# Patient Record
Sex: MALE | Race: WHITE | NOT HISPANIC OR LATINO | Employment: OTHER | URBAN - METROPOLITAN AREA
[De-identification: names, ages, dates, MRNs, and addresses within clinical notes are randomized per-mention and may not be internally consistent; named-entity substitution may affect disease eponyms.]

---

## 2017-01-01 ENCOUNTER — OFFICE VISIT (OUTPATIENT)
Dept: SURGERY | Facility: CLINIC | Age: 72
End: 2017-01-01
Payer: MEDICARE

## 2017-01-01 ENCOUNTER — TELEPHONE (OUTPATIENT)
Dept: SURGERY | Facility: CLINIC | Age: 72
End: 2017-01-01

## 2017-01-01 ENCOUNTER — ANESTHESIA EVENT (OUTPATIENT)
Dept: SURGERY | Facility: HOSPITAL | Age: 72
End: 2017-01-01
Payer: MEDICARE

## 2017-01-01 ENCOUNTER — SURGERY (OUTPATIENT)
Age: 72
End: 2017-01-01

## 2017-01-01 ENCOUNTER — OFFICE VISIT (OUTPATIENT)
Dept: PRIMARY CARE CLINIC | Facility: CLINIC | Age: 72
End: 2017-01-01
Payer: MEDICARE

## 2017-01-01 ENCOUNTER — OFFICE VISIT (OUTPATIENT)
Dept: CARDIOLOGY | Facility: CLINIC | Age: 72
End: 2017-01-01
Payer: MEDICARE

## 2017-01-01 ENCOUNTER — ANESTHESIA EVENT (OUTPATIENT)
Dept: SURGERY | Facility: HOSPITAL | Age: 72
DRG: 853 | End: 2017-01-01
Payer: MEDICARE

## 2017-01-01 ENCOUNTER — HOSPITAL ENCOUNTER (OUTPATIENT)
Facility: HOSPITAL | Age: 72
Discharge: HOME | End: 2017-11-13
Attending: EMERGENCY MEDICINE | Admitting: FAMILY MEDICINE
Payer: MEDICARE

## 2017-01-01 ENCOUNTER — HOSPITAL ENCOUNTER (OUTPATIENT)
Facility: HOSPITAL | Age: 72
Discharge: HOME | End: 2017-10-12
Attending: STUDENT IN AN ORGANIZED HEALTH CARE EDUCATION/TRAINING PROGRAM | Admitting: STUDENT IN AN ORGANIZED HEALTH CARE EDUCATION/TRAINING PROGRAM
Payer: MEDICARE

## 2017-01-01 ENCOUNTER — TELEPHONE (OUTPATIENT)
Dept: VASCULAR SURGERY | Facility: CLINIC | Age: 72
End: 2017-01-01

## 2017-01-01 ENCOUNTER — ANESTHESIA (OUTPATIENT)
Dept: SURGERY | Facility: HOSPITAL | Age: 72
End: 2017-01-01
Payer: MEDICARE

## 2017-01-01 ENCOUNTER — ANESTHESIA (OUTPATIENT)
Dept: SURGERY | Facility: HOSPITAL | Age: 72
DRG: 853 | End: 2017-01-01
Payer: MEDICARE

## 2017-01-01 ENCOUNTER — HOSPITAL ENCOUNTER (OUTPATIENT)
Dept: RADIOLOGY | Facility: HOSPITAL | Age: 72
Discharge: HOME | End: 2017-12-11
Attending: INTERNAL MEDICINE
Payer: MEDICARE

## 2017-01-01 ENCOUNTER — HOSPITAL ENCOUNTER (OUTPATIENT)
Facility: HOSPITAL | Age: 72
Discharge: HOME | End: 2017-11-29
Attending: SURGERY | Admitting: SURGERY
Payer: MEDICARE

## 2017-01-01 ENCOUNTER — LAB VISIT (OUTPATIENT)
Dept: LAB | Facility: HOSPITAL | Age: 72
End: 2017-01-01
Attending: INTERNAL MEDICINE
Payer: MEDICARE

## 2017-01-01 ENCOUNTER — HOSPITAL ENCOUNTER (INPATIENT)
Facility: HOSPITAL | Age: 72
LOS: 6 days | Discharge: HOME | DRG: 853 | End: 2017-11-07
Attending: EMERGENCY MEDICINE | Admitting: FAMILY MEDICINE
Payer: MEDICARE

## 2017-01-01 ENCOUNTER — TELEPHONE (OUTPATIENT)
Dept: PRIMARY CARE CLINIC | Facility: CLINIC | Age: 72
End: 2017-01-01

## 2017-01-01 ENCOUNTER — INITIAL CONSULT (OUTPATIENT)
Dept: VASCULAR SURGERY | Facility: CLINIC | Age: 72
End: 2017-01-01
Attending: SURGERY
Payer: MEDICARE

## 2017-01-01 ENCOUNTER — PATIENT OUTREACH (OUTPATIENT)
Dept: ADMINISTRATIVE | Facility: CLINIC | Age: 72
End: 2017-01-01

## 2017-01-01 ENCOUNTER — HOSPITAL ENCOUNTER (OUTPATIENT)
Dept: VASCULAR SURGERY | Facility: CLINIC | Age: 72
Discharge: HOME | End: 2017-10-31
Attending: SURGERY
Payer: MEDICARE

## 2017-01-01 VITALS
HEIGHT: 66 IN | OXYGEN SATURATION: 97 % | DIASTOLIC BLOOD PRESSURE: 66 MMHG | WEIGHT: 161.81 LBS | HEART RATE: 77 BPM | BODY MASS INDEX: 26.01 KG/M2 | RESPIRATION RATE: 18 BRPM | TEMPERATURE: 96 F | SYSTOLIC BLOOD PRESSURE: 140 MMHG

## 2017-01-01 VITALS
DIASTOLIC BLOOD PRESSURE: 74 MMHG | DIASTOLIC BLOOD PRESSURE: 51 MMHG | HEART RATE: 102 BPM | BODY MASS INDEX: 25.66 KG/M2 | BODY MASS INDEX: 25 KG/M2 | HEIGHT: 66 IN | WEIGHT: 154.88 LBS | SYSTOLIC BLOOD PRESSURE: 84 MMHG | TEMPERATURE: 98 F | WEIGHT: 159.63 LBS | HEART RATE: 93 BPM | TEMPERATURE: 97 F | SYSTOLIC BLOOD PRESSURE: 119 MMHG

## 2017-01-01 VITALS
OXYGEN SATURATION: 94 % | HEIGHT: 66 IN | TEMPERATURE: 98 F | SYSTOLIC BLOOD PRESSURE: 139 MMHG | RESPIRATION RATE: 20 BRPM | HEART RATE: 85 BPM | WEIGHT: 157 LBS | TEMPERATURE: 98 F | WEIGHT: 155.13 LBS | SYSTOLIC BLOOD PRESSURE: 128 MMHG | DIASTOLIC BLOOD PRESSURE: 72 MMHG | BODY MASS INDEX: 25.23 KG/M2 | HEIGHT: 66 IN | DIASTOLIC BLOOD PRESSURE: 83 MMHG | BODY MASS INDEX: 24.93 KG/M2 | HEART RATE: 90 BPM

## 2017-01-01 VITALS
SYSTOLIC BLOOD PRESSURE: 160 MMHG | WEIGHT: 153.25 LBS | TEMPERATURE: 98 F | HEART RATE: 69 BPM | HEART RATE: 77 BPM | BODY MASS INDEX: 24.73 KG/M2 | DIASTOLIC BLOOD PRESSURE: 55 MMHG | WEIGHT: 155 LBS | TEMPERATURE: 98 F | DIASTOLIC BLOOD PRESSURE: 76 MMHG | BODY MASS INDEX: 24.91 KG/M2 | BODY MASS INDEX: 25.74 KG/M2 | HEIGHT: 66 IN | HEART RATE: 66 BPM | WEIGHT: 159.5 LBS | SYSTOLIC BLOOD PRESSURE: 111 MMHG | SYSTOLIC BLOOD PRESSURE: 158 MMHG | TEMPERATURE: 99 F | DIASTOLIC BLOOD PRESSURE: 75 MMHG

## 2017-01-01 VITALS
HEIGHT: 66 IN | HEART RATE: 87 BPM | BODY MASS INDEX: 24.91 KG/M2 | TEMPERATURE: 99 F | SYSTOLIC BLOOD PRESSURE: 164 MMHG | DIASTOLIC BLOOD PRESSURE: 89 MMHG | WEIGHT: 155 LBS

## 2017-01-01 VITALS
DIASTOLIC BLOOD PRESSURE: 70 MMHG | WEIGHT: 161.5 LBS | HEART RATE: 82 BPM | TEMPERATURE: 98 F | BODY MASS INDEX: 26.06 KG/M2 | SYSTOLIC BLOOD PRESSURE: 170 MMHG

## 2017-01-01 VITALS
BODY MASS INDEX: 24.1 KG/M2 | HEIGHT: 66 IN | TEMPERATURE: 98 F | OXYGEN SATURATION: 99 % | DIASTOLIC BLOOD PRESSURE: 92 MMHG | HEART RATE: 87 BPM | RESPIRATION RATE: 18 BRPM | WEIGHT: 149.94 LBS | SYSTOLIC BLOOD PRESSURE: 157 MMHG

## 2017-01-01 VITALS
RESPIRATION RATE: 16 BRPM | WEIGHT: 157.69 LBS | DIASTOLIC BLOOD PRESSURE: 60 MMHG | WEIGHT: 154 LBS | HEART RATE: 75 BPM | HEIGHT: 66 IN | HEIGHT: 66 IN | OXYGEN SATURATION: 95 % | BODY MASS INDEX: 24.75 KG/M2 | DIASTOLIC BLOOD PRESSURE: 71 MMHG | TEMPERATURE: 98 F | OXYGEN SATURATION: 98 % | SYSTOLIC BLOOD PRESSURE: 139 MMHG | BODY MASS INDEX: 25.34 KG/M2 | SYSTOLIC BLOOD PRESSURE: 123 MMHG | HEART RATE: 64 BPM

## 2017-01-01 DIAGNOSIS — T82.7XXA: Primary | ICD-10-CM

## 2017-01-01 DIAGNOSIS — N18.6 ESRD ON PERITONEAL DIALYSIS: Primary | ICD-10-CM

## 2017-01-01 DIAGNOSIS — Z99.2 ESRD (END STAGE RENAL DISEASE) ON DIALYSIS: Primary | ICD-10-CM

## 2017-01-01 DIAGNOSIS — Z99.2 ESRD ON DIALYSIS: Primary | ICD-10-CM

## 2017-01-01 DIAGNOSIS — M79.89 SWELLING OF RIGHT LOWER EXTREMITY: Primary | ICD-10-CM

## 2017-01-01 DIAGNOSIS — R52 PAIN: ICD-10-CM

## 2017-01-01 DIAGNOSIS — R06.02 SOB (SHORTNESS OF BREATH): ICD-10-CM

## 2017-01-01 DIAGNOSIS — N18.6 ESRD (END STAGE RENAL DISEASE): Primary | ICD-10-CM

## 2017-01-01 DIAGNOSIS — A41.9 SEPSIS, DUE TO UNSPECIFIED ORGANISM: ICD-10-CM

## 2017-01-01 DIAGNOSIS — Z95.1 HX OF CABG: ICD-10-CM

## 2017-01-01 DIAGNOSIS — N18.6 ESRD ON HEMODIALYSIS: Primary | ICD-10-CM

## 2017-01-01 DIAGNOSIS — I50.32 CHRONIC DIASTOLIC HEART FAILURE: ICD-10-CM

## 2017-01-01 DIAGNOSIS — N18.6 ESRD ON DIALYSIS: ICD-10-CM

## 2017-01-01 DIAGNOSIS — I25.10 CAD (CORONARY ARTERY DISEASE): ICD-10-CM

## 2017-01-01 DIAGNOSIS — Z99.2 ESRD ON PERITONEAL DIALYSIS: Primary | ICD-10-CM

## 2017-01-01 DIAGNOSIS — I95.2 HYPOTENSION DUE TO DRUGS: ICD-10-CM

## 2017-01-01 DIAGNOSIS — Z99.2 ESRD ON HEMODIALYSIS: Primary | ICD-10-CM

## 2017-01-01 DIAGNOSIS — R11.0 NAUSEA: ICD-10-CM

## 2017-01-01 DIAGNOSIS — N18.6 ESRD (END STAGE RENAL DISEASE) ON DIALYSIS: Primary | ICD-10-CM

## 2017-01-01 DIAGNOSIS — Z99.2 ESRD (END STAGE RENAL DISEASE) ON DIALYSIS: ICD-10-CM

## 2017-01-01 DIAGNOSIS — I25.10 CORONARY ARTERY DISEASE INVOLVING NATIVE CORONARY ARTERY OF NATIVE HEART WITHOUT ANGINA PECTORIS: ICD-10-CM

## 2017-01-01 DIAGNOSIS — Z86.79 H/O CHF: ICD-10-CM

## 2017-01-01 DIAGNOSIS — I21.4 NSTEMI (NON-ST ELEVATED MYOCARDIAL INFARCTION): ICD-10-CM

## 2017-01-01 DIAGNOSIS — N18.6 ESRD ON DIALYSIS: Primary | ICD-10-CM

## 2017-01-01 DIAGNOSIS — Z99.2 ESRD ON DIALYSIS: ICD-10-CM

## 2017-01-01 DIAGNOSIS — A41.9 SEPSIS: ICD-10-CM

## 2017-01-01 DIAGNOSIS — I27.20 PULMONARY HYPERTENSION: Primary | ICD-10-CM

## 2017-01-01 DIAGNOSIS — R78.81 BACTEREMIA ASSOCIATED WITH INTRAVASCULAR LINE, SEQUELA: Primary | ICD-10-CM

## 2017-01-01 DIAGNOSIS — T82.7XXS BACTEREMIA ASSOCIATED WITH INTRAVASCULAR LINE, SEQUELA: Primary | ICD-10-CM

## 2017-01-01 DIAGNOSIS — R78.81 BACTEREMIA: ICD-10-CM

## 2017-01-01 DIAGNOSIS — I77.0 AVF (ARTERIOVENOUS FISTULA): Primary | ICD-10-CM

## 2017-01-01 DIAGNOSIS — I10 ESSENTIAL HYPERTENSION: ICD-10-CM

## 2017-01-01 DIAGNOSIS — N18.6 ESRD (END STAGE RENAL DISEASE) ON DIALYSIS: ICD-10-CM

## 2017-01-01 DIAGNOSIS — I77.0 AVF (ARTERIOVENOUS FISTULA): ICD-10-CM

## 2017-01-01 DIAGNOSIS — N18.4 TYPE 2 DIABETES MELLITUS WITH STAGE 4 CHRONIC KIDNEY DISEASE, WITHOUT LONG-TERM CURRENT USE OF INSULIN: ICD-10-CM

## 2017-01-01 DIAGNOSIS — F41.9 ANXIETY: ICD-10-CM

## 2017-01-01 DIAGNOSIS — M79.89 SWELLING OF RIGHT LOWER EXTREMITY: ICD-10-CM

## 2017-01-01 DIAGNOSIS — N18.6 ESRD (END STAGE RENAL DISEASE): ICD-10-CM

## 2017-01-01 DIAGNOSIS — Z01.818 PREOP EXAMINATION: Primary | ICD-10-CM

## 2017-01-01 DIAGNOSIS — E11.22 TYPE 2 DIABETES MELLITUS WITH STAGE 4 CHRONIC KIDNEY DISEASE, WITHOUT LONG-TERM CURRENT USE OF INSULIN: ICD-10-CM

## 2017-01-01 DIAGNOSIS — I25.2 OLD MI (MYOCARDIAL INFARCTION): ICD-10-CM

## 2017-01-01 DIAGNOSIS — Z01.818 PREOP EXAMINATION: ICD-10-CM

## 2017-01-01 DIAGNOSIS — E78.2 MIXED HYPERLIPIDEMIA: ICD-10-CM

## 2017-01-01 LAB
ALBUMIN SERPL BCP-MCNC: 1.9 G/DL
ALBUMIN SERPL BCP-MCNC: 2.1 G/DL
ALBUMIN SERPL BCP-MCNC: 2.1 G/DL
ALBUMIN SERPL BCP-MCNC: 2.2 G/DL
ALBUMIN SERPL BCP-MCNC: 2.2 G/DL
ALBUMIN SERPL BCP-MCNC: 2.4 G/DL
ALBUMIN SERPL BCP-MCNC: 2.7 G/DL
ALBUMIN SERPL BCP-MCNC: 2.8 G/DL
ALLENS TEST: ABNORMAL
ALP SERPL-CCNC: 118 U/L
ALP SERPL-CCNC: 125 U/L
ALP SERPL-CCNC: 133 U/L
ALP SERPL-CCNC: 145 U/L
ALP SERPL-CCNC: 67 U/L
ALP SERPL-CCNC: 79 U/L
ALP SERPL-CCNC: 93 U/L
ALP SERPL-CCNC: 93 U/L
ALP SERPL-CCNC: 96 U/L
ALP SERPL-CCNC: 98 U/L
ALT SERPL W/O P-5'-P-CCNC: 10 U/L
ALT SERPL W/O P-5'-P-CCNC: 11 U/L
ALT SERPL W/O P-5'-P-CCNC: 12 U/L
ALT SERPL W/O P-5'-P-CCNC: 12 U/L
ALT SERPL W/O P-5'-P-CCNC: 13 U/L
ALT SERPL W/O P-5'-P-CCNC: 14 U/L
ALT SERPL W/O P-5'-P-CCNC: 7 U/L
ALT SERPL W/O P-5'-P-CCNC: 7 U/L
ALT SERPL W/O P-5'-P-CCNC: 8 U/L
ALT SERPL W/O P-5'-P-CCNC: 9 U/L
ANION GAP SERPL CALC-SCNC: 10 MMOL/L
ANION GAP SERPL CALC-SCNC: 11 MMOL/L
ANION GAP SERPL CALC-SCNC: 12 MMOL/L
ANION GAP SERPL CALC-SCNC: 17 MMOL/L
ANION GAP SERPL CALC-SCNC: 8 MMOL/L
ANION GAP SERPL CALC-SCNC: 8 MMOL/L
ANION GAP SERPL CALC-SCNC: 9 MMOL/L
ANISOCYTOSIS BLD QL SMEAR: SLIGHT
AORTIC VALVE REGURGITATION: NORMAL
APTT BLDCRRT: 25.8 SEC
AST SERPL-CCNC: 13 U/L
AST SERPL-CCNC: 15 U/L
AST SERPL-CCNC: 17 U/L
AST SERPL-CCNC: 20 U/L
AST SERPL-CCNC: 20 U/L
AST SERPL-CCNC: 26 U/L
AST SERPL-CCNC: 34 U/L
AST SERPL-CCNC: 40 U/L
AST SERPL-CCNC: 48 U/L
AST SERPL-CCNC: 50 U/L
BACTERIA #/AREA URNS HPF: ABNORMAL /HPF
BACTERIA BLD CULT: NORMAL
BACTERIA CATH TIP CULT: NO GROWTH
BACTERIA CATH TIP CULT: NO GROWTH
BACTERIA SPEC AEROBE CULT: NO GROWTH
BACTERIA SPEC AEROBE CULT: NO GROWTH
BACTERIA SPEC ANAEROBE CULT: NORMAL
BACTERIA SPEC ANAEROBE CULT: NORMAL
BACTERIA UR CULT: NO GROWTH
BASOPHILS # BLD AUTO: 0.01 K/UL
BASOPHILS # BLD AUTO: 0.01 K/UL
BASOPHILS # BLD AUTO: 0.02 K/UL
BASOPHILS # BLD AUTO: 0.03 K/UL
BASOPHILS # BLD AUTO: 0.03 K/UL
BASOPHILS # BLD AUTO: 0.04 K/UL
BASOPHILS # BLD AUTO: 0.04 K/UL
BASOPHILS # BLD AUTO: 0.07 K/UL
BASOPHILS # BLD AUTO: ABNORMAL K/UL
BASOPHILS # BLD AUTO: ABNORMAL K/UL
BASOPHILS NFR BLD: 0 %
BASOPHILS NFR BLD: 0 %
BASOPHILS NFR BLD: 0.1 %
BASOPHILS NFR BLD: 0.1 %
BASOPHILS NFR BLD: 0.2 %
BASOPHILS NFR BLD: 0.4 %
BASOPHILS NFR BLD: 0.6 %
BASOPHILS NFR BLD: 0.6 %
BASOPHILS NFR BLD: 0.7 %
BASOPHILS NFR BLD: 0.9 %
BILIRUB SERPL-MCNC: 0.4 MG/DL
BILIRUB SERPL-MCNC: 0.5 MG/DL
BILIRUB SERPL-MCNC: 0.5 MG/DL
BILIRUB SERPL-MCNC: 0.6 MG/DL
BILIRUB SERPL-MCNC: 0.8 MG/DL
BILIRUB SERPL-MCNC: 1 MG/DL
BILIRUB SERPL-MCNC: 1.1 MG/DL
BILIRUB SERPL-MCNC: 1.2 MG/DL
BILIRUB UR QL STRIP: NEGATIVE
BILIRUB UR QL STRIP: NEGATIVE
BNP SERPL-MCNC: 4102 PG/ML
BNP SERPL-MCNC: 4102 PG/ML
BUN SERPL-MCNC: 109 MG/DL
BUN SERPL-MCNC: 22 MG/DL
BUN SERPL-MCNC: 23 MG/DL
BUN SERPL-MCNC: 23 MG/DL
BUN SERPL-MCNC: 27 MG/DL
BUN SERPL-MCNC: 29 MG/DL
BUN SERPL-MCNC: 30 MG/DL
BUN SERPL-MCNC: 31 MG/DL
BUN SERPL-MCNC: 32 MG/DL
BUN SERPL-MCNC: 38 MG/DL
BUN SERPL-MCNC: 38 MG/DL
BUN SERPL-MCNC: 41 MG/DL
BUN SERPL-MCNC: 53 MG/DL
CALCIUM SERPL-MCNC: 7.6 MG/DL
CALCIUM SERPL-MCNC: 7.8 MG/DL
CALCIUM SERPL-MCNC: 8.1 MG/DL
CALCIUM SERPL-MCNC: 8.1 MG/DL
CALCIUM SERPL-MCNC: 8.2 MG/DL
CALCIUM SERPL-MCNC: 8.2 MG/DL
CALCIUM SERPL-MCNC: 8.3 MG/DL
CALCIUM SERPL-MCNC: 8.4 MG/DL
CALCIUM SERPL-MCNC: 8.5 MG/DL
CALCIUM SERPL-MCNC: 8.5 MG/DL
CALCIUM SERPL-MCNC: 8.7 MG/DL
CALCIUM SERPL-MCNC: 9.4 MG/DL
CALCIUM SERPL-MCNC: 9.5 MG/DL
CHLORIDE SERPL-SCNC: 101 MMOL/L
CHLORIDE SERPL-SCNC: 102 MMOL/L
CHLORIDE SERPL-SCNC: 102 MMOL/L
CHLORIDE SERPL-SCNC: 105 MMOL/L
CHLORIDE SERPL-SCNC: 105 MMOL/L
CHLORIDE SERPL-SCNC: 106 MMOL/L
CHLORIDE SERPL-SCNC: 107 MMOL/L
CHLORIDE SERPL-SCNC: 93 MMOL/L
CHLORIDE SERPL-SCNC: 94 MMOL/L
CHLORIDE SERPL-SCNC: 98 MMOL/L
CHLORIDE SERPL-SCNC: 99 MMOL/L
CHOLEST SERPL-MCNC: 88 MG/DL
CHOLEST/HDLC SERPL: 2.9 {RATIO}
CLARITY UR: CLEAR
CLARITY UR: CLEAR
CO2 SERPL-SCNC: 20 MMOL/L
CO2 SERPL-SCNC: 21 MMOL/L
CO2 SERPL-SCNC: 21 MMOL/L
CO2 SERPL-SCNC: 22 MMOL/L
CO2 SERPL-SCNC: 23 MMOL/L
CO2 SERPL-SCNC: 24 MMOL/L
CO2 SERPL-SCNC: 25 MMOL/L
CO2 SERPL-SCNC: 26 MMOL/L
CO2 SERPL-SCNC: 26 MMOL/L
CO2 SERPL-SCNC: 27 MMOL/L
CO2 SERPL-SCNC: 32 MMOL/L
COLOR UR: YELLOW
COLOR UR: YELLOW
CORTIS SERPL-MCNC: 41 UG/DL
CREAT SERPL-MCNC: 3 MG/DL
CREAT SERPL-MCNC: 3.3 MG/DL
CREAT SERPL-MCNC: 3.3 MG/DL
CREAT SERPL-MCNC: 3.4 MG/DL
CREAT SERPL-MCNC: 3.6 MG/DL
CREAT SERPL-MCNC: 3.6 MG/DL
CREAT SERPL-MCNC: 4 MG/DL
CREAT SERPL-MCNC: 4.1 MG/DL
CREAT SERPL-MCNC: 4.4 MG/DL
CREAT SERPL-MCNC: 4.5 MG/DL
CREAT SERPL-MCNC: 4.5 MG/DL
CREAT SERPL-MCNC: 4.8 MG/DL
CREAT SERPL-MCNC: 5.1 MG/DL
DIFFERENTIAL METHOD: ABNORMAL
EOSINOPHIL # BLD AUTO: 0 K/UL
EOSINOPHIL # BLD AUTO: 0 K/UL
EOSINOPHIL # BLD AUTO: 0.2 K/UL
EOSINOPHIL # BLD AUTO: 0.3 K/UL
EOSINOPHIL # BLD AUTO: ABNORMAL K/UL
EOSINOPHIL # BLD AUTO: ABNORMAL K/UL
EOSINOPHIL NFR BLD: 0 %
EOSINOPHIL NFR BLD: 0 %
EOSINOPHIL NFR BLD: 1.8 %
EOSINOPHIL NFR BLD: 2 %
EOSINOPHIL NFR BLD: 2.6 %
EOSINOPHIL NFR BLD: 3.7 %
EOSINOPHIL NFR BLD: 4 %
EOSINOPHIL NFR BLD: 4.8 %
EOSINOPHIL NFR BLD: 4.9 %
EOSINOPHIL NFR BLD: 7 %
ERYTHROCYTE [DISTWIDTH] IN BLOOD BY AUTOMATED COUNT: 13 %
ERYTHROCYTE [DISTWIDTH] IN BLOOD BY AUTOMATED COUNT: 13 %
ERYTHROCYTE [DISTWIDTH] IN BLOOD BY AUTOMATED COUNT: 13.1 %
ERYTHROCYTE [DISTWIDTH] IN BLOOD BY AUTOMATED COUNT: 13.1 %
ERYTHROCYTE [DISTWIDTH] IN BLOOD BY AUTOMATED COUNT: 13.2 %
ERYTHROCYTE [DISTWIDTH] IN BLOOD BY AUTOMATED COUNT: 14 %
ERYTHROCYTE [DISTWIDTH] IN BLOOD BY AUTOMATED COUNT: 14 %
ERYTHROCYTE [DISTWIDTH] IN BLOOD BY AUTOMATED COUNT: 14.1 %
EST. GFR  (AFRICAN AMERICAN): 12 ML/MIN/1.73 M^2
EST. GFR  (AFRICAN AMERICAN): 13 ML/MIN/1.73 M^2
EST. GFR  (AFRICAN AMERICAN): 14 ML/MIN/1.73 M^2
EST. GFR  (AFRICAN AMERICAN): 15 ML/MIN/1.73 M^2
EST. GFR  (AFRICAN AMERICAN): 16 ML/MIN/1.73 M^2
EST. GFR  (AFRICAN AMERICAN): 18 ML/MIN/1.73 M^2
EST. GFR  (AFRICAN AMERICAN): 18 ML/MIN/1.73 M^2
EST. GFR  (AFRICAN AMERICAN): 19 ML/MIN/1.73 M^2
EST. GFR  (AFRICAN AMERICAN): 20 ML/MIN/1.73 M^2
EST. GFR  (AFRICAN AMERICAN): 20 ML/MIN/1.73 M^2
EST. GFR  (AFRICAN AMERICAN): 23 ML/MIN/1.73 M^2
EST. GFR  (NON AFRICAN AMERICAN): 10 ML/MIN/1.73 M^2
EST. GFR  (NON AFRICAN AMERICAN): 11 ML/MIN/1.73 M^2
EST. GFR  (NON AFRICAN AMERICAN): 12 ML/MIN/1.73 M^2
EST. GFR  (NON AFRICAN AMERICAN): 13 ML/MIN/1.73 M^2
EST. GFR  (NON AFRICAN AMERICAN): 14 ML/MIN/1.73 M^2
EST. GFR  (NON AFRICAN AMERICAN): 16 ML/MIN/1.73 M^2
EST. GFR  (NON AFRICAN AMERICAN): 16 ML/MIN/1.73 M^2
EST. GFR  (NON AFRICAN AMERICAN): 17 ML/MIN/1.73 M^2
EST. GFR  (NON AFRICAN AMERICAN): 20 ML/MIN/1.73 M^2
ESTIMATED PA SYSTOLIC PRESSURE: 26.83
FLUAV AG SPEC QL IA: NEGATIVE
FLUAV AG SPEC QL IA: NEGATIVE
FLUBV AG SPEC QL IA: NEGATIVE
FLUBV AG SPEC QL IA: NEGATIVE
FUNGUS SPEC CULT: NORMAL
GIANT PLATELETS BLD QL SMEAR: ABNORMAL
GLOBAL PERICARDIAL EFFUSION: NORMAL
GLUCOSE SERPL-MCNC: 100 MG/DL
GLUCOSE SERPL-MCNC: 100 MG/DL
GLUCOSE SERPL-MCNC: 111 MG/DL
GLUCOSE SERPL-MCNC: 117 MG/DL
GLUCOSE SERPL-MCNC: 139 MG/DL
GLUCOSE SERPL-MCNC: 77 MG/DL
GLUCOSE SERPL-MCNC: 87 MG/DL
GLUCOSE SERPL-MCNC: 88 MG/DL
GLUCOSE SERPL-MCNC: 93 MG/DL
GLUCOSE SERPL-MCNC: 96 MG/DL
GLUCOSE SERPL-MCNC: 97 MG/DL
GLUCOSE UR QL STRIP: NEGATIVE
GLUCOSE UR QL STRIP: NEGATIVE
GRAM STN SPEC: NORMAL
HBV SURFACE AB SER-ACNC: NEGATIVE M[IU]/ML
HBV SURFACE AG SERPL QL IA: NEGATIVE
HCO3 UR-SCNC: 28 MMOL/L (ref 24–28)
HCT VFR BLD AUTO: 24.7 %
HCT VFR BLD AUTO: 25.3 %
HCT VFR BLD AUTO: 26.3 %
HCT VFR BLD AUTO: 26.8 %
HCT VFR BLD AUTO: 26.9 %
HCT VFR BLD AUTO: 27.3 %
HCT VFR BLD AUTO: 27.8 %
HCT VFR BLD AUTO: 30.3 %
HCT VFR BLD AUTO: 31 %
HCT VFR BLD AUTO: 31.3 %
HDLC SERPL-MCNC: 30 MG/DL
HDLC SERPL: 34.1 %
HGB BLD-MCNC: 10.2 G/DL
HGB BLD-MCNC: 8.2 G/DL
HGB BLD-MCNC: 8.5 G/DL
HGB BLD-MCNC: 8.6 G/DL
HGB BLD-MCNC: 8.6 G/DL
HGB BLD-MCNC: 8.7 G/DL
HGB BLD-MCNC: 8.9 G/DL
HGB BLD-MCNC: 9.8 G/DL
HGB BLD-MCNC: 9.9 G/DL
HGB UR QL STRIP: ABNORMAL
HGB UR QL STRIP: ABNORMAL
HYALINE CASTS #/AREA URNS LPF: 0 /LPF
HYPOCHROMIA BLD QL SMEAR: ABNORMAL
INR PPP: 1.1
INR PPP: 1.2
KETONES UR QL STRIP: NEGATIVE
KETONES UR QL STRIP: NEGATIVE
LACTATE SERPL-SCNC: 0.7 MMOL/L
LACTATE SERPL-SCNC: 1.7 MMOL/L
LACTATE SERPL-SCNC: 3.7 MMOL/L
LDLC SERPL CALC-MCNC: 41.4 MG/DL
LEUKOCYTE ESTERASE UR QL STRIP: NEGATIVE
LEUKOCYTE ESTERASE UR QL STRIP: NEGATIVE
LIPASE SERPL-CCNC: 36 U/L
LYMPHOCYTES # BLD AUTO: 0.2 K/UL
LYMPHOCYTES # BLD AUTO: 0.4 K/UL
LYMPHOCYTES # BLD AUTO: 0.8 K/UL
LYMPHOCYTES # BLD AUTO: 1.2 K/UL
LYMPHOCYTES # BLD AUTO: 1.3 K/UL
LYMPHOCYTES # BLD AUTO: 1.7 K/UL
LYMPHOCYTES # BLD AUTO: 1.8 K/UL
LYMPHOCYTES # BLD AUTO: 2.3 K/UL
LYMPHOCYTES # BLD AUTO: ABNORMAL K/UL
LYMPHOCYTES # BLD AUTO: ABNORMAL K/UL
LYMPHOCYTES NFR BLD: 15.6 %
LYMPHOCYTES NFR BLD: 17 %
LYMPHOCYTES NFR BLD: 18.5 %
LYMPHOCYTES NFR BLD: 19.4 %
LYMPHOCYTES NFR BLD: 2 %
LYMPHOCYTES NFR BLD: 20 %
LYMPHOCYTES NFR BLD: 20.4 %
LYMPHOCYTES NFR BLD: 22.2 %
LYMPHOCYTES NFR BLD: 30.3 %
LYMPHOCYTES NFR BLD: 5 %
MAGNESIUM SERPL-MCNC: 1.1 MG/DL
MAGNESIUM SERPL-MCNC: 1.2 MG/DL
MAGNESIUM SERPL-MCNC: 1.4 MG/DL
MAGNESIUM SERPL-MCNC: 1.6 MG/DL
MAGNESIUM SERPL-MCNC: 1.7 MG/DL
MAGNESIUM SERPL-MCNC: 1.8 MG/DL
MAGNESIUM SERPL-MCNC: 1.9 MG/DL
MCH RBC QN AUTO: 29.1 PG
MCH RBC QN AUTO: 29.2 PG
MCH RBC QN AUTO: 29.3 PG
MCH RBC QN AUTO: 29.4 PG
MCH RBC QN AUTO: 29.5 PG
MCH RBC QN AUTO: 29.6 PG
MCH RBC QN AUTO: 29.8 PG
MCH RBC QN AUTO: 29.9 PG
MCH RBC QN AUTO: 29.9 PG
MCH RBC QN AUTO: 31.6 PG
MCHC RBC AUTO-ENTMCNC: 31.3 G/DL
MCHC RBC AUTO-ENTMCNC: 31.6 G/DL
MCHC RBC AUTO-ENTMCNC: 32.1 G/DL
MCHC RBC AUTO-ENTMCNC: 32.4 G/DL
MCHC RBC AUTO-ENTMCNC: 32.6 G/DL
MCHC RBC AUTO-ENTMCNC: 32.6 G/DL
MCHC RBC AUTO-ENTMCNC: 32.7 G/DL
MCHC RBC AUTO-ENTMCNC: 32.7 G/DL
MCHC RBC AUTO-ENTMCNC: 33.1 G/DL
MCHC RBC AUTO-ENTMCNC: 34.4 G/DL
MCV RBC AUTO: 89 FL
MCV RBC AUTO: 90 FL
MCV RBC AUTO: 90 FL
MCV RBC AUTO: 91 FL
MCV RBC AUTO: 91 FL
MCV RBC AUTO: 92 FL
MCV RBC AUTO: 93 FL
MCV RBC AUTO: 93 FL
METAMYELOCYTES NFR BLD MANUAL: 5 %
METAMYELOCYTES NFR BLD MANUAL: 5 %
MICROSCOPIC COMMENT: ABNORMAL
MONOCYTES # BLD AUTO: 0 K/UL
MONOCYTES # BLD AUTO: 0.3 K/UL
MONOCYTES # BLD AUTO: 0.6 K/UL
MONOCYTES # BLD AUTO: 0.7 K/UL
MONOCYTES # BLD AUTO: 0.9 K/UL
MONOCYTES # BLD AUTO: 0.9 K/UL
MONOCYTES # BLD AUTO: 1 K/UL
MONOCYTES # BLD AUTO: 1.1 K/UL
MONOCYTES # BLD AUTO: ABNORMAL K/UL
MONOCYTES # BLD AUTO: ABNORMAL K/UL
MONOCYTES NFR BLD: 0.5 %
MONOCYTES NFR BLD: 12.3 %
MONOCYTES NFR BLD: 13.2 %
MONOCYTES NFR BLD: 14.1 %
MONOCYTES NFR BLD: 15.8 %
MONOCYTES NFR BLD: 4 %
MONOCYTES NFR BLD: 7 %
MONOCYTES NFR BLD: 9 %
MONOCYTES NFR BLD: 9.7 %
MONOCYTES NFR BLD: 9.7 %
MYELOCYTES NFR BLD MANUAL: 2 %
MYELOCYTES NFR BLD MANUAL: 4 %
NEUTROPHILS # BLD AUTO: 3.3 K/UL
NEUTROPHILS # BLD AUTO: 3.7 K/UL
NEUTROPHILS # BLD AUTO: 3.9 K/UL
NEUTROPHILS # BLD AUTO: 4.2 K/UL
NEUTROPHILS # BLD AUTO: 4.6 K/UL
NEUTROPHILS # BLD AUTO: 5.8 K/UL
NEUTROPHILS # BLD AUTO: 6.8 K/UL
NEUTROPHILS # BLD AUTO: 8.3 K/UL
NEUTROPHILS NFR BLD: 54 %
NEUTROPHILS NFR BLD: 55.8 %
NEUTROPHILS NFR BLD: 57 %
NEUTROPHILS NFR BLD: 59.5 %
NEUTROPHILS NFR BLD: 60.2 %
NEUTROPHILS NFR BLD: 60.3 %
NEUTROPHILS NFR BLD: 66.5 %
NEUTROPHILS NFR BLD: 67.1 %
NEUTROPHILS NFR BLD: 90.5 %
NEUTROPHILS NFR BLD: 96.6 %
NEUTS BAND NFR BLD MANUAL: 3 %
NEUTS BAND NFR BLD MANUAL: 8 %
NITRITE UR QL STRIP: NEGATIVE
NITRITE UR QL STRIP: NEGATIVE
NONHDLC SERPL-MCNC: 58 MG/DL
PCO2 BLDA: 40 MMHG (ref 35–45)
PH SMN: 7.45 [PH] (ref 7.35–7.45)
PH UR STRIP: 6 [PH] (ref 5–8)
PH UR STRIP: 6 [PH] (ref 5–8)
PHOSPHATE SERPL-MCNC: 2.1 MG/DL
PHOSPHATE SERPL-MCNC: 2.1 MG/DL
PHOSPHATE SERPL-MCNC: 3.1 MG/DL
PHOSPHATE SERPL-MCNC: 3.2 MG/DL
PHOSPHATE SERPL-MCNC: 3.2 MG/DL
PHOSPHATE SERPL-MCNC: 3.7 MG/DL
PHOSPHATE SERPL-MCNC: 4.2 MG/DL
PHOSPHATE SERPL-MCNC: 4.5 MG/DL
PHOSPHATE SERPL-MCNC: 4.8 MG/DL
PLATELET # BLD AUTO: 137 K/UL
PLATELET # BLD AUTO: 166 K/UL
PLATELET # BLD AUTO: 175 K/UL
PLATELET # BLD AUTO: 188 K/UL
PLATELET # BLD AUTO: 196 K/UL
PLATELET # BLD AUTO: 220 K/UL
PLATELET # BLD AUTO: 229 K/UL
PLATELET # BLD AUTO: 234 K/UL
PLATELET # BLD AUTO: 257 K/UL
PLATELET # BLD AUTO: 262 K/UL
PLATELET BLD QL SMEAR: ABNORMAL
PMV BLD AUTO: 10.2 FL
PMV BLD AUTO: 10.3 FL
PMV BLD AUTO: 9 FL
PMV BLD AUTO: 9.2 FL
PMV BLD AUTO: 9.3 FL
PMV BLD AUTO: 9.4 FL
PMV BLD AUTO: 9.5 FL
PMV BLD AUTO: 9.6 FL
PMV BLD AUTO: 9.6 FL
PMV BLD AUTO: 9.8 FL
PO2 BLDA: 69 MMHG (ref 80–100)
POC BE: 4 MMOL/L
POC SATURATED O2: 94 % (ref 95–100)
POC TCO2: 29 MMOL/L (ref 23–27)
POCT GLUCOSE: 108 MG/DL (ref 70–110)
POCT GLUCOSE: 108 MG/DL (ref 70–110)
POCT GLUCOSE: 116 MG/DL (ref 70–110)
POCT GLUCOSE: 116 MG/DL (ref 70–110)
POCT GLUCOSE: 119 MG/DL (ref 70–110)
POCT GLUCOSE: 134 MG/DL (ref 70–110)
POCT GLUCOSE: 144 MG/DL (ref 70–110)
POCT GLUCOSE: 146 MG/DL (ref 70–110)
POCT GLUCOSE: 153 MG/DL (ref 70–110)
POCT GLUCOSE: 160 MG/DL (ref 70–110)
POCT GLUCOSE: 189 MG/DL (ref 70–110)
POCT GLUCOSE: 198 MG/DL (ref 70–110)
POCT GLUCOSE: 68 MG/DL (ref 70–110)
POCT GLUCOSE: 85 MG/DL (ref 70–110)
POCT GLUCOSE: 86 MG/DL (ref 70–110)
POCT GLUCOSE: 92 MG/DL (ref 70–110)
POCT GLUCOSE: 95 MG/DL (ref 70–110)
POCT GLUCOSE: 98 MG/DL (ref 70–110)
POIKILOCYTOSIS BLD QL SMEAR: SLIGHT
POIKILOCYTOSIS BLD QL SMEAR: SLIGHT
POLYCHROMASIA BLD QL SMEAR: ABNORMAL
POLYCHROMASIA BLD QL SMEAR: ABNORMAL
POTASSIUM SERPL-SCNC: 3.4 MMOL/L
POTASSIUM SERPL-SCNC: 3.4 MMOL/L
POTASSIUM SERPL-SCNC: 3.5 MMOL/L
POTASSIUM SERPL-SCNC: 3.6 MMOL/L
POTASSIUM SERPL-SCNC: 3.7 MMOL/L
POTASSIUM SERPL-SCNC: 3.7 MMOL/L
POTASSIUM SERPL-SCNC: 3.8 MMOL/L
POTASSIUM SERPL-SCNC: 3.9 MMOL/L
POTASSIUM SERPL-SCNC: 4.5 MMOL/L
PROCALCITONIN SERPL IA-MCNC: 14.82 NG/ML
PROCALCITONIN SERPL IA-MCNC: 28.94 NG/ML
PROT SERPL-MCNC: 5 G/DL
PROT SERPL-MCNC: 5.1 G/DL
PROT SERPL-MCNC: 5.3 G/DL
PROT SERPL-MCNC: 5.3 G/DL
PROT SERPL-MCNC: 5.4 G/DL
PROT SERPL-MCNC: 5.5 G/DL
PROT SERPL-MCNC: 5.7 G/DL
PROT SERPL-MCNC: 6 G/DL
PROT SERPL-MCNC: 6.1 G/DL
PROT SERPL-MCNC: 6.4 G/DL
PROT UR QL STRIP: ABNORMAL
PROT UR QL STRIP: ABNORMAL
PROTHROMBIN TIME: 11.8 SEC
PROTHROMBIN TIME: 13.1 SEC
PTH-INTACT SERPL-MCNC: 140.7 PG/ML
RBC # BLD AUTO: 2.69 M/UL
RBC # BLD AUTO: 2.78 M/UL
RBC # BLD AUTO: 2.89 M/UL
RBC # BLD AUTO: 2.91 M/UL
RBC # BLD AUTO: 2.98 M/UL
RBC # BLD AUTO: 2.98 M/UL
RBC # BLD AUTO: 3.06 M/UL
RBC # BLD AUTO: 3.35 M/UL
RBC # BLD AUTO: 3.37 M/UL
RBC # BLD AUTO: 3.41 M/UL
RBC #/AREA URNS HPF: 0 /HPF (ref 0–4)
RETIRED EF AND QEF - SEE NOTES: 50 (ref 55–65)
SAMPLE: ABNORMAL
SITE: ABNORMAL
SODIUM SERPL-SCNC: 132 MMOL/L
SODIUM SERPL-SCNC: 133 MMOL/L
SODIUM SERPL-SCNC: 134 MMOL/L
SODIUM SERPL-SCNC: 135 MMOL/L
SODIUM SERPL-SCNC: 135 MMOL/L
SODIUM SERPL-SCNC: 136 MMOL/L
SODIUM SERPL-SCNC: 136 MMOL/L
SODIUM SERPL-SCNC: 137 MMOL/L
SODIUM SERPL-SCNC: 137 MMOL/L
SODIUM SERPL-SCNC: 138 MMOL/L
SODIUM SERPL-SCNC: 140 MMOL/L
SODIUM SERPL-SCNC: 141 MMOL/L
SODIUM SERPL-SCNC: 142 MMOL/L
SP GR UR STRIP: 1.02 (ref 1–1.03)
SP GR UR STRIP: 1.02 (ref 1–1.03)
SPECIMEN SOURCE: NORMAL
SPECIMEN SOURCE: NORMAL
SQUAMOUS #/AREA URNS HPF: 0 /HPF
TRIGL SERPL-MCNC: 83 MG/DL
TROPONIN I SERPL DL<=0.01 NG/ML-MCNC: 0.64 NG/ML
TROPONIN I SERPL DL<=0.01 NG/ML-MCNC: 0.67 NG/ML
TROPONIN I SERPL DL<=0.01 NG/ML-MCNC: 0.81 NG/ML
TROPONIN I SERPL DL<=0.01 NG/ML-MCNC: 10.12 NG/ML
TROPONIN I SERPL DL<=0.01 NG/ML-MCNC: 10.12 NG/ML
TROPONIN I SERPL DL<=0.01 NG/ML-MCNC: 10.6 NG/ML
TROPONIN I SERPL DL<=0.01 NG/ML-MCNC: 12.05 NG/ML
TROPONIN I SERPL DL<=0.01 NG/ML-MCNC: 12.12 NG/ML
TROPONIN I SERPL DL<=0.01 NG/ML-MCNC: 12.12 NG/ML
TROPONIN I SERPL DL<=0.01 NG/ML-MCNC: 14.47 NG/ML
TROPONIN I SERPL DL<=0.01 NG/ML-MCNC: 14.47 NG/ML
TROPONIN I SERPL DL<=0.01 NG/ML-MCNC: 3.84 NG/ML
TROPONIN I SERPL DL<=0.01 NG/ML-MCNC: 4.1 NG/ML
TROPONIN I SERPL DL<=0.01 NG/ML-MCNC: 4.25 NG/ML
TROPONIN I SERPL DL<=0.01 NG/ML-MCNC: 4.49 NG/ML
TROPONIN I SERPL DL<=0.01 NG/ML-MCNC: 4.94 NG/ML
TROPONIN I SERPL DL<=0.01 NG/ML-MCNC: 4.94 NG/ML
TROPONIN I SERPL DL<=0.01 NG/ML-MCNC: 5.11 NG/ML
TROPONIN I SERPL DL<=0.01 NG/ML-MCNC: 5.38 NG/ML
TROPONIN I SERPL DL<=0.01 NG/ML-MCNC: 5.78 NG/ML
TROPONIN I SERPL DL<=0.01 NG/ML-MCNC: 6.45 NG/ML
TROPONIN I SERPL DL<=0.01 NG/ML-MCNC: 6.6 NG/ML
TROPONIN I SERPL DL<=0.01 NG/ML-MCNC: 6.8 NG/ML
TROPONIN I SERPL DL<=0.01 NG/ML-MCNC: 6.81 NG/ML
TROPONIN I SERPL DL<=0.01 NG/ML-MCNC: 6.88 NG/ML
TROPONIN I SERPL DL<=0.01 NG/ML-MCNC: 7.04 NG/ML
TROPONIN I SERPL DL<=0.01 NG/ML-MCNC: 7.5 NG/ML
TROPONIN I SERPL DL<=0.01 NG/ML-MCNC: 7.53 NG/ML
TROPONIN I SERPL DL<=0.01 NG/ML-MCNC: 9.28 NG/ML
TROPONIN I SERPL DL<=0.01 NG/ML-MCNC: 9.63 NG/ML
TSH SERPL DL<=0.005 MIU/L-ACNC: 0.85 UIU/ML
URN SPEC COLLECT METH UR: ABNORMAL
URN SPEC COLLECT METH UR: ABNORMAL
UROBILINOGEN UR STRIP-ACNC: NEGATIVE EU/DL
UROBILINOGEN UR STRIP-ACNC: NEGATIVE EU/DL
VANCOMYCIN SERPL-MCNC: 10.4 UG/ML
VANCOMYCIN SERPL-MCNC: 11 UG/ML
VANCOMYCIN SERPL-MCNC: 16.6 UG/ML
VANCOMYCIN SERPL-MCNC: <1.1 UG/ML
WBC # BLD AUTO: 11.47 K/UL
WBC # BLD AUTO: 4.95 K/UL
WBC # BLD AUTO: 6.08 K/UL
WBC # BLD AUTO: 6.76 K/UL
WBC # BLD AUTO: 7.13 K/UL
WBC # BLD AUTO: 7.45 K/UL
WBC # BLD AUTO: 7.55 K/UL
WBC # BLD AUTO: 7.79 K/UL
WBC # BLD AUTO: 8.56 K/UL
WBC # BLD AUTO: 8.73 K/UL
WBC #/AREA URNS HPF: 2 /HPF (ref 0–5)

## 2017-01-01 PROCEDURE — 63600175 PHARM REV CODE 636 W HCPCS: Performed by: INTERNAL MEDICINE

## 2017-01-01 PROCEDURE — 36000707: Performed by: STUDENT IN AN ORGANIZED HEALTH CARE EDUCATION/TRAINING PROGRAM

## 2017-01-01 PROCEDURE — 99999 PR PBB SHADOW E&M-EST. PATIENT-LVL III: CPT | Mod: PBBFAC,,, | Performed by: SURGERY

## 2017-01-01 PROCEDURE — 87086 URINE CULTURE/COLONY COUNT: CPT

## 2017-01-01 PROCEDURE — 25000003 PHARM REV CODE 250: Performed by: FAMILY MEDICINE

## 2017-01-01 PROCEDURE — 37000008 HC ANESTHESIA 1ST 15 MINUTES: Performed by: STUDENT IN AN ORGANIZED HEALTH CARE EDUCATION/TRAINING PROGRAM

## 2017-01-01 PROCEDURE — 84100 ASSAY OF PHOSPHORUS: CPT

## 2017-01-01 PROCEDURE — 87205 SMEAR GRAM STAIN: CPT

## 2017-01-01 PROCEDURE — 85730 THROMBOPLASTIN TIME PARTIAL: CPT

## 2017-01-01 PROCEDURE — 63600175 PHARM REV CODE 636 W HCPCS: Performed by: FAMILY MEDICINE

## 2017-01-01 PROCEDURE — 36415 COLL VENOUS BLD VENIPUNCTURE: CPT

## 2017-01-01 PROCEDURE — 63600175 PHARM REV CODE 636 W HCPCS: Performed by: STUDENT IN AN ORGANIZED HEALTH CARE EDUCATION/TRAINING PROGRAM

## 2017-01-01 PROCEDURE — 25000003 PHARM REV CODE 250: Performed by: PSYCHIATRY & NEUROLOGY

## 2017-01-01 PROCEDURE — G0378 HOSPITAL OBSERVATION PER HR: HCPCS

## 2017-01-01 PROCEDURE — 84484 ASSAY OF TROPONIN QUANT: CPT

## 2017-01-01 PROCEDURE — 85025 COMPLETE CBC W/AUTO DIFF WBC: CPT

## 2017-01-01 PROCEDURE — 84484 ASSAY OF TROPONIN QUANT: CPT | Mod: 91

## 2017-01-01 PROCEDURE — 25000242 PHARM REV CODE 250 ALT 637 W/ HCPCS: Performed by: FAMILY MEDICINE

## 2017-01-01 PROCEDURE — 93010 ELECTROCARDIOGRAM REPORT: CPT | Mod: ,,, | Performed by: INTERNAL MEDICINE

## 2017-01-01 PROCEDURE — 99999 PR PBB SHADOW E&M-EST. PATIENT-LVL III: CPT | Mod: PBBFAC,,, | Performed by: INTERNAL MEDICINE

## 2017-01-01 PROCEDURE — D9220A PRA ANESTHESIA: Mod: ANES,,, | Performed by: ANESTHESIOLOGY

## 2017-01-01 PROCEDURE — 71000015 HC POSTOP RECOV 1ST HR: Performed by: STUDENT IN AN ORGANIZED HEALTH CARE EDUCATION/TRAINING PROGRAM

## 2017-01-01 PROCEDURE — 83970 ASSAY OF PARATHORMONE: CPT

## 2017-01-01 PROCEDURE — 25000003 PHARM REV CODE 250: Performed by: ANESTHESIOLOGY

## 2017-01-01 PROCEDURE — 02PYX3Z REMOVAL OF INFUSION DEVICE FROM GREAT VESSEL, EXTERNAL APPROACH: ICD-10-PCS | Performed by: STUDENT IN AN ORGANIZED HEALTH CARE EDUCATION/TRAINING PROGRAM

## 2017-01-01 PROCEDURE — 94761 N-INVAS EAR/PLS OXIMETRY MLT: CPT

## 2017-01-01 PROCEDURE — 99285 EMERGENCY DEPT VISIT HI MDM: CPT | Mod: 25

## 2017-01-01 PROCEDURE — 36558 INSERT TUNNELED CV CATH: CPT | Mod: 58,,, | Performed by: STUDENT IN AN ORGANIZED HEALTH CARE EDUCATION/TRAINING PROGRAM

## 2017-01-01 PROCEDURE — 87205 SMEAR GRAM STAIN: CPT | Mod: 59

## 2017-01-01 PROCEDURE — 80053 COMPREHEN METABOLIC PANEL: CPT

## 2017-01-01 PROCEDURE — 83605 ASSAY OF LACTIC ACID: CPT

## 2017-01-01 PROCEDURE — 36565 INSERT TUNNELED CV CATH: CPT | Mod: RT,,, | Performed by: STUDENT IN AN ORGANIZED HEALTH CARE EDUCATION/TRAINING PROGRAM

## 2017-01-01 PROCEDURE — D9220A PRA ANESTHESIA: Mod: CRNA,,, | Performed by: NURSE ANESTHETIST, CERTIFIED REGISTERED

## 2017-01-01 PROCEDURE — 80048 BASIC METABOLIC PNL TOTAL CA: CPT

## 2017-01-01 PROCEDURE — 87400 INFLUENZA A/B EACH AG IA: CPT

## 2017-01-01 PROCEDURE — 99999 PR PBB SHADOW E&M-EST. PATIENT-LVL III: CPT | Mod: PBBFAC,,, | Performed by: NURSE PRACTITIONER

## 2017-01-01 PROCEDURE — 25000003 PHARM REV CODE 250

## 2017-01-01 PROCEDURE — A4216 STERILE WATER/SALINE, 10 ML: HCPCS | Performed by: FAMILY MEDICINE

## 2017-01-01 PROCEDURE — S0077 INJECTION, CLINDAMYCIN PHOSP: HCPCS | Performed by: STUDENT IN AN ORGANIZED HEALTH CARE EDUCATION/TRAINING PROGRAM

## 2017-01-01 PROCEDURE — 99999 PR PBB SHADOW E&M-EST. PATIENT-LVL III: CPT | Mod: PBBFAC,,, | Performed by: STUDENT IN AN ORGANIZED HEALTH CARE EDUCATION/TRAINING PROGRAM

## 2017-01-01 PROCEDURE — 99024 POSTOP FOLLOW-UP VISIT: CPT | Mod: S$GLB,,, | Performed by: SURGERY

## 2017-01-01 PROCEDURE — G8980 MOBILITY D/C STATUS: HCPCS | Mod: CH

## 2017-01-01 PROCEDURE — 71000015 HC POSTOP RECOV 1ST HR: Performed by: SURGERY

## 2017-01-01 PROCEDURE — 11000001 HC ACUTE MED/SURG PRIVATE ROOM

## 2017-01-01 PROCEDURE — 25000003 PHARM REV CODE 250: Performed by: NURSE PRACTITIONER

## 2017-01-01 PROCEDURE — 99024 POSTOP FOLLOW-UP VISIT: CPT | Mod: S$GLB,,, | Performed by: STUDENT IN AN ORGANIZED HEALTH CARE EDUCATION/TRAINING PROGRAM

## 2017-01-01 PROCEDURE — 99223 1ST HOSP IP/OBS HIGH 75: CPT | Mod: ,,, | Performed by: INTERNAL MEDICINE

## 2017-01-01 PROCEDURE — G8988 SELF CARE GOAL STATUS: HCPCS | Mod: CH

## 2017-01-01 PROCEDURE — 25000003 PHARM REV CODE 250: Performed by: NURSE ANESTHETIST, CERTIFIED REGISTERED

## 2017-01-01 PROCEDURE — 82962 GLUCOSE BLOOD TEST: CPT

## 2017-01-01 PROCEDURE — 94640 AIRWAY INHALATION TREATMENT: CPT

## 2017-01-01 PROCEDURE — 99204 OFFICE O/P NEW MOD 45 MIN: CPT | Mod: S$GLB,,, | Performed by: SURGERY

## 2017-01-01 PROCEDURE — 25000003 PHARM REV CODE 250: Performed by: STUDENT IN AN ORGANIZED HEALTH CARE EDUCATION/TRAINING PROGRAM

## 2017-01-01 PROCEDURE — 83735 ASSAY OF MAGNESIUM: CPT

## 2017-01-01 PROCEDURE — 87070 CULTURE OTHR SPECIMN AEROBIC: CPT | Mod: 59

## 2017-01-01 PROCEDURE — 63600175 PHARM REV CODE 636 W HCPCS: Performed by: EMERGENCY MEDICINE

## 2017-01-01 PROCEDURE — 83690 ASSAY OF LIPASE: CPT

## 2017-01-01 PROCEDURE — 93005 ELECTROCARDIOGRAM TRACING: CPT

## 2017-01-01 PROCEDURE — G8989 SELF CARE D/C STATUS: HCPCS | Mod: CH

## 2017-01-01 PROCEDURE — 93971 EXTREMITY STUDY: CPT | Mod: 26,,, | Performed by: RADIOLOGY

## 2017-01-01 PROCEDURE — 96365 THER/PROPH/DIAG IV INF INIT: CPT

## 2017-01-01 PROCEDURE — 63600175 PHARM REV CODE 636 W HCPCS: Performed by: SURGERY

## 2017-01-01 PROCEDURE — G8978 MOBILITY CURRENT STATUS: HCPCS | Mod: CH

## 2017-01-01 PROCEDURE — 81000 URINALYSIS NONAUTO W/SCOPE: CPT

## 2017-01-01 PROCEDURE — 84443 ASSAY THYROID STIM HORMONE: CPT

## 2017-01-01 PROCEDURE — 90945 DIALYSIS ONE EVALUATION: CPT

## 2017-01-01 PROCEDURE — 80100016 HC MAINTENANCE HEMODIALYSIS

## 2017-01-01 PROCEDURE — 85027 COMPLETE CBC AUTOMATED: CPT

## 2017-01-01 PROCEDURE — 87077 CULTURE AEROBIC IDENTIFY: CPT | Mod: 59

## 2017-01-01 PROCEDURE — 87070 CULTURE OTHR SPECIMN AEROBIC: CPT

## 2017-01-01 PROCEDURE — 49422 REMOVE TUNNELED IP CATH: CPT | Mod: ,,, | Performed by: STUDENT IN AN ORGANIZED HEALTH CARE EDUCATION/TRAINING PROGRAM

## 2017-01-01 PROCEDURE — 25000003 PHARM REV CODE 250: Performed by: INTERNAL MEDICINE

## 2017-01-01 PROCEDURE — 63600175 PHARM REV CODE 636 W HCPCS: Performed by: ANESTHESIOLOGY

## 2017-01-01 PROCEDURE — 27000221 HC OXYGEN, UP TO 24 HOURS

## 2017-01-01 PROCEDURE — 80202 ASSAY OF VANCOMYCIN: CPT

## 2017-01-01 PROCEDURE — 99900035 HC TECH TIME PER 15 MIN (STAT)

## 2017-01-01 PROCEDURE — 97165 OT EVAL LOW COMPLEX 30 MIN: CPT

## 2017-01-01 PROCEDURE — 99496 TRANSJ CARE MGMT HIGH F2F 7D: CPT | Mod: S$GLB,,, | Performed by: INTERNAL MEDICINE

## 2017-01-01 PROCEDURE — 37000009 HC ANESTHESIA EA ADD 15 MINS: Performed by: STUDENT IN AN ORGANIZED HEALTH CARE EDUCATION/TRAINING PROGRAM

## 2017-01-01 PROCEDURE — 25000242 PHARM REV CODE 250 ALT 637 W/ HCPCS: Performed by: INTERNAL MEDICINE

## 2017-01-01 PROCEDURE — 85610 PROTHROMBIN TIME: CPT

## 2017-01-01 PROCEDURE — 0WPG03Z REMOVAL OF INFUSION DEVICE FROM PERITONEAL CAVITY, OPEN APPROACH: ICD-10-PCS | Performed by: STUDENT IN AN ORGANIZED HEALTH CARE EDUCATION/TRAINING PROGRAM

## 2017-01-01 PROCEDURE — 99285 EMERGENCY DEPT VISIT HI MDM: CPT

## 2017-01-01 PROCEDURE — 83880 ASSAY OF NATRIURETIC PEPTIDE: CPT

## 2017-01-01 PROCEDURE — 87102 FUNGUS ISOLATION CULTURE: CPT

## 2017-01-01 PROCEDURE — 99214 OFFICE O/P EST MOD 30 MIN: CPT | Mod: S$GLB,,, | Performed by: INTERNAL MEDICINE

## 2017-01-01 PROCEDURE — 83605 ASSAY OF LACTIC ACID: CPT | Mod: 91

## 2017-01-01 PROCEDURE — 20000000 HC ICU ROOM

## 2017-01-01 PROCEDURE — 87186 SC STD MICRODIL/AGAR DIL: CPT

## 2017-01-01 PROCEDURE — 63600175 PHARM REV CODE 636 W HCPCS: Performed by: NURSE ANESTHETIST, CERTIFIED REGISTERED

## 2017-01-01 PROCEDURE — 85018 HEMOGLOBIN: CPT

## 2017-01-01 PROCEDURE — C1750 CATH, HEMODIALYSIS,LONG-TERM: HCPCS | Performed by: STUDENT IN AN ORGANIZED HEALTH CARE EDUCATION/TRAINING PROGRAM

## 2017-01-01 PROCEDURE — 71000016 HC POSTOP RECOV ADDL HR: Performed by: STUDENT IN AN ORGANIZED HEALTH CARE EDUCATION/TRAINING PROGRAM

## 2017-01-01 PROCEDURE — 36000707: Performed by: SURGERY

## 2017-01-01 PROCEDURE — 93306 TTE W/DOPPLER COMPLETE: CPT

## 2017-01-01 PROCEDURE — 80202 ASSAY OF VANCOMYCIN: CPT | Mod: 91

## 2017-01-01 PROCEDURE — 77001 FLUOROGUIDE FOR VEIN DEVICE: CPT | Mod: 26,,, | Performed by: STUDENT IN AN ORGANIZED HEALTH CARE EDUCATION/TRAINING PROGRAM

## 2017-01-01 PROCEDURE — 99233 SBSQ HOSP IP/OBS HIGH 50: CPT | Mod: ,,, | Performed by: INTERNAL MEDICINE

## 2017-01-01 PROCEDURE — 84145 PROCALCITONIN (PCT): CPT | Mod: 91

## 2017-01-01 PROCEDURE — 93971 EXTREMITY STUDY: CPT | Mod: TC

## 2017-01-01 PROCEDURE — G8979 MOBILITY GOAL STATUS: HCPCS | Mod: CH

## 2017-01-01 PROCEDURE — 25000003 PHARM REV CODE 250: Performed by: SURGERY

## 2017-01-01 PROCEDURE — 86706 HEP B SURFACE ANTIBODY: CPT

## 2017-01-01 PROCEDURE — 82803 BLOOD GASES ANY COMBINATION: CPT

## 2017-01-01 PROCEDURE — G0365 VESSEL MAPPING HEMO ACCESS: HCPCS | Mod: S$GLB,,, | Performed by: SURGERY

## 2017-01-01 PROCEDURE — 87040 BLOOD CULTURE FOR BACTERIA: CPT

## 2017-01-01 PROCEDURE — 27201423 OPTIME MED/SURG SUP & DEVICES STERILE SUPPLY: Performed by: SURGERY

## 2017-01-01 PROCEDURE — 99214 OFFICE O/P EST MOD 30 MIN: CPT | Mod: S$GLB,,, | Performed by: NURSE PRACTITIONER

## 2017-01-01 PROCEDURE — 99999 PR PBB SHADOW E&M-EST. PATIENT-LVL IV: CPT | Mod: PBBFAC,,, | Performed by: STUDENT IN AN ORGANIZED HEALTH CARE EDUCATION/TRAINING PROGRAM

## 2017-01-01 PROCEDURE — 99999 PR PBB SHADOW E&M-EST. PATIENT-LVL IV: CPT | Mod: PBBFAC,,, | Performed by: INTERNAL MEDICINE

## 2017-01-01 PROCEDURE — 36000705 HC OR TIME LEV I EA ADD 15 MIN: Performed by: STUDENT IN AN ORGANIZED HEALTH CARE EDUCATION/TRAINING PROGRAM

## 2017-01-01 PROCEDURE — 76937 US GUIDE VASCULAR ACCESS: CPT | Mod: 26,,, | Performed by: STUDENT IN AN ORGANIZED HEALTH CARE EDUCATION/TRAINING PROGRAM

## 2017-01-01 PROCEDURE — 93306 TTE W/DOPPLER COMPLETE: CPT | Mod: 26,,, | Performed by: INTERNAL MEDICINE

## 2017-01-01 PROCEDURE — 96361 HYDRATE IV INFUSION ADD-ON: CPT

## 2017-01-01 PROCEDURE — 36821 AV FUSION DIRECT ANY SITE: CPT | Mod: ,,, | Performed by: SURGERY

## 2017-01-01 PROCEDURE — 99220 PR INITIAL OBSERVATION CARE,LEVL III: CPT | Mod: 24,,, | Performed by: SURGERY

## 2017-01-01 PROCEDURE — 80061 LIPID PANEL: CPT

## 2017-01-01 PROCEDURE — 82533 TOTAL CORTISOL: CPT

## 2017-01-01 PROCEDURE — 37000009 HC ANESTHESIA EA ADD 15 MINS: Performed by: SURGERY

## 2017-01-01 PROCEDURE — 97161 PT EVAL LOW COMPLEX 20 MIN: CPT

## 2017-01-01 PROCEDURE — 36600 WITHDRAWAL OF ARTERIAL BLOOD: CPT

## 2017-01-01 PROCEDURE — A4216 STERILE WATER/SALINE, 10 ML: HCPCS | Performed by: ANESTHESIOLOGY

## 2017-01-01 PROCEDURE — 84145 PROCALCITONIN (PCT): CPT

## 2017-01-01 PROCEDURE — G8987 SELF CARE CURRENT STATUS: HCPCS | Mod: CH

## 2017-01-01 PROCEDURE — 25500020 PHARM REV CODE 255: Performed by: FAMILY MEDICINE

## 2017-01-01 PROCEDURE — 87077 CULTURE AEROBIC IDENTIFY: CPT

## 2017-01-01 PROCEDURE — 85007 BL SMEAR W/DIFF WBC COUNT: CPT

## 2017-01-01 PROCEDURE — 25000003 PHARM REV CODE 250: Performed by: EMERGENCY MEDICINE

## 2017-01-01 PROCEDURE — 87075 CULTR BACTERIA EXCEPT BLOOD: CPT

## 2017-01-01 PROCEDURE — G0257 UNSCHED DIALYSIS ESRD PT HOS: HCPCS

## 2017-01-01 PROCEDURE — 36000706: Performed by: SURGERY

## 2017-01-01 PROCEDURE — 02HV33Z INSERTION OF INFUSION DEVICE INTO SUPERIOR VENA CAVA, PERCUTANEOUS APPROACH: ICD-10-PCS | Performed by: STUDENT IN AN ORGANIZED HEALTH CARE EDUCATION/TRAINING PROGRAM

## 2017-01-01 PROCEDURE — 87400 INFLUENZA A/B EACH AG IA: CPT | Mod: 59

## 2017-01-01 PROCEDURE — 71000033 HC RECOVERY, INTIAL HOUR: Performed by: STUDENT IN AN ORGANIZED HEALTH CARE EDUCATION/TRAINING PROGRAM

## 2017-01-01 PROCEDURE — 36000706: Performed by: STUDENT IN AN ORGANIZED HEALTH CARE EDUCATION/TRAINING PROGRAM

## 2017-01-01 PROCEDURE — 87075 CULTR BACTERIA EXCEPT BLOOD: CPT | Mod: 59

## 2017-01-01 PROCEDURE — 71000016 HC POSTOP RECOV ADDL HR: Performed by: SURGERY

## 2017-01-01 PROCEDURE — 37000008 HC ANESTHESIA 1ST 15 MINUTES: Performed by: SURGERY

## 2017-01-01 PROCEDURE — 36000704 HC OR TIME LEV I 1ST 15 MIN: Performed by: STUDENT IN AN ORGANIZED HEALTH CARE EDUCATION/TRAINING PROGRAM

## 2017-01-01 PROCEDURE — 63600175 PHARM REV CODE 636 W HCPCS: Performed by: NURSE PRACTITIONER

## 2017-01-01 PROCEDURE — 87340 HEPATITIS B SURFACE AG IA: CPT

## 2017-01-01 DEVICE — CATH DIALYSIS HEMOSPLIT16FR 23: Type: IMPLANTABLE DEVICE | Site: NECK | Status: FUNCTIONAL

## 2017-01-01 DEVICE — CATH DURA-MAX 15.5F X 24CM: Type: IMPLANTABLE DEVICE | Site: CHEST | Status: FUNCTIONAL

## 2017-01-01 RX ORDER — SODIUM CHLORIDE 9 MG/ML
INJECTION, SOLUTION INTRAVENOUS
Status: DISCONTINUED | OUTPATIENT
Start: 2017-01-01 | End: 2017-01-01 | Stop reason: HOSPADM

## 2017-01-01 RX ORDER — FUROSEMIDE 10 MG/ML
80 INJECTION INTRAMUSCULAR; INTRAVENOUS ONCE
Status: COMPLETED | OUTPATIENT
Start: 2017-01-01 | End: 2017-01-01

## 2017-01-01 RX ORDER — PROPOFOL 10 MG/ML
VIAL (ML) INTRAVENOUS
Status: DISCONTINUED | OUTPATIENT
Start: 2017-01-01 | End: 2017-01-01

## 2017-01-01 RX ORDER — KETAMINE HYDROCHLORIDE 100 MG/ML
INJECTION, SOLUTION INTRAMUSCULAR; INTRAVENOUS
Status: DISCONTINUED | OUTPATIENT
Start: 2017-01-01 | End: 2017-01-01

## 2017-01-01 RX ORDER — AMLODIPINE BESYLATE 5 MG/1
10 TABLET ORAL DAILY
Status: DISCONTINUED | OUTPATIENT
Start: 2017-01-01 | End: 2017-01-01

## 2017-01-01 RX ORDER — RAMELTEON 8 MG/1
8 TABLET ORAL NIGHTLY PRN
Status: DISCONTINUED | OUTPATIENT
Start: 2017-01-01 | End: 2017-01-01 | Stop reason: HOSPADM

## 2017-01-01 RX ORDER — PARICALCITOL 5 UG/ML
5 INJECTION, SOLUTION INTRAVENOUS
Status: DISCONTINUED | OUTPATIENT
Start: 2017-01-01 | End: 2017-01-01

## 2017-01-01 RX ORDER — AMLODIPINE BESYLATE 5 MG/1
5 TABLET ORAL DAILY
Qty: 30 TABLET | Refills: 11 | Status: SHIPPED | OUTPATIENT
Start: 2017-01-01 | End: 2017-01-01 | Stop reason: SDUPTHER

## 2017-01-01 RX ORDER — HYDROMORPHONE HYDROCHLORIDE 2 MG/ML
0.5 INJECTION, SOLUTION INTRAMUSCULAR; INTRAVENOUS; SUBCUTANEOUS EVERY 5 MIN PRN
Status: DISCONTINUED | OUTPATIENT
Start: 2017-01-01 | End: 2017-01-01 | Stop reason: HOSPADM

## 2017-01-01 RX ORDER — IBUPROFEN 200 MG
24 TABLET ORAL
Status: DISCONTINUED | OUTPATIENT
Start: 2017-01-01 | End: 2017-01-01

## 2017-01-01 RX ORDER — HYDRALAZINE HYDROCHLORIDE 20 MG/ML
10 INJECTION INTRAMUSCULAR; INTRAVENOUS ONCE
Status: COMPLETED | OUTPATIENT
Start: 2017-01-01 | End: 2017-01-01

## 2017-01-01 RX ORDER — FENTANYL CITRATE 50 UG/ML
INJECTION, SOLUTION INTRAMUSCULAR; INTRAVENOUS
Status: DISCONTINUED | OUTPATIENT
Start: 2017-01-01 | End: 2017-01-01

## 2017-01-01 RX ORDER — ERGOCALCIFEROL 1.25 MG/1
50000 CAPSULE ORAL
Qty: 4 CAPSULE | Refills: 6 | Status: ON HOLD | OUTPATIENT
Start: 2017-01-01 | End: 2017-01-01

## 2017-01-01 RX ORDER — HYDROMORPHONE HYDROCHLORIDE 1 MG/ML
0.2 INJECTION, SOLUTION INTRAMUSCULAR; INTRAVENOUS; SUBCUTANEOUS EVERY 5 MIN PRN
Status: DISCONTINUED | OUTPATIENT
Start: 2017-01-01 | End: 2017-01-01 | Stop reason: HOSPADM

## 2017-01-01 RX ORDER — SERTRALINE HYDROCHLORIDE 50 MG/1
50 TABLET, FILM COATED ORAL DAILY
Qty: 30 TABLET | Refills: 11 | Status: SHIPPED | OUTPATIENT
Start: 2017-01-01 | End: 2018-01-01 | Stop reason: SDUPTHER

## 2017-01-01 RX ORDER — OXYCODONE AND ACETAMINOPHEN 10; 325 MG/1; MG/1
1 TABLET ORAL EVERY 4 HOURS PRN
Status: DISCONTINUED | OUTPATIENT
Start: 2017-01-01 | End: 2017-01-01 | Stop reason: HOSPADM

## 2017-01-01 RX ORDER — ONDANSETRON 2 MG/ML
4 INJECTION INTRAMUSCULAR; INTRAVENOUS DAILY PRN
Status: DISCONTINUED | OUTPATIENT
Start: 2017-01-01 | End: 2017-01-01 | Stop reason: HOSPADM

## 2017-01-01 RX ORDER — PARICALCITOL 5 UG/ML
0.1 INJECTION, SOLUTION INTRAVENOUS
Status: DISCONTINUED | OUTPATIENT
Start: 2017-01-01 | End: 2017-01-01 | Stop reason: HOSPADM

## 2017-01-01 RX ORDER — CARVEDILOL 6.25 MG/1
6.25 TABLET ORAL 2 TIMES DAILY
Status: DISCONTINUED | OUTPATIENT
Start: 2017-01-01 | End: 2017-01-01

## 2017-01-01 RX ORDER — CLINDAMYCIN PHOSPHATE 600 MG/50ML
600 INJECTION, SOLUTION INTRAVENOUS
Status: DISCONTINUED | OUTPATIENT
Start: 2017-01-01 | End: 2017-01-01 | Stop reason: HOSPADM

## 2017-01-01 RX ORDER — SODIUM CHLORIDE 0.9 % (FLUSH) 0.9 %
3 SYRINGE (ML) INJECTION EVERY 8 HOURS
Status: DISCONTINUED | OUTPATIENT
Start: 2017-01-01 | End: 2017-01-01 | Stop reason: SDUPTHER

## 2017-01-01 RX ORDER — SODIUM CHLORIDE 9 MG/ML
INJECTION, SOLUTION INTRAVENOUS ONCE
Status: DISCONTINUED | OUTPATIENT
Start: 2017-01-01 | End: 2017-01-01 | Stop reason: HOSPADM

## 2017-01-01 RX ORDER — SODIUM CHLORIDE 0.9 % (FLUSH) 0.9 %
3 SYRINGE (ML) INJECTION
Status: DISCONTINUED | OUTPATIENT
Start: 2017-01-01 | End: 2017-01-01 | Stop reason: HOSPADM

## 2017-01-01 RX ORDER — OXYCODONE AND ACETAMINOPHEN 10; 325 MG/1; MG/1
1 TABLET ORAL EVERY 8 HOURS PRN
Qty: 15 TABLET | Refills: 0 | Status: ON HOLD | OUTPATIENT
Start: 2017-01-01 | End: 2017-01-01 | Stop reason: HOSPADM

## 2017-01-01 RX ORDER — ALLOPURINOL 100 MG/1
100 TABLET ORAL DAILY
Status: DISCONTINUED | OUTPATIENT
Start: 2017-01-01 | End: 2017-01-01 | Stop reason: HOSPADM

## 2017-01-01 RX ORDER — METOPROLOL SUCCINATE 100 MG/1
100 TABLET, EXTENDED RELEASE ORAL DAILY
Qty: 30 TABLET | Refills: 11 | Status: SHIPPED | OUTPATIENT
Start: 2017-01-01 | End: 2018-11-08

## 2017-01-01 RX ORDER — IPRATROPIUM BROMIDE AND ALBUTEROL SULFATE 2.5; .5 MG/3ML; MG/3ML
3 SOLUTION RESPIRATORY (INHALATION) ONCE
Status: COMPLETED | OUTPATIENT
Start: 2017-01-01 | End: 2017-01-01

## 2017-01-01 RX ORDER — LORAZEPAM 0.5 MG/1
0.5 TABLET ORAL EVERY 12 HOURS PRN
Status: DISCONTINUED | OUTPATIENT
Start: 2017-01-01 | End: 2017-01-01 | Stop reason: HOSPADM

## 2017-01-01 RX ORDER — LIDOCAINE HCL/PF 100 MG/5ML
SYRINGE (ML) INTRAVENOUS
Status: DISCONTINUED | OUTPATIENT
Start: 2017-01-01 | End: 2017-01-01

## 2017-01-01 RX ORDER — SODIUM CHLORIDE 9 MG/ML
INJECTION, SOLUTION INTRAVENOUS ONCE
Status: DISCONTINUED | OUTPATIENT
Start: 2017-01-01 | End: 2017-01-01

## 2017-01-01 RX ORDER — GLUCAGON 1 MG
1 KIT INJECTION
Status: DISCONTINUED | OUTPATIENT
Start: 2017-01-01 | End: 2017-01-01

## 2017-01-01 RX ORDER — METOPROLOL SUCCINATE 50 MG/1
100 TABLET, EXTENDED RELEASE ORAL DAILY
Status: DISCONTINUED | OUTPATIENT
Start: 2017-01-01 | End: 2017-01-01

## 2017-01-01 RX ORDER — SERTRALINE HYDROCHLORIDE 25 MG/1
25 TABLET, FILM COATED ORAL DAILY
Status: COMPLETED | OUTPATIENT
Start: 2017-01-01 | End: 2017-01-01

## 2017-01-01 RX ORDER — LISINOPRIL 10 MG/1
10 TABLET ORAL DAILY
Qty: 90 TABLET | Refills: 3 | Status: SHIPPED | OUTPATIENT
Start: 2017-01-01 | End: 2017-01-01 | Stop reason: HOSPADM

## 2017-01-01 RX ORDER — ACETAMINOPHEN 500 MG
1000 TABLET ORAL
Status: COMPLETED | OUTPATIENT
Start: 2017-01-01 | End: 2017-01-01

## 2017-01-01 RX ORDER — CARVEDILOL 3.12 MG/1
3.12 TABLET ORAL 2 TIMES DAILY
Status: DISCONTINUED | OUTPATIENT
Start: 2017-01-01 | End: 2017-01-01

## 2017-01-01 RX ORDER — MAGNESIUM SULFATE HEPTAHYDRATE 40 MG/ML
2 INJECTION, SOLUTION INTRAVENOUS ONCE
Status: COMPLETED | OUTPATIENT
Start: 2017-01-01 | End: 2017-01-01

## 2017-01-01 RX ORDER — HYDROMORPHONE HYDROCHLORIDE 2 MG/ML
0.5 INJECTION, SOLUTION INTRAMUSCULAR; INTRAVENOUS; SUBCUTANEOUS EVERY 5 MIN PRN
Status: DISCONTINUED | OUTPATIENT
Start: 2017-01-01 | End: 2017-01-01

## 2017-01-01 RX ORDER — OXYCODONE AND ACETAMINOPHEN 5; 325 MG/1; MG/1
1 TABLET ORAL ONCE
Status: COMPLETED | OUTPATIENT
Start: 2017-01-01 | End: 2017-01-01

## 2017-01-01 RX ORDER — SERTRALINE HYDROCHLORIDE 50 MG/1
50 TABLET, FILM COATED ORAL DAILY
Status: DISCONTINUED | OUTPATIENT
Start: 2017-01-01 | End: 2017-01-01 | Stop reason: HOSPADM

## 2017-01-01 RX ORDER — SODIUM CHLORIDE 0.9 % (FLUSH) 0.9 %
3 SYRINGE (ML) INJECTION EVERY 8 HOURS
Status: DISCONTINUED | OUTPATIENT
Start: 2017-01-01 | End: 2017-01-01 | Stop reason: HOSPADM

## 2017-01-01 RX ORDER — PARICALCITOL 5 UG/ML
5 INJECTION, SOLUTION INTRAVENOUS
Status: DISCONTINUED | OUTPATIENT
Start: 2017-01-01 | End: 2017-01-01 | Stop reason: SDUPTHER

## 2017-01-01 RX ORDER — HYDRALAZINE HYDROCHLORIDE 20 MG/ML
10 INJECTION INTRAMUSCULAR; INTRAVENOUS EVERY 8 HOURS PRN
Status: DISCONTINUED | OUTPATIENT
Start: 2017-01-01 | End: 2017-01-01 | Stop reason: HOSPADM

## 2017-01-01 RX ORDER — ERGOCALCIFEROL 1.25 MG/1
2 CAPSULE ORAL DAILY
COMMUNITY
Start: 2017-01-01

## 2017-01-01 RX ORDER — CLOPIDOGREL BISULFATE 75 MG/1
75 TABLET ORAL DAILY
Status: DISCONTINUED | OUTPATIENT
Start: 2017-01-01 | End: 2017-01-01 | Stop reason: HOSPADM

## 2017-01-01 RX ORDER — LIDOCAINE HYDROCHLORIDE 10 MG/ML
INJECTION, SOLUTION EPIDURAL; INFILTRATION; INTRACAUDAL; PERINEURAL
Status: DISCONTINUED | OUTPATIENT
Start: 2017-01-01 | End: 2017-01-01 | Stop reason: HOSPADM

## 2017-01-01 RX ORDER — CALCIUM ACETATE 667 MG/1
667 CAPSULE ORAL
Qty: 30 CAPSULE | Refills: 3 | Status: CANCELLED | OUTPATIENT
Start: 2017-01-01 | End: 2018-10-25

## 2017-01-01 RX ORDER — PROPOFOL 10 MG/ML
VIAL (ML) INTRAVENOUS CONTINUOUS PRN
Status: DISCONTINUED | OUTPATIENT
Start: 2017-01-01 | End: 2017-01-01

## 2017-01-01 RX ORDER — AMOXICILLIN AND CLAVULANATE POTASSIUM 875; 125 MG/1; MG/1
1 TABLET, FILM COATED ORAL EVERY 12 HOURS
Qty: 20 TABLET | Refills: 0 | Status: SHIPPED | OUTPATIENT
Start: 2017-01-01 | End: 2017-01-01

## 2017-01-01 RX ORDER — OXYCODONE AND ACETAMINOPHEN 10; 325 MG/1; MG/1
1 TABLET ORAL EVERY 4 HOURS PRN
Qty: 30 TABLET | Refills: 0 | Status: CANCELLED | OUTPATIENT
Start: 2017-01-01

## 2017-01-01 RX ORDER — METOPROLOL SUCCINATE 100 MG/1
100 TABLET, EXTENDED RELEASE ORAL DAILY
COMMUNITY
Start: 2017-10-09 | End: 2017-01-01 | Stop reason: SINTOL

## 2017-01-01 RX ORDER — ALPRAZOLAM 0.25 MG/1
0.25 TABLET ORAL EVERY 8 HOURS PRN
Qty: 60 TABLET | Refills: 0 | Status: SHIPPED | OUTPATIENT
Start: 2017-01-01 | End: 2017-01-01 | Stop reason: SDUPTHER

## 2017-01-01 RX ORDER — SODIUM CHLORIDE 9 MG/ML
INJECTION, SOLUTION INTRAVENOUS CONTINUOUS PRN
Status: DISCONTINUED | OUTPATIENT
Start: 2017-01-01 | End: 2017-01-01

## 2017-01-01 RX ORDER — HYDROCODONE BITARTRATE AND ACETAMINOPHEN 5; 325 MG/1; MG/1
1 TABLET ORAL EVERY 4 HOURS PRN
Status: DISCONTINUED | OUTPATIENT
Start: 2017-01-01 | End: 2017-01-01 | Stop reason: HOSPADM

## 2017-01-01 RX ORDER — LISINOPRIL 20 MG/1
40 TABLET ORAL DAILY
Status: DISCONTINUED | OUTPATIENT
Start: 2017-01-01 | End: 2017-01-01 | Stop reason: HOSPADM

## 2017-01-01 RX ORDER — PRAVASTATIN SODIUM 40 MG/1
40 TABLET ORAL DAILY
Status: DISCONTINUED | OUTPATIENT
Start: 2017-01-01 | End: 2017-01-01 | Stop reason: HOSPADM

## 2017-01-01 RX ORDER — ACETAMINOPHEN 325 MG/1
650 TABLET ORAL EVERY 6 HOURS PRN
Status: DISCONTINUED | OUTPATIENT
Start: 2017-01-01 | End: 2017-01-01 | Stop reason: HOSPADM

## 2017-01-01 RX ORDER — TIOTROPIUM BROMIDE 18 UG/1
18 CAPSULE ORAL; RESPIRATORY (INHALATION) DAILY
Qty: 30 CAPSULE | Refills: 0 | Status: SHIPPED | OUTPATIENT
Start: 2017-01-01 | End: 2018-11-21

## 2017-01-01 RX ORDER — SODIUM CHLORIDE 9 MG/ML
INJECTION, SOLUTION INTRAVENOUS
Status: DISCONTINUED | OUTPATIENT
Start: 2017-01-01 | End: 2017-01-01

## 2017-01-01 RX ORDER — AMLODIPINE BESYLATE 10 MG/1
10 TABLET ORAL DAILY
Qty: 30 TABLET | Refills: 11
Start: 2017-01-01 | End: 2018-01-01 | Stop reason: ALTCHOICE

## 2017-01-01 RX ORDER — ONDANSETRON 2 MG/ML
4 INJECTION INTRAMUSCULAR; INTRAVENOUS EVERY 6 HOURS PRN
Status: DISCONTINUED | OUTPATIENT
Start: 2017-01-01 | End: 2017-01-01 | Stop reason: HOSPADM

## 2017-01-01 RX ORDER — ONDANSETRON 2 MG/ML
4 INJECTION INTRAMUSCULAR; INTRAVENOUS ONCE AS NEEDED
Status: ACTIVE | OUTPATIENT
Start: 2017-01-01 | End: 2017-01-01

## 2017-01-01 RX ORDER — OXYCODONE AND ACETAMINOPHEN 10; 325 MG/1; MG/1
1 TABLET ORAL EVERY 8 HOURS PRN
Status: DISCONTINUED | OUTPATIENT
Start: 2017-01-01 | End: 2017-01-01

## 2017-01-01 RX ORDER — ASPIRIN 81 MG/1
81 TABLET ORAL DAILY
Status: DISCONTINUED | OUTPATIENT
Start: 2017-01-01 | End: 2017-01-01 | Stop reason: HOSPADM

## 2017-01-01 RX ORDER — CARVEDILOL 3.12 MG/1
3.12 TABLET ORAL 2 TIMES DAILY
Status: DISCONTINUED | OUTPATIENT
Start: 2017-01-01 | End: 2017-01-01 | Stop reason: HOSPADM

## 2017-01-01 RX ORDER — MIDAZOLAM HYDROCHLORIDE 1 MG/ML
INJECTION, SOLUTION INTRAMUSCULAR; INTRAVENOUS
Status: DISCONTINUED | OUTPATIENT
Start: 2017-01-01 | End: 2017-01-01

## 2017-01-01 RX ORDER — DIPHENHYDRAMINE HYDROCHLORIDE 50 MG/ML
12.5 INJECTION INTRAMUSCULAR; INTRAVENOUS EVERY 6 HOURS PRN
Status: DISCONTINUED | OUTPATIENT
Start: 2017-01-01 | End: 2017-01-01 | Stop reason: HOSPADM

## 2017-01-01 RX ORDER — INSULIN ASPART 100 [IU]/ML
1-10 INJECTION, SOLUTION INTRAVENOUS; SUBCUTANEOUS
Status: DISCONTINUED | OUTPATIENT
Start: 2017-01-01 | End: 2017-01-01

## 2017-01-01 RX ORDER — ONDANSETRON 4 MG/1
4 TABLET, FILM COATED ORAL EVERY 8 HOURS PRN
Qty: 10 TABLET | Refills: 0 | Status: SHIPPED | OUTPATIENT
Start: 2017-01-01 | End: 2017-01-01 | Stop reason: SDUPTHER

## 2017-01-01 RX ORDER — CALCITRIOL 0.25 UG/1
0.25 CAPSULE ORAL DAILY
Status: DISCONTINUED | OUTPATIENT
Start: 2017-01-01 | End: 2017-01-01 | Stop reason: HOSPADM

## 2017-01-01 RX ORDER — METOPROLOL SUCCINATE 50 MG/1
100 TABLET, EXTENDED RELEASE ORAL DAILY
Status: DISCONTINUED | OUTPATIENT
Start: 2017-01-01 | End: 2017-01-01 | Stop reason: HOSPADM

## 2017-01-01 RX ORDER — ACETAMINOPHEN 500 MG
500 TABLET ORAL EVERY 6 HOURS PRN
Status: DISCONTINUED | OUTPATIENT
Start: 2017-01-01 | End: 2017-01-01 | Stop reason: HOSPADM

## 2017-01-01 RX ORDER — IBUPROFEN 200 MG
16 TABLET ORAL
Status: DISCONTINUED | OUTPATIENT
Start: 2017-01-01 | End: 2017-01-01

## 2017-01-01 RX ORDER — LABETALOL HYDROCHLORIDE 5 MG/ML
10 INJECTION, SOLUTION INTRAVENOUS EVERY 6 HOURS PRN
Status: DISCONTINUED | OUTPATIENT
Start: 2017-01-01 | End: 2017-01-01 | Stop reason: HOSPADM

## 2017-01-01 RX ORDER — LISINOPRIL 40 MG/1
40 TABLET ORAL DAILY
Qty: 90 TABLET | Refills: 3 | Status: SHIPPED | OUTPATIENT
Start: 2017-01-01 | End: 2018-01-01 | Stop reason: SDUPTHER

## 2017-01-01 RX ORDER — CEFAZOLIN SODIUM 2 G/50ML
2 SOLUTION INTRAVENOUS ONCE
Status: COMPLETED | OUTPATIENT
Start: 2017-01-01 | End: 2017-01-01

## 2017-01-01 RX ORDER — POTASSIUM CHLORIDE 7.45 MG/ML
10 INJECTION INTRAVENOUS ONCE
Status: COMPLETED | OUTPATIENT
Start: 2017-01-01 | End: 2017-01-01

## 2017-01-01 RX ORDER — ALPRAZOLAM 0.25 MG/1
0.25 TABLET ORAL EVERY 8 HOURS PRN
Qty: 60 TABLET | Refills: 0 | Status: SHIPPED | OUTPATIENT
Start: 2017-01-01 | End: 2018-01-01 | Stop reason: SDUPTHER

## 2017-01-01 RX ORDER — ONDANSETRON 4 MG/1
4 TABLET, FILM COATED ORAL EVERY 8 HOURS PRN
Qty: 30 TABLET | Refills: 0 | Status: SHIPPED | OUTPATIENT
Start: 2017-01-01 | End: 2018-01-01

## 2017-01-01 RX ORDER — ACETAMINOPHEN 325 MG/1
650 TABLET ORAL EVERY 6 HOURS PRN
Status: DISCONTINUED | OUTPATIENT
Start: 2017-01-01 | End: 2017-01-01

## 2017-01-01 RX ORDER — HEPARIN SODIUM 1000 [USP'U]/ML
INJECTION, SOLUTION INTRAVENOUS; SUBCUTANEOUS
Status: DISCONTINUED | OUTPATIENT
Start: 2017-01-01 | End: 2017-01-01

## 2017-01-01 RX ORDER — ALBUTEROL SULFATE 0.83 MG/ML
2.5 SOLUTION RESPIRATORY (INHALATION)
Status: DISCONTINUED | OUTPATIENT
Start: 2017-01-01 | End: 2017-01-01

## 2017-01-01 RX ORDER — SODIUM CHLORIDE 9 MG/ML
INJECTION, SOLUTION INTRAVENOUS CONTINUOUS
Status: DISCONTINUED | OUTPATIENT
Start: 2017-01-01 | End: 2017-01-01

## 2017-01-01 RX ORDER — HEPARIN SODIUM 5000 [USP'U]/ML
5000 INJECTION, SOLUTION INTRAVENOUS; SUBCUTANEOUS EVERY 8 HOURS
Status: DISCONTINUED | OUTPATIENT
Start: 2017-01-01 | End: 2017-01-01 | Stop reason: HOSPADM

## 2017-01-01 RX ORDER — PARICALCITOL 5 UG/ML
0.1 INJECTION, SOLUTION INTRAVENOUS
Status: DISCONTINUED | OUTPATIENT
Start: 2017-01-01 | End: 2017-01-01

## 2017-01-01 RX ORDER — SODIUM CHLORIDE 0.9 % (FLUSH) 0.9 %
3 SYRINGE (ML) INJECTION EVERY 8 HOURS
Status: DISCONTINUED | OUTPATIENT
Start: 2017-01-01 | End: 2017-01-01

## 2017-01-01 RX ORDER — ONDANSETRON 2 MG/ML
INJECTION INTRAMUSCULAR; INTRAVENOUS
Status: DISCONTINUED | OUTPATIENT
Start: 2017-01-01 | End: 2017-01-01

## 2017-01-01 RX ORDER — PHENYLEPHRINE HYDROCHLORIDE 10 MG/ML
INJECTION INTRAVENOUS
Status: DISCONTINUED | OUTPATIENT
Start: 2017-01-01 | End: 2017-01-01

## 2017-01-01 RX ORDER — AMOXICILLIN AND CLAVULANATE POTASSIUM 875; 125 MG/1; MG/1
1 TABLET, FILM COATED ORAL EVERY 12 HOURS
Status: DISCONTINUED | OUTPATIENT
Start: 2017-01-01 | End: 2017-01-01 | Stop reason: HOSPADM

## 2017-01-01 RX ORDER — LIDOCAINE HYDROCHLORIDE 10 MG/ML
1 INJECTION, SOLUTION EPIDURAL; INFILTRATION; INTRACAUDAL; PERINEURAL ONCE
Status: COMPLETED | OUTPATIENT
Start: 2017-01-01 | End: 2017-01-01

## 2017-01-01 RX ORDER — ASPIRIN 325 MG
325 TABLET ORAL
Status: COMPLETED | OUTPATIENT
Start: 2017-01-01 | End: 2017-01-01

## 2017-01-01 RX ORDER — CALCIUM ACETATE 667 MG/1
667 CAPSULE ORAL
Qty: 90 CAPSULE | Refills: 11 | Status: ON HOLD | OUTPATIENT
Start: 2017-01-01 | End: 2017-01-01

## 2017-01-01 RX ORDER — CARVEDILOL 3.12 MG/1
3.12 TABLET ORAL 2 TIMES DAILY
Qty: 60 TABLET | Refills: 11 | Status: ON HOLD | OUTPATIENT
Start: 2017-01-01 | End: 2017-01-01 | Stop reason: HOSPADM

## 2017-01-01 RX ORDER — OXYCODONE AND ACETAMINOPHEN 10; 325 MG/1; MG/1
1 TABLET ORAL EVERY 8 HOURS PRN
Qty: 20 TABLET | Refills: 0 | Status: SHIPPED | OUTPATIENT
Start: 2017-01-01 | End: 2017-01-01

## 2017-01-01 RX ORDER — SODIUM CHLORIDE 9 MG/ML
INJECTION, SOLUTION INTRAVENOUS CONTINUOUS
Status: DISCONTINUED | OUTPATIENT
Start: 2017-01-01 | End: 2017-01-01 | Stop reason: HOSPADM

## 2017-01-01 RX ORDER — HYDROMORPHONE HYDROCHLORIDE 2 MG/ML
0.2 INJECTION, SOLUTION INTRAMUSCULAR; INTRAVENOUS; SUBCUTANEOUS EVERY 5 MIN PRN
Status: DISCONTINUED | OUTPATIENT
Start: 2017-01-01 | End: 2017-01-01

## 2017-01-01 RX ORDER — HEPARIN SODIUM 1000 [USP'U]/ML
INJECTION, SOLUTION INTRAVENOUS; SUBCUTANEOUS
Status: DISCONTINUED | OUTPATIENT
Start: 2017-01-01 | End: 2017-01-01 | Stop reason: HOSPADM

## 2017-01-01 RX ORDER — CLOPIDOGREL BISULFATE 75 MG/1
75 TABLET ORAL DAILY
Qty: 30 TABLET | Refills: 11 | Status: SHIPPED | OUTPATIENT
Start: 2017-01-01 | End: 2018-01-01 | Stop reason: SDUPTHER

## 2017-01-01 RX ORDER — OXYCODONE AND ACETAMINOPHEN 10; 325 MG/1; MG/1
1 TABLET ORAL EVERY 6 HOURS PRN
Status: DISCONTINUED | OUTPATIENT
Start: 2017-01-01 | End: 2017-01-01

## 2017-01-01 RX ORDER — CEFAZOLIN SODIUM 1 G/3ML
INJECTION, POWDER, FOR SOLUTION INTRAMUSCULAR; INTRAVENOUS
Status: DISCONTINUED | OUTPATIENT
Start: 2017-01-01 | End: 2017-01-01

## 2017-01-01 RX ORDER — PARICALCITOL 5 UG/ML
0.1 INJECTION, SOLUTION INTRAVENOUS
Status: COMPLETED | OUTPATIENT
Start: 2017-01-01 | End: 2017-01-01

## 2017-01-01 RX ORDER — DOCUSATE SODIUM 100 MG/1
100 CAPSULE, LIQUID FILLED ORAL 2 TIMES DAILY
Status: DISCONTINUED | OUTPATIENT
Start: 2017-01-01 | End: 2017-01-01 | Stop reason: HOSPADM

## 2017-01-01 RX ORDER — AMLODIPINE BESYLATE 5 MG/1
5 TABLET ORAL DAILY
Status: DISCONTINUED | OUTPATIENT
Start: 2017-01-01 | End: 2017-01-01 | Stop reason: HOSPADM

## 2017-01-01 RX ORDER — ERGOCALCIFEROL 1.25 MG/1
50000 CAPSULE ORAL
Qty: 30 CAPSULE | Refills: 1 | Status: CANCELLED | OUTPATIENT
Start: 2017-01-01

## 2017-01-01 RX ORDER — SODIUM CHLORIDE 0.9 % (FLUSH) 0.9 %
5 SYRINGE (ML) INJECTION
Status: DISCONTINUED | OUTPATIENT
Start: 2017-01-01 | End: 2017-01-01 | Stop reason: HOSPADM

## 2017-01-01 RX ORDER — OXYCODONE AND ACETAMINOPHEN 5; 325 MG/1; MG/1
1 TABLET ORAL EVERY 6 HOURS PRN
Status: DISCONTINUED | OUTPATIENT
Start: 2017-01-01 | End: 2017-01-01

## 2017-01-01 RX ORDER — HEPARIN SODIUM 1000 [USP'U]/ML
4100 INJECTION, SOLUTION INTRAVENOUS; SUBCUTANEOUS
Status: DISCONTINUED | OUTPATIENT
Start: 2017-01-01 | End: 2017-01-01 | Stop reason: HOSPADM

## 2017-01-01 RX ORDER — OXYCODONE AND ACETAMINOPHEN 7.5; 325 MG/1; MG/1
1 TABLET ORAL ONCE
Status: COMPLETED | OUTPATIENT
Start: 2017-01-01 | End: 2017-01-01

## 2017-01-01 RX ORDER — LISINOPRIL 10 MG/1
10 TABLET ORAL DAILY
Status: DISCONTINUED | OUTPATIENT
Start: 2017-01-01 | End: 2017-01-01

## 2017-01-01 RX ORDER — AMLODIPINE BESYLATE 10 MG/1
10 TABLET ORAL DAILY
COMMUNITY
Start: 2017-10-09 | End: 2017-01-01 | Stop reason: SINTOL

## 2017-01-01 RX ORDER — HYDROMORPHONE HYDROCHLORIDE 2 MG/ML
INJECTION, SOLUTION INTRAMUSCULAR; INTRAVENOUS; SUBCUTANEOUS
Status: DISPENSED
Start: 2017-01-01 | End: 2017-01-01

## 2017-01-01 RX ORDER — POLYETHYLENE GLYCOL 3350 17 G/17G
17 POWDER, FOR SOLUTION ORAL DAILY
Status: COMPLETED | OUTPATIENT
Start: 2017-01-01 | End: 2017-01-01

## 2017-01-01 RX ORDER — OXYCODONE AND ACETAMINOPHEN 5; 325 MG/1; MG/1
1 TABLET ORAL EVERY 4 HOURS PRN
Qty: 31 TABLET | Refills: 0 | Status: SHIPPED | OUTPATIENT
Start: 2017-01-01 | End: 2018-01-01

## 2017-01-01 RX ORDER — ALBUTEROL SULFATE 0.83 MG/ML
2.5 SOLUTION RESPIRATORY (INHALATION) EVERY 6 HOURS PRN
Status: DISCONTINUED | OUTPATIENT
Start: 2017-01-01 | End: 2017-01-01 | Stop reason: HOSPADM

## 2017-01-01 RX ORDER — BUPIVACAINE HYDROCHLORIDE 2.5 MG/ML
INJECTION, SOLUTION EPIDURAL; INFILTRATION; INTRACAUDAL
Status: DISCONTINUED | OUTPATIENT
Start: 2017-01-01 | End: 2017-01-01 | Stop reason: HOSPADM

## 2017-01-01 RX ORDER — CARVEDILOL 3.12 MG/1
3.12 TABLET ORAL 2 TIMES DAILY
Qty: 60 TABLET | Refills: 11 | Status: SHIPPED | OUTPATIENT
Start: 2017-01-01 | End: 2017-01-01

## 2017-01-01 RX ORDER — DOCUSATE SODIUM 100 MG/1
100 CAPSULE, LIQUID FILLED ORAL 2 TIMES DAILY
Qty: 30 CAPSULE | OUTPATIENT
Start: 2017-01-01

## 2017-01-01 RX ORDER — ALBUTEROL SULFATE 0.83 MG/ML
2.5 SOLUTION RESPIRATORY (INHALATION) EVERY 4 HOURS
Status: DISCONTINUED | OUTPATIENT
Start: 2017-01-01 | End: 2017-01-01

## 2017-01-01 RX ORDER — AMLODIPINE BESYLATE 5 MG/1
5 TABLET ORAL DAILY
Status: DISCONTINUED | OUTPATIENT
Start: 2017-01-01 | End: 2017-01-01

## 2017-01-01 RX ORDER — OXYCODONE AND ACETAMINOPHEN 10; 325 MG/1; MG/1
1 TABLET ORAL EVERY 6 HOURS PRN
Status: DISCONTINUED | OUTPATIENT
Start: 2017-01-01 | End: 2017-01-01 | Stop reason: HOSPADM

## 2017-01-01 RX ORDER — DOCUSATE SODIUM 100 MG/1
100 CAPSULE, LIQUID FILLED ORAL DAILY
Status: DISCONTINUED | OUTPATIENT
Start: 2017-01-01 | End: 2017-01-01 | Stop reason: HOSPADM

## 2017-01-01 RX ORDER — ALPRAZOLAM 0.25 MG/1
0.25 TABLET ORAL EVERY 8 HOURS PRN
Status: DISCONTINUED | OUTPATIENT
Start: 2017-01-01 | End: 2017-01-01 | Stop reason: HOSPADM

## 2017-01-01 RX ADMIN — FUROSEMIDE 80 MG: 10 INJECTION, SOLUTION INTRAMUSCULAR; INTRAVENOUS at 04:11

## 2017-01-01 RX ADMIN — MAGNESIUM SULFATE IN WATER 2 G: 40 INJECTION, SOLUTION INTRAVENOUS at 11:11

## 2017-01-01 RX ADMIN — PIPERACILLIN AND TAZOBACTAM 4.5 G: 4; .5 INJECTION, POWDER, LYOPHILIZED, FOR SOLUTION INTRAVENOUS; PARENTERAL at 02:11

## 2017-01-01 RX ADMIN — SERTRALINE HYDROCHLORIDE 50 MG: 50 TABLET ORAL at 08:11

## 2017-01-01 RX ADMIN — ALBUTEROL SULFATE 2.5 MG: 2.5 SOLUTION RESPIRATORY (INHALATION) at 08:11

## 2017-01-01 RX ADMIN — OXYCODONE HYDROCHLORIDE AND ACETAMINOPHEN 1 TABLET: 10; 325 TABLET ORAL at 06:11

## 2017-01-01 RX ADMIN — CLOPIDOGREL BISULFATE 75 MG: 75 TABLET ORAL at 08:11

## 2017-01-01 RX ADMIN — ACETAMINOPHEN 650 MG: 325 TABLET ORAL at 10:11

## 2017-01-01 RX ADMIN — CEFAZOLIN 2 G: 330 INJECTION, POWDER, FOR SOLUTION INTRAMUSCULAR; INTRAVENOUS at 09:11

## 2017-01-01 RX ADMIN — FENTANYL CITRATE 25 MCG: 50 INJECTION, SOLUTION INTRAMUSCULAR; INTRAVENOUS at 07:10

## 2017-01-01 RX ADMIN — ALBUTEROL SULFATE 2.5 MG: 2.5 SOLUTION RESPIRATORY (INHALATION) at 01:11

## 2017-01-01 RX ADMIN — SODIUM CHLORIDE, PRESERVATIVE FREE 3 ML: 5 INJECTION INTRAVENOUS at 05:11

## 2017-01-01 RX ADMIN — HEPARIN SODIUM 3000 UNITS: 1000 INJECTION, SOLUTION INTRAVENOUS; SUBCUTANEOUS at 08:11

## 2017-01-01 RX ADMIN — FENTANYL CITRATE 50 MCG: 50 INJECTION, SOLUTION INTRAMUSCULAR; INTRAVENOUS at 08:11

## 2017-01-01 RX ADMIN — PRAVASTATIN SODIUM 40 MG: 40 TABLET ORAL at 08:11

## 2017-01-01 RX ADMIN — ONDANSETRON 4 MG: 2 INJECTION INTRAMUSCULAR; INTRAVENOUS at 10:11

## 2017-01-01 RX ADMIN — SODIUM CHLORIDE, PRESERVATIVE FREE 3 ML: 5 INJECTION INTRAVENOUS at 10:11

## 2017-01-01 RX ADMIN — CLOPIDOGREL BISULFATE 75 MG: 75 TABLET ORAL at 09:11

## 2017-01-01 RX ADMIN — SODIUM CHLORIDE: 0.9 INJECTION, SOLUTION INTRAVENOUS at 11:11

## 2017-01-01 RX ADMIN — LISINOPRIL 40 MG: 20 TABLET ORAL at 12:11

## 2017-01-01 RX ADMIN — OXYCODONE HYDROCHLORIDE AND ACETAMINOPHEN 1 TABLET: 10; 325 TABLET ORAL at 03:11

## 2017-01-01 RX ADMIN — ALBUTEROL SULFATE 2.5 MG: 2.5 SOLUTION RESPIRATORY (INHALATION) at 04:11

## 2017-01-01 RX ADMIN — METOPROLOL SUCCINATE 100 MG: 50 TABLET, EXTENDED RELEASE ORAL at 02:11

## 2017-01-01 RX ADMIN — HYDRALAZINE HYDROCHLORIDE 10 MG: 20 INJECTION INTRAMUSCULAR; INTRAVENOUS at 11:11

## 2017-01-01 RX ADMIN — PROPOFOL 30 MG: 10 INJECTION, EMULSION INTRAVENOUS at 12:11

## 2017-01-01 RX ADMIN — POLYETHYLENE GLYCOL 3350 17 G: 17 POWDER, FOR SOLUTION ORAL at 10:11

## 2017-01-01 RX ADMIN — OXYCODONE HYDROCHLORIDE AND ACETAMINOPHEN 1 TABLET: 10; 325 TABLET ORAL at 05:11

## 2017-01-01 RX ADMIN — DOCUSATE SODIUM 100 MG: 100 CAPSULE, LIQUID FILLED ORAL at 08:11

## 2017-01-01 RX ADMIN — CLINDAMYCIN IN 5 PERCENT DEXTROSE 600 MG: 12 INJECTION, SOLUTION INTRAVENOUS at 09:11

## 2017-01-01 RX ADMIN — OXYCODONE HYDROCHLORIDE AND ACETAMINOPHEN 1 TABLET: 10; 325 TABLET ORAL at 04:11

## 2017-01-01 RX ADMIN — SODIUM CHLORIDE, PRESERVATIVE FREE 3 ML: 5 INJECTION INTRAVENOUS at 02:11

## 2017-01-01 RX ADMIN — PROPOFOL 150 MCG/KG/MIN: 10 INJECTION, EMULSION INTRAVENOUS at 12:11

## 2017-01-01 RX ADMIN — AMOXICILLIN AND CLAVULANATE POTASSIUM 1 TABLET: 875; 125 TABLET, FILM COATED ORAL at 02:11

## 2017-01-01 RX ADMIN — SERTRALINE HYDROCHLORIDE 50 MG: 50 TABLET ORAL at 09:11

## 2017-01-01 RX ADMIN — CARVEDILOL 3.12 MG: 3.12 TABLET, FILM COATED ORAL at 09:11

## 2017-01-01 RX ADMIN — ALBUTEROL SULFATE 2.5 MG: 2.5 SOLUTION RESPIRATORY (INHALATION) at 12:11

## 2017-01-01 RX ADMIN — CARVEDILOL 3.12 MG: 3.12 TABLET, FILM COATED ORAL at 08:11

## 2017-01-01 RX ADMIN — LIDOCAINE HYDROCHLORIDE 100 MG: 20 INJECTION, SOLUTION INTRAVENOUS at 12:11

## 2017-01-01 RX ADMIN — HYDRALAZINE HYDROCHLORIDE 10 MG: 20 INJECTION INTRAMUSCULAR; INTRAVENOUS at 06:11

## 2017-01-01 RX ADMIN — OXYCODONE HYDROCHLORIDE AND ACETAMINOPHEN 1 TABLET: 10; 325 TABLET ORAL at 08:11

## 2017-01-01 RX ADMIN — OXYCODONE HYDROCHLORIDE AND ACETAMINOPHEN 1 TABLET: 10; 325 TABLET ORAL at 01:11

## 2017-01-01 RX ADMIN — OXYCODONE AND ACETAMINOPHEN 1 TABLET: 5; 325 TABLET ORAL at 01:11

## 2017-01-01 RX ADMIN — ONDANSETRON 4 MG: 2 INJECTION INTRAMUSCULAR; INTRAVENOUS at 06:11

## 2017-01-01 RX ADMIN — SODIUM CHLORIDE: 0.9 INJECTION, SOLUTION INTRAVENOUS at 06:10

## 2017-01-01 RX ADMIN — FENTANYL CITRATE 25 MCG: 50 INJECTION, SOLUTION INTRAMUSCULAR; INTRAVENOUS at 09:11

## 2017-01-01 RX ADMIN — LORAZEPAM 0.5 MG: 0.5 TABLET ORAL at 04:11

## 2017-01-01 RX ADMIN — CALCITRIOL 0.25 MCG: 0.25 CAPSULE, LIQUID FILLED ORAL at 08:11

## 2017-01-01 RX ADMIN — VANCOMYCIN HYDROCHLORIDE 500 MG: 500 INJECTION, POWDER, LYOPHILIZED, FOR SOLUTION INTRAVENOUS at 06:11

## 2017-01-01 RX ADMIN — SERTRALINE HYDROCHLORIDE 25 MG: 25 TABLET ORAL at 08:11

## 2017-01-01 RX ADMIN — ASPIRIN 81 MG: 81 TABLET, COATED ORAL at 08:11

## 2017-01-01 RX ADMIN — LABETALOL HYDROCHLORIDE 10 MG: 5 INJECTION INTRAVENOUS at 09:11

## 2017-01-01 RX ADMIN — DOCUSATE SODIUM 100 MG: 100 CAPSULE, LIQUID FILLED ORAL at 09:11

## 2017-01-01 RX ADMIN — SODIUM CHLORIDE: 0.9 INJECTION, SOLUTION INTRAVENOUS at 09:11

## 2017-01-01 RX ADMIN — RAMELTEON 8 MG: 8 TABLET, FILM COATED ORAL at 09:11

## 2017-01-01 RX ADMIN — ALBUTEROL SULFATE 2.5 MG: 2.5 SOLUTION RESPIRATORY (INHALATION) at 07:11

## 2017-01-01 RX ADMIN — DOCUSATE SODIUM 100 MG: 100 CAPSULE, LIQUID FILLED ORAL at 10:11

## 2017-01-01 RX ADMIN — HEPARIN SODIUM 5000 UNITS: 5000 INJECTION, SOLUTION INTRAVENOUS; SUBCUTANEOUS at 09:11

## 2017-01-01 RX ADMIN — CEFAZOLIN SODIUM 2 G: 2 SOLUTION INTRAVENOUS at 08:11

## 2017-01-01 RX ADMIN — RAMELTEON 8 MG: 8 TABLET, FILM COATED ORAL at 10:11

## 2017-01-01 RX ADMIN — ACETAMINOPHEN 1000 MG: 500 TABLET ORAL at 12:11

## 2017-01-01 RX ADMIN — ERYTHROPOIETIN 10000 UNITS: 10000 INJECTION, SOLUTION INTRAVENOUS; SUBCUTANEOUS at 01:11

## 2017-01-01 RX ADMIN — LORAZEPAM 0.5 MG: 0.5 TABLET ORAL at 05:11

## 2017-01-01 RX ADMIN — ALLOPURINOL 100 MG: 100 TABLET ORAL at 08:11

## 2017-01-01 RX ADMIN — PRAVASTATIN SODIUM 40 MG: 40 TABLET ORAL at 09:11

## 2017-01-01 RX ADMIN — AMLODIPINE BESYLATE 5 MG: 5 TABLET ORAL at 02:11

## 2017-01-01 RX ADMIN — HEPARIN SODIUM 10000 UNITS: 1000 INJECTION, SOLUTION INTRAVENOUS; SUBCUTANEOUS at 08:11

## 2017-01-01 RX ADMIN — MIDAZOLAM HYDROCHLORIDE 1 MG: 1 INJECTION, SOLUTION INTRAMUSCULAR; INTRAVENOUS at 08:11

## 2017-01-01 RX ADMIN — ALBUTEROL SULFATE 2.5 MG: 2.5 SOLUTION RESPIRATORY (INHALATION) at 03:11

## 2017-01-01 RX ADMIN — POLYETHYLENE GLYCOL 3350 17 G: 17 POWDER, FOR SOLUTION ORAL at 09:11

## 2017-01-01 RX ADMIN — OXYCODONE HYDROCHLORIDE AND ACETAMINOPHEN 1 TABLET: 10; 325 TABLET ORAL at 10:11

## 2017-01-01 RX ADMIN — ASPIRIN 325 MG ORAL TABLET 325 MG: 325 PILL ORAL at 02:11

## 2017-01-01 RX ADMIN — CALCITRIOL 0.25 MCG: 0.25 CAPSULE, LIQUID FILLED ORAL at 09:11

## 2017-01-01 RX ADMIN — PARICALCITOL 7 MCG: 5 INJECTION, SOLUTION INTRAVENOUS at 12:11

## 2017-01-01 RX ADMIN — ALLOPURINOL 100 MG: 100 TABLET ORAL at 09:11

## 2017-01-01 RX ADMIN — HYDROMORPHONE HYDROCHLORIDE 0.2 MG: 1 INJECTION, SOLUTION INTRAMUSCULAR; INTRAVENOUS; SUBCUTANEOUS at 10:11

## 2017-01-01 RX ADMIN — ONDANSETRON 4 MG: 2 INJECTION INTRAMUSCULAR; INTRAVENOUS at 05:11

## 2017-01-01 RX ADMIN — OXYCODONE AND ACETAMINOPHEN 1 TABLET: 5; 325 TABLET ORAL at 04:11

## 2017-01-01 RX ADMIN — PROPOFOL 20 MG: 10 INJECTION, EMULSION INTRAVENOUS at 09:11

## 2017-01-01 RX ADMIN — ONDANSETRON 4 MG: 2 INJECTION INTRAMUSCULAR; INTRAVENOUS at 01:11

## 2017-01-01 RX ADMIN — Medication 1 CAPSULE: at 08:11

## 2017-01-01 RX ADMIN — ASPIRIN 81 MG: 81 TABLET, COATED ORAL at 10:11

## 2017-01-01 RX ADMIN — SERTRALINE HYDROCHLORIDE 25 MG: 25 TABLET ORAL at 09:11

## 2017-01-01 RX ADMIN — PIPERACILLIN AND TAZOBACTAM 4.5 G: 4; .5 INJECTION, POWDER, LYOPHILIZED, FOR SOLUTION INTRAVENOUS; PARENTERAL at 01:11

## 2017-01-01 RX ADMIN — HYDROMORPHONE HYDROCHLORIDE 0.5 MG: 2 INJECTION, SOLUTION INTRAMUSCULAR; INTRAVENOUS; SUBCUTANEOUS at 10:11

## 2017-01-01 RX ADMIN — SODIUM CHLORIDE: 900 INJECTION, SOLUTION INTRAVENOUS at 02:11

## 2017-01-01 RX ADMIN — ONDANSETRON 4 MG: 2 INJECTION INTRAMUSCULAR; INTRAVENOUS at 08:11

## 2017-01-01 RX ADMIN — SODIUM CHLORIDE, PRESERVATIVE FREE 3 ML: 5 INJECTION INTRAVENOUS at 06:11

## 2017-01-01 RX ADMIN — LORAZEPAM 0.5 MG: 0.5 TABLET ORAL at 11:11

## 2017-01-01 RX ADMIN — SODIUM CHLORIDE 500 ML: 900 INJECTION, SOLUTION INTRAVENOUS at 12:11

## 2017-01-01 RX ADMIN — Medication 1 CAPSULE: at 09:11

## 2017-01-01 RX ADMIN — HEPARIN SODIUM 5000 UNITS: 5000 INJECTION, SOLUTION INTRAVENOUS; SUBCUTANEOUS at 05:11

## 2017-01-01 RX ADMIN — AMOXICILLIN AND CLAVULANATE POTASSIUM 1 TABLET: 875; 125 TABLET, FILM COATED ORAL at 09:11

## 2017-01-01 RX ADMIN — LORAZEPAM 0.5 MG: 0.5 TABLET ORAL at 08:11

## 2017-01-01 RX ADMIN — ASPIRIN 81 MG: 81 TABLET, COATED ORAL at 09:11

## 2017-01-01 RX ADMIN — PROPOFOL 30 MG: 10 INJECTION, EMULSION INTRAVENOUS at 08:11

## 2017-01-01 RX ADMIN — ALLOPURINOL 100 MG: 100 TABLET ORAL at 10:11

## 2017-01-01 RX ADMIN — CARVEDILOL 3.12 MG: 3.12 TABLET, FILM COATED ORAL at 10:11

## 2017-01-01 RX ADMIN — ERYTHROPOIETIN 10000 UNITS: 10000 INJECTION, SOLUTION INTRAVENOUS; SUBCUTANEOUS at 04:11

## 2017-01-01 RX ADMIN — KETAMINE HYDROCHLORIDE 20 MG: 100 INJECTION, SOLUTION, CONCENTRATE INTRAMUSCULAR; INTRAVENOUS at 08:11

## 2017-01-01 RX ADMIN — CLINDAMYCIN IN 5 PERCENT DEXTROSE 600 MG: 12 INJECTION, SOLUTION INTRAVENOUS at 05:11

## 2017-01-01 RX ADMIN — PARICALCITOL 7 MCG: 5 INJECTION, SOLUTION INTRAVENOUS at 11:11

## 2017-01-01 RX ADMIN — AMOXICILLIN AND CLAVULANATE POTASSIUM 1 TABLET: 875; 125 TABLET, FILM COATED ORAL at 10:11

## 2017-01-01 RX ADMIN — LISINOPRIL 40 MG: 20 TABLET ORAL at 08:11

## 2017-01-01 RX ADMIN — MIDAZOLAM 2 MG: 1 INJECTION INTRAMUSCULAR; INTRAVENOUS at 07:10

## 2017-01-01 RX ADMIN — PROPOFOL 50 MCG/KG/MIN: 10 INJECTION, EMULSION INTRAVENOUS at 08:11

## 2017-01-01 RX ADMIN — LIDOCAINE HYDROCHLORIDE 0.5 MG: 10 INJECTION, SOLUTION EPIDURAL; INFILTRATION; INTRACAUDAL; PERINEURAL at 06:11

## 2017-01-01 RX ADMIN — PROPOFOL 150 MCG/KG/MIN: 10 INJECTION, EMULSION INTRAVENOUS at 09:11

## 2017-01-01 RX ADMIN — SODIUM CHLORIDE, PRESERVATIVE FREE 3 ML: 5 INJECTION INTRAVENOUS at 01:11

## 2017-01-01 RX ADMIN — HEPARIN SODIUM 5000 UNITS: 5000 INJECTION, SOLUTION INTRAVENOUS; SUBCUTANEOUS at 01:11

## 2017-01-01 RX ADMIN — LISINOPRIL 10 MG: 10 TABLET ORAL at 08:11

## 2017-01-01 RX ADMIN — LORAZEPAM 0.5 MG: 0.5 TABLET ORAL at 10:11

## 2017-01-01 RX ADMIN — CARVEDILOL 6.25 MG: 6.25 TABLET, FILM COATED ORAL at 09:11

## 2017-01-01 RX ADMIN — LIDOCAINE HYDROCHLORIDE 20 ML: 10 INJECTION, SOLUTION EPIDURAL; INFILTRATION; INTRACAUDAL; PERINEURAL at 07:10

## 2017-01-01 RX ADMIN — Medication 1 CAPSULE: at 11:11

## 2017-01-01 RX ADMIN — HEPARIN SODIUM 5000 UNITS: 1000 INJECTION, SOLUTION INTRAVENOUS; SUBCUTANEOUS at 07:10

## 2017-01-01 RX ADMIN — LIDOCAINE HYDROCHLORIDE 50 MG: 20 INJECTION, SOLUTION INTRAVENOUS at 09:11

## 2017-01-01 RX ADMIN — SODIUM CHLORIDE 2109 ML: 0.9 INJECTION, SOLUTION INTRAVENOUS at 04:11

## 2017-01-01 RX ADMIN — METOPROLOL SUCCINATE 100 MG: 50 TABLET, EXTENDED RELEASE ORAL at 01:11

## 2017-01-01 RX ADMIN — IPRATROPIUM BROMIDE AND ALBUTEROL SULFATE 3 ML: .5; 3 SOLUTION RESPIRATORY (INHALATION) at 12:11

## 2017-01-01 RX ADMIN — LIDOCAINE HYDROCHLORIDE 25 MG: 20 INJECTION, SOLUTION INTRAVENOUS at 08:11

## 2017-01-01 RX ADMIN — LABETALOL HYDROCHLORIDE 10 MG: 5 INJECTION INTRAVENOUS at 05:11

## 2017-01-01 RX ADMIN — CLINDAMYCIN IN 5 PERCENT DEXTROSE 600 MG: 12 INJECTION, SOLUTION INTRAVENOUS at 01:11

## 2017-01-01 RX ADMIN — ALBUTEROL SULFATE 2.5 MG: 2.5 SOLUTION RESPIRATORY (INHALATION) at 11:11

## 2017-01-01 RX ADMIN — SERTRALINE HYDROCHLORIDE 50 MG: 50 TABLET ORAL at 10:11

## 2017-01-01 RX ADMIN — VANCOMYCIN HYDROCHLORIDE 1000 MG: 1 INJECTION, POWDER, LYOPHILIZED, FOR SOLUTION INTRAVENOUS at 04:11

## 2017-01-01 RX ADMIN — ALPRAZOLAM 0.25 MG: 0.25 TABLET ORAL at 09:11

## 2017-01-01 RX ADMIN — SERTRALINE HYDROCHLORIDE 25 MG: 25 TABLET ORAL at 01:11

## 2017-01-01 RX ADMIN — HEPARIN SODIUM 4100 UNITS: 1000 INJECTION, SOLUTION INTRAVENOUS; SUBCUTANEOUS at 04:11

## 2017-01-01 RX ADMIN — SODIUM CHLORIDE: 900 INJECTION, SOLUTION INTRAVENOUS at 05:11

## 2017-01-01 RX ADMIN — DEXTROSE MONOHYDRATE 4.5 G: 50 INJECTION, SOLUTION INTRAVENOUS at 02:11

## 2017-01-01 RX ADMIN — PHENYLEPHRINE HYDROCHLORIDE 100 MCG: 10 INJECTION INTRAVENOUS at 07:10

## 2017-01-01 RX ADMIN — PROPOFOL 125 MCG/KG/MIN: 10 INJECTION, EMULSION INTRAVENOUS at 07:10

## 2017-01-01 RX ADMIN — RAMELTEON 8 MG: 8 TABLET, FILM COATED ORAL at 11:11

## 2017-01-01 RX ADMIN — ACETAMINOPHEN 500 MG: 500 TABLET ORAL at 02:11

## 2017-01-01 RX ADMIN — CLOPIDOGREL BISULFATE 75 MG: 75 TABLET ORAL at 10:11

## 2017-01-01 RX ADMIN — OXYCODONE HYDROCHLORIDE AND ACETAMINOPHEN 1 TABLET: 7.5; 325 TABLET ORAL at 11:11

## 2017-01-01 RX ADMIN — PIPERACILLIN AND TAZOBACTAM 4.5 G: 4; .5 INJECTION, POWDER, LYOPHILIZED, FOR SOLUTION INTRAVENOUS; PARENTERAL at 12:11

## 2017-01-01 RX ADMIN — SODIUM CHLORIDE: 0.9 INJECTION, SOLUTION INTRAVENOUS at 05:11

## 2017-01-01 RX ADMIN — SODIUM CHLORIDE, PRESERVATIVE FREE 3 ML: 5 INJECTION INTRAVENOUS at 11:11

## 2017-01-01 RX ADMIN — ACETAMINOPHEN 650 MG: 325 TABLET ORAL at 07:11

## 2017-01-01 RX ADMIN — CLOPIDOGREL BISULFATE 75 MG: 75 TABLET ORAL at 11:11

## 2017-01-01 RX ADMIN — SODIUM CHLORIDE, PRESERVATIVE FREE 3 ML: 5 INJECTION INTRAVENOUS at 04:11

## 2017-01-01 RX ADMIN — AMOXICILLIN AND CLAVULANATE POTASSIUM 1 TABLET: 875; 125 TABLET, FILM COATED ORAL at 08:11

## 2017-01-01 RX ADMIN — PRAVASTATIN SODIUM 40 MG: 40 TABLET ORAL at 10:11

## 2017-01-01 RX ADMIN — ERYTHROPOIETIN 10000 UNITS: 10000 INJECTION, SOLUTION INTRAVENOUS; SUBCUTANEOUS at 11:11

## 2017-01-01 RX ADMIN — OXYCODONE HYDROCHLORIDE AND ACETAMINOPHEN 1 TABLET: 10; 325 TABLET ORAL at 09:11

## 2017-01-01 RX ADMIN — IOHEXOL 75 ML: 350 INJECTION, SOLUTION INTRAVENOUS at 05:11

## 2017-01-01 RX ADMIN — POTASSIUM CHLORIDE 10 MEQ: 10 INJECTION, SOLUTION INTRAVENOUS at 11:11

## 2017-01-01 RX ADMIN — SODIUM CHLORIDE: 900 INJECTION, SOLUTION INTRAVENOUS at 06:11

## 2017-01-01 RX ADMIN — RAMELTEON 8 MG: 8 TABLET, FILM COATED ORAL at 08:11

## 2017-01-01 RX ADMIN — IRON SUCROSE 100 MG: 20 INJECTION, SOLUTION INTRAVENOUS at 12:11

## 2017-01-01 RX ADMIN — PIPERACILLIN AND TAZOBACTAM 4.5 G: 4; .5 INJECTION, POWDER, LYOPHILIZED, FOR SOLUTION INTRAVENOUS; PARENTERAL at 03:11

## 2017-01-01 RX ADMIN — Medication 1 CAPSULE: at 10:11

## 2017-08-22 ENCOUNTER — HOSPITAL ENCOUNTER (INPATIENT)
Facility: HOSPITAL | Age: 72
LOS: 3 days | Discharge: HOME | DRG: 291 | End: 2017-08-25
Attending: EMERGENCY MEDICINE | Admitting: INTERNAL MEDICINE
Payer: MEDICARE

## 2017-08-22 DIAGNOSIS — R80.9 PROTEINURIA, UNSPECIFIED TYPE: ICD-10-CM

## 2017-08-22 DIAGNOSIS — D64.9 ANEMIA, UNSPECIFIED: ICD-10-CM

## 2017-08-22 DIAGNOSIS — I27.20 PULMONARY HYPERTENSION: ICD-10-CM

## 2017-08-22 DIAGNOSIS — D64.9 ANEMIA, UNSPECIFIED TYPE: ICD-10-CM

## 2017-08-22 DIAGNOSIS — N18.4 CHRONIC KIDNEY DISEASE, STAGE 4 (SEVERE): ICD-10-CM

## 2017-08-22 DIAGNOSIS — I10 ESSENTIAL (PRIMARY) HYPERTENSION: ICD-10-CM

## 2017-08-22 DIAGNOSIS — E83.42 HYPOMAGNESEMIA: ICD-10-CM

## 2017-08-22 DIAGNOSIS — E87.20 METABOLIC ACIDOSIS: ICD-10-CM

## 2017-08-22 DIAGNOSIS — E78.2 MIXED HYPERLIPIDEMIA: ICD-10-CM

## 2017-08-22 DIAGNOSIS — R60.0 EDEMA EXTREMITIES: ICD-10-CM

## 2017-08-22 DIAGNOSIS — N18.4 ACUTE RENAL FAILURE SUPERIMPOSED ON STAGE 4 CHRONIC KIDNEY DISEASE, UNSPECIFIED ACUTE RENAL FAILURE TYPE: ICD-10-CM

## 2017-08-22 DIAGNOSIS — E87.2 ACIDOSIS: ICD-10-CM

## 2017-08-22 DIAGNOSIS — N17.9 ACUTE KIDNEY FAILURE, UNSPECIFIED: ICD-10-CM

## 2017-08-22 DIAGNOSIS — N18.4 CKD (CHRONIC KIDNEY DISEASE) STAGE 4, GFR 15-29 ML/MIN: ICD-10-CM

## 2017-08-22 DIAGNOSIS — N18.4 TYPE 2 DIABETES MELLITUS WITH STAGE 4 CHRONIC KIDNEY DISEASE, WITHOUT LONG-TERM CURRENT USE OF INSULIN: ICD-10-CM

## 2017-08-22 DIAGNOSIS — I50.33 ACUTE ON CHRONIC DIASTOLIC HEART FAILURE: Primary | ICD-10-CM

## 2017-08-22 DIAGNOSIS — E87.5 HYPERKALEMIA: ICD-10-CM

## 2017-08-22 DIAGNOSIS — I25.10 CORONARY ARTERY DISEASE INVOLVING NATIVE CORONARY ARTERY OF NATIVE HEART WITHOUT ANGINA PECTORIS: ICD-10-CM

## 2017-08-22 DIAGNOSIS — N19 RENAL FAILURE, UNSPECIFIED CHRONICITY: ICD-10-CM

## 2017-08-22 DIAGNOSIS — I25.10 ATHEROSCLEROTIC HEART DISEASE OF NATIVE CORONARY ARTERY WITHOUT ANGINA PECTORIS: ICD-10-CM

## 2017-08-22 DIAGNOSIS — N17.9 ACUTE RENAL FAILURE SUPERIMPOSED ON STAGE 4 CHRONIC KIDNEY DISEASE, UNSPECIFIED ACUTE RENAL FAILURE TYPE: ICD-10-CM

## 2017-08-22 DIAGNOSIS — E87.5 HYPERKALEMIA, DIMINISHED RENAL EXCRETION: ICD-10-CM

## 2017-08-22 DIAGNOSIS — I10 ESSENTIAL HYPERTENSION: ICD-10-CM

## 2017-08-22 DIAGNOSIS — D64.9 NORMOCYTIC ANEMIA: ICD-10-CM

## 2017-08-22 DIAGNOSIS — E87.70 FLUID OVERLOAD, UNSPECIFIED: ICD-10-CM

## 2017-08-22 DIAGNOSIS — E11.22 TYPE 2 DIABETES MELLITUS WITH STAGE 4 CHRONIC KIDNEY DISEASE, WITHOUT LONG-TERM CURRENT USE OF INSULIN: ICD-10-CM

## 2017-08-22 DIAGNOSIS — E87.6 HYPOKALEMIA: ICD-10-CM

## 2017-08-22 DIAGNOSIS — E87.70 HYPERVOLEMIA, UNSPECIFIED HYPERVOLEMIA TYPE: ICD-10-CM

## 2017-08-22 LAB
ALBUMIN SERPL BCP-MCNC: 3 G/DL
ALBUMIN SERPL BCP-MCNC: 3.2 G/DL
ALBUMIN SERPL BCP-MCNC: 3.3 G/DL
ALP SERPL-CCNC: 84 U/L
ALP SERPL-CCNC: 87 U/L
ALT SERPL W/O P-5'-P-CCNC: 10 U/L
ALT SERPL W/O P-5'-P-CCNC: 10 U/L
ANION GAP SERPL CALC-SCNC: 12 MMOL/L
ANION GAP SERPL CALC-SCNC: 14 MMOL/L
ANION GAP SERPL CALC-SCNC: 9 MMOL/L
AST SERPL-CCNC: 16 U/L
AST SERPL-CCNC: 16 U/L
BACTERIA #/AREA URNS HPF: NORMAL /HPF
BASOPHILS # BLD AUTO: 0.03 K/UL
BASOPHILS # BLD AUTO: 0.04 K/UL
BASOPHILS NFR BLD: 0.5 %
BASOPHILS NFR BLD: 0.7 %
BILIRUB SERPL-MCNC: 0.3 MG/DL
BILIRUB SERPL-MCNC: 0.4 MG/DL
BILIRUB UR QL STRIP: NEGATIVE
BNP SERPL-MCNC: 3431 PG/ML
BUN SERPL-MCNC: 70 MG/DL
BUN SERPL-MCNC: 70 MG/DL
BUN SERPL-MCNC: 73 MG/DL
CALCIUM SERPL-MCNC: 8.8 MG/DL
CALCIUM SERPL-MCNC: 8.8 MG/DL
CALCIUM SERPL-MCNC: 9.1 MG/DL
CHLORIDE SERPL-SCNC: 108 MMOL/L
CHLORIDE SERPL-SCNC: 108 MMOL/L
CHLORIDE SERPL-SCNC: 109 MMOL/L
CLARITY UR: CLEAR
CO2 SERPL-SCNC: 16 MMOL/L
CO2 SERPL-SCNC: 18 MMOL/L
CO2 SERPL-SCNC: 21 MMOL/L
COLOR UR: YELLOW
CREAT SERPL-MCNC: 6 MG/DL
CREAT SERPL-MCNC: 6 MG/DL
CREAT SERPL-MCNC: 6.2 MG/DL
CREAT UR-MCNC: 54.3 MG/DL
CREAT UR-MCNC: 86.5 MG/DL
DIFFERENTIAL METHOD: ABNORMAL
DIFFERENTIAL METHOD: ABNORMAL
EOSINOPHIL # BLD AUTO: 0.3 K/UL
EOSINOPHIL # BLD AUTO: 0.3 K/UL
EOSINOPHIL NFR BLD: 4.9 %
EOSINOPHIL NFR BLD: 5.6 %
EOSINOPHIL URNS QL WRIGHT STN: NORMAL
ERYTHROCYTE [DISTWIDTH] IN BLOOD BY AUTOMATED COUNT: 13.8 %
ERYTHROCYTE [DISTWIDTH] IN BLOOD BY AUTOMATED COUNT: 13.9 %
EST. GFR  (AFRICAN AMERICAN): 10 ML/MIN/1.73 M^2
EST. GFR  (AFRICAN AMERICAN): 10 ML/MIN/1.73 M^2
EST. GFR  (AFRICAN AMERICAN): 9 ML/MIN/1.73 M^2
EST. GFR  (NON AFRICAN AMERICAN): 8 ML/MIN/1.73 M^2
FERRITIN SERPL-MCNC: 91 NG/ML
FOLATE SERPL-MCNC: 6.7 NG/ML
GLUCOSE SERPL-MCNC: 103 MG/DL
GLUCOSE SERPL-MCNC: 112 MG/DL
GLUCOSE SERPL-MCNC: 82 MG/DL
GLUCOSE UR QL STRIP: NEGATIVE
HCT VFR BLD AUTO: 29.6 %
HCT VFR BLD AUTO: 31.5 %
HGB BLD-MCNC: 10.2 G/DL
HGB BLD-MCNC: 9.6 G/DL
HGB UR QL STRIP: ABNORMAL
HYALINE CASTS #/AREA URNS LPF: 0 /LPF
IRON SERPL-MCNC: 35 UG/DL
KETONES UR QL STRIP: NEGATIVE
LEUKOCYTE ESTERASE UR QL STRIP: NEGATIVE
LYMPHOCYTES # BLD AUTO: 1.2 K/UL
LYMPHOCYTES # BLD AUTO: 2.4 K/UL
LYMPHOCYTES NFR BLD: 20.4 %
LYMPHOCYTES NFR BLD: 38.8 %
MCH RBC QN AUTO: 30.1 PG
MCH RBC QN AUTO: 30.2 PG
MCHC RBC AUTO-ENTMCNC: 32.4 G/DL
MCHC RBC AUTO-ENTMCNC: 32.4 G/DL
MCV RBC AUTO: 93 FL
MCV RBC AUTO: 93 FL
MICROSCOPIC COMMENT: NORMAL
MONOCYTES # BLD AUTO: 0.7 K/UL
MONOCYTES # BLD AUTO: 0.7 K/UL
MONOCYTES NFR BLD: 11.1 %
MONOCYTES NFR BLD: 12.1 %
NEUTROPHILS # BLD AUTO: 2.7 K/UL
NEUTROPHILS # BLD AUTO: 3.7 K/UL
NEUTROPHILS NFR BLD: 43.4 %
NEUTROPHILS NFR BLD: 62 %
NITRITE UR QL STRIP: NEGATIVE
PH UR STRIP: 6 [PH] (ref 5–8)
PHOSPHATE SERPL-MCNC: 5.8 MG/DL
PHOSPHATE SERPL-MCNC: 6.2 MG/DL
PLATELET # BLD AUTO: 208 K/UL
PLATELET # BLD AUTO: 226 K/UL
PMV BLD AUTO: 9.7 FL
PMV BLD AUTO: 9.8 FL
POCT GLUCOSE: 118 MG/DL (ref 70–110)
POCT GLUCOSE: 162 MG/DL (ref 70–110)
POCT GLUCOSE: 32 MG/DL (ref 70–110)
POTASSIUM SERPL-SCNC: 4.7 MMOL/L
POTASSIUM SERPL-SCNC: 4.8 MMOL/L
POTASSIUM SERPL-SCNC: 5.7 MMOL/L
PROT SERPL-MCNC: 6 G/DL
PROT SERPL-MCNC: 6.6 G/DL
PROT UR QL STRIP: ABNORMAL
PROT UR-MCNC: 139 MG/DL
PROT UR-MCNC: 218 MG/DL
PROT/CREAT RATIO, UR: 2.52
PROT/CREAT RATIO, UR: 2.56
RBC # BLD AUTO: 3.18 M/UL
RBC # BLD AUTO: 3.39 M/UL
RBC #/AREA URNS HPF: 1 /HPF (ref 0–4)
SATURATED IRON: 12 %
SODIUM SERPL-SCNC: 136 MMOL/L
SODIUM SERPL-SCNC: 139 MMOL/L
SODIUM SERPL-SCNC: 140 MMOL/L
SODIUM UR-SCNC: 40 MMOL/L
SODIUM UR-SCNC: 75 MMOL/L
SP GR UR STRIP: 1.02 (ref 1–1.03)
TOTAL IRON BINDING CAPACITY: 292 UG/DL
TRANSFERRIN SERPL-MCNC: 197 MG/DL
TRANSFERRIN SERPL-MCNC: 197 MG/DL
TROPONIN I SERPL DL<=0.01 NG/ML-MCNC: 0.03 NG/ML
TROPONIN I SERPL DL<=0.01 NG/ML-MCNC: 0.03 NG/ML
TSH SERPL DL<=0.005 MIU/L-ACNC: 1.56 UIU/ML
URATE SERPL-MCNC: 7.5 MG/DL
URN SPEC COLLECT METH UR: ABNORMAL
UROBILINOGEN UR STRIP-ACNC: NEGATIVE EU/DL
UUN UR-MCNC: 321 MG/DL
UUN UR-MCNC: 480 MG/DL
VIT B12 SERPL-MCNC: 532 PG/ML
WBC # BLD AUTO: 5.94 K/UL
WBC # BLD AUTO: 6.14 K/UL
WBC #/AREA URNS HPF: 0 /HPF (ref 0–5)

## 2017-08-22 PROCEDURE — 80053 COMPREHEN METABOLIC PANEL: CPT

## 2017-08-22 PROCEDURE — 82728 ASSAY OF FERRITIN: CPT

## 2017-08-22 PROCEDURE — 85025 COMPLETE CBC W/AUTO DIFF WBC: CPT | Mod: 91

## 2017-08-22 PROCEDURE — 93005 ELECTROCARDIOGRAM TRACING: CPT

## 2017-08-22 PROCEDURE — 96365 THER/PROPH/DIAG IV INF INIT: CPT

## 2017-08-22 PROCEDURE — 82746 ASSAY OF FOLIC ACID SERUM: CPT

## 2017-08-22 PROCEDURE — 84484 ASSAY OF TROPONIN QUANT: CPT | Mod: 91

## 2017-08-22 PROCEDURE — 94644 CONT INHLJ TX 1ST HOUR: CPT

## 2017-08-22 PROCEDURE — 80069 RENAL FUNCTION PANEL: CPT | Mod: 91

## 2017-08-22 PROCEDURE — 83880 ASSAY OF NATRIURETIC PEPTIDE: CPT

## 2017-08-22 PROCEDURE — 63600175 PHARM REV CODE 636 W HCPCS: Performed by: STUDENT IN AN ORGANIZED HEALTH CARE EDUCATION/TRAINING PROGRAM

## 2017-08-22 PROCEDURE — 21400001 HC TELEMETRY ROOM

## 2017-08-22 PROCEDURE — 81000 URINALYSIS NONAUTO W/SCOPE: CPT

## 2017-08-22 PROCEDURE — 99291 CRITICAL CARE FIRST HOUR: CPT | Mod: 25

## 2017-08-22 PROCEDURE — 25000242 PHARM REV CODE 250 ALT 637 W/ HCPCS: Performed by: EMERGENCY MEDICINE

## 2017-08-22 PROCEDURE — 82570 ASSAY OF URINE CREATININE: CPT | Mod: 91

## 2017-08-22 PROCEDURE — 80053 COMPREHEN METABOLIC PANEL: CPT | Mod: 91

## 2017-08-22 PROCEDURE — 82962 GLUCOSE BLOOD TEST: CPT

## 2017-08-22 PROCEDURE — 84540 ASSAY OF URINE/UREA-N: CPT

## 2017-08-22 PROCEDURE — 84443 ASSAY THYROID STIM HORMONE: CPT

## 2017-08-22 PROCEDURE — 93306 TTE W/DOPPLER COMPLETE: CPT

## 2017-08-22 PROCEDURE — 82570 ASSAY OF URINE CREATININE: CPT

## 2017-08-22 PROCEDURE — 96367 TX/PROPH/DG ADDL SEQ IV INF: CPT

## 2017-08-22 PROCEDURE — 99285 EMERGENCY DEPT VISIT HI MDM: CPT

## 2017-08-22 PROCEDURE — 36415 COLL VENOUS BLD VENIPUNCTURE: CPT

## 2017-08-22 PROCEDURE — 82607 VITAMIN B-12: CPT

## 2017-08-22 PROCEDURE — 87205 SMEAR GRAM STAIN: CPT

## 2017-08-22 PROCEDURE — 96372 THER/PROPH/DIAG INJ SC/IM: CPT

## 2017-08-22 PROCEDURE — 84540 ASSAY OF URINE/UREA-N: CPT | Mod: 91

## 2017-08-22 PROCEDURE — 83036 HEMOGLOBIN GLYCOSYLATED A1C: CPT

## 2017-08-22 PROCEDURE — 25000003 PHARM REV CODE 250: Performed by: EMERGENCY MEDICINE

## 2017-08-22 PROCEDURE — 84550 ASSAY OF BLOOD/URIC ACID: CPT

## 2017-08-22 PROCEDURE — 83540 ASSAY OF IRON: CPT

## 2017-08-22 PROCEDURE — 84300 ASSAY OF URINE SODIUM: CPT | Mod: 91

## 2017-08-22 PROCEDURE — 84100 ASSAY OF PHOSPHORUS: CPT

## 2017-08-22 PROCEDURE — 25000003 PHARM REV CODE 250: Performed by: STUDENT IN AN ORGANIZED HEALTH CARE EDUCATION/TRAINING PROGRAM

## 2017-08-22 PROCEDURE — 80069 RENAL FUNCTION PANEL: CPT

## 2017-08-22 PROCEDURE — 84484 ASSAY OF TROPONIN QUANT: CPT

## 2017-08-22 PROCEDURE — 84300 ASSAY OF URINE SODIUM: CPT

## 2017-08-22 PROCEDURE — 63600175 PHARM REV CODE 636 W HCPCS: Performed by: EMERGENCY MEDICINE

## 2017-08-22 PROCEDURE — 96376 TX/PRO/DX INJ SAME DRUG ADON: CPT

## 2017-08-22 RX ORDER — DEXTROSE 50 % IN WATER (D50W) INTRAVENOUS SYRINGE
25
Status: COMPLETED | OUTPATIENT
Start: 2017-08-22 | End: 2017-08-22

## 2017-08-22 RX ORDER — ACETAMINOPHEN 325 MG/1
650 TABLET ORAL EVERY 6 HOURS PRN
Status: DISCONTINUED | OUTPATIENT
Start: 2017-08-22 | End: 2017-08-25 | Stop reason: HOSPADM

## 2017-08-22 RX ORDER — IBUPROFEN 200 MG
24 TABLET ORAL
Status: DISCONTINUED | OUTPATIENT
Start: 2017-08-22 | End: 2017-08-25 | Stop reason: HOSPADM

## 2017-08-22 RX ORDER — ASPIRIN 81 MG/1
81 TABLET ORAL NIGHTLY
COMMUNITY

## 2017-08-22 RX ORDER — HEPARIN SODIUM 5000 [USP'U]/ML
5000 INJECTION, SOLUTION INTRAVENOUS; SUBCUTANEOUS EVERY 8 HOURS
Status: DISCONTINUED | OUTPATIENT
Start: 2017-08-22 | End: 2017-08-25 | Stop reason: HOSPADM

## 2017-08-22 RX ORDER — ALBUTEROL SULFATE 0.83 MG/ML
15 SOLUTION RESPIRATORY (INHALATION)
Status: COMPLETED | OUTPATIENT
Start: 2017-08-22 | End: 2017-08-22

## 2017-08-22 RX ORDER — INSULIN ASPART 100 [IU]/ML
1-10 INJECTION, SOLUTION INTRAVENOUS; SUBCUTANEOUS
Status: DISCONTINUED | OUTPATIENT
Start: 2017-08-22 | End: 2017-08-25 | Stop reason: HOSPADM

## 2017-08-22 RX ORDER — GLUCAGON 1 MG
1 KIT INJECTION
Status: DISCONTINUED | OUTPATIENT
Start: 2017-08-22 | End: 2017-08-25 | Stop reason: HOSPADM

## 2017-08-22 RX ORDER — FUROSEMIDE 10 MG/ML
160 INJECTION INTRAMUSCULAR; INTRAVENOUS EVERY 8 HOURS
Status: DISCONTINUED | OUTPATIENT
Start: 2017-08-22 | End: 2017-08-24

## 2017-08-22 RX ORDER — IBUPROFEN 200 MG
16 TABLET ORAL
Status: DISCONTINUED | OUTPATIENT
Start: 2017-08-22 | End: 2017-08-25 | Stop reason: HOSPADM

## 2017-08-22 RX ORDER — METOPROLOL SUCCINATE 50 MG/1
100 TABLET, EXTENDED RELEASE ORAL DAILY
Status: DISCONTINUED | OUTPATIENT
Start: 2017-08-22 | End: 2017-08-23

## 2017-08-22 RX ORDER — AMLODIPINE BESYLATE 5 MG/1
10 TABLET ORAL DAILY
Status: DISCONTINUED | OUTPATIENT
Start: 2017-08-22 | End: 2017-08-25 | Stop reason: HOSPADM

## 2017-08-22 RX ORDER — SODIUM BICARBONATE 1 MEQ/ML
50 SYRINGE (ML) INTRAVENOUS
Status: COMPLETED | OUTPATIENT
Start: 2017-08-22 | End: 2017-08-22

## 2017-08-22 RX ORDER — ASPIRIN 81 MG/1
81 TABLET ORAL DAILY
Status: DISCONTINUED | OUTPATIENT
Start: 2017-08-22 | End: 2017-08-25 | Stop reason: HOSPADM

## 2017-08-22 RX ORDER — FUROSEMIDE 10 MG/ML
120 INJECTION INTRAMUSCULAR; INTRAVENOUS
Status: COMPLETED | OUTPATIENT
Start: 2017-08-22 | End: 2017-08-22

## 2017-08-22 RX ADMIN — METOPROLOL SUCCINATE 100 MG: 25 TABLET, EXTENDED RELEASE ORAL at 12:08

## 2017-08-22 RX ADMIN — FUROSEMIDE 160 MG: 10 INJECTION, SOLUTION INTRAMUSCULAR; INTRAVENOUS at 02:08

## 2017-08-22 RX ADMIN — HEPARIN SODIUM 5000 UNITS: 5000 INJECTION, SOLUTION INTRAVENOUS; SUBCUTANEOUS at 02:08

## 2017-08-22 RX ADMIN — SODIUM BICARBONATE 50 MEQ: 84 INJECTION, SOLUTION INTRAVENOUS at 07:08

## 2017-08-22 RX ADMIN — FUROSEMIDE 120 MG: 10 INJECTION, SOLUTION INTRAMUSCULAR; INTRAVENOUS at 08:08

## 2017-08-22 RX ADMIN — FUROSEMIDE 160 MG: 10 INJECTION, SOLUTION INTRAMUSCULAR; INTRAVENOUS at 09:08

## 2017-08-22 RX ADMIN — DEXTROSE MONOHYDRATE 25 G: 25 INJECTION, SOLUTION INTRAVENOUS at 07:08

## 2017-08-22 RX ADMIN — ACETAMINOPHEN 650 MG: 325 TABLET ORAL at 12:08

## 2017-08-22 RX ADMIN — HEPARIN SODIUM 5000 UNITS: 5000 INJECTION, SOLUTION INTRAVENOUS; SUBCUTANEOUS at 09:08

## 2017-08-22 RX ADMIN — CALCIUM GLUCONATE 1 G: 94 INJECTION, SOLUTION INTRAVENOUS at 07:08

## 2017-08-22 RX ADMIN — DEXTROSE MONOHYDRATE 25 G: 25 INJECTION, SOLUTION INTRAVENOUS at 09:08

## 2017-08-22 RX ADMIN — AMLODIPINE BESYLATE 10 MG: 5 TABLET ORAL at 12:08

## 2017-08-22 RX ADMIN — INSULIN HUMAN 10 UNITS: 100 INJECTION, SOLUTION PARENTERAL at 07:08

## 2017-08-22 RX ADMIN — ASPIRIN 81 MG: 81 TABLET, COATED ORAL at 12:08

## 2017-08-22 RX ADMIN — ALBUTEROL SULFATE 15 MG: 2.5 SOLUTION RESPIRATORY (INHALATION) at 07:08

## 2017-08-22 NOTE — H&P
"[facility] Internal Medicine H&P    Admitting Team: Team A  Attending Physician: Dr. Wade  Resident: Dr. Metcalf  Intern: Dr. Lynn     Date of Admit: 8/22/2017    Chief Complaint     Worn out for 2 months     Subjective:      History of Present Illness:  Mr. Motley is a 73 yo man with a PMHx of HTN, CAD  s/p CABG x 4 in 2004, history of recurrent nephrolithiasis (since childhood) requiring multiple lithotripsies (calcium stones) per patient, and per chart review: CAD, HTN, Gout, and CKD stage 4 (2015: Cr 3.3 and K 5.6), HLD, Vitamin D deficiency, Anemia of Chronic Disease, and DMII (unk A1C). He was in his usual state of health until he noticed worsening FREIRE (can not walk one flight of stairs), scrotal and b/l LE swelling, PND, orthopnea, b/l LE pruirits, b/l leg cramping, and LUQ abdominal pain for the past couple of months. He states he is compliant with medications, has watched his salt and fluid intake, and denies NSAID usage. However, for the past month he has been taking his wife's 40meq oral potassium daily to reduce his leg cramping. He also has been under recent stress due to his wife's worsening medical condition. He has had kidney stone removal and diverticulitis requiring surgery in the past. On ROS, he endorses dysuria for many years and has notices "urethral tissue" that he believes to be dried blood. He endorses bruising noticed within the past 2 weeks. Denies n/v, change in urinary frequency, change in bowels, f/c/ns, chest pain, dysphagia, penile discharge, dizziness, headache, confusion, tremors, cough.           Past Medical History:  Past Medical History:   Diagnosis Date    Anemia     Coronary artery disease     Diabetes mellitus     Gout     Hypertension     Recurrent nephrolithiasis     Unspecified disorder of kidney and ureter     Urinary tract infection        Past Surgical History:  Kidney stone removal  Diverticulitis requiring abdominal surgery  CABGx4, " 2004    Allergies:  Review of patient's allergies indicates:  No Known Allergies    Home Medications:  Prior to Admission medications    Medication Sig Start Date End Date Taking? Authorizing Provider   albuterol (PROVENTIL) 2.5 mg /3 mL (0.083 %) nebulizer solution  1/22/15  Yes Historical Provider, MD   allopurinol (ZYLOPRIM) 100 MG tablet  11/18/13  Yes Historical Provider, MD   amlodipine (NORVASC) 10 MG tablet  11/18/13  Yes Historical Provider, MD   aspirin (ECOTRIN) 81 MG EC tablet Take 81 mg by mouth once daily.   Yes Historical Provider, MD   furosemide (LASIX) 20 MG tablet Take 2 tablets (40 mg total) by mouth once daily. 4/30/15 8/22/17 Yes Schuyler Valenzuela MD   metoprolol succinate (TOPROL-XL) 100 MG 24 hr tablet  8/13/13  Yes Historical Provider, MD   oxycodone-acetaminophen  mg (PERCOCET)  mg per tablet  10/7/13  Yes Historical Provider, MD   pravastatin (PRAVACHOL) 40 MG tablet  8/13/13  Yes Historical Provider, MD   diazepam (VALIUM) 10 MG Tab  10/26/13   Historical Provider, MD   hydrOXYzine (VISTARIL) 25 MG Cap  9/9/13   Historical Provider, MD   PROAIR HFA 90 mcg/actuation inhaler  1/21/15   Historical Provider, MD   amoxicillin-clavulanate 875-125mg (AUGMENTIN) 875-125 mg per tablet  1/21/15 8/22/17  Historical Provider, MD   benazepril (LOTENSIN) 40 MG tablet  1/26/15 8/22/17  Historical Provider, MD   ciclopirox (LOPROX) 0.77 % Crea  10/7/13 8/22/17  Historical Provider, MD   levofloxacin (LEVAQUIN) 500 MG tablet  3/3/15 8/22/17  Historical Provider, MD   tizanidine (ZANAFLEX) 4 MG tablet  4/17/15 8/22/17  Historical Provider, MD       Family History:  Family History   Problem Relation Age of Onset    Cancer Mother     Kidney disease Mother     Heart attack Father        Social History:  Social History   Substance Use Topics    Smoking status: Former Smoker     Packs/day: 2.00     Years: 30.00    Smokeless tobacco: Former User     Quit date: 1/12/1995    Alcohol use Not  "on file     Lives at home with wife in [place]. Performs ADLs and drives. Denies smoking, illicit drug use, or alcohol use.       Review of Systems:  Pertinent items are noted in HPI. All other systems are reviewed and are negative.    Health Maintaince :   Primary Care Physician: Dr. Lauri Kerr     Immunizations:   TDap UTD  Flu Not UTD  Pna UTD    Cancer Screening:  Colonoscopy : Over 10 years ago      Objective:   Last 24 Hour Vital Signs:  BP  Min: 142/53  Max: 158/66  Temp  Av.6 °F (37 °C)  Min: 98.6 °F (37 °C)  Max: 98.6 °F (37 °C)  Pulse  Av.2  Min: 59  Max: 70  Resp  Av.5  Min: 16  Max: 20  SpO2  Av.3 %  Min: 95 %  Max: 96 %  Height  Av' 6" (167.6 cm)  Min: 5' 6" (167.6 cm)  Max: 5' 6" (167.6 cm)  Weight  Av.8 kg (156 lb)  Min: 70.8 kg (156 lb)  Max: 70.8 kg (156 lb)  Body mass index is 25.18 kg/m².  No intake/output data recorded.    Physical Examination:  Gen: Able to speak full sentences, well-nourished, pleasant, AAAOx3  Eyes: No scleral icterus, EOMI  HEENT: MMM, JVP 11cm, No thyromegaly  Cards: RRR, no murmurs, rubs, or gallops  Ext: anasarca with sacral pitting edema, b/l LE pitting edema to knee with skin changes, no asterixis, pulses 2+ b/l   Skin: chest and abdomen ecchymoses   Abd: BS positive, non-tender, non-distended, no organomegaly, midline suprapubic healed scar, negative Diaz's sign  Genital: No scrotal swelling or tenderness, no penile discharge or meatus dried blood  Pulm: decreased breath sounds b/l, no wheezes, crackles          Laboratory:  Most Recent Data:  CBC: Lab Results   Component Value Date    WBC 5.94 2017    HGB 10.2 (L) 2017    HCT 31.5 (L) 2017     2017    MCV 93 2017    RDW 13.9 2017       BMP: Lab Results   Component Value Date     2017    K 5.7 (H) 2017     2017    CO2 16 (L) 2017    BUN 73 (H) 2017    CREATININE 6.2 (H) 2017     (H) " 08/22/2017    CALCIUM 8.8 08/22/2017    PHOS 4.8 (H) 05/29/2015     LFTs: Lab Results   Component Value Date    PROT 6.6 08/22/2017    ALBUMIN 3.2 (L) 08/22/2017    BILITOT 0.4 08/22/2017    AST 16 08/22/2017    ALKPHOS 87 08/22/2017    ALT 10 08/22/2017     Coags: No results found for: INR, PROTIME, PTT  FLP: No results found for: CHOL, HDL, LDLCALC, TRIG, CHOLHDL  DM: Lab Results   Component Value Date    HGBA1C 5.8 05/27/2015    CREATININE 6.2 (H) 08/22/2017     Thyroid: No results found for: TSH, FREET4, L7YNNKJ, T9HSZDG, THYROIDAB  Anemia: Lab Results   Component Value Date    IRON 86 05/27/2015    TIBC 410 05/27/2015    FERRITIN 47 05/27/2015     Cardiac: Lab Results   Component Value Date    TROPONINI 0.032 (H) 08/22/2017    BNP 3,431 (H) 08/22/2017     Urinalysis: Lab Results   Component Value Date    COLORU Yellow 08/22/2017    SPECGRAV 1.025 08/22/2017    NITRITE Negative 08/22/2017    KETONESU Negative 08/22/2017    UROBILINOGEN Negative 08/22/2017    WBCUA 0 08/22/2017       Trended Lab Data:    Recent Labs  Lab 08/22/17  0531   WBC 5.94   HGB 10.2*   HCT 31.5*      MCV 93   RDW 13.9      K 5.7*      CO2 16*   BUN 73*   CREATININE 6.2*   *   PROT 6.6   ALBUMIN 3.2*   BILITOT 0.4   AST 16   ALKPHOS 87   ALT 10       Trended Cardiac Data:    Recent Labs  Lab 08/22/17  0531   TROPONINI 0.032*   BNP 3,431*         Other Results:  EKG (my interpretation): Not on file yet    Radiology:  Imaging Results          X-Ray Chest 1 View (Final result)  Result time 08/22/17 06:43:39    Final result by Priscilla Mauricio MD (08/22/17 06:43:39)                 Impression:       As above.      Electronically signed by: PRISCILLA MAURICIO  Date:     08/22/17  Time:    06:43              Narrative:    Comparison: None available    Technique: Single portable AP chest radiograph.    Findings: There is postoperative change of prior median sternotomy.  The cardiomediastinal silhouette is within normal limits  The visualized airway is unremarkable.  There is subsegmental atelectasis at the lung bases.  No evidence of focal consolidation, significant pleural effusion or pneumothorax.  Osseous structures demonstrate no acute abnormality.                              CXR (8/22): Findings: There is postoperative change of prior median sternotomy.  The cardiomediastinal silhouette is within normal limits The visualized airway is unremarkable.  There is subsegmental atelectasis at the lung bases.  No evidence of focal consolidation, significant pleural effusion or pneumothorax.  Osseous structures demonstrate no acute abnormality.     Assessment:     Gunner Motley is a 72 y.o. male with:  Patient Active Problem List    Diagnosis Date Noted    HTN (hypertension) 05/01/2015    Recurrent nephrolithiasis 05/01/2015    CKD (chronic kidney disease) stage 4, GFR 15-29 ml/min 05/01/2015    Anemia 05/01/2015    Gout 05/01/2015    CAD (coronary artery disease) 05/01/2015        Plan:     1. Acute on Chronic Kidney Disease IV (2015: Cr 3.3, now 6.2)  -Will hold nephrotoxic agents  -Consult Nephrology  -Daily weights, strict Is and Os  -Renal US to r/o Post Renal   -Cardiorenal: BNP 3,431, CXR, physical exam sings of overload s/p Lasix 120mg Iv. Ordered Echo.  -Urine Na, Cr, Urea, Eos, prt/cr ratio, Ck, TSH, P  -Will continue to diurese to assess response  -Ua: 3+ protein, 1+ occult blood     2. HTN  -Stable at 142/53  -Amlodipine 10, metoprolol 100    3. Hyperkalemia  -Likely 2/2 AoCKD with increased intake   -Shifted in the ED with Nabicarb, caglc, insulin + D50, albuterol, will repeat CMP, EKG    4. CAD s/p CABGx4 (2004)  -Stable  -Maximize medical management: Bb metoprolol 100, ASA 81, lasix po 20 BID  -Hold ACE-I 2/2 ALEXI  -Hold statin 2/2 ALEXI      5. Normocytic Anemia  -Hgb 9.6/29.6 from 10.2  -Stable, no active bleeding  -Ua: 1+ occult blood   -Will order TIBC, Folate, B12, Ferritin, Transferin  -H/o JOANNE in 2015    6.  HLD  -Hold statin pravastatin 40     7. DMII  -A1c 5.8 in 2015, will reorder  -SSI and Accuchecks       8. Vit D Deficiency   -2015 records taking Vit D  -Will reorder    9. Troponinemia (0.032)  -Likely 2/2 ARK  -Will trend     10. Metabolic Acidosis   -Will resolve     11. Gout  -Stable  -Home med allopurinol 100mg         Code Status:     Full?    Dispo: heparin, diuresis management, renal diet     Karishma Lynn MD  [facility] Internal Medicine HO1    [facility] Medicine Hospitalist Pager numbers:   [facility] Hospitalist Medicine Team A (Farooq/Mauro): [phone]  [facility] Hospitalist Medicine Team B (Chaka/Lowell):  [phone]

## 2017-08-22 NOTE — MEDICAL/APP STUDENT
[facility] Medical Student L4 H&P    CC: Fatigue x2-3 months    HPI:     73 yo M with a hx of HTN, CAD s/p CABGx4 (2004), CKD4, HLD, and DM2 was in his usual state of health living at home with his wife and performing all ADLs without assistance until the past 2-3 months he noticed worsening fatigue associated with SOB, abdominal pain, swelling of his bilateral lower extremities, and panic attacks. He reports his SOB is only present with exertion and decreases with rest. His BLE swelling decreases with elevation, but has worsened over the past 3 weeks to the point where has also noticed swelling above his knees including his scrotum. Pt's abdominal pain described as cramping and located beneath his ribs. His abdominal discomfort has worsened over the past few days. He usually has 1 bowel movement per day but his last one was 3 days ago. He also reports recent cramping in his BLEs; pt took 4-5 of his wife's 40 mg potassium pills over the past week hoping to improve his cramping. Dysuria for many years that pt associates with urethra tissue damage secondary to multiple kidney stones. Recent decrease in urinary frequency over the past month. No penile discharge. Pt recently fell on his right side resulting in a large bruise over his RUE. Last night his bilateral LE swelling worsened to the point that it did not decrease with elevation. Pt associated swelling with pain and difficulty sleeping causing him to seek medical attention.     PMHx:   Anemia      Coronary artery disease      Diabetes mellitus      Gout      Hypertension      Recurrent nephrolithiasis      Unspecified disorder of kidney and ureter      Urinary tract infection      Allergies: NKA     Meds:    Current Facility-Administered Medications:     acetaminophen tablet 650 mg, 650 mg, Oral, Q6H PRN, Veda Sheikh MD, 650 mg at 08/22/17 1212    amlodipine tablet 10 mg, 10 mg, Oral, Daily, Veda Sheikh MD, 10 mg at 08/22/17 1213    aspirin  EC tablet 81 mg, 81 mg, Oral, Daily, Veda Sheikh MD, 81 mg at 08/22/17 1212    dextrose 50% injection 12.5 g, 12.5 g, Intravenous, PRN, Veda Sheikh MD    dextrose 50% injection 25 g, 25 g, Intravenous, PRN, Veda Sheikh MD    furosemide injection 160 mg, 160 mg, Intravenous, Q8H, Veda Sheikh MD, 160 mg at 08/22/17 1425    glucagon (human recombinant) injection 1 mg, 1 mg, Intramuscular, PRN, Veda Sheikh MD    glucose chewable tablet 16 g, 16 g, Oral, PRN, Veda Sheikh MD    glucose chewable tablet 24 g, 24 g, Oral, PRN, Veda Sheikh MD    heparin (porcine) injection 5,000 Units, 5,000 Units, Subcutaneous, Q8H, Veda Sheikh MD, 5,000 Units at 08/22/17 1425    insulin aspart pen 1-10 Units, 1-10 Units, Subcutaneous, QID (AC + HS) PRN, Veda Sheikh MD    metoprolol succinate (TOPROL-XL) 24 hr tablet 100 mg, 100 mg, Oral, Daily, Veda Sheikh MD, 100 mg at 08/22/17 1212    Current Outpatient Prescriptions:     albuterol (PROVENTIL) 2.5 mg /3 mL (0.083 %) nebulizer solution, , Disp: , Rfl: 0    allopurinol (ZYLOPRIM) 100 MG tablet, , Disp: , Rfl:     amlodipine (NORVASC) 10 MG tablet, , Disp: , Rfl:     aspirin (ECOTRIN) 81 MG EC tablet, Take 81 mg by mouth once daily., Disp: , Rfl:     furosemide (LASIX) 20 MG tablet, Take 2 tablets (40 mg total) by mouth once daily., Disp: 60 tablet, Rfl: 3    metoprolol succinate (TOPROL-XL) 100 MG 24 hr tablet, , Disp: , Rfl:     oxycodone-acetaminophen  mg (PERCOCET)  mg per tablet, , Disp: , Rfl:     pravastatin (PRAVACHOL) 40 MG tablet, , Disp: , Rfl:     diazepam (VALIUM) 10 MG Tab, , Disp: , Rfl:     hydrOXYzine (VISTARIL) 25 MG Cap, , Disp: , Rfl:     PROAIR HFA 90 mcg/actuation inhaler, , Disp: , Rfl: 0    FamHx:    Cancer Mother      Kidney disease Mother      Heart attack Father        Surg:   Kidney stone removal  Diverticulitis requiring abdominal  surgery  CABGx4, 2004    Soc: Former smoker 2-3 ppd for 30 years-quit in 2004, No alcohol use since MI in 2004, no illicit drug use.     Immunizations: TDap UTD  Flu Not UTD  Pna UTD     Screening: Colonoscopy : Over 10 years ago     ROS:  No chest pain or palpitations. No dizziness or LOC. No n/v, fever or chills. All other pertinent systems reviewed and negative.     Physical Exam:    Vital Signs Admit:    BP: 158/66  Temp: 98.6  Pulse: 70  RR: 20  SpO2: 95    PE:  Gen: awake, cooperative, alert and oriented  HEENT: PERRL, MMM, clear oropharynx, neck supple, no thyroid enlargement, no LAD, JVP 11cm   Resp: Diffuse crackles bilateral upper and lower lung fields on auscultation, no wheezing  Abd: soft, positive BS, no pain on palpation, non-distended, no organomegaly, negative azul's sign.   CV: RRR, no murmurs, no clicks, no rubs.   Ext: Pitting edema in bilateral lower ext up to pt's knees with erythema over shins, no cyanosis, no jaundice.   Neuro: Alert and Oriented.   Skin: Scattered bruising over chest and abdomen, large bruise over right UE.     Labs:  H/H: 10.2/31.5  Plt: 226  WBC: 5.94  Na: 136  K: 5.7  Bicarb: 16  Cl: 108  BUN: 73  Cr:  6.2  Glucose: 112  Ca: 8.8  Albumin: 3.2  Tbili: 0.4  AST: 16  ALT: 10  Alk Phos: 87    Troponin: 0.032  BNP: 3431    U/A: 3+ protein, 1+ blood, no glucose, no ketones, no LE or nitrites, 1 RBC, no WBC, no bacteria     Imaging:    CXR:     Postoperative change of prior median sternotomy.  The cardiomediastinal silhouette is within normal limits The visualized airway is unremarkable.  There is subsegmental atelectasis at the lung bases.  No evidence of focal consolidation, significant pleural effusion or pneumothorax.  Osseous structures demonstrate no acute abnormality.    U/S Kidney:    Changes consistent with chronic medical renal disease. Probable nonobstructing right renal calculus, unchanged.        Assessment:    73 yo M with a hx of HTN, CAD s/p CABGx4 (2004),  CKD4, HLD, recurrent nephrolithiasis, gout, anemia and DM2 here with acute on chronic kidney disease IV.     Plan:    Acute on Chronic Kidney Disease IV:   -Consult nephrology and  Hold nephrotoxic agents   -Daily weights, strict Is and Os  -Renal US showed no post renal obstruction  -Cardiorenal: BNP 3,431, CXR, physical exam sings of overload s/p Lasix 120mg Iv.   -Follow up Echo.  -Urine Na, Cr, Urea, Eos, prt/cr ratio, Ck, TSH, Phos  -Will continue to diurese to assess response  -Ua: 3+ protein, 1+ occult blood     HTN:  -158/66, continue to monitor   -Amlodipine 10, metoprolol 100    Hyperkalemia:  -Likely 2/2 AoCKD with increased intake of K supplements   -Shifted in the ED with Nabicarb, caglc, insulin + D50, albuterol, will repeat CMP, EKG     CAD s/p CABGx4 in 2004:  -Stable  -Maximize medical management: Bb metoprolol 100, ASA 81, lasix po 20 BID  -Hold ACE-I 2/2 ALEXI  -Hold statin 2/2 ALEXI     Normocytic Anemia:  -Hgb 9.6/29.6 from 10.2  -Stable, no active bleeding  -Ua: 1+ occult blood   -Will order TIBC, Folate, B12, Ferritin, Transferin  -H/o JOANNE in 2015     HLD:  -Hold statin pravastatin 40    DM2:  -A1c 5.8 in 2015, will reorder  -SSI and Accuchecks     Vitamin D Deficiency:  -2015 records taking Vit D  -Will reorder    Elevated Troponin:   -Likely 2/2 ARK  -Will trend     Metabolic Acidosis:   -Will resolve     Gout:  -Stable  -Home med allopurinol 100mg     Dispo: f/u nephrology consult, renal diet, heparin for DVT prophylaxis, continue lasix     Papito REVELES

## 2017-08-22 NOTE — ED PROVIDER NOTES
Encounter Date: 8/22/2017       History     Chief Complaint   Patient presents with    Leg Swelling     pt. reportes worsening edema to lower extremities x1 week. pt. also c/o bruising on arms and abdomen. pt. has bruising to rt. arm from trip and fall last week.      This is a 72-year-old man who presents with a complaint of lower extremity edema.  He is being evaluated for this by his PCP as he has also had worsening exertional  dyspnea for the last several months.  Denies other complaints.           Review of patient's allergies indicates:  No Known Allergies  Past Medical History:   Diagnosis Date    Anemia     Coronary artery disease     Diabetes mellitus     Gout     Hypertension     Recurrent nephrolithiasis     Unspecified disorder of kidney and ureter     Urinary tract infection      No past surgical history on file.  Family History   Problem Relation Age of Onset    Cancer Mother     Kidney disease Mother     Heart attack Father      Social History   Substance Use Topics    Smoking status: Former Smoker     Packs/day: 2.00     Years: 30.00    Smokeless tobacco: Former User     Quit date: 1/12/1995    Alcohol use Not on file     Review of Systems   Constitutional: Negative for chills and fever.   HENT: Negative for sore throat and trouble swallowing.    Respiratory: Positive for shortness of breath. Negative for cough, choking, wheezing and stridor.    Cardiovascular: Positive for leg swelling. Negative for chest pain and palpitations.   Gastrointestinal: Negative for abdominal pain.   Genitourinary: Positive for difficulty urinating. Negative for decreased urine volume.       Physical Exam     Initial Vitals [08/22/17 0455]   BP Pulse Resp Temp SpO2   (!) 158/66 70 20 98.6 °F (37 °C) 95 %      MAP       96.67         Physical Exam    Constitutional: He appears well-developed and well-nourished. No distress.   HENT:   Head: Normocephalic.   Eyes: Conjunctivae and EOM are normal.   Neck:  Normal range of motion. No JVD present.   Cardiovascular: Normal rate, regular rhythm, normal heart sounds and intact distal pulses. Exam reveals no gallop and no friction rub.    No murmur heard.  Pulmonary/Chest: Breath sounds normal. No respiratory distress. He has no wheezes. He has no rhonchi. He has no rales.   Abdominal: Soft. He exhibits no distension and no mass. There is no tenderness. There is no guarding.   Musculoskeletal: He exhibits edema.   1 + bialteral LE pitting edema from mid-shin distally.  No calf swelling, cording, tenderness or erythema.    Lymphadenopathy:     He has no cervical adenopathy.   Neurological: He is alert and oriented to person, place, and time. He has normal strength. Coordination normal.   Gait steady         ED Course   Critical Care  Date/Time: 8/22/2017 8:53 AM  Performed by: KENA BLANCHARD  Authorized by: TASIA NICHOLS   Direct patient critical care time: 12 minutes  Additional history critical care time: 5 minutes  Ordering / reviewing critical care time: 12 minutes  Documentation critical care time: 12 minutes  Consulting other physicians critical care time: 3 minutes  Total critical care time (exclusive of procedural time) : 44 minutes  Critical care was time spent personally by me on the following activities: ordering and review of radiographic studies, ordering and performing treatments and interventions, examination of patient, evaluation of patient's response to treatment, obtaining history from patient or surrogate and ordering and review of laboratory studies.        Labs Reviewed   CBC W/ AUTO DIFFERENTIAL - Abnormal; Notable for the following:        Result Value    RBC 3.39 (*)     Hemoglobin 10.2 (*)     Hematocrit 31.5 (*)     All other components within normal limits   COMPREHENSIVE METABOLIC PANEL - Abnormal; Notable for the following:     Potassium 5.7 (*)     CO2 16 (*)     Glucose 112 (*)     BUN, Bld 73 (*)     Creatinine 6.2 (*)     Albumin  3.2 (*)     eGFR if  9 (*)     eGFR if non  8 (*)     All other components within normal limits   B-TYPE NATRIURETIC PEPTIDE - Abnormal; Notable for the following:     BNP 3,431 (*)     All other components within normal limits   TROPONIN I - Abnormal; Notable for the following:     Troponin I 0.032 (*)     All other components within normal limits   URINALYSIS - Abnormal; Notable for the following:     Protein, UA 3+ (*)     Occult Blood UA 1+ (*)     All other components within normal limits   URINALYSIS MICROSCOPIC     EKG Readings: (Independently Interpreted)   Rhythm: Normal Sinus Rhythm. Heart Rate: 64. Ectopy: Bigeminy.   Sinus rhythm with Bigeminy, PVC.  Rate 64.  Non-specific T-wave abdnormailites.     Imaging Results          X-Ray Chest 1 View (Final result)  Result time 08/22/17 06:43:39    Final result by Priscilla Mauricio MD (08/22/17 06:43:39)                 Impression:       As above.      Electronically signed by: PRISCILLA MAURICIO  Date:     08/22/17  Time:    06:43              Narrative:    Comparison: None available    Technique: Single portable AP chest radiograph.    Findings: There is postoperative change of prior median sternotomy.  The cardiomediastinal silhouette is within normal limits The visualized airway is unremarkable.  There is subsegmental atelectasis at the lung bases.  No evidence of focal consolidation, significant pleural effusion or pneumothorax.  Osseous structures demonstrate no acute abnormality.                                 Medical Decision Making:   Differential Diagnosis:   Dyspnea is chronic.   Vitals stable in the ER and looks well without distress.    He is being evaluated for renal insufficiency by his PCP.                    ED Course     Clinical Impression:   The primary encounter diagnosis was Renal failure, unspecified chronicity. Diagnoses of Edema extremities and Hyperkalemia were also pertinent to this visit.                            Lasha Clay MD  08/22/17 0874

## 2017-08-22 NOTE — NURSING
At 1615: Patient arrived to  via stretcher with COLLIN Elizabeth from ER. Daughter at bedside. Patient AAO x4. At present no distress noted. No SOB or FREIRE noted. +1-2 edema noted to BLE and ankles. IV sites x2 c/d/i. Tele monitor placed on patient as per orders. Bruising noted to JOSE ROBERTO and Left chest wall. Will cont to monitor pt and intervene prn. For further information, refer to admission assessment data sheets.

## 2017-08-22 NOTE — ED TRIAGE NOTES
Pt. To ER with c/o bruising to left side of chest and abdominal wall with bilateral leg swelling for the past few weeks. The pt. Is awake, alert and oriented. He states his PMD and nephrologist have suggested him beginning dialysis several times within the past few months, but he takes care of his spouse and isn't ready to begin dialysis. Pt. Placed on cardiac, BP and pulse oximetry monitoring. Resp. Even and non labored, clear bilaterally. Abdomen soft and flat non tender. Pitting edema noted to bilateral legs and ankles. Bed in low position, side rails elevated x 2. Daughter at the bedside. Pt. Has a purplish bruise to his right upper arm, he fell outside while moving a table a few weeks ago, denies LOC.

## 2017-08-22 NOTE — CONSULTS
"[employer] renal fellow HOIV  Consult note    Reason for Consult:     "ALEXI with hyperkalemia"    Subjective:      History of Present Illness:  Gunner Motley is a 72 y.o. C male who  has a past medical history of Anemia; Coronary artery disease; Diabetes mellitus; Gout; Hypertension; Recurrent nephrolithiasis; Unspecified disorder of kidney and ureter; and Urinary tract infection.. The patient presented to the [facility] on 8/22/2017 with a primary complaint of Leg Swelling (pt. reportes worsening edema to lower extremities x1 week. pt. also c/o bruising on arms and abdomen. pt. has bruising to rt. arm from trip and fall last week. )    Pt is distressed and has trouble staying on topic; interview aided by help of pt's daughter.    Pt states he cares for his debilitated wife who had spinal surgery for mass; since this surgery he admits to neglecting his own health; he states approx 2-3 weeks ago, he noticed that he was drinking more but urinating less; he also states this is about the time frame he started to develop B LE edema.  The daughter states she noticed approx 3 months ago, the pt began to have increased anxiety; she also states that approx 2-3 days ago she noticed swelling in pt's B ankles.  Pt was last hospitalized in 10/2016 for what daughter described at uremic [facility] - during this admission at [facility], the pt was "going to have a port placed for HD" but lab work was done prior to this and the pt had resolution of his "kidney problems."  Unfortunately, the pt has not returned to a MD since then, so we do not have recent renal function studies to compare.    pt denies NSAID use/metallic taste/dysgeusia; pt endorses pruritis; pt currently denies CP/SOB leading up to this hospitalization; he denies dysuria/hematuria.  He does endorse some supra pubic pain/Left flank pain      Pt's renal Hx per chart check and interview as follows:  2015 - chart review stating pt had recurrent nephrolithiasis with multiple " lithotripsy/CKD IV (etiol multifactorial including intermittent obstruction/recurrent pyelonephritits/gout; had a [facility] hospitalization for AMS + ALEXI in 1/2015; has Hx of hyperkalemia; during 2015, BL Cr approx 2.0-3.0; renal US with evidence of chronic medical renal Dz    Past Medical History:  Past Medical History:   Diagnosis Date    Anemia     Coronary artery disease     Diabetes mellitus     Gout     Hypertension     Recurrent nephrolithiasis     Unspecified disorder of kidney and ureter     Urinary tract infection        Past Surgical History:  History reviewed. No pertinent surgical history.    Allergies:  Review of patient's allergies indicates:  No Known Allergies    Medications:   In-Hospital Scheduled Medications:     In-Hospital PRN Medications:     In-Hospital IV Infusion Medications:     Home Medications:  Prior to Admission medications    Medication Sig Start Date End Date Taking? Authorizing Provider   albuterol (PROVENTIL) 2.5 mg /3 mL (0.083 %) nebulizer solution  1/22/15  Yes Historical Provider, MD   allopurinol (ZYLOPRIM) 100 MG tablet  11/18/13  Yes Historical Provider, MD   amlodipine (NORVASC) 10 MG tablet  11/18/13  Yes Historical Provider, MD   aspirin (ECOTRIN) 81 MG EC tablet Take 81 mg by mouth once daily.   Yes Historical Provider, MD   furosemide (LASIX) 20 MG tablet Take 2 tablets (40 mg total) by mouth once daily. 4/30/15 8/22/17 Yes Schuyler Valenzuela MD   metoprolol succinate (TOPROL-XL) 100 MG 24 hr tablet  8/13/13  Yes Historical Provider, MD   oxycodone-acetaminophen  mg (PERCOCET)  mg per tablet  10/7/13  Yes Historical Provider, MD   pravastatin (PRAVACHOL) 40 MG tablet  8/13/13  Yes Historical Provider, MD   diazepam (VALIUM) 10 MG Tab  10/26/13   Historical Provider, MD   hydrOXYzine (VISTARIL) 25 MG Cap  9/9/13   Historical Provider, MD   PROAIR HFA 90 mcg/actuation inhaler  1/21/15   Historical Provider, MD   amoxicillin-clavulanate 875-125mg  "(AUGMENTIN) 875-125 mg per tablet  1/21/15 8/22/17  Historical Provider, MD   benazepril (LOTENSIN) 40 MG tablet  1/26/15 8/22/17  Historical Provider, MD   ciclopirox (LOPROX) 0.77 % Crea  10/7/13 8/22/17  Historical Provider, MD   levofloxacin (LEVAQUIN) 500 MG tablet  3/3/15 8/22/17  Historical Provider, MD   tizanidine (ZANAFLEX) 4 MG tablet  4/17/15 8/22/17  Historical Provider, MD       Family History:  Family History   Problem Relation Age of Onset    Cancer Mother     Kidney disease Mother     Heart attack Father        Social History:  Social History   Substance Use Topics    Smoking status: Former Smoker     Packs/day: 2.00     Years: 30.00    Smokeless tobacco: Former User     Quit date: 1995    Alcohol use Not on file       Review of Systems:  All other systems are reviewed and are negative.         Objective:   Last 24 Hour Vital Signs:  BP  Min: 142/53  Max: 158/66  Temp  Av.6 °F (37 °C)  Min: 98.6 °F (37 °C)  Max: 98.6 °F (37 °C)  Pulse  Av.2  Min: 59  Max: 70  Resp  Av.5  Min: 16  Max: 20  SpO2  Av.3 %  Min: 95 %  Max: 96 %  Height  Av' 6" (167.6 cm)  Min: 5' 6" (167.6 cm)  Max: 5' 6" (167.6 cm)  Weight  Av.8 kg (156 lb)  Min: 70.8 kg (156 lb)  Max: 70.8 kg (156 lb)  No intake/output data recorded.    Physical Examination:  GEN - anxious; AAOx4, NAD  NECK - JVP approx 10cm H2O  HEART - occasional extra beats; no m/r/s3/s4  CHEST - B crackles midway up lung fields  ABD - soft; nonttp; BS +  EXT - pitting edema to B thighs; 2+ B DP pulses; warm  NEURO - no asterixis  SKIN - scabbed excoriations on B shins      Laboratory Results:    Trended Lab Data:    Recent Labs  Lab 17  0531   WBC 5.94   HGB 10.2*   HCT 31.5*      MCV 93   RDW 13.9      K 5.7*      CO2 16*   BUN 73*   *   PROT 6.6   ALBUMIN 3.2*   BILITOT 0.4   AST 16   ALKPHOS 87   ALT 10       Trended Cardiac Data:    Recent Labs  Lab 17  0531   TROPONINI 0.032*   BNP " 3,431*         Other Results:  EKG (my interpretation): frequent PVCs    Radiology:  X-ray Chest 1 View    Result Date: 8/22/2017  Central pulmonary venous congestion; no focal consolidation; B blunting of costophrenic angles           Assessment/Plan     This is a 73 yo male here for worsening B LE edema; L-renal has been consulted for    ALEXI with hyperkalemia on CKD IV  -etiol of CKD includes recurrent obstructive uropathy/HTN/small vessel Dz; etiol of ALEXI includes obstruction VS progression of renal Dz  -BL Cr from records 2.0-3.0; now BUN:Cr 73/6.2; UA sig fro 3+ protein/pH of 6.0/SG of 1.025  -ED has tx'd hyperkalemia with insulin/albuterol/NaHCO3/Ca gluconate; rec trending serum K; rec holding ACEi/ARB for now  -rec renal US to rule out obstruction; urine Na/Urea; serum uric acid/cpk/Pr:Cr ordered  -agree with diuresis as pt is hypervolemic - rec echo with elevated BNP to rule out/in cardiac causes  -I have spoken with pt and daughter and they agree to HD if needed    Normocytic anemia  -work-up per primary    Hypo Vit D  -will need ergo as outpt    Papito Paniagua II  [employer] renal HO IV  [phone]

## 2017-08-23 PROBLEM — I50.33 ACUTE ON CHRONIC DIASTOLIC HEART FAILURE: Status: ACTIVE | Noted: 2017-08-23

## 2017-08-23 PROBLEM — I27.20 PULMONARY HYPERTENSION: Status: ACTIVE | Noted: 2017-08-23

## 2017-08-23 LAB
ALBUMIN SERPL BCP-MCNC: 3 G/DL
ALBUMIN SERPL BCP-MCNC: 3.1 G/DL
ALBUMIN SERPL BCP-MCNC: 3.1 G/DL
ALBUMIN SERPL BCP-MCNC: 3.2 G/DL
ALP SERPL-CCNC: 92 U/L
ALT SERPL W/O P-5'-P-CCNC: 10 U/L
ANION GAP SERPL CALC-SCNC: 10 MMOL/L
ANION GAP SERPL CALC-SCNC: 11 MMOL/L
ANION GAP SERPL CALC-SCNC: 12 MMOL/L
ANION GAP SERPL CALC-SCNC: 14 MMOL/L
AORTIC VALVE REGURGITATION: ABNORMAL
AST SERPL-CCNC: 16 U/L
BASOPHILS # BLD AUTO: 0.04 K/UL
BASOPHILS NFR BLD: 0.7 %
BILIRUB SERPL-MCNC: 0.4 MG/DL
BUN SERPL-MCNC: 67 MG/DL
BUN SERPL-MCNC: 68 MG/DL
BUN SERPL-MCNC: 71 MG/DL
BUN SERPL-MCNC: 71 MG/DL
C3 SERPL-MCNC: 110 MG/DL
C4 SERPL-MCNC: 26 MG/DL
CALCIUM SERPL-MCNC: 8.6 MG/DL
CALCIUM SERPL-MCNC: 8.7 MG/DL
CALCIUM SERPL-MCNC: 8.7 MG/DL
CALCIUM SERPL-MCNC: 8.9 MG/DL
CHLORIDE SERPL-SCNC: 102 MMOL/L
CHLORIDE SERPL-SCNC: 102 MMOL/L
CHLORIDE SERPL-SCNC: 104 MMOL/L
CHLORIDE SERPL-SCNC: 106 MMOL/L
CO2 SERPL-SCNC: 24 MMOL/L
CO2 SERPL-SCNC: 24 MMOL/L
CO2 SERPL-SCNC: 25 MMOL/L
CO2 SERPL-SCNC: 27 MMOL/L
CREAT SERPL-MCNC: 5.4 MG/DL
CREAT SERPL-MCNC: 5.6 MG/DL
CREAT SERPL-MCNC: 5.8 MG/DL
CREAT SERPL-MCNC: 6.1 MG/DL
DIASTOLIC DYSFUNCTION: YES
DIFFERENTIAL METHOD: ABNORMAL
EOSINOPHIL # BLD AUTO: 0.4 K/UL
EOSINOPHIL NFR BLD: 6.8 %
ERYTHROCYTE [DISTWIDTH] IN BLOOD BY AUTOMATED COUNT: 13.8 %
EST. GFR  (AFRICAN AMERICAN): 10 ML/MIN/1.73 M^2
EST. GFR  (AFRICAN AMERICAN): 10 ML/MIN/1.73 M^2
EST. GFR  (AFRICAN AMERICAN): 11 ML/MIN/1.73 M^2
EST. GFR  (AFRICAN AMERICAN): 11 ML/MIN/1.73 M^2
EST. GFR  (NON AFRICAN AMERICAN): 10 ML/MIN/1.73 M^2
EST. GFR  (NON AFRICAN AMERICAN): 8 ML/MIN/1.73 M^2
EST. GFR  (NON AFRICAN AMERICAN): 9 ML/MIN/1.73 M^2
EST. GFR  (NON AFRICAN AMERICAN): 9 ML/MIN/1.73 M^2
ESTIMATED AVG GLUCOSE: 114 MG/DL
ESTIMATED PA SYSTOLIC PRESSURE: 51.82
GLUCOSE SERPL-MCNC: 114 MG/DL
GLUCOSE SERPL-MCNC: 118 MG/DL
GLUCOSE SERPL-MCNC: 121 MG/DL
GLUCOSE SERPL-MCNC: 161 MG/DL
HBA1C MFR BLD HPLC: 5.6 %
HCT VFR BLD AUTO: 31.9 %
HGB BLD-MCNC: 10.5 G/DL
LYMPHOCYTES # BLD AUTO: 1.2 K/UL
LYMPHOCYTES NFR BLD: 21.3 %
MAGNESIUM SERPL-MCNC: 1.8 MG/DL
MCH RBC QN AUTO: 30.2 PG
MCHC RBC AUTO-ENTMCNC: 32.9 G/DL
MCV RBC AUTO: 92 FL
MITRAL VALVE REGURGITATION: ABNORMAL
MONOCYTES # BLD AUTO: 0.9 K/UL
MONOCYTES NFR BLD: 15.2 %
NEUTROPHILS # BLD AUTO: 3.1 K/UL
NEUTROPHILS NFR BLD: 55.8 %
PHOSPHATE SERPL-MCNC: 5.1 MG/DL
PHOSPHATE SERPL-MCNC: 5.6 MG/DL
PHOSPHATE SERPL-MCNC: 5.7 MG/DL
PHOSPHATE SERPL-MCNC: 6.1 MG/DL
PLATELET # BLD AUTO: 232 K/UL
PMV BLD AUTO: 9.9 FL
POCT GLUCOSE: 100 MG/DL (ref 70–110)
POCT GLUCOSE: 106 MG/DL (ref 70–110)
POCT GLUCOSE: 124 MG/DL (ref 70–110)
POCT GLUCOSE: 207 MG/DL (ref 70–110)
POCT GLUCOSE: 215 MG/DL (ref 70–110)
POTASSIUM SERPL-SCNC: 4 MMOL/L
POTASSIUM SERPL-SCNC: 4.4 MMOL/L
POTASSIUM SERPL-SCNC: 4.4 MMOL/L
POTASSIUM SERPL-SCNC: 4.9 MMOL/L
PROT SERPL-MCNC: 6.5 G/DL
PROT UR-MCNC: 60 MG/DL
PTH-INTACT SERPL-MCNC: 360.2 PG/ML
RBC # BLD AUTO: 3.48 M/UL
RETIRED EF AND QEF - SEE NOTES: 55 (ref 55–65)
RHEUMATOID FACT SERPL-ACNC: <10 IU/ML
SODIUM SERPL-SCNC: 138 MMOL/L
SODIUM SERPL-SCNC: 139 MMOL/L
SODIUM SERPL-SCNC: 142 MMOL/L
SODIUM SERPL-SCNC: 142 MMOL/L
TRICUSPID VALVE REGURGITATION: ABNORMAL
WBC # BLD AUTO: 5.59 K/UL

## 2017-08-23 PROCEDURE — 84165 PROTEIN E-PHORESIS SERUM: CPT | Mod: 26,,, | Performed by: PATHOLOGY

## 2017-08-23 PROCEDURE — 63600175 PHARM REV CODE 636 W HCPCS

## 2017-08-23 PROCEDURE — 86038 ANTINUCLEAR ANTIBODIES: CPT

## 2017-08-23 PROCEDURE — 25000003 PHARM REV CODE 250: Performed by: INTERNAL MEDICINE

## 2017-08-23 PROCEDURE — 84100 ASSAY OF PHOSPHORUS: CPT

## 2017-08-23 PROCEDURE — 83516 IMMUNOASSAY NONANTIBODY: CPT | Mod: 59

## 2017-08-23 PROCEDURE — 86160 COMPLEMENT ANTIGEN: CPT

## 2017-08-23 PROCEDURE — 94761 N-INVAS EAR/PLS OXIMETRY MLT: CPT

## 2017-08-23 PROCEDURE — 83735 ASSAY OF MAGNESIUM: CPT

## 2017-08-23 PROCEDURE — 86431 RHEUMATOID FACTOR QUANT: CPT

## 2017-08-23 PROCEDURE — 25000003 PHARM REV CODE 250: Performed by: STUDENT IN AN ORGANIZED HEALTH CARE EDUCATION/TRAINING PROGRAM

## 2017-08-23 PROCEDURE — 86580 TB INTRADERMAL TEST: CPT

## 2017-08-23 PROCEDURE — 83520 IMMUNOASSAY QUANT NOS NONAB: CPT | Mod: 59

## 2017-08-23 PROCEDURE — 80069 RENAL FUNCTION PANEL: CPT | Mod: 91

## 2017-08-23 PROCEDURE — 84166 PROTEIN E-PHORESIS/URINE/CSF: CPT

## 2017-08-23 PROCEDURE — 83970 ASSAY OF PARATHORMONE: CPT

## 2017-08-23 PROCEDURE — 63600175 PHARM REV CODE 636 W HCPCS: Performed by: STUDENT IN AN ORGANIZED HEALTH CARE EDUCATION/TRAINING PROGRAM

## 2017-08-23 PROCEDURE — 86060 ANTISTREPTOLYSIN O TITER: CPT

## 2017-08-23 PROCEDURE — 86703 HIV-1/HIV-2 1 RESULT ANTBDY: CPT

## 2017-08-23 PROCEDURE — 86704 HEP B CORE ANTIBODY TOTAL: CPT

## 2017-08-23 PROCEDURE — 84166 PROTEIN E-PHORESIS/URINE/CSF: CPT | Mod: 26,,, | Performed by: PATHOLOGY

## 2017-08-23 PROCEDURE — 80074 ACUTE HEPATITIS PANEL: CPT

## 2017-08-23 PROCEDURE — 85025 COMPLETE CBC W/AUTO DIFF WBC: CPT

## 2017-08-23 PROCEDURE — 86706 HEP B SURFACE ANTIBODY: CPT

## 2017-08-23 PROCEDURE — 86255 FLUORESCENT ANTIBODY SCREEN: CPT | Mod: 91

## 2017-08-23 PROCEDURE — 83520 IMMUNOASSAY QUANT NOS NONAB: CPT

## 2017-08-23 PROCEDURE — 84165 PROTEIN E-PHORESIS SERUM: CPT

## 2017-08-23 PROCEDURE — 80053 COMPREHEN METABOLIC PANEL: CPT

## 2017-08-23 PROCEDURE — 36415 COLL VENOUS BLD VENIPUNCTURE: CPT

## 2017-08-23 PROCEDURE — 84156 ASSAY OF PROTEIN URINE: CPT

## 2017-08-23 PROCEDURE — 83516 IMMUNOASSAY NONANTIBODY: CPT

## 2017-08-23 PROCEDURE — 87340 HEPATITIS B SURFACE AG IA: CPT

## 2017-08-23 PROCEDURE — 86592 SYPHILIS TEST NON-TREP QUAL: CPT

## 2017-08-23 PROCEDURE — 86160 COMPLEMENT ANTIGEN: CPT | Mod: 59

## 2017-08-23 PROCEDURE — 21400001 HC TELEMETRY ROOM

## 2017-08-23 RX ORDER — SEVELAMER CARBONATE 800 MG/1
800 TABLET, FILM COATED ORAL
Status: DISCONTINUED | OUTPATIENT
Start: 2017-08-23 | End: 2017-08-23

## 2017-08-23 RX ORDER — METOPROLOL SUCCINATE 50 MG/1
200 TABLET, EXTENDED RELEASE ORAL DAILY
Status: DISCONTINUED | OUTPATIENT
Start: 2017-08-23 | End: 2017-08-23

## 2017-08-23 RX ORDER — CALCIUM ACETATE 667 MG/1
667 CAPSULE ORAL
Status: DISCONTINUED | OUTPATIENT
Start: 2017-08-23 | End: 2017-08-25 | Stop reason: HOSPADM

## 2017-08-23 RX ORDER — CARVEDILOL 25 MG/1
25 TABLET ORAL 2 TIMES DAILY
Status: DISCONTINUED | OUTPATIENT
Start: 2017-08-23 | End: 2017-08-25 | Stop reason: HOSPADM

## 2017-08-23 RX ADMIN — FUROSEMIDE 160 MG: 10 INJECTION, SOLUTION INTRAMUSCULAR; INTRAVENOUS at 09:08

## 2017-08-23 RX ADMIN — HEPARIN SODIUM 5000 UNITS: 5000 INJECTION, SOLUTION INTRAVENOUS; SUBCUTANEOUS at 09:08

## 2017-08-23 RX ADMIN — FUROSEMIDE 160 MG: 10 INJECTION, SOLUTION INTRAMUSCULAR; INTRAVENOUS at 02:08

## 2017-08-23 RX ADMIN — TUBERCULIN PURIFIED PROTEIN DERIVATIVE 5 UNITS: 5 INJECTION INTRADERMAL at 05:08

## 2017-08-23 RX ADMIN — CALCIUM ACETATE 667 MG: 667 CAPSULE ORAL at 05:08

## 2017-08-23 RX ADMIN — INSULIN ASPART 1 UNITS: 100 INJECTION, SOLUTION INTRAVENOUS; SUBCUTANEOUS at 01:08

## 2017-08-23 RX ADMIN — HEPARIN SODIUM 5000 UNITS: 5000 INJECTION, SOLUTION INTRAVENOUS; SUBCUTANEOUS at 05:08

## 2017-08-23 RX ADMIN — ASPIRIN 81 MG: 81 TABLET, COATED ORAL at 10:08

## 2017-08-23 RX ADMIN — AMLODIPINE BESYLATE 10 MG: 5 TABLET ORAL at 10:08

## 2017-08-23 RX ADMIN — FUROSEMIDE 160 MG: 10 INJECTION, SOLUTION INTRAMUSCULAR; INTRAVENOUS at 05:08

## 2017-08-23 RX ADMIN — CARVEDILOL 25 MG: 25 TABLET, FILM COATED ORAL at 12:08

## 2017-08-23 RX ADMIN — CARVEDILOL 25 MG: 25 TABLET, FILM COATED ORAL at 09:08

## 2017-08-23 RX ADMIN — HEPARIN SODIUM 5000 UNITS: 5000 INJECTION, SOLUTION INTRAVENOUS; SUBCUTANEOUS at 02:08

## 2017-08-23 RX ADMIN — METOPROLOL SUCCINATE 200 MG: 50 TABLET, EXTENDED RELEASE ORAL at 10:08

## 2017-08-23 RX ADMIN — CALCIUM ACETATE 667 MG: 667 CAPSULE ORAL at 12:08

## 2017-08-23 NOTE — PLAN OF CARE
Patient lives at home with wife who is on home hospice (wife is current patient of [facility])  He is primary caretaker of wife.  Patient independent with ADL''s. No dme or HH. Patient drives       08/23/17 1006   Discharge Assessment   Assessment Type Discharge Planning Assessment   Confirmed/corrected address and phone number on facesheet? Yes   Assessment information obtained from? Patient   Communicated expected length of stay with patient/caregiver yes   Prior to hospitilization cognitive status: Alert/Oriented   Prior to hospitalization functional status: Independent   Current cognitive status: Alert/Oriented   Current Functional Status: Independent   Lives With spouse   Able to Return to Prior Arrangements yes   Is patient able to care for self after discharge? Yes   Patient's perception of discharge disposition home or selfcare   Readmission Within The Last 30 Days no previous admission in last 30 days   Patient currently being followed by outpatient case management? No   Patient currently receives any other outside agency services? No   Equipment Currently Used at Home none   Do you have any problems affording any of your prescribed medications? No   Is the patient taking medications as prescribed? yes   Does the patient have transportation home? Yes   Transportation Available family or friend will provide   Does the patient receive services at the [facility]? No   Discharge Plan A Home;Home with family   Discharge Plan B Home;Home with family;Home Health   Patient/Family In Agreement With Plan yes      Marcy Don RN, CCM, CMSRN  RN Transition Navigator  [phone]

## 2017-08-23 NOTE — PROGRESS NOTES
"[employer] renal fellow HOIV      Subjective:      Pt is less sob from day prior; denies uremic Sx     Objective:   Last 24 Hour Vital Signs:  BP  Min: 143/54  Max: 188/79  Temp  Av.9 °F (36.6 °C)  Min: 97.1 °F (36.2 °C)  Max: 98.6 °F (37 °C)  Pulse  Av.9  Min: 66  Max: 78  Resp  Av.7  Min: 16  Max: 20  SpO2  Av.3 %  Min: 93 %  Max: 99 %  Height  Av' 6" (167.6 cm)  Min: 5' 6" (167.6 cm)  Max: 5' 6" (167.6 cm)  Weight  Av.8 kg (153 lb 14.1 oz)  Min: 69.8 kg (153 lb 14.1 oz)  Max: 69.8 kg (153 lb 14.1 oz)  I/O last 3 completed shifts:  In: 705 [P.O.:635; I.V.:70]  Out: 4895 [Urine:4895]    Physical Examination:  GEN - AAOx4, NAD  NECK - JVP approx 10cm H2O  HEART - extra beats; no m/r/s3/s4  CHEST - bibasilar crackles improved from day prior down to bottom 1/4 of lungs  ABD - soft; nonttp; BS +  EXT - warm; 1+ pitting edema to B LE; 2+ B DP pulses  NEURO - no asterixis  Laboratory:  Laboratory   Pertinent Findings:    Component      Latest Ref Rng & Units 2017   Sodium      136 - 145 mmol/L 142   Potassium      3.5 - 5.1 mmol/L 4.4   Chloride      95 - 110 mmol/L 104   CO2      23 - 29 mmol/L 24   Glucose      70 - 110 mg/dL 114 (H)   BUN, Bld      8 - 23 mg/dL 71 (H)   Creatinine      0.5 - 1.4 mg/dL 5.8 (H)   Calcium      8.7 - 10.5 mg/dL 8.6 (L)   Total Protein      6.0 - 8.4 g/dL 6.5   Albumin      3.5 - 5.2 g/dL 3.1 (L)   Total Bilirubin      0.1 - 1.0 mg/dL 0.4   Alkaline Phosphatase      55 - 135 U/L 92   AST      10 - 40 U/L 16   ALT      10 - 44 U/L 10   Anion Gap      8 - 16 mmol/L 14   eGFR if African American      >60 mL/min/1.73 m:2 10 (A)   eGFR if non African American      >60 mL/min/1.73 m:2 9 (A)             Current Medications:     Infusions:        Scheduled:   amlodipine  10 mg Oral Daily    aspirin  81 mg Oral Daily    furosemide  160 mg Intravenous Q8H    heparin (porcine)  5,000 Units Subcutaneous Q8H    metoprolol succinate  200 mg Oral Daily    sevelamer " carbonate  800 mg Oral TID WM        PRN:  acetaminophen, dextrose 50%, dextrose 50%, glucagon (human recombinant), glucose, glucose, insulin aspart        Assessment/Plan     This is a 73 yo male here for worsening B LE edema; L-renal has been consulted for     ALEXI on CKD IV  -etiol of CKD includes recurrent obstructive uropathy/HTN/small vessel Dz; etiol of ALEXI includes cardio renal VS progression of renal Dz; BL Cr from records 2.0-3.0  -acid/base: AGMA of 16 (albumin corrected)  -BUN:Cr stable from day prior; renal US with chronic medical Dz; FeUrea <35%/FeNa>2%; uric acid mildly elevated at 7.5  -agree with diuresis as pt is hypervolemic; f/u echo results; rec holding ACEi/ARB for now  -will order phosphorus binder phoslo 667 mg TID with meal  -would like to give pt another day or two to find out if renal function improves - if no improvement, pt would need to start dialysis with GFR <10 and tenuous electrolyte/volume balance; in the interim, I have spoken with pt and daughter and they agree to HD if needed    Proteinuria  -discrepancy of UA 1+ protein VS Pr/Cr of 2.56gr  -given age/kidney dysfunction, will order SPEP/UPEP/serum FLC; also will order more extensive serologic work-up - see orders for details     Normocytic anemia  -work-up per primary     Hypo Vit D  -will need ergo as outpt     Papito Paniagua II  [employer] renal HO IV  [phone]

## 2017-08-23 NOTE — PROGRESS NOTES
"[employer] IM Resident HO1 Progress Note    Subjective:      Gunner Motley is a 72 y.o. male being followed fro ALEXI on CKD Iv. PVCs overnight, no sxs. Renal Diet. abd pain resolving. No cp or sob     Objective:   Last 24 Hour Vital Signs:  BP  Min: 143/54  Max: 188/79  Temp  Av.9 °F (36.6 °C)  Min: 97.1 °F (36.2 °C)  Max: 98.6 °F (37 °C)  Pulse  Av.5  Min: 66  Max: 78  Resp  Av.7  Min: 16  Max: 20  SpO2  Av.6 %  Min: 94 %  Max: 99 %  Height  Av' 6" (167.6 cm)  Min: 5' 6" (167.6 cm)  Max: 5' 6" (167.6 cm)  Weight  Av.8 kg (153 lb 14.1 oz)  Min: 69.8 kg (153 lb 14.1 oz)  Max: 69.8 kg (153 lb 14.1 oz)  I/O last 3 completed shifts:  In: 705 [P.O.:635; I.V.:70]  Out: 4895 [Urine:4895]    Bp:163/72, Urine output 5L     Physical Examination:  Gen: Able to speak full sentences, well-nourished, pleasant, AAAOx3  Eyes: No scleral icterus, EOMI  HEENT: MMM, JVP 11cm, No thyromegaly  Cards: RRR, no murmurs, rubs, or gallops  Ext: anasarca with sacral pitting edema, b/l LE pitting edema to knee with skin changes, no asterixis, pulses 2+ b/l   Skin: chest and abdomen ecchymoses   Abd: BS positive, non-tender, non-distended, no organomegaly, midline suprapubic healed scar, negative Diaz's sign  Genital: No scrotal swelling or tenderness, no penile discharge or meatus dried blood  Pulm: decreased breath sounds b/l, no wheezes, crackles       Laboratory:  Laboratory Data Reviewed: yes  Pertinent Findings:  P 6.1  Mg 1.8  CMP: BUN 71/ Cr 5.8, Ca 9.32 corrected wnl        Other Results:  EKG (my interpretation): PVCs    Radiology Data Reviewed: yes      Current Medications:     Infusions:        Scheduled:   amlodipine  10 mg Oral Daily    aspirin  81 mg Oral Daily    furosemide  160 mg Intravenous Q8H    heparin (porcine)  5,000 Units Subcutaneous Q8H    metoprolol succinate  100 mg Oral Daily        PRN:  acetaminophen, dextrose 50%, dextrose 50%, glucagon (human recombinant), glucose, glucose, insulin " aspart        Lines and Day Number of Therapy:  PIV    Assessment:     Gunner Motley is a 72 y.o.male with  Patient Active Problem List    Diagnosis Date Noted    Acute renal failure superimposed on stage 4 chronic kidney disease 08/22/2017    Mixed hyperlipidemia 08/22/2017    Edema extremities 08/22/2017    Metabolic acidosis 08/22/2017    Hyperkalemia 08/22/2017    Type 2 diabetes mellitus with stage 4 chronic kidney disease, without long-term current use of insulin 08/22/2017    Hypervolemia     HTN (hypertension) 05/01/2015    Recurrent nephrolithiasis 05/01/2015    CKD (chronic kidney disease) stage 4, GFR 15-29 ml/min 05/01/2015    Normocytic anemia 05/01/2015    Gout 05/01/2015    CAD (coronary artery disease) 05/01/2015        Plan:     1. Acute on Chronic Kidney Disease IV (2015: Cr 3.3, admit 6.2)  - Cr now 5.8, hyperkalemia resolved, metabolic acidosis resolved   -Will hold nephrotoxic agents  -Nephrology following  -Renal US no concern for Post Renal Disease  -Cardiorenal: BNP 3,431, CXR, physical exam sings of overload given Lasix   -Ordered Echo pending.  -Urine Na, Cr, Urea, Eos, prt/cr ratio, Ck, TSH, P  -Ua: 3+ protein, 1+ occult blood   -Lasix 160 TID IV with 4.8L removed (-4L since admit)     2. HTN  -Stable at 163/72  -Amlodipine 10, metoprolol 100     3. Hyperkalemia  -resolved at 4.4 from 5.7  -Likely 2/2 AoCKD with increased intake   -Shifted in the ED with Nabicarb, caglc, insulin + D50, albuterol, will repeat CMP, EKG     4. CAD s/p CABGx4 (2004)  -Stable  -Maximize medical management: Bb metoprolol 100, ASA 81, lasix   -Hold ACE-I 2/2 ALEXI  -Hold statin 2/2 ALEXI        5. Iron Deficiency Anemia   -Stable, no active bleeding  -Ua: 1+ occult blood   -Ferritin 91       6. HLD  -Hold statin pravastatin 40      7. DMII  -A1c 5.6  -SSI and Accuchecks         8. Vit D Deficiency   -2015 records taking Vit D  -Will reorder     9. Troponinemia (0.032)  -Likely 2/2 ARK  -Stable 0.032 w/o  EKG changes     10. Metabolic Acidosis   -Resolved     11. Gout  -Stable  -Held allopurinol 100mg     12. Hyperphosphatemia (6.1 trending up)  -Renal following  -Selever?       Dispo: heparin, diuresis management, renal diet        Karishma Lynn  [facility] HO1  [facility] IM Service Team A    [facility] Medicine Hospitalist Pager numbers:   [facility] Hospitalist Medicine Team A (Farooq/Mauro): [phone]  [facility] Hospitalist Medicine Team B (Chaka/Lowell):  [phone]

## 2017-08-23 NOTE — PLAN OF CARE
Problem: Patient Care Overview  Goal: Plan of Care Review  Room air SpO2   94%. Pt with no apparent distress noted. Will continue to monitor.

## 2017-08-23 NOTE — MEDICAL/APP STUDENT
[employer] Medical Student L4 Progress Note    Subjective:    Pt reports his ankles feel less swollen than on admit. He is still having pain in his right abdomen but no discomfort in bilateral LE. Increased urinary frequency due to lasix, yellow urine without blood and no pain on urination. Denies n/v, diarrhea, CP, SOB or HA.     Objective:    Vital Signs Last 24 Hrs:   (Min-Max)    BP: 142-188/53-79, current 163/72  Tmax: 98.6  Pulse: 64-73  RR: 16-20  SpO2: 94-99    I/O:   In: 705  Out: 4895  Net: -4190    PE:  Gen: awake, cooperative, alert and oriented  HEENT: PERRL, MMM, clear oropharynx, neck supple, no thyroid enlargement, no LAD, JVP 10cm   Resp: Diffuse crackles bilateral upper and lower lung fields on auscultation, no wheezing  Abd: soft, positive BS, no pain on palpation, non-distended, no organomegaly, negative azul's sign.   CV: RRR, no murmurs, no clicks, no rubs.   Ext: Pitting edema in bilateral lower ext over shins-decreased compared to admit, no cyanosis, no jaundice.   Neuro: Alert and Oriented.   Skin: Scattered bruising over chest and abdomen, large bruise over right UE.      Labs:  H/H: 10.5/31.9  Plt: 232  WBC: 5.59  Na: 142  K: 4.4  Bicarb: 24  Cl: 104  BUN: 71  Cr:  5.8  Glucose: 114  Ca: 8.6, corrected to 9.32  Albumin: 3.1  Tbili: 0.4  AST: 16  ALT: 10  Alk Phos: 92     Iron: 35  TIBC: 292  Transferrin: 197  Ferritin: 91  Folate: 6.7  Vit B12: 532    HbA1c: 5.6  TSH: 1.557    Urine Pr/Cr: 2.56  Urine Na: 75  Urine Pr: 139  Urine Ferrer's stain: No eosinophils      U/A: 3+ protein, 1+ blood, no glucose, no ketones, no LE or nitrites, 1 RBC, no WBC, no bacteria      Imaging:     CXR:      Postoperative change of prior median sternotomy.  The cardiomediastinal silhouette is within normal limits The visualized airway is unremarkable.  There is subsegmental atelectasis at the lung bases.  No evidence of focal consolidation, significant pleural effusion or pneumothorax.  Osseous structures  demonstrate no acute abnormality.      U/S Kidney:     Changes consistent with chronic medical renal disease. Probable nonobstructing right renal calculus, unchanged.          Assessment:     71 yo M with a hx of HTN, CAD s/p CABGx4 (2004), CKD4, HLD, recurrent nephrolithiasis, gout, anemia and DM2 here with acute on chronic kidney disease IV.      Plan:     Acute on Chronic Kidney Disease IV:   -Continue to hold nephrotoxic agents   -Daily weights, strict Is and Os- Net Output 4.19 L since admit, 4.8 L output   -Renal US showed no post renal obstruction  -Cardiorenal: BNP 3,431, CXR, physical exam sings of overload s/p Lasix 160mg IV q8hr.   -Follow up Echo-pending  -Urine Na-75 wnl, Ur Pr/Cr-2.56 High, Ur Pr-139, Ur azevedo-no eosinophils, TSH wnl, Phos- 6.1 H,   -CK and Urine Cr-pending   -Ua: 3+ protein, 1+ occult blood   -Cr 6.2/73 on admit, 5.8/71 today, BL Cr 2-3 from 2015  -Continue nephrology consult   -Will continue to diurese to assess response    HTN:  -163/72, continue to monitor   -Continue Amlodipine 10, Increase metoprolol 100 to 200 mg qday      Hyperkalemia-Resolved   -Likely 2/2 AoCKD with increased intake of K supplements   -Shifted in the ED with Nabicarb, caglc, insulin + D50, albuterol, will repeat CMP, EKG  -4.4 today from 5.7 on admit   -Continue to follow up improvement with BMPs    CAD s/p CABGx4 in 2004:  -Stable  -Maximize medical management: Bb metoprolol 100, ASA 8, Lasix   -Hold ACE-I 2/2 ALEXI  -Hold statin 2/2 ALEXI     Normocytic Anemia:  -Hgb 10.5/31.9  -Stable, no active bleeding  -Ua: 1+ occult blood   -Iron-35 Low, TIBC 292 wnl, Folate wnl, B12 wnl, Ferritin 91 wnl, Transferrin 197 low   -H/o JOANNE in 2015, new workup continues to be consistent with JOANNE  -Last colonoscopy over 10 years, follow up with PCP out pt to set up colonoscopy     HLD:  -Hold statin pravastatin 40     DM2:  -A1c 5.8 in 2015, will reorder  -Continue SSI and Accuchecks   -Serum 114, HbA1c 5.6      Hyperphosphatemia:  -6.2-->5.7-->6.1  -Start Sevelamer carbonate 800 mg TID, continue to monitor      Vitamin D Deficiency:  -2015 records taking Vit D  -Will reorder     Elevated Troponin:   -Likely 2/2 ALEXI  -0.032 on admit, stable without changes on EKG     Metabolic Acidosis:  -Resolved     Gout:  -Stable  -Home med allopurinol 100mg      Dispo: continue nephrology consult, renal diet, heparin for DVT prophylaxis, continue argenis REVELES

## 2017-08-23 NOTE — PROGRESS NOTES
.Pharmacy New Medication Education    Patient accepted medication education.    Pharmacy educated patient on name and purpose of medications and possible side effects, using the teach-back method.     Apap  Amlodipine  Asa  D50%  Furosemide  Glucagon  Glucose  Heparin  Novolog  toprol    Learners of pharmacy medication education included:  patient    Patient +/- learner response:  teachback

## 2017-08-23 NOTE — PLAN OF CARE
Problem: Patient Care Overview  Goal: Plan of Care Review  Outcome: Ongoing (interventions implemented as appropriate)  Pt has been in normal sinus rhythm with PVCs throughout the night.  Pt has slept well.  Pt edema has decreased.  Pt has had no complaints during the shift.

## 2017-08-24 ENCOUNTER — ANESTHESIA EVENT (OUTPATIENT)
Dept: SURGERY | Facility: HOSPITAL | Age: 72
End: 2017-08-24
Payer: MEDICARE

## 2017-08-24 PROBLEM — E83.42 HYPOMAGNESEMIA: Status: ACTIVE | Noted: 2017-08-24

## 2017-08-24 LAB
ALBUMIN SERPL BCP-MCNC: 3.1 G/DL
ALBUMIN SERPL BCP-MCNC: 3.2 G/DL
ALBUMIN SERPL ELPH-MCNC: 3.13 G/DL
ALP SERPL-CCNC: 88 U/L
ALPHA1 GLOB SERPL ELPH-MCNC: 0.35 G/DL
ALPHA2 GLOB SERPL ELPH-MCNC: 0.86 G/DL
ALT SERPL W/O P-5'-P-CCNC: 7 U/L
ANA SER QL IF: NORMAL
ANION GAP SERPL CALC-SCNC: 11 MMOL/L
ANION GAP SERPL CALC-SCNC: 11 MMOL/L
ANION GAP SERPL CALC-SCNC: 12 MMOL/L
ANION GAP SERPL CALC-SCNC: 13 MMOL/L
AST SERPL-CCNC: 15 U/L
B-GLOBULIN SERPL ELPH-MCNC: 0.63 G/DL
BASOPHILS # BLD AUTO: 0.04 K/UL
BASOPHILS NFR BLD: 0.6 %
BILIRUB SERPL-MCNC: 0.6 MG/DL
BM IGG SER-ACNC: <0.2 U
BUN SERPL-MCNC: 67 MG/DL
BUN SERPL-MCNC: 69 MG/DL
BUN SERPL-MCNC: 69 MG/DL
BUN SERPL-MCNC: 70 MG/DL
CALCIUM SERPL-MCNC: 8.6 MG/DL
CALCIUM SERPL-MCNC: 8.7 MG/DL
CALCIUM SERPL-MCNC: 8.9 MG/DL
CALCIUM SERPL-MCNC: 8.9 MG/DL
CHLORIDE SERPL-SCNC: 100 MMOL/L
CHLORIDE SERPL-SCNC: 100 MMOL/L
CHLORIDE SERPL-SCNC: 95 MMOL/L
CHLORIDE SERPL-SCNC: 98 MMOL/L
CO2 SERPL-SCNC: 27 MMOL/L
CO2 SERPL-SCNC: 27 MMOL/L
CO2 SERPL-SCNC: 28 MMOL/L
CO2 SERPL-SCNC: 28 MMOL/L
CREAT SERPL-MCNC: 5.3 MG/DL
CREAT SERPL-MCNC: 5.4 MG/DL
CREAT SERPL-MCNC: 5.6 MG/DL
CREAT SERPL-MCNC: 5.7 MG/DL
DIFFERENTIAL METHOD: ABNORMAL
EOSINOPHIL # BLD AUTO: 0.4 K/UL
EOSINOPHIL NFR BLD: 6.3 %
ERYTHROCYTE [DISTWIDTH] IN BLOOD BY AUTOMATED COUNT: 13.7 %
EST. GFR  (AFRICAN AMERICAN): 10 ML/MIN/1.73 M^2
EST. GFR  (AFRICAN AMERICAN): 11 ML/MIN/1.73 M^2
EST. GFR  (NON AFRICAN AMERICAN): 10 ML/MIN/1.73 M^2
EST. GFR  (NON AFRICAN AMERICAN): 10 ML/MIN/1.73 M^2
EST. GFR  (NON AFRICAN AMERICAN): 9 ML/MIN/1.73 M^2
EST. GFR  (NON AFRICAN AMERICAN): 9 ML/MIN/1.73 M^2
GAMMA GLOB SERPL ELPH-MCNC: 0.93 G/DL
GLUCOSE SERPL-MCNC: 103 MG/DL
GLUCOSE SERPL-MCNC: 118 MG/DL
GLUCOSE SERPL-MCNC: 124 MG/DL
GLUCOSE SERPL-MCNC: 92 MG/DL
HAV IGM SERPL QL IA: NEGATIVE
HBV CORE AB SERPL QL IA: NEGATIVE
HBV CORE IGM SERPL QL IA: NEGATIVE
HBV SURFACE AB SER-ACNC: NEGATIVE M[IU]/ML
HBV SURFACE AG SERPL QL IA: NEGATIVE
HBV SURFACE AG SERPL QL IA: NEGATIVE
HCT VFR BLD AUTO: 33.1 %
HCV AB SERPL QL IA: NEGATIVE
HGB BLD-MCNC: 11.1 G/DL
HIV 1+2 AB+HIV1 P24 AG SERPL QL IA: NEGATIVE
KAPPA LC SER QL IA: 9.49 MG/DL
KAPPA LC/LAMBDA SER IA: 2.27
LAMBDA LC SER QL IA: 4.18 MG/DL
LYMPHOCYTES # BLD AUTO: 1.1 K/UL
LYMPHOCYTES NFR BLD: 17.2 %
MAGNESIUM SERPL-MCNC: 1.3 MG/DL
MCH RBC QN AUTO: 29.9 PG
MCHC RBC AUTO-ENTMCNC: 33.5 G/DL
MCV RBC AUTO: 89 FL
MONOCYTES # BLD AUTO: 0.8 K/UL
MONOCYTES NFR BLD: 11.9 %
NEUTROPHILS # BLD AUTO: 4.2 K/UL
NEUTROPHILS NFR BLD: 63.8 %
PATHOLOGIST INTERPRETATION SPE: NORMAL
PHOSPHATE SERPL-MCNC: 3.4 MG/DL
PHOSPHATE SERPL-MCNC: 4.5 MG/DL
PHOSPHATE SERPL-MCNC: 4.6 MG/DL
PHOSPHATE SERPL-MCNC: 4.8 MG/DL
PLATELET # BLD AUTO: 235 K/UL
PMV BLD AUTO: 10 FL
POCT GLUCOSE: 105 MG/DL (ref 70–110)
POCT GLUCOSE: 108 MG/DL (ref 70–110)
POCT GLUCOSE: 120 MG/DL (ref 70–110)
POCT GLUCOSE: 89 MG/DL (ref 70–110)
POTASSIUM SERPL-SCNC: 3.8 MMOL/L
POTASSIUM SERPL-SCNC: 3.8 MMOL/L
POTASSIUM SERPL-SCNC: 3.9 MMOL/L
POTASSIUM SERPL-SCNC: 4 MMOL/L
PROT PATTERN UR ELPH-IMP: NORMAL
PROT SERPL-MCNC: 5.9 G/DL
PROT SERPL-MCNC: 6.5 G/DL
PROTEINASE3 IGG SER-ACNC: <0.2 U
RBC # BLD AUTO: 3.71 M/UL
RPR SER QL: NORMAL
SODIUM SERPL-SCNC: 134 MMOL/L
SODIUM SERPL-SCNC: 137 MMOL/L
SODIUM SERPL-SCNC: 139 MMOL/L
SODIUM SERPL-SCNC: 140 MMOL/L
WBC # BLD AUTO: 6.64 K/UL

## 2017-08-24 PROCEDURE — 25000003 PHARM REV CODE 250: Performed by: STUDENT IN AN ORGANIZED HEALTH CARE EDUCATION/TRAINING PROGRAM

## 2017-08-24 PROCEDURE — 63600175 PHARM REV CODE 636 W HCPCS

## 2017-08-24 PROCEDURE — 63600175 PHARM REV CODE 636 W HCPCS: Performed by: STUDENT IN AN ORGANIZED HEALTH CARE EDUCATION/TRAINING PROGRAM

## 2017-08-24 PROCEDURE — 85025 COMPLETE CBC W/AUTO DIFF WBC: CPT

## 2017-08-24 PROCEDURE — 36415 COLL VENOUS BLD VENIPUNCTURE: CPT

## 2017-08-24 PROCEDURE — 21400001 HC TELEMETRY ROOM

## 2017-08-24 PROCEDURE — 83735 ASSAY OF MAGNESIUM: CPT

## 2017-08-24 PROCEDURE — 80053 COMPREHEN METABOLIC PANEL: CPT

## 2017-08-24 PROCEDURE — 80069 RENAL FUNCTION PANEL: CPT | Mod: 91

## 2017-08-24 PROCEDURE — 84100 ASSAY OF PHOSPHORUS: CPT

## 2017-08-24 PROCEDURE — 80069 RENAL FUNCTION PANEL: CPT

## 2017-08-24 PROCEDURE — 94761 N-INVAS EAR/PLS OXIMETRY MLT: CPT

## 2017-08-24 PROCEDURE — 99223 1ST HOSP IP/OBS HIGH 75: CPT | Mod: ,,, | Performed by: SURGERY

## 2017-08-24 PROCEDURE — 25000003 PHARM REV CODE 250: Performed by: INTERNAL MEDICINE

## 2017-08-24 RX ORDER — CALCITRIOL 0.25 UG/1
0.25 CAPSULE ORAL DAILY
Status: DISCONTINUED | OUTPATIENT
Start: 2017-08-24 | End: 2017-08-25 | Stop reason: HOSPADM

## 2017-08-24 RX ORDER — ERGOCALCIFEROL 1.25 MG/1
50000 CAPSULE ORAL
Status: DISCONTINUED | OUTPATIENT
Start: 2017-08-24 | End: 2017-08-25 | Stop reason: HOSPADM

## 2017-08-24 RX ORDER — FUROSEMIDE 10 MG/ML
80 INJECTION INTRAMUSCULAR; INTRAVENOUS EVERY 8 HOURS
Status: DISCONTINUED | OUTPATIENT
Start: 2017-08-24 | End: 2017-08-25

## 2017-08-24 RX ADMIN — CALCIUM ACETATE 667 MG: 667 CAPSULE ORAL at 12:08

## 2017-08-24 RX ADMIN — CALCITRIOL 0.25 MCG: 0.25 CAPSULE, LIQUID FILLED ORAL at 12:08

## 2017-08-24 RX ADMIN — FUROSEMIDE 160 MG: 10 INJECTION, SOLUTION INTRAMUSCULAR; INTRAVENOUS at 05:08

## 2017-08-24 RX ADMIN — CARVEDILOL 25 MG: 25 TABLET, FILM COATED ORAL at 09:08

## 2017-08-24 RX ADMIN — HEPARIN SODIUM 5000 UNITS: 5000 INJECTION, SOLUTION INTRAVENOUS; SUBCUTANEOUS at 09:08

## 2017-08-24 RX ADMIN — FUROSEMIDE 80 MG: 10 INJECTION, SOLUTION INTRAMUSCULAR; INTRAVENOUS at 05:08

## 2017-08-24 RX ADMIN — CARVEDILOL 25 MG: 25 TABLET, FILM COATED ORAL at 08:08

## 2017-08-24 RX ADMIN — CALCIUM ACETATE 667 MG: 667 CAPSULE ORAL at 05:08

## 2017-08-24 RX ADMIN — HEPARIN SODIUM 5000 UNITS: 5000 INJECTION, SOLUTION INTRAVENOUS; SUBCUTANEOUS at 05:08

## 2017-08-24 RX ADMIN — ASPIRIN 81 MG: 81 TABLET, COATED ORAL at 08:08

## 2017-08-24 RX ADMIN — FUROSEMIDE 80 MG: 10 INJECTION, SOLUTION INTRAMUSCULAR; INTRAVENOUS at 09:08

## 2017-08-24 RX ADMIN — CALCIUM ACETATE 667 MG: 667 CAPSULE ORAL at 08:08

## 2017-08-24 RX ADMIN — MAGNESIUM SULFATE HEPTAHYDRATE 1 G: 500 INJECTION, SOLUTION INTRAMUSCULAR; INTRAVENOUS at 08:08

## 2017-08-24 RX ADMIN — AMLODIPINE BESYLATE 10 MG: 5 TABLET ORAL at 08:08

## 2017-08-24 NOTE — PROGRESS NOTES
[facility] faxed HD clinicals to [facility] [phone] for PD placement at [facility].

## 2017-08-24 NOTE — PLAN OF CARE
Problem: Patient Care Overview  Goal: Plan of Care Review  Outcome: Ongoing (interventions implemented as appropriate)  Pt has had no complaints of pain and has slept well tonight.  No edema noted.  Pt continues to have multiple PVCs throughout the night.

## 2017-08-24 NOTE — PROGRESS NOTES
..Pharmacy New Medication Education    Patient accepted medication education.    Pharmacy educated patient on name and purpose of medications and possible side effects, using the teach-back method.     Phoslo  Coreg  Magnesium sulfate    Learners of pharmacy medication education included:  patient    Patient +/- learner response:  teachback

## 2017-08-24 NOTE — PLAN OF CARE
Problem: Patient Care Overview  Goal: Plan of Care Review  Will continue to monitor SpO2 daily

## 2017-08-24 NOTE — CONSULTS
"[facility]  General Surgery  Consult Note    Inpatient consult to General Surgery  Consult performed by: COLT SCOTT  Consult ordered by: BERNICE MCCALLUM II        Subjective:     Chief Complaint/Reason for Admission:     History of Present Illness: 73 yo M with a history of HTN, gout, DM type II, CAD s/p CABG x4 (2004), recurrent nephrolithiasis, CKD stage IV, HLD and anemia admitted to the medicine service for worsening kidney function and fluid overload. He noted worsening dyspnea on exertion, bilateral lower extremity edema as well as orthopnea. He states that he is currently taking care of his wife who is not doing well and that has worsened his stress levels lately. He was admitted, aggressively diuresed and is now feeling better, with decrease bilateral lower extremity edema. General Surgery consulted for placement of peritoneal dialysis catheter for future needs for dialysis. The patient is aware of his worsening renal function and states that he has "put off" addressing his likely need for dialysis for "a while now".    He has a history of diverticulitis in the past for which he ultimately had "18 inches" of colon removed. He did not have an ostomy after the surgery. He did have a prolonged recover but has never had a bowel obstruction or further abdominal surgeries. He did require ?open lithotripsy in the past and has a well surgical scar from that procedure.    No current facility-administered medications on file prior to encounter.      Current Outpatient Prescriptions on File Prior to Encounter   Medication Sig    albuterol (PROVENTIL) 2.5 mg /3 mL (0.083 %) nebulizer solution     allopurinol (ZYLOPRIM) 100 MG tablet     amlodipine (NORVASC) 10 MG tablet     furosemide (LASIX) 20 MG tablet Take 2 tablets (40 mg total) by mouth once daily.    metoprolol succinate (TOPROL-XL) 100 MG 24 hr tablet     oxycodone-acetaminophen  mg (PERCOCET)  mg per tablet     " pravastatin (PRAVACHOL) 40 MG tablet     diazepam (VALIUM) 10 MG Tab     hydrOXYzine (VISTARIL) 25 MG Cap     PROAIR HFA 90 mcg/actuation inhaler        Review of patient's allergies indicates:  No Known Allergies    Past Medical History:   Diagnosis Date    Anemia     Coronary artery disease     Diabetes mellitus     Gout     Hypertension     Recurrent nephrolithiasis     Unspecified disorder of kidney and ureter     Urinary tract infection      History reviewed. No pertinent surgical history.  Family History     Problem Relation (Age of Onset)    Cancer Mother    Heart attack Father    Kidney disease Mother        Social History Main Topics    Smoking status: Former Smoker     Packs/day: 2.00     Years: 30.00    Smokeless tobacco: Former User     Quit date: 1/12/1995    Alcohol use Not on file    Drug use: Unknown    Sexual activity: Not on file     Review of Systems   Constitutional: Negative for activity change, appetite change and unexpected weight change.   HENT: Negative for congestion and trouble swallowing.    Respiratory: Positive for shortness of breath.    Cardiovascular: Positive for leg swelling.   Gastrointestinal: Negative for abdominal distention, abdominal pain, blood in stool and constipation.   Endocrine: Negative for cold intolerance and heat intolerance.   Genitourinary: Negative for difficulty urinating and dysuria.   Musculoskeletal: Negative for arthralgias and back pain.   Skin: Negative for color change.   Neurological: Negative for dizziness and weakness.   Hematological: Negative.      Objective:     Vital Signs (Most Recent):  Temp: 98 °F (36.7 °C) (08/24/17 1603)  Pulse: 75 (08/24/17 1603)  Resp: 18 (08/24/17 1603)  BP: (!) 159/69 (08/24/17 1603)  SpO2: 95 % (08/24/17 1600) Vital Signs (24h Range):  Temp:  [98 °F (36.7 °C)-98.7 °F (37.1 °C)] 98 °F (36.7 °C)  Pulse:  [66-85] 75  Resp:  [18-19] 18  SpO2:  [93 %-95 %] 95 %  BP: (134-186)/(60-79) 159/69     Weight: 69.8 kg  (153 lb 14.1 oz)  Body mass index is 24.84 kg/m².      Intake/Output Summary (Last 24 hours) at 08/24/17 1639  Last data filed at 08/24/17 0815   Gross per 24 hour   Intake              500 ml   Output             3275 ml   Net            -2775 ml       Physical Exam   Constitutional: He is oriented to person, place, and time. He appears well-developed and well-nourished.   HENT:   Head: Normocephalic and atraumatic.   Eyes: EOM are normal.   Neck: Normal range of motion. Neck supple.   Cardiovascular: Normal rate and regular rhythm.    Pulmonary/Chest: Effort normal. He has wheezes.   Expiratory wheezing noted in the R posterior mid-field    Abdominal: Soft. Bowel sounds are normal. He exhibits no distension. There is no tenderness. There is no guarding.   Musculoskeletal: He exhibits edema.        Right ankle: He exhibits swelling.   Neurological: He is alert and oriented to person, place, and time.   Skin: Skin is warm and dry.       Significant Labs:  BMP:   Recent Labs  Lab 08/24/17  0256  08/24/17  1532   GLU 92  < > 124*     < > 134*   K 3.8  < > 3.9     < > 95   CO2 27  < > 28   BUN 69*  < > 70*   CREATININE 5.6*  < > 5.4*   CALCIUM 8.6*  < > 8.9   MG 1.3*  --   --    < > = values in this interval not displayed.  CBC:   Recent Labs  Lab 08/24/17  0257   WBC 6.64   RBC 3.71*   HGB 11.1*   HCT 33.1*      MCV 89   MCH 29.9   MCHC 33.5       Significant Diagnostics:  None    Assessment/Plan:     Active Diagnoses:    Diagnosis Date Noted POA    PRINCIPAL PROBLEM:  Acute renal failure superimposed on stage 4 chronic kidney disease [N17.9, N18.4] 08/22/2017 Yes    Hypomagnesemia [E83.42] 08/24/2017 No    Acute on chronic diastolic heart failure [I50.33] 08/23/2017 Yes    Pulmonary hypertension [I27.2] 08/23/2017 Yes    Anemia [D64.9]  Yes    Essential hypertension [I10]  Yes    Proteinuria [R80.9]  Yes    Mixed hyperlipidemia [E78.2] 08/22/2017 Yes    Edema extremities [R60.0] 08/22/2017  Yes    Metabolic acidosis [E87.2] 08/22/2017 Yes    Hyperkalemia [E87.5] 08/22/2017 Yes    Type 2 diabetes mellitus with stage 4 chronic kidney disease, without long-term current use of insulin [E11.22, N18.4] 08/22/2017 Yes    CAD (coronary artery disease) [I25.10] 05/01/2015 Yes    HTN (hypertension) [I10] 05/01/2015 Yes      Problems Resolved During this Admission:    Diagnosis Date Noted Date Resolved POA     -Discussed with patient that due to his colon resection for diverticulitis that there is a possibility that the peritoneal dialysis catheter will not work if there is too much scar tissue from his previous surgery. Because there is not a definitive way to assess that except for undergoing surgery, we recommended that we will assess this in the operating room. If the scar tissue is minimal we will place the catheter and if the scar tissue is significant, we will abort the procedure. He understands the risks including bleeding, wound infection, damage to surrounding tissues, injury to intra-abdominal organs, malpositioning of the catheter, the need for further surgery and risks associated with anesthesia. All of his concerns were addressed and he has signed an informed consent.  -Plan for surgery on 8/28/17    Thank you for your consult. I will follow-up with patient. Please contact us if you have any additional questions.    Ant Lo MD  General Surgery  [facility]

## 2017-08-24 NOTE — PROGRESS NOTES
[employer] IM Resident HO1 Progress Note    Subjective:      Gunner Motley is a 72 y.o. male being followed fro ALEXI on CKD Iv. Asyxs bigemeny overnight Renal Diet. abd pain resolving. No cp or sob, abd pain and swelling improving.     Objective:   Last 24 Hour Vital Signs:  BP  Min: 134/62  Max: 173/81  Temp  Av.1 °F (36.7 °C)  Min: 97.3 °F (36.3 °C)  Max: 98.7 °F (37.1 °C)  Pulse  Av.9  Min: 58  Max: 85  Resp  Av.8  Min: 18  Max: 20  SpO2  Av.5 %  Min: 92 %  Max: 95 %  I/O last 3 completed shifts:  In: 1135 [P.O.:1065; I.V.:70]  Out: 8345 [Urine:8345]    Bp:163/72, Urine output 5L     Physical Examination:  Gen: Able to speak full sentences, well-nourished, pleasant, AAAOx3  Eyes: No scleral icterus, EOMI  HEENT: MMM, JVP 8cm, No thyromegaly  Cards: RRR, no murmurs, rubs, or gallops  Ext: mild b/l LE pitting edema with skin changes, no asterixis, pulses 2+ b/l   Skin: chest and abdomen ecchymoses   Abd: BS positive, non-tender, non-distended, no organomegaly, midline suprapubic healed scar, negative Diaz's sign  Genital: No scrotal swelling or tenderness, no penile discharge or meatus dried blood  Pulm: decreased breath sounds b/l, no wheezes, crackles       Laboratory:  Laboratory Data Reviewed: yes  Pertinent Findings:  P 6.1  Mg 1.8  CMP: BUN 71/ Cr 5.8, Ca 9.32 corrected wnl        Other Results:  EKG (my interpretation): Merged with [facility]    Radiology Data Reviewed: yes  ECHO    1 - Severe left atrial enlargement.     2 - Eccentric hypertrophy.     3 - No wall motion abnormalities.     4 - Normal left ventricular systolic function (EF 55-60%).     5 - Restrictive LV filling pattern, indicating markedly elevated LAP (grade 3 diastolic dysfunction).     6 - Normal right ventricular systolic function .     7 - Pulmonary hypertension. The estimated PA systolic pressure is 52 mmHg.     8 - Trivial to mild aortic regurgitation.     9 - Moderate mitral regurgitation.     10 - Moderate tricuspid regurgitation.      11 - Intermediate central venous pressure.         Current Medications:     Infusions:        Scheduled:   amlodipine  10 mg Oral Daily    aspirin  81 mg Oral Daily    calcium acetate  667 mg Oral TID WM    carvedilol  25 mg Oral BID    furosemide  160 mg Intravenous Q8H    heparin (porcine)  5,000 Units Subcutaneous Q8H        PRN:  acetaminophen, dextrose 50%, dextrose 50%, glucagon (human recombinant), glucose, glucose, insulin aspart        Lines and Day Number of Therapy:  PIV    Assessment:     Gunner Motley is a 72 y.o.male with  Patient Active Problem List    Diagnosis Date Noted    Acute on chronic diastolic heart failure 08/23/2017    Pulmonary hypertension 08/23/2017    Anemia     Essential hypertension     Proteinuria     Acute renal failure superimposed on stage 4 chronic kidney disease 08/22/2017    Mixed hyperlipidemia 08/22/2017    Edema extremities 08/22/2017    Metabolic acidosis 08/22/2017    Hyperkalemia 08/22/2017    Type 2 diabetes mellitus with stage 4 chronic kidney disease, without long-term current use of insulin 08/22/2017    Hypervolemia     HTN (hypertension) 05/01/2015    Recurrent nephrolithiasis 05/01/2015    CKD (chronic kidney disease) stage 4, GFR 15-29 ml/min 05/01/2015    Normocytic anemia 05/01/2015    Gout 05/01/2015    CAD (coronary artery disease) 05/01/2015        Plan:     1. Acute on Chronic Kidney Disease IV (2015: Cr 3.3, admit 6.2)  - Neph: Likely CKD V progressed to ESRD, possibly inpt RRT or urgent PD  - Cr now 5.6, will little improvment, hyperkalemia resolved, metabolic acidosis resolved   -Will hold nephrotoxic agents  -Nephrology following and has ordered work up that is pending   -Renal US no concern for Post Renal Disease  -Cardiorenal: BNP 3,431, CXR, physical exam sings of overload given Lasix   -Ordered Echo: 55%, diastolic dysfxn   -Urine Na, Cr, Urea, Eos, prt/cr ratio, Ck, TSH, P  -Ua: 3+ protein, 1+ occult blood   -Lasix 160  TID IV with 4.8L removed (-8L since admit)    2. Acute on Chronic Diastolic Heart Failure with preserved EF 55%  -ECHO 8/22       2. HTN  -Stable at 140/72  -Amlodipine 10, coreg 25 BID     3. Hyperkalemia  -resolved at 3.8  -Likely 2/2 AoCKD with increased intake   -Shifted in the ED with Nabicarb, caglc, insulin + D50, albuterol, will repeat CMP, EKG     4. CAD s/p CABGx4 (2004)  -Stable  -Maximize medical management: Bb coreg25 BID, ASA 81, lasix, CCB 10  -Hold ACE-I 2/2 ALEXI  -Hold statin 2/2 ALEXI        5. Iron Deficiency Anemia   -Stable and trending upwards, no active bleeding  -Ua: 1+ occult blood   -Ferritin 91       6. HLD  -Hold statin pravastatin 40      7. DMII  -A1c 5.6  -SSI and Accuchecks         8. Vit D Deficiency   -2015 records taking Vit D  -Ergo outpt per Neph     9. Troponinemia (0.032)  -Likely 2/2 ARK  -Stable 0.032 w/o EKG changes     10. Metabolic Acidosis   -Resolved     11. Gout  -Stable  -Held allopurinol 100mg     12. Hyperphosphatemia (4.6p)  -Renal following  -Calcium acetate 667 TID     13. HypoMag  -1mg Mg    14. Secondary HyperPTH  -  -Likely 2/2 CKD         Dispo: heparin, diuresis management, renal diet        Karishma Lynn  [facility] Internal Medicine HO1  [facility] IM Service Team A    [facility] Medicine Hospitalist Pager numbers:   [facility] Hospitalist Medicine Team A (Farooq/Mauro): [phone]  [facility] Hospitalist Medicine Team B (Chaka/Lowell):  [phone]

## 2017-08-24 NOTE — PROGRESS NOTES
[employer] renal fellow SHANIQUE      Subjective:      Lengthy convo with daughter and patient; he is leaning towards wanting PD cath placed and followed as outpt VS CVC an inpt start     Objective:   Last 24 Hour Vital Signs:  BP  Min: 134/62  Max: 186/79  Temp  Av °F (36.7 °C)  Min: 97.3 °F (36.3 °C)  Max: 98.7 °F (37.1 °C)  Pulse  Av.9  Min: 58  Max: 85  Resp  Av.4  Min: 18  Max: 19  SpO2  Av.6 %  Min: 92 %  Max: 95 %  I/O last 3 completed shifts:  In: 980 [P.O.:980]  Out: 7900 [Urine:7900]    Physical Examination:  GEN - AAOx4, NAD  NECK - JVP [id number] H2O  CHEST - minimal scattered bibasilar crackles  HEART - extra heart beats; no m/r/s3/s4  ABD - nonttp; BS +  EXT - trace edema to shins B; warm; 2+ B DP pulses  NEURO - no asterixis    Laboratory:  Laboratory   Pertinent Findings:    Recent Labs  Lab 17  0925  17  0341  17  1557 17  0052 17  0256 17  0257   WBC 6.14  --  5.59  --   --   --   --  6.64   HGB 9.6*  --  10.5*  --   --   --   --  11.1*   HCT 29.6*  --  31.9*  --   --   --   --  33.1*     --  232  --   --   --   --  235   MCV 93  --  92  --   --   --   --  89   RDW 13.8  --  13.8  --   --   --   --  13.7     < > 142  < > 139 139 140  --    K 4.7  < > 4.4  < > 4.9 4.0 3.8  --      < > 104  < > 102 100 100  --    CO2 21*  < > 24  < > 27 27 27  --    BUN 70*  < > 71*  < > 67* 69* 69*  --      < > 114*  < > 118* 103 92  --    PROT 6.0  --  6.5  --   --   --  6.5  --    ALBUMIN 3.0*  < > 3.1*  < > 3.1* 3.2* 3.1*  --    BILITOT 0.3  --  0.4  --   --   --  0.6  --    AST 16  --  16  --   --   --  15  --    ALKPHOS 84  --  92  --   --   --  88  --    ALT 10  --  10  --   --   --  7*  --    < > = values in this interval not displayed.  PO4 4.6  Mg 1.3          Current Medications:     Infusions:        Scheduled:   amlodipine  10 mg Oral Daily    aspirin  81 mg Oral Daily    calcium acetate  667 mg Oral TID WM    carvedilol  25 mg  Oral BID    furosemide  160 mg Intravenous Q8H    heparin (porcine)  5,000 Units Subcutaneous Q8H    magnesium sulfate IVPB  1 g Intravenous Once        PRN:  acetaminophen, dextrose 50%, dextrose 50%, glucagon (human recombinant), glucose, glucose, insulin aspart        Assessment/Plan     This is a 73 yo male here for worsening B LE edema; L-renal has been consulted for     ALEXI on CKD IV  -etiol of CKD includes recurrent obstructive uropathy/HTN/small vessel Dz; etiol of ALEXI includes progression of renal Dz; BL Cr from records 2.0-3.0; renal US with chronic medical Dz  -BUN:Cr stable from day prior  -acid/base/electrolytes: AGMA of 15 (albumin corrected); rec 2gr Mg replacement   -UO x24 hours approx 5.7L with net neg 9L since admit; rec decreasing lasix to 80mg IV TID with transition to oral in AM  -cont phoslo; no indication for RRT now; ok to restart low dose ACEi/ARB  -renal education modalities arranged for today; will speak with Gen surgery for PD cath placement     Proteinuria  -discrepancy of UA 1+ protein VS Pr/Cr of 2.56gr  -serology work up in progress; so far, neg/WNL for RPR/RF/C3/C4; will continue to f/u pending studies     Normocytic anemia  -work-up per primary     Hypo Vit D  -will give ergo 50k weekly x 3 months then monthly thereafter     Papito Paniagua II  [employer] renal HO IV  [phone]

## 2017-08-24 NOTE — MEDICAL/APP STUDENT
[employer] Medical Student L4 Progress Note     Subjective:     Pt feeling well this morning. Reports ankles feel less swollen and no discomfort in BLE. Minimal left sided abdominal discomfort that is significantly decreased since admit. Continues to urinate frequently without difficulty. Denies n/v, diarrhea, CP, SOB or HA.      Objective:     Vital Signs Last 24 Hrs:   (Min-Max)     BP: 134-173/62-81, 140/72 current   Tmax: 98.7  Pulse: 58-85  RR: 18-20  SpO2: 92-95     I/O:   In: 930  Out: 4820  Net: -9010     PE:  Gen: awake, cooperative, alert and oriented  HEENT: PERRL, MMM, clear oropharynx, neck supple, no thyroid enlargement, no LAD, JVP 8cm   Resp: Diffuse crackles bilateral upper and lower lung fields on auscultation, no wheezing  Abd: soft, positive BS, LUQ discomfort on palpation, non-distended, no organomegaly, negative azul's sign.   CV: RRR, no murmurs, no clicks, no rubs.   Ext: Pitting edema bilateral lower ext over shins-decreased compared to admit, no cyanosis, no jaundice.   Neuro: Alert and Oriented.   Skin: Scattered bruising over chest and abdomen, large bruise over right UE.      Labs:  H/H: 11.1/33.1  Plt: 235  WBC: 6.64  Na: 140  K: 3.8  Bicarb: 27  Cl: 100  BUN: 69  Cr:  5.6  Glucose: 92  Ca: 8.6, corrected to 9.32  Albumin: 3.1  Tbili: 0.6  AST: 15  ALT: 7  Alk Phos: 88     PTH: 360.2  C3: 110  C4: 26  RF: wnl  RPR: non-reactive      U/A on admit: 3+ protein, 1+ blood, no glucose, no ketones, no LE or nitrites, 1 RBC, no WBC, no bacteria      Imaging:     CXR:      Postoperative change of prior median sternotomy.  The cardiomediastinal silhouette is within normal limits The visualized airway is unremarkable.  There is subsegmental atelectasis at the lung bases.  No evidence of focal consolidation, significant pleural effusion or pneumothorax.  Osseous structures demonstrate no acute abnormality.      U/S Kidney:     Changes consistent with chronic medical renal disease. Probable  nonobstructing right renal calculus, unchanged.          Assessment:     73 yo M with a hx of HTN, CAD s/p CABGx4 (2004), CKD4, HLD, recurrent nephrolithiasis, gout, anemia and DM2 here with acute on chronic kidney disease IV.      Plan:     Acute on Chronic Kidney Disease IV:   -Continue to hold nephrotoxic agents   -Daily weights, strict Is and Os- Net Output 9L  -Renal US showed no post renal obstruction  -Cardiorenal: BNP 3,431, CXR, physical exam sings of overload s/p Lasix 160mg IV q8hr.   -Follow up Echo-diastolic dysfunction with EF wnl   -Urine Na-75 wnl, Ur Pr/Cr-2.56 High, Ur Pr-139, Ur [place]-no eosinophils, TSH wnl, Phos- 6.1 H,    -Ua: 3+ protein, 1+ occult blood   -Cr 6.2/73 on admit, 5.6/69 today, BL Cr 2-3 from 2015  -Continue nephrology consult   -Will continue to diurese with lasix 160 IV TID to assess response     Acute on Chronic Diastolic Heart Failure:    -Echo showed diastolic dysfunction with preserved EF of 55%  -BNP 3431 on admit  -Fluid overload likely 2/2 to acute on chronic kidney disease IV and HFpEF  -Stable, Continue to diurese with lasix     HTN:  -140/72, continue to monitor   -Continue Amlodipine 10, stopped Metoprolol and started Coreg 25 mg po BID      Hyperkalemia-Resolved   -Likely 2/2 AoCKD with increased intake of K supplements   -Shifted in the ED with Nabicarb, caglc, insulin + D50, albuterol, will repeat CMP, EKG  -3.8 today from 5.7 on admit   -Continue to follow up with BMPs     CAD s/p CABGx4 in 2004:  -Stable  -Maximize medical management: Coreg 25, ASA 8, Lasix   -Hold ACE-I 2/2 ALEXI  -Hold statin 2/2 ALEXI     Normocytic Anemia:  -Hgb 11.1/33.1  -Stable, no active bleeding  -Ua: 1+ occult blood   -Iron-35 Low, TIBC 292 wnl, Folate wnl, B12 wnl, Ferritin 91 wnl, Transferrin 197 low   -H/o JOANNE in 2015, new workup continues to be consistent with JOANNE  -Last colonoscopy over 10 years, follow up with PCP out pt to set up colonoscopy  -Consider Iron/Colace on  DC     HLD:  -Hold statin pravastatin 40     HypoMg:   -1mg Mg today given Mg of 1.3     Secondary Hyperparathyroidism:   -.2, likely secondary to CKD IV     -DM2:  -A1c 5.8 in 2015, HbA1c 5.6 on 8/22  -Continue SSI and Accuchecks   -Serum 92 today      Hyperphosphatemia:  -6.2-->5.7-->6.1-->4.6  -Start Calcium acetate 667 mg TID continue to monitor improvement       Vitamin D Deficiency:  -2015 records taking Vit D  -Ergo on DC per nephrology recs      Elevated Troponin:   -Likely 2/2 ALEXI  -0.032 on admit, stable without changes on EKG     Metabolic Acidosis:  -Resolved      Gout:  -Stable  -Home med allopurinol 100mg      Dispo: continue nephrology consult, renal diet, heparin for DVT prophylaxis, continue lasix.      Papito REVELES

## 2017-08-24 NOTE — ANESTHESIA PREPROCEDURE EVALUATION
08/28/2017  Gunner Motley is a 72 y.o., male with ESRD for PD catheter placement on 8/28/17    Anesthesia Evaluation    I have reviewed the Patient Summary Reports.     I have reviewed the Nursing Notes.      Review of Systems  Anesthesia Hx:  No problems with previous Anesthesia  History of prior surgery of interest to airway management or planning:  Denies Personal Hx of Anesthesia complications.   Social:  Former Smoker    Hematology/Oncology:         -- Anemia:   EENT/Dental:EENT/Dental Normal   Cardiovascular:   Exercise tolerance: poor Hypertension Valvular problems/Murmurs (TR), MR CAD (2004, 4 vessel CABG)   CHF (with preserved EF)   TTE  CONCLUSIONS     1 - Severe left atrial enlargement.     2 - Eccentric hypertrophy.     3 - No wall motion abnormalities.     4 - Normal left ventricular systolic function (EF 55-60%).     5 - Restrictive LV filling pattern, indicating markedly elevated LAP (grade 3 diastolic dysfunction).     6 - Normal right ventricular systolic function .     7 - Pulmonary hypertension. The estimated PA systolic pressure is 52 mmHg.     8 - Trivial to mild aortic regurgitation.     9 - Moderate mitral regurgitation.     10 - Moderate tricuspid regurgitation.     11 - Intermediate central venous pressure.    Sinus rhythm with frequent and consecutive Premature ventricular complexes   in a pattern of bigeminy  Nonspecific T wave abnormality  Abnormal ECG  No previous ECGs available  Confirmed by Elise Hansen MD ([id number]) on 8/23/2017 10:31:13 AM   Pulmonary:     Pulm HTN   Renal/:   Chronic Renal Disease, ESRD, Dialysis    Hepatic/GI:  Hepatic/GI Normal    Neurological:  Neurology Normal    Endocrine:   Diabetes        Physical Exam  General:  Well nourished    Airway/Jaw/Neck:  Airway Findings: Mouth Opening: Normal Tongue: Normal  General Airway Assessment: Adult   Mallampati: II  TM Distance: Normal, at least 6 cm       Chest/Lungs:  Chest/Lungs Findings: Normal Respiratory Rate, Expiratory Wheezes, Mild     Heart/Vascular:  Heart Findings: Rate: Normal  Rhythm: Regular Rhythm  Sounds: Normal        Mental Status:  Mental Status Findings:  Alert and Oriented, Cooperative       Lab Results   Component Value Date    WBC 9.85 08/27/2017    HGB 11.6 (L) 08/27/2017    HCT 33.9 (L) 08/27/2017     08/27/2017    ALT 23 08/27/2017    AST 32 08/27/2017     (L) 08/27/2017    K 4.0 08/27/2017    CL 89 (L) 08/27/2017    CREATININE 6.0 (H) 08/27/2017    BUN 96 (H) 08/27/2017    CO2 24 08/27/2017    TSH 1.557 08/22/2017    INR 1.1 08/27/2017    HGBA1C 5.6 08/22/2017     Wt Readings from Last 3 Encounters:   08/27/17 68 kg (150 lb)   08/26/17 68 kg (150 lb)   08/25/17 70 kg (154 lb 5.2 oz)     Temp Readings from Last 3 Encounters:   08/27/17 36.8 °C (98.2 °F) (Oral)   08/26/17 36.9 °C (98.4 °F) (Oral)   08/25/17 36.8 °C (98.3 °F) (Oral)     BP Readings from Last 3 Encounters:   08/27/17 (!) 146/72   08/26/17 (!) 147/73   08/25/17 (!) 119/57     Pulse Readings from Last 3 Encounters:   08/27/17 68   08/26/17 74   08/25/17 72         Anesthesia Plan  Type of Anesthesia, risks & benefits discussed:  Anesthesia Type:  general  Patient's Preference:   Intra-op Monitoring Plan: standard ASA monitors  Intra-op Monitoring Plan Comments:   Post Op Pain Control Plan: per primary service following discharge from PACU and IV/PO Opioids PRN  Post Op Pain Control Plan Comments:   Induction:    Beta Blocker:         Informed Consent: Patient understands risks and agrees with Anesthesia plan.  Questions answered. Anesthesia consent signed with patient.  ASA Score: 4     Day of Surgery Review of History & Physical: I have interviewed and examined the patient. I have reviewed the patient's H&P dated:  There are no significant changes.          Ready For Surgery From Anesthesia Perspective.

## 2017-08-24 NOTE — PROGRESS NOTES
Spoke with Dr. Paniagua and Dr. Moraes regarding outpatient HD- they want patient to be setup at [facility] (by [facility]) for peritioneal dialysis to start in 2 weeks (September 11). Will confirm with patient and ask CM SSC to start working on coordination of PD dialysis. Nephrology team stated patient will get PD catheter placed Monday, August 28th.    Marcy Don, RN, CCM, CMSRN  RN Transition Navigator  [phone]

## 2017-08-24 NOTE — PROGRESS NOTES
[facility] [place] [facility] call to [facility] Admissions, spoke to Admit Coordinator, Marnie. She informed [facility] [place] [facility] that she received all clinicals except Hep B Panels. [facility] refaxed labs to [phone]. Marnie also informed [facility] [place] [facility] that Manas (ext [phone]) would be the coordinator assigned to this patient.

## 2017-08-25 VITALS
OXYGEN SATURATION: 95 % | HEART RATE: 72 BPM | BODY MASS INDEX: 24.8 KG/M2 | TEMPERATURE: 98 F | HEIGHT: 66 IN | SYSTOLIC BLOOD PRESSURE: 119 MMHG | RESPIRATION RATE: 19 BRPM | WEIGHT: 154.31 LBS | DIASTOLIC BLOOD PRESSURE: 57 MMHG

## 2017-08-25 PROBLEM — E87.6 HYPOKALEMIA: Status: ACTIVE | Noted: 2017-08-25

## 2017-08-25 PROBLEM — R60.0 EDEMA EXTREMITIES: Status: RESOLVED | Noted: 2017-08-22 | Resolved: 2017-08-25

## 2017-08-25 PROBLEM — E87.5 HYPERKALEMIA: Status: RESOLVED | Noted: 2017-08-22 | Resolved: 2017-08-25

## 2017-08-25 PROBLEM — E87.20 METABOLIC ACIDOSIS: Status: RESOLVED | Noted: 2017-08-22 | Resolved: 2017-08-25

## 2017-08-25 LAB
ALBUMIN SERPL BCP-MCNC: 3.3 G/DL
ALBUMIN SERPL BCP-MCNC: 3.5 G/DL
ALP SERPL-CCNC: 89 U/L
ALT SERPL W/O P-5'-P-CCNC: 7 U/L
ANCA AB TITR SER IF: NORMAL TITER
ANION GAP SERPL CALC-SCNC: 16 MMOL/L
ANION GAP SERPL CALC-SCNC: 17 MMOL/L
AST SERPL-CCNC: 15 U/L
BASOPHILS # BLD AUTO: 0.02 K/UL
BASOPHILS NFR BLD: 0.3 %
BILIRUB SERPL-MCNC: 0.6 MG/DL
BUN SERPL-MCNC: 76 MG/DL
BUN SERPL-MCNC: 76 MG/DL
CALCIUM SERPL-MCNC: 9.2 MG/DL
CALCIUM SERPL-MCNC: 9.5 MG/DL
CHLORIDE SERPL-SCNC: 94 MMOL/L
CHLORIDE SERPL-SCNC: 95 MMOL/L
CO2 SERPL-SCNC: 25 MMOL/L
CO2 SERPL-SCNC: 26 MMOL/L
CREAT SERPL-MCNC: 5.3 MG/DL
CREAT SERPL-MCNC: 5.4 MG/DL
DIFFERENTIAL METHOD: ABNORMAL
EOSINOPHIL # BLD AUTO: 0.3 K/UL
EOSINOPHIL NFR BLD: 4.1 %
ERYTHROCYTE [DISTWIDTH] IN BLOOD BY AUTOMATED COUNT: 13.6 %
EST. GFR  (AFRICAN AMERICAN): 11 ML/MIN/1.73 M^2
EST. GFR  (AFRICAN AMERICAN): 11 ML/MIN/1.73 M^2
EST. GFR  (NON AFRICAN AMERICAN): 10 ML/MIN/1.73 M^2
EST. GFR  (NON AFRICAN AMERICAN): 10 ML/MIN/1.73 M^2
GLUCOSE SERPL-MCNC: 101 MG/DL
GLUCOSE SERPL-MCNC: 85 MG/DL
HCT VFR BLD AUTO: 36.2 %
HGB BLD-MCNC: 12.3 G/DL
LYMPHOCYTES # BLD AUTO: 1.5 K/UL
LYMPHOCYTES NFR BLD: 19.4 %
MAGNESIUM SERPL-MCNC: 1.6 MG/DL
MCH RBC QN AUTO: 30.2 PG
MCHC RBC AUTO-ENTMCNC: 34 G/DL
MCV RBC AUTO: 89 FL
MONOCYTES # BLD AUTO: 0.9 K/UL
MONOCYTES NFR BLD: 11.5 %
MYELOPEROXIDASE AB SER-ACNC: 3 UNITS
NEUTROPHILS # BLD AUTO: 4.9 K/UL
NEUTROPHILS NFR BLD: 64.6 %
P-ANCA TITR SER IF: NORMAL TITER
PATHOLOGIST INTERPRETATION UPE: NORMAL
PHOSPHATE SERPL-MCNC: 4.3 MG/DL
PHOSPHATE SERPL-MCNC: 4.4 MG/DL
PLATELET # BLD AUTO: 261 K/UL
PMV BLD AUTO: 10.3 FL
POCT GLUCOSE: 101 MG/DL (ref 70–110)
POCT GLUCOSE: 108 MG/DL (ref 70–110)
POTASSIUM SERPL-SCNC: 3.5 MMOL/L
POTASSIUM SERPL-SCNC: 3.6 MMOL/L
PROT SERPL-MCNC: 7.3 G/DL
RBC # BLD AUTO: 4.07 M/UL
SODIUM SERPL-SCNC: 136 MMOL/L
SODIUM SERPL-SCNC: 137 MMOL/L
WBC # BLD AUTO: 7.58 K/UL

## 2017-08-25 PROCEDURE — 25000003 PHARM REV CODE 250: Performed by: STUDENT IN AN ORGANIZED HEALTH CARE EDUCATION/TRAINING PROGRAM

## 2017-08-25 PROCEDURE — 63600175 PHARM REV CODE 636 W HCPCS

## 2017-08-25 PROCEDURE — 85025 COMPLETE CBC W/AUTO DIFF WBC: CPT

## 2017-08-25 PROCEDURE — 84100 ASSAY OF PHOSPHORUS: CPT

## 2017-08-25 PROCEDURE — 94761 N-INVAS EAR/PLS OXIMETRY MLT: CPT

## 2017-08-25 PROCEDURE — 83735 ASSAY OF MAGNESIUM: CPT

## 2017-08-25 PROCEDURE — 63600175 PHARM REV CODE 636 W HCPCS: Performed by: STUDENT IN AN ORGANIZED HEALTH CARE EDUCATION/TRAINING PROGRAM

## 2017-08-25 PROCEDURE — 36415 COLL VENOUS BLD VENIPUNCTURE: CPT

## 2017-08-25 PROCEDURE — 80053 COMPREHEN METABOLIC PANEL: CPT

## 2017-08-25 PROCEDURE — 25000003 PHARM REV CODE 250: Performed by: INTERNAL MEDICINE

## 2017-08-25 RX ORDER — MAGNESIUM SULFATE HEPTAHYDRATE 40 MG/ML
2 INJECTION, SOLUTION INTRAVENOUS ONCE
Status: COMPLETED | OUTPATIENT
Start: 2017-08-25 | End: 2017-08-25

## 2017-08-25 RX ORDER — BENAZEPRIL HYDROCHLORIDE 10 MG/1
10 TABLET ORAL DAILY
Status: DISCONTINUED | OUTPATIENT
Start: 2017-08-25 | End: 2017-08-25 | Stop reason: HOSPADM

## 2017-08-25 RX ORDER — ERGOCALCIFEROL 1.25 MG/1
50000 CAPSULE ORAL
Qty: 30 CAPSULE | Refills: 1 | Status: SHIPPED | OUTPATIENT
Start: 2017-08-25 | End: 2017-01-01 | Stop reason: SDUPTHER

## 2017-08-25 RX ORDER — FUROSEMIDE 40 MG/1
80 TABLET ORAL EVERY 8 HOURS
Status: DISCONTINUED | OUTPATIENT
Start: 2017-08-25 | End: 2017-08-25 | Stop reason: HOSPADM

## 2017-08-25 RX ORDER — CALCITRIOL 0.25 UG/1
0.25 CAPSULE ORAL DAILY
Qty: 30 CAPSULE | Refills: 3 | Status: SHIPPED | OUTPATIENT
Start: 2017-08-25 | End: 2017-10-04 | Stop reason: SDUPTHER

## 2017-08-25 RX ORDER — CARVEDILOL 25 MG/1
25 TABLET ORAL 2 TIMES DAILY
Qty: 30 TABLET | Refills: 3 | Status: SHIPPED | OUTPATIENT
Start: 2017-08-25 | End: 2017-09-11 | Stop reason: SDUPTHER

## 2017-08-25 RX ORDER — SEVELAMER CARBONATE 800 MG/1
800 TABLET, FILM COATED ORAL
Status: DISCONTINUED | OUTPATIENT
Start: 2017-08-25 | End: 2017-08-25 | Stop reason: HOSPADM

## 2017-08-25 RX ORDER — BENAZEPRIL HYDROCHLORIDE 10 MG/1
10 TABLET ORAL DAILY
Qty: 30 TABLET | Refills: 3 | Status: SHIPPED | OUTPATIENT
Start: 2017-08-25 | End: 2017-09-11 | Stop reason: SDUPTHER

## 2017-08-25 RX ORDER — CALCIUM ACETATE 667 MG/1
667 CAPSULE ORAL
Qty: 30 CAPSULE | Refills: 3 | Status: SHIPPED | OUTPATIENT
Start: 2017-08-25 | End: 2017-01-01 | Stop reason: SDUPTHER

## 2017-08-25 RX ORDER — FUROSEMIDE 80 MG/1
160 TABLET ORAL 2 TIMES DAILY
Qty: 60 TABLET | Refills: 3 | Status: SHIPPED | OUTPATIENT
Start: 2017-08-25 | End: 2017-09-11 | Stop reason: SDUPTHER

## 2017-08-25 RX ADMIN — SEVELAMER CARBONATE 800 MG: 800 TABLET, FILM COATED ORAL at 12:08

## 2017-08-25 RX ADMIN — BENAZEPRIL HYDROCHLORIDE 10 MG: 10 TABLET, FILM COATED ORAL at 08:08

## 2017-08-25 RX ADMIN — CALCIUM ACETATE 667 MG: 667 CAPSULE ORAL at 08:08

## 2017-08-25 RX ADMIN — HEPARIN SODIUM 5000 UNITS: 5000 INJECTION, SOLUTION INTRAVENOUS; SUBCUTANEOUS at 06:08

## 2017-08-25 RX ADMIN — AMLODIPINE BESYLATE 10 MG: 5 TABLET ORAL at 08:08

## 2017-08-25 RX ADMIN — CALCIUM ACETATE 667 MG: 667 CAPSULE ORAL at 12:08

## 2017-08-25 RX ADMIN — MAGNESIUM SULFATE IN WATER 2 G: 40 INJECTION, SOLUTION INTRAVENOUS at 08:08

## 2017-08-25 RX ADMIN — FUROSEMIDE 80 MG: 40 TABLET ORAL at 02:08

## 2017-08-25 RX ADMIN — CALCITRIOL 0.25 MCG: 0.25 CAPSULE, LIQUID FILLED ORAL at 08:08

## 2017-08-25 RX ADMIN — CARVEDILOL 25 MG: 25 TABLET, FILM COATED ORAL at 08:08

## 2017-08-25 RX ADMIN — FUROSEMIDE 80 MG: 10 INJECTION, SOLUTION INTRAMUSCULAR; INTRAVENOUS at 06:08

## 2017-08-25 RX ADMIN — ASPIRIN 81 MG: 81 TABLET, COATED ORAL at 08:08

## 2017-08-25 NOTE — MEDICAL/APP STUDENT
[employer] Medical Student L4 Progress Note     Subjective:     Pt feeling well this morning. Complained of intermittent cramping throughout the night. Understands plan for PD catheter with gen surg and follow up with nephrology. No lower extremity or abdominal discomfort. Continues to urinate frequently without difficulty. Denies n/v, diarrhea, CP, SOB or HA.      Objective:     Vital Signs Last 24 Hrs:   (Min-Max)     BP: 134-186/60-79, current 140/58  Tmax: 98.6  Pulse: 38-88, 55 on exam   RR: 16-20  SpO2: 94-95     I/O:   In: 500  Out: 1800  Net Admit: -10.3 L      PE:  Gen: awake, cooperative, alert and oriented  HEENT: PERRL, MMM, clear oropharynx, neck supple, no thyroid enlargement, no LAD, JVP 8cm   Resp: Diffuse crackles bilateral upper and lower lung fields on auscultation, no wheezing  Abd: soft, positive BS, non-tender, non-distended, no organomegaly, negative azul's sign.   CV: RRR, no murmurs, no clicks, no rubs.   Ext: Minimal edema over BLE significantly decreased since admit, no cyanosis, no jaundice.   Neuro: Alert and Oriented.   Skin: Scattered bruising over chest and abdomen, large bruise over right UE.      Labs:  H/H: 12.3/36.2  Plt: 261  WBC: 7.58  Na: 137  K: 3.5  Bicarb: 26  Cl: 94  BUN: 76  Cr:  5.4  Glucose: 85  Ca: 9.5  Albumin: 3.5  Tbili: 0.6  AST: 15  ALT: 7  Alk Phos: 89    U/A on admit: 3+ protein, 1+ blood, no glucose, no ketones, no LE or nitrites, 1 RBC, no WBC, no bacteria      Imaging:     CXR:      Postoperative change of prior median sternotomy.  The cardiomediastinal silhouette is within normal limits The visualized airway is unremarkable.  There is subsegmental atelectasis at the lung bases.  No evidence of focal consolidation, significant pleural effusion or pneumothorax.  Osseous structures demonstrate no acute abnormality.      U/S Kidney:     Changes consistent with chronic medical renal disease. Probable nonobstructing right renal calculus, unchanged.           Assessment:     73 yo M with a hx of HTN, CAD s/p CABGx4 (2004), CKD4, HLD, recurrent nephrolithiasis, gout, anemia and DM2 here with acute on chronic kidney disease IV.      Plan:     Acute on Chronic Kidney Disease IV with cardiorenal component with likely progression to CKD V with hyperkalemia, metabolic acidosis, proteinuria:  -Continue to hold nephrotoxic agents   -Daily weights, strict Is and Os- Net Output -10.3  -Renal US showed no post renal obstruction  -Cardiorenal: BNP 3,431, CXR, physical exam sings of overload s/p Lasix 160mg IV q8hr.   -Echo-diastolic dysfunction with EF wnl   -Urine Na-75 wnl, Ur Pr/Cr-2.56 High, Ur Pr-139, Ur azevedo-no eosinophils, TSH wnl  -Ua: 3+ protein, 1+ occult blood   -Cr 6.2/73 on admit, 5.4/69 today, BL Cr 2-3 from 2015  -Follow up with nephrology on DC, PD catheter on Aug 28th and Dr. Memorial on Sept 11th.   -Will continue lasix 80 mg po TID     Acute on Chronic Diastolic Heart Failure:    -Echo showed diastolic dysfunction with preserved EF of 55%  -BNP 3431 on admit  -Fluid overload likely 2/2 to acute on chronic kidney disease IV and HFpEF  -Stable, Continue to diurese with po lasix      HTN:  -140/58, continue to monitor   -Continue Amlodipine 10 qd, Coreg 25 mg po BID and benazepril 10 qd      Hyperkalemia-Resolved   -Likely 2/2 AoCKD with increased intake of K supplements   -Shifted in the ED with Nabicarb, caglc, insulin + D50, albuterol, will repeat CMP, EKG  -3.5 today from 5.7 on admit      CAD s/p CABGx4 in 2004:  -Stable  -Maximize medical management: Coreg, ASA , Lasix   -Resumed benazepril 10 qd   -Hold statin 2/2 ALEXI, resume on DC     Normocytic Anemia 2/2 to Anemia of Chronic Inflammation 2/2 CKD IV and JOANNE   -Hgb 12,3/36.2  -Stable, no active bleeding  -Ua: 1+ occult blood   -Iron-35 Low, TIBC 292 wnl, Folate wnl, B12 wnl, Ferritin 91 wnl, Transferrin 197 low   -H/o JOANNE in 2015, new workup continues to be consistent with JOANNE and AofCI  -Last  colonoscopy over 10 years, follow up with PCP out pt to set up colonoscopy     HLD:  -Hold statin pravastatin 40, resume on DC      HypoMg-Resolved   -1mg Mg yesterday   -1.3--> 1.6      Secondary Hyperparathyroidism:   -.2, likely secondary to CKD IV   -Started Calcitriol 0.25 mg qd      -DM2 controlled without insulin:   -A1c 5.8 in 2015, HbA1c 5.6 on 8/22  -Continue SSI while in patient and Accuchecks   -Serum 85 today     Hyperphosphatemia-Resolved   -6.2-->5.7-->6.1-->4.6-->4.3   -Started Calcium acetate 667 mg TID, continue to monitor improvement       Vitamin D Deficiency:  -2015 records taking Vit D  -Ergo 50K weekly for 3 months and then monthly after      Elevated Troponin:   -Likely 2/2 ALEXI  -0.032 on admit, stable without changes on EKG     Metabolic Acidosis:  -Resolved      Gout:  -Stable  -Home med allopurinol 100mg      Dispo: Likely DC today, PD catheter placement on Aug 28th and Nephrology appt with Dr. Camilo on Sept 11th.

## 2017-08-25 NOTE — PROGRESS NOTES
.Pharmacy New Medication Education    Patient accepted medication education.    Pharmacy educated patient on name and purpose of medications and possible side effects, using the teach-back method.     Benazepril  Calcitriol  Ergocalciferol      Learners of pharmacy medication education included:  patient    Patient +/- learner response:  teachback

## 2017-08-25 NOTE — PROGRESS NOTES
[employer] renal fellow HOIV      Subjective:      Pt denies sob; spoke with pt and pt's daughter at length regarding PD plans     Objective:   Last 24 Hour Vital Signs:  BP  Min: 134/71  Max: 170/72  Temp  Av.3 °F (36.8 °C)  Min: 98 °F (36.7 °C)  Max: 98.6 °F (37 °C)  Pulse  Av.7  Min: 38  Max: 88  Resp  Av.2  Min: 16  Max: 20  SpO2  Av.5 %  Min: 94 %  Max: 95 %  Weight  Av kg (154 lb 5.2 oz)  Min: 70 kg (154 lb 5.2 oz)  Max: 70 kg (154 lb 5.2 oz)  I/O last 3 completed shifts:  In: 1000 [P.O.:1000]  Out: 4100 [Urine:4100]    Physical Examination:  GEN - AAOx4, NAD  NECK - JVP approx 7cm  CHEST - minimal bibasilar crackles  HEART - rrr, no m/r/s3/s4  ABD - minimal ttp R LQ; BS +; soft and non distended  EXT - warm; no edema; 2+ B DP pulses      Laboratory:  Laboratory   Pertinent Findings:    Recent Labs  Lab 17  0341  17  0256 17  0257  17  1532 17  2340 17  0300 17  0301   WBC 5.59  --   --  6.64  --   --   --   --  7.58   HGB 10.5*  --   --  11.1*  --   --   --   --  12.3*   HCT 31.9*  --   --  33.1*  --   --   --   --  36.2*     --   --  235  --   --   --   --  261   MCV 92  --   --  89  --   --   --   --  89   RDW 13.8  --   --  13.7  --   --   --   --  13.6     < > 140  --   < > 134* 136 137  --    K 4.4  < > 3.8  --   < > 3.9 3.6 3.5  --      < > 100  --   < > 95 95 94*  --    CO2 24  < > 27  --   < > 28 25 26  --    BUN 71*  < > 69*  --   < > 70* 76* 76*  --    *  < > 92  --   < > 124* 101 85  --    PROT 6.5  --  6.5  --   --   --   --  7.3  --    ALBUMIN 3.1*  < > 3.1*  --   < > 3.1* 3.3* 3.5  --    BILITOT 0.4  --  0.6  --   --   --   --  0.6  --    AST 16  --  15  --   --   --   --  15  --    ALKPHOS 92  --  88  --   --   --   --  89  --    ALT 10  --  7*  --   --   --   --  7*  --    < > = values in this interval not displayed.        Other Results:  EKG (my interpretation):     Radiology   Pertinent  Findings:      Current Medications:     Infusions:        Scheduled:   amlodipine  10 mg Oral Daily    aspirin  81 mg Oral Daily    benazepril  10 mg Oral Daily    calcitRIOL  0.25 mcg Oral Daily    calcium acetate  667 mg Oral TID WM    carvedilol  25 mg Oral BID    ergocalciferol  50,000 Units Oral Q7 Days    furosemide  80 mg Oral Q8H    heparin (porcine)  5,000 Units Subcutaneous Q8H    magnesium sulfate IVPB  2 g Intravenous Once        PRN:  acetaminophen, dextrose 50%, dextrose 50%, glucagon (human recombinant), glucose, glucose, insulin aspart        Assessment/Plan     This is a 73 yo male here for worsening B LE edema; L-renal has been consulted for     ALEXI stage III on CKD IV  -etiol of CKD includes recurrent obstructive uropathy/HTN/small vessel Dz; etiol of ALEXI includes progression of renal Dz; BL Cr from records 2.0-3.0; renal US with chronic medical Dz  -BUN:Cr stable from day prior  -acid/base/electrolytes: AGMA of 17 (albumin corrected); rec 2gr Mg replacement   -UO x24 hours approx 1.8L with net neg 10L since admit; rec lasix 160mg BID oral   -will change phoslo to sevelamer (increasing Ca); no indication for RRT now  -renal education given; to have PD cath placed Monday and then to see PD RN at [facility] the next day    hyperPTH  -360  -cont ergo weekly and daily calcitriol     Proteinuria  -discrepancy of UA 1+ protein VS Pr/Cr of 2.56gr  -serology work up in progress; so far, neg/WNL for RPR/RF/C3/C4/GBM/anti-PR3; SPEP without monoclonal peaks; serum FLC with elevated ratio within norm in setting of CKD; will continue to f/u pending studies     Normocytic anemia  -not a candidate for Epo  -will begin ferrlecit as outpt     Hypo Vit D  -will give ergo 50k weekly x 3 months then monthly thereafter  -calcitriol .25mcg daily     Papito Paniagua II  [employer] renal HO IV  [phone]

## 2017-08-25 NOTE — PLAN OF CARE
Future Appointments  Date Time Provider Department Center   8/31/2017 3:00 PM Jannet Herman MD [facility] [place]     Patient scheduled Monday with Dr. Jen Talamantes at 12pm to get peritoneal catheter placed.     Gave patient written instructions on [facility] to go there on Monday, September 11th at 12pm to start PD catheter training. Patient verbalizes understanding.    No dme or home elissa needs.       08/25/17 1401   Final Note   Assessment Type Final Discharge Note   Discharge Disposition Home   What phone number can be called within the next 1-3 days to see how you are doing after discharge? [phone]   Hospital Follow Up  Appt(s) scheduled? Yes   Discharge plans and expectations educations in teach back method with documentation complete? Yes   Right Care Referral Info   Referral Type no care     Marcy Don, RN, CCM, CMSRN  RN Transition Navigator  [phone]

## 2017-08-25 NOTE — PROGRESS NOTES
[employer] IM Resident HO1 Progress Note    Subjective:      Gunner Motley is a 72 y.o. male being followed fro ALEXI on CKD Iv. Asyxs PVCs overnight.  Renal Diet. abd pain resolving. No cp or sob, abd pain and swelling improving.    D/C today with surgery on  for PD placement  Nephrology follow outpatient [facility] on      Objective:   Last 24 Hour Vital Signs:  BP  Min: 134/71  Max: 186/79  Temp  Av.2 °F (36.8 °C)  Min: 98 °F (36.7 °C)  Max: 98.6 °F (37 °C)  Pulse  Av.6  Min: 38  Max: 88  Resp  Av.2  Min: 16  Max: 20  SpO2  Av.5 %  Min: 94 %  Max: 95 %  Weight  Av kg (154 lb 5.2 oz)  Min: 70 kg (154 lb 5.2 oz)  Max: 70 kg (154 lb 5.2 oz)  I/O last 3 completed shifts:  In: 1000 [P.O.:1000]  Out: 4100 [Urine:4100]    Bp:140/58 from 170/71 at 3am, HR 38, Urine output 1.8L     Physical Examination:  Gen: Able to speak full sentences, well-nourished, pleasant, AAAOx3  Eyes: No scleral icterus, EOMI  HEENT: MMM, JVP 8cm, No thyromegaly  Cards: RRR, no murmurs, rubs, or gallops  Ext: edema improved, no asterixis, pulses 2+ b/l   Skin: chest and abdomen ecchymoses   Abd: BS positive, non-tender, non-distended, no organomegaly, midline suprapubic healed scar, negative Diaz's sign  Genital: No scrotal swelling or tenderness, no penile discharge or meatus dried blood  Pulm: decreased breath sounds b/l, no wheezes, crackles       Laboratory:  Laboratory Data Reviewed: yes  Pertinent Findings:  P 4.3  Mg 1.6  K 3.5  CMP: Cr 5.4        Other Results:  EKG (my interpretation): PVCs    Radiology Data Reviewed: yes  ECHO    1 - Severe left atrial enlargement.     2 - Eccentric hypertrophy.     3 - No wall motion abnormalities.     4 - Normal left ventricular systolic function (EF 55-60%).     5 - Restrictive LV filling pattern, indicating markedly elevated LAP (grade 3 diastolic dysfunction).     6 - Normal right ventricular systolic function .     7 - Pulmonary hypertension. The estimated PA  systolic pressure is 52 mmHg.     8 - Trivial to mild aortic regurgitation.     9 - Moderate mitral regurgitation.     10 - Moderate tricuspid regurgitation.     11 - Intermediate central venous pressure.         Current Medications:     Infusions:        Scheduled:   amlodipine  10 mg Oral Daily    aspirin  81 mg Oral Daily    calcitRIOL  0.25 mcg Oral Daily    calcium acetate  667 mg Oral TID WM    carvedilol  25 mg Oral BID    ergocalciferol  50,000 Units Oral Q7 Days    furosemide  80 mg Intravenous Q8H    heparin (porcine)  5,000 Units Subcutaneous Q8H        PRN:  acetaminophen, dextrose 50%, dextrose 50%, glucagon (human recombinant), glucose, glucose, insulin aspart        Lines and Day Number of Therapy:  PIV    Assessment:     Gunner Motley is a 72 y.o.male with  Patient Active Problem List    Diagnosis Date Noted    Hypomagnesemia 08/24/2017    Acute on chronic diastolic heart failure 08/23/2017    Pulmonary hypertension 08/23/2017    Anemia     Essential hypertension     Proteinuria     Acute renal failure superimposed on stage 4 chronic kidney disease 08/22/2017    Mixed hyperlipidemia 08/22/2017    Edema extremities 08/22/2017    Metabolic acidosis 08/22/2017    Hyperkalemia 08/22/2017    Type 2 diabetes mellitus with stage 4 chronic kidney disease, without long-term current use of insulin 08/22/2017    Hypervolemia     HTN (hypertension) 05/01/2015    Recurrent nephrolithiasis 05/01/2015    CKD (chronic kidney disease) stage 4, GFR 15-29 ml/min 05/01/2015    Normocytic anemia 05/01/2015    Gout 05/01/2015    CAD (coronary artery disease) 05/01/2015        Plan:     1. Acute on Chronic Kidney Disease IV (2015: Cr 3.3, admit 6.2) with cardiorenal component with likely progression to CKD V with hyperkalemia, metabolic acidosis, proteinuria  - Neph: Likely CKD V progressed to ESRD, possibly inpt RRT or urgent PD  - Cr now 5.4, will little improvment, hyperkalemia resolved,  metabolic acidosis resolved   -Nephrology following and has ordered work up that is pending   -Renal US no concern for Post Renal Disease  -Cardiorenal: BNP 3,431, CXR, physical exam sings of overload given Lasix   -Ordered Echo: 55%, diastolic dysfxn   -Ua: 3+ protein, 1+ occult blood   -Lasix 80 TID po with 1.8L removed (-10L since admit)    2. Acute on Chronic Diastolic Heart Failure with preserved EF 55%  -ECHO 8/22  -Symptoms much improved; responding well to diuresis (net negative 9 L to date  - Decrease IV lasix and plan to transition to oral in am     2. HTN  -Stable at 140/58 this am although 170/72 at 3am  -Amlodipine 10, coreg 25 BID, renal approves ACE-I/ARB     3. Hyperkalemia  -Resolved at 3.5  -Likely 2/2 AoCKD with increased intake   -Shifted in the ED with Nabicarb, caglc, insulin + D50, albuterol, will repeat CMP, EKG     4. CAD s/p CABGx4 (2004)  -Stable  -Maximize medical management: Bb coreg25 BID, ASA 81, lasix, CCB 10       5. Iron Deficiency Anemia   -Stable and trending upwards, no active bleeding  -Ua: 1+ occult blood   -Ferritin 91       6. HLD  -Hold statin pravastatin 40      7. DMII  -A1c 5.6, poct at goal  -SSI and Accuchecks         8. Vit D Deficiency   -2015 records taking Vit D  -Neph will give ergo 50k weekly x 3 months then monthly thereafter     9. Troponinemia (0.032)  -Likely 2/2 ARK  -Stable 0.032 w/o EKG changes     10. Metabolic Acidosis   -Resolved     11. Gout  -Stable  -Held allopurinol 100mg     12. Hyperphosphatemia (4.3p)  -Renal following, resolved  -Cont. calcium acetate 667 TID per renal    13. HypoMag (1.6)  -s/p 2mg Mg  -resolved    14. Secondary HyperPTH  -  -Likely 2/2 CKD         Dispo: heparin, diuresis management, renal diet        Karishma Lynn  \[facility]\"" Internal Medicine HO1  \[facility]\"" IM Service Team A    \[facility]\"" Medicine Hospitalist Pager numbers:   \[facility]\"" Hospitalist Medicine Team A (Farooq/Mauro): [phone]  \[facility]\"" Hospitalist Medicine Team B  (Chaka/Lowell):  [phone]

## 2017-08-25 NOTE — PROGRESS NOTES
9:14am-[facility] received call from Admit Coordinator Manas informing she received all necessary documents required to get outpatient dialysis set up. She sent all docs to [facility] and is now pending patient's schedule.    9:43- [facility] made contact with [facility] ([phone]) to ensure all documents were received and check on status PD setup. Left message for the  , Ms. Schmitt.    10:07- [facility] spoke with FA- Ms. Schmitt, she does have all documents for the patient.  She informed the PD nurse will call patient after he gets his PD cath placed. The PD nurse will  inform him on what date to report to the center for training.

## 2017-08-25 NOTE — PLAN OF CARE
Removed IV , tip in tact. Removed Tele, no ectopy on tele. Went over discharge instructions, verbalized understanding. Patient waiting on ride home.

## 2017-08-25 NOTE — DISCHARGE SUMMARY
[facility] Internal Medicine Discharge Summary    Primary Team: [facility] Internal Medicine Team A   Attending Physician: Gisell Wade MD  Resident: Dr. Metcalf   Intern: Dr. Lynn     Date of Admit: 8/22/2017  Date of Discharge: 8/25/2017    Discharge to: Home  Condition: Stable     Discharge Diagnoses     Patient Active Problem List   Diagnosis    HTN (hypertension)    Recurrent nephrolithiasis    CKD (chronic kidney disease) stage 4, GFR 15-29 ml/min    Normocytic anemia    Gout    CAD (coronary artery disease)    Acute renal failure superimposed on stage 4 chronic kidney disease    Mixed hyperlipidemia    Hypervolemia    Type 2 diabetes mellitus with stage 4 chronic kidney disease, without long-term current use of insulin    Acute on chronic diastolic heart failure    Anemia    Essential hypertension    Proteinuria    Pulmonary hypertension    Hypomagnesemia       Consultants and Procedures     Consultants:  General Surgery, Nephrology    Procedures:   None    Brief History of Present Illness      Mr. Motley is a 73 yo man with a PMHx of HTN, CAD s/p CABG x 4 in 2004, history of recurrent nephrolithiasis (since childhood) requiring multiple lithotripsies (calcium stones) per patient, and per chart review: Gout,CKD stage 4 (2015: Cr 3.3 and K 5.6), HLD, Vitamin D deficiency, Anemia of Chronic Disease, and non-insulin dependent DMII (unk A1C). He was in his usual state of health until he noticed worsening FREIRE (can not walk one flight of stairs), scrotal and b/l LE swelling, PND, orthopnea, b/l LE pruirits, b/l leg cramping, and LUQ abdominal pain for the past couple of months. He states he is compliant with medications, has monitored his salt and fluid intake, and denies NSAID usage. However, for the past month he has been taking his wife's 40meq oral potassium daily to reduce his leg cramping. He also has been under recent stress due to his wife's worsening medical condition. He has had kidney stone  "removal and diverticulitis requiring surgery in the past. On ROS, he endorses dysuria for many years and has notices "urethral tissue" that he believes to be dried blood. He endorses bruising noticed within the past 2 weeks. Denies n/v, change in urinary frequency, change in bowels, f/c/ns, chest pain, dysphagia, penile discharge, dizziness, headache, confusion, tremors, or cough.     Hospital Course By Problem with Pertinent Findings     1. Acute on Chronic Kidney Disease IV with cardiorenal component with likely progression to CKD V with hyperkalemia, metabolic acidosis, and proteinuria  On admit, patient was found to have a creatinine of 6.2 with a baseline in 2015 of 3.3. Physical exam, chest x-ray, and BNP suggested a cardiorenal component for volume overload. Creatinine improved somewhat with Lasix however is still markedly elevated due to CKD. Ten liters of fluid were removed with resolution of symptoms. Hyperkalemia and metabolic acidosis resolved. Renal US showed no concern for Post Renal Disease. Echo was ordered which showed an EF of 55% with diastolic dysfunction. Nephrology was consulted and believed CKD IV progressed to CKD V with possibility of ESRD requiring urgent peritoneal dialysis. Patient has an scheduled surgery with general surgery on 8/28/2017 for outpatient PD and has discussed procedure with patient. Patient will follow renal afterwards.        2. Acute on Chronic Diastolic Heart Failure with preserved EF 55%  Echo was ordered which showed an EF of 55% with diastolic dysfunction. Symptoms much improved and responded well to diuresis. Lasix IV was transitioned to oral 80 BID for discharge. Echo was noted with diastolic dysfunction, pulmonary HTN, moderate MR/TR.     3. Controlled Hypertension  Blood pressure was monitored stable. We continued home Amlodipine, transitioned Toprol  to Coreg 25 BID, and decreased benazepril 40 to 10mg daily. Furosemide 80 po BID was given as well.      4. " Hyperkalemia  Resolved during hospitalization and was likely secondary to kidney disease with increased oral intake of wife's potassium. Potassium was shifted in the emergency room with [place], calcium gluconate, insulin + D50, albuterol and monitored.      5. CAD s/p CABGx4 (2004)  Stable throughout with maximize medical management of Coreg25 BID, ASA 81, Benazepril 10, and pravastatin 40.        6. Iron Deficiency Anemia   Ferritin was measure and found to be decreased. Bleeding was monitored and hemoglobin was stable.        7. Hyperlipidemia  Held secondary to acute kidney injury and can resume pravastatin 40 upon discharge.      8. Non-Insulin Dependent DMII (A1c 5.6)  Per chart review, patient has DMII. A1c was measured to be 5.6. SSI and Accuchecks were given during hospitalization. He will not need diabetic medication currently.          9. Vitamin D Deficiency   History in 2015 revealed Vit D deficiency. Nephrology was consulted and recommended Ergo 50k weekly x 3 months then monthly thereafter     10. Troponinemia   Elevated on admission yet stable at 0.032 without signs of acute ischemic EKG changes.     11. Metabolic Acidosis   Resolved during hospitalization.      12. Gout  Stable without any flares. Can continue allopurinol 100mg upon discharge.      13. Hyperphosphatemia   Renal was consulted and gave intermittent phosphate binder calcium acetate 667 TID and resolved.      14. Hypomagnesia  Magnesium was monitored and repleted as seen fit.      15. Secondary HyperPTH  PTH was measured and found to be elevated at 360 likely secondary to chronic kidney disease. Calcitriol 0.25mcg was be given to help ameliorate low calcium levels from secondary hyperparathyroidism.        Discharge Medications        Medication List      START taking these medications    calcitRIOL 0.25 MCG Cap  Commonly known as:  ROCALTROL  Take 1 capsule (0.25 mcg total) by mouth once daily.     calcium acetate 667 mg  capsule  Commonly known as:  PHOSLO  Take 1 capsule (667 mg total) by mouth 3 (three) times daily with meals.     carvedilol 25 MG tablet  Commonly known as:  COREG  Take 1 tablet (25 mg total) by mouth 2 (two) times daily.     ergocalciferol 50,000 unit Cap  Commonly known as:  ERGOCALCIFEROL  Take 1 capsule (50,000 Units total) by mouth every 7 days.        CHANGE how you take these medications    albuterol 2.5 mg /3 mL (0.083 %) nebulizer solution  Commonly known as:  PROVENTIL  What changed:  Another medication with the same name was removed. Continue taking this medication, and follow the directions you see here.     benazepril 10 MG tablet  Commonly known as:  LOTENSIN  Take 1 tablet (10 mg total) by mouth once daily.  What changed:  · medication strength  · how much to take  · how to take this  · when to take this     furosemide 80 MG tablet  Commonly known as:  LASIX  Take 2 tablets (160 mg total) by mouth 2 (two) times daily.  What changed:  · medication strength  · how much to take  · when to take this        CONTINUE taking these medications    allopurinol 100 MG tablet  Commonly known as:  ZYLOPRIM     amlodipine 10 MG tablet  Commonly known as:  NORVASC     aspirin 81 MG EC tablet  Commonly known as:  ECOTRIN     oxycodone-acetaminophen  mg per tablet  Commonly known as:  PERCOCET     pravastatin 40 MG tablet  Commonly known as:  PRAVACHOL        STOP taking these medications    amoxicillin-clavulanate 875-125mg 875-125 mg per tablet  Commonly known as:  AUGMENTIN     ciclopirox 0.77 % Crea  Commonly known as:  LOPROX     diazePAM 10 MG Tab  Commonly known as:  VALIUM     hydrOXYzine pamoate 25 MG Cap  Commonly known as:  VISTARIL     levoFLOXacin 500 MG tablet  Commonly known as:  LEVAQUIN     metoprolol succinate 100 MG 24 hr tablet  Commonly known as:  TOPROL-XL     tizanidine 4 MG tablet  Commonly known as:  ZANAFLEX           Where to Get Your Medications      You can get these medications  from any pharmacy    Bring a paper prescription for each of these medications  · benazepril 10 MG tablet  · calcitRIOL 0.25 MCG Cap  · calcium acetate 667 mg capsule  · carvedilol 25 MG tablet  · ergocalciferol 50,000 unit Cap  · furosemide 80 MG tablet         Discharge Information:   Diet:  Diabetic     Physical Activity:  As tolerated     Instructions:  1. Take all medications as prescribed  2. Keep all follow-up appointments  3. Return to the hospital or call your primary care physicians if any worsening symptoms such as shortness of breath, chest pain, confusion, or dizziness occur.     Follow-Up Appointments:  PCP  General Surgery for PD  Renal    Karishma Lynn  [facility]

## 2017-08-25 NOTE — PLAN OF CARE
Problem: Patient Care Overview  Goal: Plan of Care Review  Outcome: Ongoing (interventions implemented as appropriate)  No c/o pain.  Lasix given.  Pt voiding.  Due for dc today and to return Monday for PD cath placement.  NSR with PVC's on the monitor with HR 60's to 80's.  Pt refusing bed alarm.  Will continue to monitor.

## 2017-08-26 ENCOUNTER — HOSPITAL ENCOUNTER (EMERGENCY)
Facility: HOSPITAL | Age: 72
Discharge: HOME | End: 2017-08-26
Attending: EMERGENCY MEDICINE
Payer: MEDICARE

## 2017-08-26 ENCOUNTER — NURSE TRIAGE (OUTPATIENT)
Dept: ADMINISTRATIVE | Facility: CLINIC | Age: 72
End: 2017-08-26

## 2017-08-26 VITALS
RESPIRATION RATE: 16 BRPM | SYSTOLIC BLOOD PRESSURE: 147 MMHG | HEIGHT: 66 IN | BODY MASS INDEX: 24.11 KG/M2 | TEMPERATURE: 98 F | HEART RATE: 74 BPM | WEIGHT: 150 LBS | DIASTOLIC BLOOD PRESSURE: 73 MMHG | OXYGEN SATURATION: 97 %

## 2017-08-26 DIAGNOSIS — M25.561 RIGHT KNEE PAIN: ICD-10-CM

## 2017-08-26 PROCEDURE — 25000003 PHARM REV CODE 250: Performed by: EMERGENCY MEDICINE

## 2017-08-26 PROCEDURE — 99283 EMERGENCY DEPT VISIT LOW MDM: CPT

## 2017-08-26 RX ORDER — OXYCODONE AND ACETAMINOPHEN 7.5; 325 MG/1; MG/1
1 TABLET ORAL ONCE
Status: DISCONTINUED | OUTPATIENT
Start: 2017-08-26 | End: 2017-08-26

## 2017-08-26 RX ORDER — OXYCODONE AND ACETAMINOPHEN 7.5; 325 MG/1; MG/1
1 TABLET ORAL ONCE
Status: COMPLETED | OUTPATIENT
Start: 2017-08-26 | End: 2017-08-26

## 2017-08-26 RX ORDER — OXYCODONE AND ACETAMINOPHEN 10; 325 MG/1; MG/1
1 TABLET ORAL EVERY 4 HOURS PRN
Qty: 20 TABLET | Refills: 0 | Status: SHIPPED | OUTPATIENT
Start: 2017-08-26 | End: 2017-09-06 | Stop reason: SDUPTHER

## 2017-08-26 RX ADMIN — OXYCODONE HYDROCHLORIDE AND ACETAMINOPHEN 1 TABLET: 7.5; 325 TABLET ORAL at 09:08

## 2017-08-26 NOTE — TELEPHONE ENCOUNTER
Reason for Disposition   [1] Thigh or calf pain AND [2] only 1 side AND [3] present > 1 hour    Protocols used: ST KNEE PAIN-A-AH

## 2017-08-27 ENCOUNTER — HOSPITAL ENCOUNTER (EMERGENCY)
Facility: HOSPITAL | Age: 72
Discharge: HOME | End: 2017-08-27
Attending: EMERGENCY MEDICINE | Admitting: SURGERY
Payer: MEDICARE

## 2017-08-27 VITALS
DIASTOLIC BLOOD PRESSURE: 72 MMHG | WEIGHT: 150 LBS | HEIGHT: 66 IN | RESPIRATION RATE: 18 BRPM | HEART RATE: 68 BPM | OXYGEN SATURATION: 95 % | BODY MASS INDEX: 24.11 KG/M2 | TEMPERATURE: 98 F | SYSTOLIC BLOOD PRESSURE: 146 MMHG

## 2017-08-27 DIAGNOSIS — M25.561 ACUTE PAIN OF RIGHT KNEE: ICD-10-CM

## 2017-08-27 DIAGNOSIS — N18.6 ESRD (END STAGE RENAL DISEASE): ICD-10-CM

## 2017-08-27 DIAGNOSIS — M10.9 ACUTE GOUT OF KNEE, UNSPECIFIED CAUSE, UNSPECIFIED LATERALITY: ICD-10-CM

## 2017-08-27 DIAGNOSIS — M79.604 RIGHT LEG PAIN: ICD-10-CM

## 2017-08-27 DIAGNOSIS — M54.16 LUMBAR RADICULOPATHY, ACUTE: Primary | ICD-10-CM

## 2017-08-27 LAB
ALBUMIN SERPL BCP-MCNC: 3.6 G/DL
ALP SERPL-CCNC: 95 U/L
ALT SERPL W/O P-5'-P-CCNC: 23 U/L
ANION GAP SERPL CALC-SCNC: 16 MMOL/L
APTT BLDCRRT: 27.5 SEC
AST SERPL-CCNC: 32 U/L
BASOPHILS # BLD AUTO: 0.02 K/UL
BASOPHILS NFR BLD: 0.2 %
BILIRUB SERPL-MCNC: 0.5 MG/DL
BUN SERPL-MCNC: 96 MG/DL
CALCIUM SERPL-MCNC: 9.6 MG/DL
CHLORIDE SERPL-SCNC: 89 MMOL/L
CO2 SERPL-SCNC: 24 MMOL/L
CREAT SERPL-MCNC: 6 MG/DL
DIFFERENTIAL METHOD: ABNORMAL
EOSINOPHIL # BLD AUTO: 0.5 K/UL
EOSINOPHIL NFR BLD: 4.7 %
ERYTHROCYTE [DISTWIDTH] IN BLOOD BY AUTOMATED COUNT: 13.6 %
EST. GFR  (AFRICAN AMERICAN): 10 ML/MIN/1.73 M^2
EST. GFR  (NON AFRICAN AMERICAN): 8 ML/MIN/1.73 M^2
GLUCOSE SERPL-MCNC: 116 MG/DL
HCT VFR BLD AUTO: 33.9 %
HGB BLD-MCNC: 11.6 G/DL
INR PPP: 1.1
LYMPHOCYTES # BLD AUTO: 1.7 K/UL
LYMPHOCYTES NFR BLD: 17.2 %
MCH RBC QN AUTO: 30.3 PG
MCHC RBC AUTO-ENTMCNC: 34.2 G/DL
MCV RBC AUTO: 89 FL
MONOCYTES # BLD AUTO: 1.2 K/UL
MONOCYTES NFR BLD: 11.7 %
NEUTROPHILS # BLD AUTO: 6.5 K/UL
NEUTROPHILS NFR BLD: 65.8 %
PLATELET # BLD AUTO: 246 K/UL
PMV BLD AUTO: 9.6 FL
POCT GLUCOSE: 119 MG/DL (ref 70–110)
POTASSIUM SERPL-SCNC: 4 MMOL/L
PROT SERPL-MCNC: 7.2 G/DL
PROTHROMBIN TIME: 11.5 SEC
RBC # BLD AUTO: 3.83 M/UL
SODIUM SERPL-SCNC: 129 MMOL/L
URATE SERPL-MCNC: 10.5 MG/DL
WBC # BLD AUTO: 9.85 K/UL

## 2017-08-27 PROCEDURE — 85730 THROMBOPLASTIN TIME PARTIAL: CPT

## 2017-08-27 PROCEDURE — 85025 COMPLETE CBC W/AUTO DIFF WBC: CPT

## 2017-08-27 PROCEDURE — 84550 ASSAY OF BLOOD/URIC ACID: CPT

## 2017-08-27 PROCEDURE — 82962 GLUCOSE BLOOD TEST: CPT

## 2017-08-27 PROCEDURE — 63600175 PHARM REV CODE 636 W HCPCS: Performed by: NURSE PRACTITIONER

## 2017-08-27 PROCEDURE — 99284 EMERGENCY DEPT VISIT MOD MDM: CPT

## 2017-08-27 PROCEDURE — 80053 COMPREHEN METABOLIC PANEL: CPT

## 2017-08-27 PROCEDURE — 85610 PROTHROMBIN TIME: CPT

## 2017-08-27 RX ORDER — PREDNISONE 20 MG/1
60 TABLET ORAL DAILY
Qty: 9 TABLET | Refills: 0 | Status: SHIPPED | OUTPATIENT
Start: 2017-08-27 | End: 2017-08-30

## 2017-08-27 RX ORDER — PREDNISONE 20 MG/1
60 TABLET ORAL
Status: COMPLETED | OUTPATIENT
Start: 2017-08-27 | End: 2017-08-27

## 2017-08-27 RX ADMIN — PREDNISONE 60 MG: 20 TABLET ORAL at 03:08

## 2017-08-27 NOTE — ED NOTES
Patient identifiers for Gunner Pedalino checked and correct.  LOC: The patient is awake, alert and aware of environment with an appropriate affect, the patient is oriented x 3 and speaking appropriately.  APPEARANCE: Patient uncomfortable and in no acute distress, patient is clean and well groomed, patient's clothing are properly fastened.  SKIN: The skin is warm and dry, patient has normal skin turgor and moist mucus membranes, skin intact, no breakdown or brusing noted.  MUSKULOSKELETAL: Patient moving all extremities, no obvious swelling or deformities noted. + pedal pulse, good movement in toes.  RESPIRATORY: Airway is open and patent, respirations are spontaneous, patient has a normal effort and rate.

## 2017-08-27 NOTE — CONSULTS
CC     Right knee pain     DX     ckd stage 5   Acute gout     HPI       73yo male with pmhx of CAD, DM, gout, HTN, CKD, and UTI is here for right leg pain.  Pt states the pain started on Tuesday after he was discharged from this hospital (admit for acute on chronic renal failure) as he was getting up off the couch.  The pt denies trauma, fever/chills, weakness, numbness/tingling, SOB, cough, and rash.  Pt states the pain is mostly located at his right lateral knee, but does radiate to his entire leg.            Results for SINDI BURTON (MRN [id number]) as of 8/27/2017 15:56   Ref. Range 8/25/2017 11:55 8/26/2017 21:20 8/27/2017 11:58 8/27/2017 12:15 8/27/2017 12:31   WBC Latest Ref Range: 3.90 - 12.70 K/uL   9.85     RBC Latest Ref Range: 4.60 - 6.20 M/uL   3.83 (L)     Hemoglobin Latest Ref Range: 14.0 - 18.0 g/dL   11.6 (L)     Hematocrit Latest Ref Range: 40.0 - 54.0 %   33.9 (L)     MCV Latest Ref Range: 82 - 98 fL   89     MCH Latest Ref Range: 27.0 - 31.0 pg   30.3     MCHC Latest Ref Range: 32.0 - 36.0 g/dL   34.2     RDW Latest Ref Range: 11.5 - 14.5 %   13.6     Platelets Latest Ref Range: 150 - 350 K/uL   246     MPV Latest Ref Range: 9.2 - 12.9 fL   9.6     Gran% Latest Ref Range: 38.0 - 73.0 %   65.8     Gran # Latest Ref Range: 1.8 - 7.7 K/uL   6.5     Lymph% Latest Ref Range: 18.0 - 48.0 %   17.2 (L)     Lymph # Latest Ref Range: 1.0 - 4.8 K/uL   1.7     Mono% Latest Ref Range: 4.0 - 15.0 %   11.7     Mono # Latest Ref Range: 0.3 - 1.0 K/uL   1.2 (H)     Eosinophil% Latest Ref Range: 0.0 - 8.0 %   4.7     Eos # Latest Ref Range: 0.0 - 0.5 K/uL   0.5     Basophil% Latest Ref Range: 0.0 - 1.9 %   0.2     Baso # Latest Ref Range: 0.00 - 0.20 K/uL   0.02     Protime Latest Ref Range: 9.0 - 12.5 sec   11.5     Coumadin Monitoring INR Latest Ref Range: 0.8 - 1.2    1.1     aPTT Latest Ref Range: 21.0 - 32.0 sec   27.5     Sodium Latest Ref Range: 136 - 145 mmol/L   129 (L)     Potassium Latest Ref Range:  3.5 - 5.1 mmol/L   4.0     Chloride Latest Ref Range: 95 - 110 mmol/L   89 (L)     CO2 Latest Ref Range: 23 - 29 mmol/L   24     Anion Gap Latest Ref Range: 8 - 16 mmol/L   16     BUN, Bld Latest Ref Range: 8 - 23 mg/dL   96 (H)     Creatinine Latest Ref Range: 0.5 - 1.4 mg/dL   6.0 (H)     eGFR if non African American Latest Ref Range: >60 mL/min/1.73 m^2   8 (A)     eGFR if African American Latest Ref Range: >60 mL/min/1.73 m^2   10 (A)     Glucose Latest Ref Range: 70 - 110 mg/dL   116 (H)     Calcium Latest Ref Range: 8.7 - 10.5 mg/dL   9.6     Alkaline Phosphatase Latest Ref Range: 55 - 135 U/L   95     Total Protein Latest Ref Range: 6.0 - 8.4 g/dL   7.2     Albumin Latest Ref Range: 3.5 - 5.2 g/dL   3.6     Uric Acid Latest Ref Range: 3.4 - 7.0 mg/dL   10.5 (H)     Total Bilirubin Latest Ref Range: 0.1 - 1.0 mg/dL   0.5     AST Latest Ref Range: 10 - 40 U/L   32     ALT Latest Ref Range: 10 - 44 U/L   23     POCT Glucose Latest Ref Range: 70 - 110 mg/dL 108   119 (H)        PE    Ge; aaox 3  HEENT;PERRLA EOMI  HEART ; RRR NO MRG  LUNG;  CTA B/L   EXT ; redness pain full to palpation right knee       A/P     Pt has CKD, scheduled to get a peritoneal catheter placed as an outpatient tomorrow.  Pt presented to ED c/o right knee pain that is worse than usual has no signs of confusion , fluid overload OR ACIDOSIS.    CKD STAGE 5  -- catheter placed as an outpatient tomorrow.   -- not need HD at this moment    ACUTE GOUT   --agree with prednisone for gout   DM 2  HTN     Travis Leon MD   [facility]

## 2017-08-27 NOTE — TELEPHONE ENCOUNTER
"Patient informed unable to contact community provider Dr. Morrison as well as Rx for requested Indomethecin 50 mg is not on med-list and Rx for Zyloprim 100 mg in EP[facility] is listed as "historical"; OOC care advice reiterated for patient to seek medical care regarding symptoms; patient understood/agreed, however, it is unclear from triage aspect if patient will follow care advice. Patient had no questions further at end of call.   "

## 2017-08-27 NOTE — ED PROVIDER NOTES
Encounter Date: 8/27/2017       History     Chief Complaint   Patient presents with    Knee Pain     reports right sided knee pain that he was seen for last night. Pt stated that he talked to Dr. Talamantes and was told to come to the ER. Reports a renal hx.      71yo male with pmhx of CAD, DM, gout, HTN, CKD, and UTI is here for right leg pain.  Pt states the pain started on Tuesday after he was discharged from this hospital (admit for acute on chronic renal failure) as he was getting up off the couch.  The pt denies trauma, fever/chills, weakness, numbness/tingling, SOB, cough, and rash.  Pt states the pain is mostly located at his right lateral knee, but does radiate to his entire leg.  Movement and palpation make the pain worse.  NOthing seems to help.  The pt was seen in this ED yesterday for the same compliant.  He reports that he had an x-ray which revealed vascular calcification, but no other problems.  Pt states he was discharged with Percocet, which does not help his pain.  He does report that the pain feels similar to his previous gout flares.  He called  today and was advised to return to the ED for reevaluation.  The pt is scheduled for a dialysis cath placement tomorrow.        The history is provided by the patient.   Leg Pain    There was no injury mechanism. The incident occurred several days ago. The pain is present in the right knee. The quality of the pain is described as aching. The pain is at a severity of 9/10. The pain has been worsening since onset. Pertinent negatives include no numbness, no inability to bear weight, no loss of motion, no muscle weakness, no loss of sensation and no tingling.     Review of patient's allergies indicates:  No Known Allergies  Past Medical History:   Diagnosis Date    Anemia     Coronary artery disease     Diabetes mellitus     Gout     Hypertension     Recurrent nephrolithiasis     Unspecified disorder of kidney and ureter     Urinary tract  infection      No past surgical history on file.  Family History   Problem Relation Age of Onset    Cancer Mother     Kidney disease Mother     Heart attack Father      Social History   Substance Use Topics    Smoking status: Former Smoker     Packs/day: 2.00     Years: 30.00    Smokeless tobacco: Former User     Quit date: 1/12/1995    Alcohol use Not on file     Review of Systems   Constitutional: Negative for chills and fever.   HENT: Negative for congestion.    Respiratory: Negative for cough and shortness of breath.    Cardiovascular: Positive for leg swelling.   Gastrointestinal: Negative for abdominal pain and vomiting.   Musculoskeletal: Positive for arthralgias and joint swelling.   Skin: Negative for rash and wound.   Neurological: Negative for tingling, weakness and numbness.   Psychiatric/Behavioral: Negative for confusion.       Physical Exam     Initial Vitals [08/27/17 1123]   BP Pulse Resp Temp SpO2   (!) 116/56 60 18 98.2 °F (36.8 °C) 96 %      MAP       76         Physical Exam    Nursing note and vitals reviewed.  Constitutional: Vital signs are normal. He appears well-developed and well-nourished. He is cooperative. He is easily aroused.  Non-toxic appearance. He does not have a sickly appearance. He does not appear ill. No distress.   HENT:   Head: Normocephalic and atraumatic.   Eyes: Conjunctivae are normal.   Neck: Normal range of motion.   Cardiovascular: Normal rate and regular rhythm.   Pulses:       Dorsalis pedis pulses are 1+ on the right side, and 1+ on the left side.   Pulmonary/Chest: Effort normal and breath sounds normal.   Abdominal: Soft. Normal appearance and bowel sounds are normal. There is no tenderness.   Musculoskeletal:        Right knee: He exhibits decreased range of motion. He exhibits no swelling, no effusion, no ecchymosis, no deformity, no laceration, no erythema, normal alignment, no LCL laxity, normal patellar mobility, no bony tenderness, normal meniscus and  no MCL laxity. Tenderness found. Lateral joint line tenderness noted. No medial joint line, no MCL, no LCL and no patellar tendon tenderness noted.        Right ankle: He exhibits swelling. He exhibits normal range of motion, no ecchymosis, no deformity, no laceration and normal pulse. No tenderness. Achilles tendon normal.        Right upper leg: Normal.        Right lower leg: Normal.        Legs:       Right foot: Normal.   Neurological: He is alert, oriented to person, place, and time and easily aroused. He has normal strength. No sensory deficit. GCS eye subscore is 4. GCS verbal subscore is 5. GCS motor subscore is 6.   Skin: Skin is warm, dry and intact. No bruising and no rash noted. No erythema.   Psychiatric: He has a normal mood and affect. His speech is normal and behavior is normal. Judgment and thought content normal. Cognition and memory are normal.         ED Course   Procedures  Labs Reviewed   CBC W/ AUTO DIFFERENTIAL - Abnormal; Notable for the following:        Result Value    RBC 3.83 (*)     Hemoglobin 11.6 (*)     Hematocrit 33.9 (*)     Mono # 1.2 (*)     Lymph% 17.2 (*)     All other components within normal limits   COMPREHENSIVE METABOLIC PANEL - Abnormal; Notable for the following:     Sodium 129 (*)     Chloride 89 (*)     Glucose 116 (*)     BUN, Bld 96 (*)     Creatinine 6.0 (*)     eGFR if  10 (*)     eGFR if non  8 (*)     All other components within normal limits   URIC ACID - Abnormal; Notable for the following:     Uric Acid 10.5 (*)     All other components within normal limits   POCT GLUCOSE - Abnormal; Notable for the following:     POCT Glucose 119 (*)     All other components within normal limits   PROTIME-INR   APTT   POCT GLUCOSE MONITORING CONTINUOUS              Imaging Results          US Lower Extremity Veins Right (Final result)  Result time 08/27/17 12:34:43    Final result by Nick Maurer MD (08/27/17 12:34:43)                  Impression:        No evidence of acute deep venous thrombosis of the right lower extremity.          Electronically signed by: ALBINO SALAS MD  Date:     08/27/17  Time:    12:34              Narrative:    Comparison: None    Clinical history: Pain in right leg    Findings:    Duplex and color flow Doppler evaluation does not reveal any evidence of acute venous thrombosis in the common femoral, superficial femoral, greater saphenous, popliteal, peroneal, anterior tibial and posterior tibial veins of the right lower extremity.  There is no reflux to suggest valvular incompetence.                              Medical Decision Making:   History:   Old Medical Records: I decided to obtain old medical records.  Old Records Summarized: records from previous admission(s).  Initial Assessment:   72-year-old male with ESRD here for right knee pain since Tuesday.  The patient had an x-ray yesterday which was negative for effusion and fracture.  The patient was recently discharged from this facility for acute on chronic renal failure.  He has an appointment tomorrow for AV fistula placement for dialysis.  Patient reports continued pain despite by mouth Percocet.  No known trauma.  Patient appears well, nontoxic.  There is point tenderness to the right lateral knee.  It is also tenderness to posterior right leg.  There is mild swelling of the right ankle.  DP 1+ bilaterally by palpation.  Sensation and strength intact bilateral lower extremities.  Differential Diagnosis:   Strain, sprain, spasm, lumbar radiculopathy, OA, DVT, gout  Clinical Tests:   Lab Tests: Ordered and Reviewed  Radiological Study: Ordered and Reviewed  ED Management:  Labs, US LLE, PO prednisone  Other:   I have discussed this case with another health care provider.       <> Summary of the Discussion: Jen Talamantes- consult nephrology.  No need for surgical intervention at this time.     Tonya (nephrology)- will see patient in the ED.  No need for emergent  dialysis. May give steroids for pain.        (hospitalist) 1430- will see patient in the ED.   consulted Dr.K. Wade.  No indication for admission.    Ultrasound negative for DVT.  Mild increase in BUN and creatinine.  1420- Pt is refusing discharge as he does not want to have to drive back to the hospital tomorrow morning for his AV fistula placement.  After speaking with hospitalist, the pt is in agreement with discharge plan and return tomorrow for his AV fistula placement.  I will start the patient on prednisone to help this lumbar radiculopathy.  He is to return to the hospital tomorrow as scheduled.  Return to the ER if condition changes, progresses, or if there are any concerns.  The patient and his daughter verbalized understanding, compliance, and agreement with the treatment plan.    RX Prednisone                   ED Course     Clinical Impression:   The primary encounter diagnosis was Lumbar radiculopathy, acute. Diagnoses of Right leg pain and Acute pain of right knee were also pertinent to this visit.                           Nazanin Mann, [facility]  08/27/17 1551

## 2017-08-27 NOTE — CONSULTS
Consulted for evaluation for possible admission for knee pain.  Pt has CKD, scheduled to get a peritoneal catheter placed as an outpatient tomorrow.  Pt presented to ED c/o right knee pain that is worse than usual.  Pt seen by Dr. Castaneda with nephrology today in the ED who states he has no indication for emergent dialysis.  Per Dr. Castaneda, it is okay to give pt prednisone for likely gout flare (which is causing his worsening of knee pain).  On exam, pt has no signs of confusion or fluid overload, his knee is able to range with pain, has no signs of joint infection, and has very mild localized knee swelling.  Labs show no acute worsening of CKD that would require emergent intervention.  He is afebrile, and VS are unremarkable.  Recommend giving pt prednisone 60mg q Day for 5 days for gout flare and pain control (NO nsaids).  At this time, pt does not have indication for admission.  Discussed plan with pt and his daughter and they are agreeable to discharge with close follow up.  Discussed case with Dr. Gisell Wade who agrees with discharge with home pain control.  Thank you for this consult, please contact us with questions.     Debra Anthony MD  [facility] Hospital Medicine Team B  [facility] Internal Medicine / Emergency Medicine HO5  8/27/2017 2:59 PM    [facility] Medicine Hospitalist Pager numbers:   [facility] Hospitalist Medicine Team A (Farooq/Mauro):                    [phone]  [facility] Hospitalist Medicine Team B (Chaka/Lowell):                  [phone]

## 2017-08-27 NOTE — ED NOTES
Pt resting comfortably at this time. Pt updated on plan of care that nephrology will be down to talk with him. NAD noted. VSS. Provided urinal.

## 2017-08-27 NOTE — ED NOTES
Pt lying comfortably in bed watching tv. Updated on plan of care. NAD noted. Nephrology has talked with pt.

## 2017-08-27 NOTE — ED NOTES
"Pt presents to ED via referral from Dr. Talamantes for c/o R knee pain. Pt was discharged on Friday after being hospitalized for kidney failure and was scheduled for catheter placement by Dr. Talamantes. Pt states on Friday night he had sudden onset of R knee pain that radiates down leg. Pt denies injury to area. Pt is unable to walk due to pain. Pt was evaluated in ED last night for same and discharged. Pt had xray but family is concerned for gout and states "last time I had this pain it was gout". Per pt, Dr. Talamantes referred him to ED for further evaluation. Pt states they have decided to wait to have surgery until they can figure out source of pain. NAD noted. No redness, swelling or warmth noted to R knee. Family at bedside. Will continue to monitor.   "

## 2017-08-27 NOTE — ED PROVIDER NOTES
Encounter Date: 8/26/2017       History     Chief Complaint   Patient presents with    Leg Pain     Point tenderness to R lateral knee, states pain also goes from michael up leg to R buttocks. Recent d/c from hospital for ARF     Patient is a 72-year-old male who complains of severe pain at the lateral aspect of his right knee.  He denies any trauma, although says prior to the onset of pain he fell asleep on his sofa and after getting into bed noted the pain.  He has increased pain with movement of the knee and with ambulation.  No numbness.  He denies knee or lower leg swelling.  No fever or chills.      The history is provided by the patient.     Review of patient's allergies indicates:  No Known Allergies  Past Medical History:   Diagnosis Date    Anemia     Coronary artery disease     Diabetes mellitus     Gout     Hypertension     Recurrent nephrolithiasis     Unspecified disorder of kidney and ureter     Urinary tract infection      History reviewed. No pertinent surgical history.  Family History   Problem Relation Age of Onset    Cancer Mother     Kidney disease Mother     Heart attack Father      Social History   Substance Use Topics    Smoking status: Former Smoker     Packs/day: 2.00     Years: 30.00    Smokeless tobacco: Former User     Quit date: 1/12/1995    Alcohol use Not on file     Review of Systems   Constitutional: Negative for chills and fever.   Respiratory: Negative for shortness of breath.    Cardiovascular: Negative for chest pain.   Musculoskeletal: Negative for back pain.        Right knee pain.   Skin: Negative for color change and rash.   Neurological: Negative for numbness.       Physical Exam     Initial Vitals [08/26/17 2019]   BP Pulse Resp Temp SpO2   (!) 118/57 61 16 98.1 °F (36.7 °C) 96 %      MAP       77.33         Physical Exam    Nursing note and vitals reviewed.  Constitutional: No distress.   HENT:   Head: Normocephalic and atraumatic.   Eyes: EOM are normal.    Neck: Neck supple.   Cardiovascular: Normal rate, regular rhythm and normal heart sounds.   Pulmonary/Chest: Breath sounds normal.   Musculoskeletal:   There is tenderness of the lateral aspect of the right knee without knee swelling or bruising.  There is decreased range of motion of the right knee due to pain.  There is no right calf swelling.  No erythema.  Negative Homans sign.  Neurovascularly intact.   Neurological: He is alert and oriented to person, place, and time.   Skin: Skin is warm and dry.   Psychiatric: His behavior is normal. Thought content normal.         ED Course   Procedures  Labs Reviewed - No data to display     Imaging Results          X-Ray Knee 1 or 2 View Right (Final result)  Result time 08/26/17 21:32:09    Final result by Albino Maurer MD (08/26/17 21:32:09)                 Impression:      1.  No acute displaced fracture or dislocation of the knee.      Electronically signed by: ALBINO MAURER MD  Date:     08/26/17  Time:    21:32              Narrative:    Knee wall and are 2 view    Clinical history: Pain in right knee    Comparison: None    Findings:  2 views.    No acute displaced fracture or dislocation of the knee.  No large knee joint effusion.  There is vascular calcification.  Degenerative changes are noted primarily of the medial compartment.                                 Medical Decision Making:   Clinical Tests:   Radiological Study: Ordered and Reviewed  ED Management:  72-year-old female with pain to the lateral aspect of his right knee after falling asleep on the sofa.  He has no obvious signs of trauma to the knee.  X-ray shows only small areas of vascular calcifications.  This does not have the appearance of gout.  I will place the patient on Percocet, staying away from NSAIDs as the patient has renal failure and is soon scheduled to begin peritoneal dialysis.  I have suggested follow-up with his primary physician when able for recheck and further treatment  as warranted.                   ED Course     Clinical Impression:   The encounter diagnosis was Right knee pain.                            Lasha Clay MD  08/27/17 [id number]

## 2017-08-28 ENCOUNTER — SURGERY (OUTPATIENT)
Age: 72
End: 2017-08-28

## 2017-08-28 ENCOUNTER — HOSPITAL ENCOUNTER (OUTPATIENT)
Facility: HOSPITAL | Age: 72
Discharge: HOME | End: 2017-08-28
Attending: SURGERY | Admitting: SURGERY
Payer: MEDICARE

## 2017-08-28 ENCOUNTER — ANESTHESIA (OUTPATIENT)
Dept: SURGERY | Facility: HOSPITAL | Age: 72
End: 2017-08-28
Payer: MEDICARE

## 2017-08-28 VITALS
DIASTOLIC BLOOD PRESSURE: 64 MMHG | HEART RATE: 82 BPM | RESPIRATION RATE: 18 BRPM | BODY MASS INDEX: 24.11 KG/M2 | WEIGHT: 150 LBS | TEMPERATURE: 98 F | HEIGHT: 66 IN | OXYGEN SATURATION: 96 % | SYSTOLIC BLOOD PRESSURE: 137 MMHG

## 2017-08-28 DIAGNOSIS — I12.0 BENIGN HYPERTENSIVE CKD, STAGE 5 CHRONIC KIDNEY DISEASE OR END STAGE RENAL DISEASE: Primary | ICD-10-CM

## 2017-08-28 PROBLEM — I12.9 BENIGN HYPERTENSIVE CKD: Status: ACTIVE | Noted: 2017-08-28

## 2017-08-28 LAB — ASO AB SERPL-ACNC: <14 IU/ML

## 2017-08-28 PROCEDURE — 36000709 HC OR TIME LEV III EA ADD 15 MIN: Performed by: SURGERY

## 2017-08-28 PROCEDURE — 36000708 HC OR TIME LEV III 1ST 15 MIN: Performed by: SURGERY

## 2017-08-28 PROCEDURE — 25000003 PHARM REV CODE 250: Performed by: SURGERY

## 2017-08-28 PROCEDURE — 71000016 HC POSTOP RECOV ADDL HR: Performed by: SURGERY

## 2017-08-28 PROCEDURE — 37000009 HC ANESTHESIA EA ADD 15 MINS: Performed by: SURGERY

## 2017-08-28 PROCEDURE — 37000008 HC ANESTHESIA 1ST 15 MINUTES: Performed by: SURGERY

## 2017-08-28 PROCEDURE — 63600175 PHARM REV CODE 636 W HCPCS: Performed by: SURGERY

## 2017-08-28 PROCEDURE — 25000003 PHARM REV CODE 250: Performed by: NURSE ANESTHETIST, CERTIFIED REGISTERED

## 2017-08-28 PROCEDURE — 49324 LAP INSERT TUNNEL IP CATH: CPT | Mod: ,,, | Performed by: SURGERY

## 2017-08-28 PROCEDURE — 71000033 HC RECOVERY, INTIAL HOUR: Performed by: SURGERY

## 2017-08-28 PROCEDURE — C1750 CATH, HEMODIALYSIS,LONG-TERM: HCPCS | Performed by: SURGERY

## 2017-08-28 PROCEDURE — 71000015 HC POSTOP RECOV 1ST HR: Performed by: SURGERY

## 2017-08-28 PROCEDURE — 63600175 PHARM REV CODE 636 W HCPCS: Performed by: NURSE ANESTHETIST, CERTIFIED REGISTERED

## 2017-08-28 PROCEDURE — 25000003 PHARM REV CODE 250: Performed by: ANESTHESIOLOGY

## 2017-08-28 DEVICE — KIT PERITONEAL DIALYS 62.0CM: Type: IMPLANTABLE DEVICE | Site: ABDOMEN | Status: FUNCTIONAL

## 2017-08-28 RX ORDER — BUPIVACAINE HYDROCHLORIDE 2.5 MG/ML
INJECTION, SOLUTION EPIDURAL; INFILTRATION; INTRACAUDAL
Status: DISCONTINUED | OUTPATIENT
Start: 2017-08-28 | End: 2017-08-28 | Stop reason: HOSPADM

## 2017-08-28 RX ORDER — SODIUM CHLORIDE 0.9 % (FLUSH) 0.9 %
3 SYRINGE (ML) INJECTION
Status: DISCONTINUED | OUTPATIENT
Start: 2017-08-28 | End: 2017-08-28 | Stop reason: HOSPADM

## 2017-08-28 RX ORDER — SODIUM CHLORIDE, SODIUM LACTATE, POTASSIUM CHLORIDE, CALCIUM CHLORIDE 600; 310; 30; 20 MG/100ML; MG/100ML; MG/100ML; MG/100ML
INJECTION, SOLUTION INTRAVENOUS CONTINUOUS
Status: DISCONTINUED | OUTPATIENT
Start: 2017-08-28 | End: 2017-08-28 | Stop reason: HOSPADM

## 2017-08-28 RX ORDER — FENTANYL CITRATE 50 UG/ML
INJECTION, SOLUTION INTRAMUSCULAR; INTRAVENOUS
Status: DISCONTINUED | OUTPATIENT
Start: 2017-08-28 | End: 2017-08-28

## 2017-08-28 RX ORDER — HEPARIN SODIUM 5000 [USP'U]/ML
5000 INJECTION, SOLUTION INTRAVENOUS; SUBCUTANEOUS ONCE
Status: COMPLETED | OUTPATIENT
Start: 2017-08-28 | End: 2017-08-28

## 2017-08-28 RX ORDER — ONDANSETRON 2 MG/ML
4 INJECTION INTRAMUSCULAR; INTRAVENOUS DAILY PRN
Status: DISCONTINUED | OUTPATIENT
Start: 2017-08-28 | End: 2017-08-28 | Stop reason: HOSPADM

## 2017-08-28 RX ORDER — PROPOFOL 10 MG/ML
VIAL (ML) INTRAVENOUS
Status: DISCONTINUED | OUTPATIENT
Start: 2017-08-28 | End: 2017-08-28

## 2017-08-28 RX ORDER — CEFAZOLIN SODIUM 2 G/50ML
2 SOLUTION INTRAVENOUS
Status: COMPLETED | OUTPATIENT
Start: 2017-08-28 | End: 2017-08-28

## 2017-08-28 RX ORDER — SODIUM CHLORIDE 9 MG/ML
INJECTION, SOLUTION INTRAVENOUS CONTINUOUS
Status: DISCONTINUED | OUTPATIENT
Start: 2017-08-28 | End: 2017-08-28 | Stop reason: HOSPADM

## 2017-08-28 RX ORDER — OXYCODONE AND ACETAMINOPHEN 10; 325 MG/1; MG/1
1 TABLET ORAL ONCE
Status: COMPLETED | OUTPATIENT
Start: 2017-08-28 | End: 2017-08-28

## 2017-08-28 RX ORDER — LIDOCAINE HCL/PF 100 MG/5ML
SYRINGE (ML) INTRAVENOUS
Status: DISCONTINUED | OUTPATIENT
Start: 2017-08-28 | End: 2017-08-28

## 2017-08-28 RX ORDER — LIDOCAINE HYDROCHLORIDE 10 MG/ML
1 INJECTION, SOLUTION EPIDURAL; INFILTRATION; INTRACAUDAL; PERINEURAL ONCE
Status: DISCONTINUED | OUTPATIENT
Start: 2017-08-28 | End: 2017-08-28 | Stop reason: HOSPADM

## 2017-08-28 RX ORDER — HYDROMORPHONE HYDROCHLORIDE 2 MG/ML
0.5 INJECTION, SOLUTION INTRAMUSCULAR; INTRAVENOUS; SUBCUTANEOUS EVERY 5 MIN PRN
Status: DISCONTINUED | OUTPATIENT
Start: 2017-08-28 | End: 2017-08-28 | Stop reason: HOSPADM

## 2017-08-28 RX ORDER — CISATRACURIUM BESYLATE 2 MG/ML
INJECTION, SOLUTION INTRAVENOUS
Status: DISCONTINUED | OUTPATIENT
Start: 2017-08-28 | End: 2017-08-28

## 2017-08-28 RX ORDER — BACITRACIN 50000 [IU]/1
INJECTION, POWDER, FOR SOLUTION INTRAMUSCULAR
Status: DISCONTINUED | OUTPATIENT
Start: 2017-08-28 | End: 2017-08-28 | Stop reason: HOSPADM

## 2017-08-28 RX ORDER — SUCCINYLCHOLINE CHLORIDE 20 MG/ML
INJECTION INTRAMUSCULAR; INTRAVENOUS
Status: DISCONTINUED | OUTPATIENT
Start: 2017-08-28 | End: 2017-08-28

## 2017-08-28 RX ORDER — ONDANSETRON 2 MG/ML
INJECTION INTRAMUSCULAR; INTRAVENOUS
Status: DISCONTINUED | OUTPATIENT
Start: 2017-08-28 | End: 2017-08-28

## 2017-08-28 RX ADMIN — BUPIVACAINE HYDROCHLORIDE 30 ML: 2.5 INJECTION, SOLUTION EPIDURAL; INFILTRATION; INTRACAUDAL; PERINEURAL at 11:08

## 2017-08-28 RX ADMIN — BACITRACIN 50000 UNITS: 5000 INJECTION, POWDER, FOR SOLUTION INTRAMUSCULAR at 11:08

## 2017-08-28 RX ADMIN — HEPARIN SODIUM 5000 UNITS: 5000 INJECTION, SOLUTION INTRAVENOUS; SUBCUTANEOUS at 09:08

## 2017-08-28 RX ADMIN — FENTANYL CITRATE 100 MCG: 50 INJECTION, SOLUTION INTRAMUSCULAR; INTRAVENOUS at 10:08

## 2017-08-28 RX ADMIN — LIDOCAINE HYDROCHLORIDE 60 MG: 20 INJECTION, SOLUTION INTRAVENOUS at 10:08

## 2017-08-28 RX ADMIN — SUCCINYLCHOLINE CHLORIDE 50 MG: 20 INJECTION, SOLUTION INTRAMUSCULAR; INTRAVENOUS at 10:08

## 2017-08-28 RX ADMIN — ONDANSETRON 4 MG: 2 INJECTION, SOLUTION INTRAMUSCULAR; INTRAVENOUS at 11:08

## 2017-08-28 RX ADMIN — PROPOFOL 100 MG: 10 INJECTION, EMULSION INTRAVENOUS at 10:08

## 2017-08-28 RX ADMIN — CISATRACURIUM BESYLATE 4 MG: 2 INJECTION INTRAVENOUS at 10:08

## 2017-08-28 RX ADMIN — SUCCINYLCHOLINE CHLORIDE 100 MG: 20 INJECTION, SOLUTION INTRAMUSCULAR; INTRAVENOUS at 10:08

## 2017-08-28 RX ADMIN — CEFAZOLIN SODIUM 2 G: 2 SOLUTION INTRAVENOUS at 10:08

## 2017-08-28 RX ADMIN — CISATRACURIUM BESYLATE 1 MG: 2 INJECTION INTRAVENOUS at 10:08

## 2017-08-28 RX ADMIN — SODIUM CHLORIDE: 0.9 INJECTION, SOLUTION INTRAVENOUS at 10:08

## 2017-08-28 RX ADMIN — OXYCODONE HYDROCHLORIDE AND ACETAMINOPHEN 1 TABLET: 10; 325 TABLET ORAL at 12:08

## 2017-08-28 NOTE — H&P (VIEW-ONLY)
"[facility]  General Surgery  Consult Note    Inpatient consult to General Surgery  Consult performed by: COLT SCOTT  Consult ordered by: BERNICE MCCALLUM II        Subjective:     Chief Complaint/Reason for Admission:     History of Present Illness: 71 yo M with a history of HTN, gout, DM type II, CAD s/p CABG x4 (2004), recurrent nephrolithiasis, CKD stage IV, HLD and anemia admitted to the medicine service for worsening kidney function and fluid overload. He noted worsening dyspnea on exertion, bilateral lower extremity edema as well as orthopnea. He states that he is currently taking care of his wife who is not doing well and that has worsened his stress levels lately. He was admitted, aggressively diuresed and is now feeling better, with decrease bilateral lower extremity edema. General Surgery consulted for placement of peritoneal dialysis catheter for future needs for dialysis. The patient is aware of his worsening renal function and states that he has "put off" addressing his likely need for dialysis for "a while now".    He has a history of diverticulitis in the past for which he ultimately had "18 inches" of colon removed. He did not have an ostomy after the surgery. He did have a prolonged recover but has never had a bowel obstruction or further abdominal surgeries. He did require ?open lithotripsy in the past and has a well surgical scar from that procedure.    No current facility-administered medications on file prior to encounter.      Current Outpatient Prescriptions on File Prior to Encounter   Medication Sig    albuterol (PROVENTIL) 2.5 mg /3 mL (0.083 %) nebulizer solution     allopurinol (ZYLOPRIM) 100 MG tablet     amlodipine (NORVASC) 10 MG tablet     furosemide (LASIX) 20 MG tablet Take 2 tablets (40 mg total) by mouth once daily.    metoprolol succinate (TOPROL-XL) 100 MG 24 hr tablet     oxycodone-acetaminophen  mg (PERCOCET)  mg per tablet     " pravastatin (PRAVACHOL) 40 MG tablet     diazepam (VALIUM) 10 MG Tab     hydrOXYzine (VISTARIL) 25 MG Cap     PROAIR HFA 90 mcg/actuation inhaler        Review of patient's allergies indicates:  No Known Allergies    Past Medical History:   Diagnosis Date    Anemia     Coronary artery disease     Diabetes mellitus     Gout     Hypertension     Recurrent nephrolithiasis     Unspecified disorder of kidney and ureter     Urinary tract infection      History reviewed. No pertinent surgical history.  Family History     Problem Relation (Age of Onset)    Cancer Mother    Heart attack Father    Kidney disease Mother        Social History Main Topics    Smoking status: Former Smoker     Packs/day: 2.00     Years: 30.00    Smokeless tobacco: Former User     Quit date: 1/12/1995    Alcohol use Not on file    Drug use: Unknown    Sexual activity: Not on file     Review of Systems   Constitutional: Negative for activity change, appetite change and unexpected weight change.   HENT: Negative for congestion and trouble swallowing.    Respiratory: Positive for shortness of breath.    Cardiovascular: Positive for leg swelling.   Gastrointestinal: Negative for abdominal distention, abdominal pain, blood in stool and constipation.   Endocrine: Negative for cold intolerance and heat intolerance.   Genitourinary: Negative for difficulty urinating and dysuria.   Musculoskeletal: Negative for arthralgias and back pain.   Skin: Negative for color change.   Neurological: Negative for dizziness and weakness.   Hematological: Negative.      Objective:     Vital Signs (Most Recent):  Temp: 98 °F (36.7 °C) (08/24/17 1603)  Pulse: 75 (08/24/17 1603)  Resp: 18 (08/24/17 1603)  BP: (!) 159/69 (08/24/17 1603)  SpO2: 95 % (08/24/17 1600) Vital Signs (24h Range):  Temp:  [98 °F (36.7 °C)-98.7 °F (37.1 °C)] 98 °F (36.7 °C)  Pulse:  [66-85] 75  Resp:  [18-19] 18  SpO2:  [93 %-95 %] 95 %  BP: (134-186)/(60-79) 159/69     Weight: 69.8 kg  (153 lb 14.1 oz)  Body mass index is 24.84 kg/m².      Intake/Output Summary (Last 24 hours) at 08/24/17 1639  Last data filed at 08/24/17 0815   Gross per 24 hour   Intake              500 ml   Output             3275 ml   Net            -2775 ml       Physical Exam   Constitutional: He is oriented to person, place, and time. He appears well-developed and well-nourished.   HENT:   Head: Normocephalic and atraumatic.   Eyes: EOM are normal.   Neck: Normal range of motion. Neck supple.   Cardiovascular: Normal rate and regular rhythm.    Pulmonary/Chest: Effort normal. He has wheezes.   Expiratory wheezing noted in the R posterior mid-field    Abdominal: Soft. Bowel sounds are normal. He exhibits no distension. There is no tenderness. There is no guarding.   Musculoskeletal: He exhibits edema.        Right ankle: He exhibits swelling.   Neurological: He is alert and oriented to person, place, and time.   Skin: Skin is warm and dry.       Significant Labs:  BMP:   Recent Labs  Lab 08/24/17  0256  08/24/17  1532   GLU 92  < > 124*     < > 134*   K 3.8  < > 3.9     < > 95   CO2 27  < > 28   BUN 69*  < > 70*   CREATININE 5.6*  < > 5.4*   CALCIUM 8.6*  < > 8.9   MG 1.3*  --   --    < > = values in this interval not displayed.  CBC:   Recent Labs  Lab 08/24/17  0257   WBC 6.64   RBC 3.71*   HGB 11.1*   HCT 33.1*      MCV 89   MCH 29.9   MCHC 33.5       Significant Diagnostics:  None    Assessment/Plan:     Active Diagnoses:    Diagnosis Date Noted POA    PRINCIPAL PROBLEM:  Acute renal failure superimposed on stage 4 chronic kidney disease [N17.9, N18.4] 08/22/2017 Yes    Hypomagnesemia [E83.42] 08/24/2017 No    Acute on chronic diastolic heart failure [I50.33] 08/23/2017 Yes    Pulmonary hypertension [I27.2] 08/23/2017 Yes    Anemia [D64.9]  Yes    Essential hypertension [I10]  Yes    Proteinuria [R80.9]  Yes    Mixed hyperlipidemia [E78.2] 08/22/2017 Yes    Edema extremities [R60.0] 08/22/2017  Yes    Metabolic acidosis [E87.2] 08/22/2017 Yes    Hyperkalemia [E87.5] 08/22/2017 Yes    Type 2 diabetes mellitus with stage 4 chronic kidney disease, without long-term current use of insulin [E11.22, N18.4] 08/22/2017 Yes    CAD (coronary artery disease) [I25.10] 05/01/2015 Yes    HTN (hypertension) [I10] 05/01/2015 Yes      Problems Resolved During this Admission:    Diagnosis Date Noted Date Resolved POA     -Discussed with patient that due to his colon resection for diverticulitis that there is a possibility that the peritoneal dialysis catheter will not work if there is too much scar tissue from his previous surgery. Because there is not a definitive way to assess that except for undergoing surgery, we recommended that we will assess this in the operating room. If the scar tissue is minimal we will place the catheter and if the scar tissue is significant, we will abort the procedure. He understands the risks including bleeding, wound infection, damage to surrounding tissues, injury to intra-abdominal organs, malpositioning of the catheter, the need for further surgery and risks associated with anesthesia. All of his concerns were addressed and he has signed an informed consent.  -Plan for surgery on 8/28/17    Thank you for your consult. I will follow-up with patient. Please contact us if you have any additional questions.    Ant Lo MD  General Surgery  [facility]

## 2017-08-28 NOTE — OP NOTE
DATE OF PROCEDURE: 08/28/2017    PREOPERATIVE DIAGNOSIS: CKD V    POSTOPERATIVE DIAGNOSIS: CKD V    PROCEDURE: Laparoscopic placement of peritoneal dialysis catheter.    SURGEON: Jen Talamantes M.D.    ASSISTANT: Celina Mendez    ANESTHESIA: General endotracheal prep chlorhexidine.    ESTIMATED BLOOD LOSS: Minimal.    SPECIMENS: None.    INDICATIONS: The patient is an 72 y.o. male, who has CKD V and is planning to initiate peritoneal dialysis. The risks of surgery were discussed with   the patient including bleeding, infection, pain, scar, wound complications,   injury to local structures, wanting more extensive surgery, and potential need   for further surgery. The patient demonstrated understanding of these risks and   a consent form was obtained.    PROCEDURE: The patient was identified in the Preoperative Unit and taken back    to the Operating Room, laid supine on the operating room table. IV antibiotics    were administered prior to the induction of general anesthesia. General    anesthesia was induced without complication. The patient was then prepped and    draped in a standard sterile fashion. Local anesthesia was infiltrated into the  skin and subcutaneous tissues to all the incision site. A 5 mm incision was    made in the Left upper quadrant with an #11 blade scalpel. visiport entrance was    Performed without injury.  There was no intraabdominal pathology noted. There was intra  Abdominal adhesions but these were mostly right lateral and not in the deep pelvis.  An 8mm trocar was placed in the left lateral infra-umbilical location. It was tunneled  In the posterior rectus sheath for a few centimeters before entry into the peritoneum.    The catheter was then Positioned into the deep pelvis space.    It then tunneled in the subcutaneous space to a site superior and    lateral. The 2 cuffs were confirmed to be in the posterior rectus sheath and in    the subcutaneous space. The catheter was flushed and  aspirated and then    positioned to gravity and good back flow was appreciated. The skin incisions were  closed using 4-0 Monocryl suture in interrupted fashion. Dermabond was applied  and sterile dressing was applied to the catheter. The patient was awakened    from general anesthesia without complication and returned to the Postoperative    Recovery Unit in stable condition. At the end of the case, sponge, instrument    and needle counts were correct on 2 occasions. I was present and scrubbed    throughout the entirety of the case.        COMPLICATIONS: None.    CONDITION: Stable.    IMPLANTS: Peritoneal dialysis catheter.

## 2017-08-28 NOTE — INTERVAL H&P NOTE
The patient has been examined and the H&P has been reviewed:    I concur with the findings and no changes have occurred since H&P was written.    Anesthesia/Surgery risks, benefits and alternative options discussed and understood by patient/family.          Active Hospital Problems    Diagnosis  POA    Benign hypertensive CKD [I12.9]  Yes      Resolved Hospital Problems    Diagnosis Date Resolved POA   No resolved problems to display.

## 2017-08-28 NOTE — OP NOTE
DATE OF PROCEDURE: 08/28/2017    PREOPERATIVE DIAGNOSIS: CKD V    POSTOPERATIVE DIAGNOSIS: CKD V    PROCEDURE: Laparoscopic placement of peritoneal dialysis catheter.    SURGEON: Jen Talamantes M.D.    ASSISTANT: Celina Mendez M.D. (Res)    ANESTHESIA: General endotracheal prep chlorhexidine.    ESTIMATED BLOOD LOSS: Minimal.    SPECIMENS: None.    INDICATIONS: The patient is an 72 y.o. male who has CKD V and is planning to initiate peritoneal dialysis. The risks of surgery were discussed with the patient including bleeding, infection, pain, scar, wound complications, injury to local structures, wanting more extensive surgery, and potential need for further surgery. The patient demonstrated understanding of these risks and   a consent form was obtained.    PROCEDURE: The patient was identified in the Preoperative Unit and taken back  to the Operating Room, laid supine on the operating room table. IV antibiotics  were administered prior to the induction of general anesthesia. General anesthesia was induced without complication. The patient was then prepped and draped in a standard sterile fashion. Local anesthesia was infiltrated into the  skin and subcutaneous tissues to all the incision site. A 5 mm incision was  made in the Left upper quadrant with an #11 blade scalpel. visiport entrance was performed without injury.  There were adhesions with some small bowel at the midline stuck up to the anterior abdominal wall. We did have a window for trocar placement, and the pelvis itself was free of adhesions. An 8mm trocar was placed in the left lateral infra-umbilical location. It was tunneled in the posterior rectus sheath for a few centimeters before entry into the peritoneum. The catheter was then positioned into the deep pelvis space. It was then tunneled in the subcutaneous space to a site superior and lateral to the 8 mm trocar site. The 2 cuffs were confirmed to be in the posterior rectus sheath and in the  subcutaneous space. The catheter was flushed and aspirated and then positioned to gravity and good back flow was appreciated. The skin incisions were closed using 4-0 Monocryl suture in interrupted fashion. Dermabond was applied and sterile dressing was applied to the catheter. The patient was awakened from general anesthesia without complication and returned to the Postoperative    Recovery Unit in stable condition. At the end of the case, sponge, instrument and needle counts were correct on 2 occasions. Dr Talamantes was present and scrubbed throughout the entirety of the case.    COMPLICATIONS: None.    CONDITION: Stable.    IMPLANTS: Peritoneal dialysis catheter.

## 2017-08-28 NOTE — TRANSFER OF CARE
"Anesthesia Transfer of Care Note    Patient: Gunner Motley    Procedure(s) Performed: Procedure(s) (LRB):  INSERTION-CATHETER-DIALYSIS-PERITONEAL-LAPAROSCOPIC (N/A)    Patient location: PACU    Anesthesia Type: general    Transport from OR: Transported from OR on 6-10 L/min O2 by face mask with adequate spontaneous ventilation    Post pain: adequate analgesia    Post assessment: no apparent anesthetic complications and tolerated procedure well    Post vital signs: stable    Level of consciousness: awake, alert and oriented    Nausea/Vomiting: no nausea/vomiting    Complications: none    Transfer of care protocol was followed      Last vitals:   Visit Vitals  BP (!) 184/77   Pulse 91   Temp 37.1 °C (98.8 °F) (Skin)   Resp 16   Ht 5' 6" (1.676 m)   Wt 68 kg (150 lb)   SpO2 100%   BMI 24.21 kg/m²     "

## 2017-08-28 NOTE — BRIEF OP NOTE
[facility]  Brief Operative Note     SUMMARY     Surgery Date: 8/28/2017     Surgeon(s) and Role:     * Jen Talamantes MD - Primary    Assisting Surgeon: Celina Mendez MD - Res    Pre-op Diagnosis:  CKD (chronic kidney disease) stage 4, GFR 15-29 ml/min [N18.4]    Post-op Diagnosis:  Post-Op Diagnosis Codes:     * CKD (chronic kidney disease) stage 4, GFR 15-29 ml/min [N18.4]    Procedure(s) (LRB):  INSERTION-CATHETER-DIALYSIS-PERITONEAL-LAPAROSCOPIC (N/A)    Anesthesia: General    Description of the findings of the procedure: adhesions with bowel to anterior abdominal wall, pelvis relatively free of adhesions, peritoneal dialysis catheter placed in pelvis with good flow and return of fluid    Findings/Key Components: see op note    Estimated Blood Loss: * No values recorded between 8/28/2017 10:50 AM and 8/28/2017 11:32 AM *         Specimens:   Specimen (12h ago through future)    None          Discharge Note    SUMMARY     Admit Date: 8/28/2017    Discharge Date and Time:  08/28/2017 12:23 PM    Hospital Course (synopsis of major diagnoses, care, treatment, and services provided during the course of the hospital stay): 73 yo M here for PD cath placement. He tolerated the procedure well and was discharged to home.      Final Diagnosis: Post-Op Diagnosis Codes:     * CKD (chronic kidney disease) stage 4, GFR 15-29 ml/min [N18.4]    Disposition: Home or Self Care    Follow Up/Patient Instructions:     Medications:  Reconciled Home Medications:   Current Discharge Medication List      START taking these medications    Details   docusate sodium (COLACE) 50 MG capsule Take 2 capsules (100 mg total) by mouth 2 (two) times daily.  Qty: 56 capsule, Refills: 0         CONTINUE these medications which have NOT CHANGED    Details   albuterol (PROVENTIL) 2.5 mg /3 mL (0.083 %) nebulizer solution Refills: 0      allopurinol (ZYLOPRIM) 100 MG tablet       amlodipine (NORVASC) 10 MG tablet       aspirin  (ECOTRIN) 81 MG EC tablet Take 81 mg by mouth once daily.      benazepril (LOTENSIN) 10 MG tablet Take 1 tablet (10 mg total) by mouth once daily.  Qty: 30 tablet, Refills: 3      calcitRIOL (ROCALTROL) 0.25 MCG Cap Take 1 capsule (0.25 mcg total) by mouth once daily.  Qty: 30 capsule, Refills: 3      calcium acetate (PHOSLO) 667 mg capsule Take 1 capsule (667 mg total) by mouth 3 (three) times daily with meals.  Qty: 30 capsule, Refills: 3      carvedilol (COREG) 25 MG tablet Take 1 tablet (25 mg total) by mouth 2 (two) times daily.  Qty: 30 tablet, Refills: 3      ergocalciferol (ERGOCALCIFEROL) 50,000 unit Cap Take 1 capsule (50,000 Units total) by mouth every 7 days.  Qty: 30 capsule, Refills: 1      furosemide (LASIX) 80 MG tablet Take 2 tablets (160 mg total) by mouth 2 (two) times daily.  Qty: 60 tablet, Refills: 3      oxycodone-acetaminophen (PERCOCET)  mg per tablet Take 1 tablet by mouth every 4 (four) hours as needed for Pain.  Qty: 20 tablet, Refills: 0      pravastatin (PRAVACHOL) 40 MG tablet       predniSONE (DELTASONE) 20 MG tablet Take 3 tablets (60 mg total) by mouth once daily.  Qty: 9 tablet, Refills: 0             Discharge Procedure Orders  Diet general     Other restrictions (specify):   Order Comments: Keep dressing on over catheter, call PD catheter nurses for questions about the dressing. Ok to shower in 2 days. No soaking bath or swimming until after follow up. Take your pain medication as needed. Take over the counter stool softener while taking pain medication to prevent constipation. If no bowel movement in 2 days take miralax twice a day. Ok for light activity (light housework, walking, stair climbing) no strenuous exercise until after follow up. No lifting greater than 20 pounds or 1 gallon of milk until after follow up. Please call my office if fever > 101.5, pain uncontrolled with oral medications, persistent nausea/vomiting/or diarrhea, redness or drainage from the  incisions, or any other worrisome concerns.       Follow-up Information     Jen Talamantes MD In 2 weeks.    Specialties:  Surgery, General Surgery  Contact information:  [address]  [address]  [facility] [address] [phone]

## 2017-08-28 NOTE — TELEPHONE ENCOUNTER
"  Answer Assessment - Initial Assessment Questions  1. LOCATION and RADIATION: "Where is the pain located?"       Right knee  2. QUALITY: "What does the pain feel like?"  (e.g., sharp, dull, aching, burning)      Sharp   3. SEVERITY: "How bad is the pain?" "What does it keep you from doing?"   (Scale 1-10; or mild, moderate, severe)    -  MILD (1-3): doesn't interfere with normal activities     -  MODERATE (4-7): interferes with normal activities (e.g., work or school) or awakens from sleep, limping     -  SEVERE (8-10): excruciating pain, unable to do any normal activities, unable to walk      Severe   4. ONSET: "When did the pain start?" "Does it come and go, or is it there all the time?"      Past Tuesday after recent hospital discharge   5. RECURRENT: "Have you had this pain before?" If so, ask: "When, and what happened then?"      No   6. SETTING: "Has there been any recent work, exercise or other activity that involved that part of the body?"       No   7. AGGRAVATING FACTORS: "What makes the knee pain worse?" (e.g., walking, climbing stairs, running)      Laying down unable to sleep   8. ASSOCIATED SYMPTOMS: "Is there any swelling or redness of the knee?"      No   9. OTHER SYMPTOMS: "Do you have any other symptoms?" (e.g., chest pain, difficulty breathing, fever, calf pain)      No   10. PREGNANCY: "Is there any chance you are pregnant?" "When was your last menstrual period?"        N/A    Protocols used: ST KNEE PAIN-A-    "

## 2017-08-28 NOTE — ANESTHESIA POSTPROCEDURE EVALUATION
"Anesthesia Post Evaluation    Patient: Gunner Motley    Procedure(s) Performed: Procedure(s) (LRB):  INSERTION-CATHETER-DIALYSIS-PERITONEAL-LAPAROSCOPIC (N/A)    Final Anesthesia Type: general  Patient location during evaluation: PACU  Patient participation: Yes- Able to Participate  Level of consciousness: awake and alert and oriented  Post-procedure vital signs: reviewed and stable  Pain management: adequate  Airway patency: patent  PONV status at discharge: No PONV  Anesthetic complications: no      Cardiovascular status: blood pressure returned to baseline, hemodynamically stable and stable  Respiratory status: unassisted, spontaneous ventilation and room air  Hydration status: euvolemic  Follow-up not needed.        Visit Vitals  /65   Pulse 78   Temp 36.4 °C (97.5 °F) (Skin)   Resp 17   Ht 5' 6" (1.676 m)   Wt 68 kg (150 lb)   SpO2 (!) 92%   BMI 24.21 kg/m²       Pain/Beryl Score: Pain Assessment Performed: Yes (8/28/2017 12:02 PM)  Presence of Pain: denies (8/28/2017 12:02 PM)  Beryl Score: 10 (8/28/2017 12:02 PM)      "

## 2017-08-29 DIAGNOSIS — N18.5 CKD (CHRONIC KIDNEY DISEASE) STAGE 5, GFR LESS THAN 15 ML/MIN: Primary | ICD-10-CM

## 2017-08-31 ENCOUNTER — OFFICE VISIT (OUTPATIENT)
Dept: PRIMARY CARE CLINIC | Facility: CLINIC | Age: 72
End: 2017-08-31
Payer: MEDICARE

## 2017-08-31 ENCOUNTER — LAB VISIT (OUTPATIENT)
Dept: LAB | Facility: HOSPITAL | Age: 72
End: 2017-08-31
Attending: INTERNAL MEDICINE
Payer: MEDICARE

## 2017-08-31 VITALS
WEIGHT: 169 LBS | BODY MASS INDEX: 27.27 KG/M2 | SYSTOLIC BLOOD PRESSURE: 119 MMHG | HEART RATE: 74 BPM | DIASTOLIC BLOOD PRESSURE: 57 MMHG | TEMPERATURE: 98 F

## 2017-08-31 DIAGNOSIS — I50.33 ACUTE ON CHRONIC DIASTOLIC HEART FAILURE: ICD-10-CM

## 2017-08-31 DIAGNOSIS — N18.5 CKD (CHRONIC KIDNEY DISEASE) STAGE 5, GFR LESS THAN 15 ML/MIN: ICD-10-CM

## 2017-08-31 DIAGNOSIS — E78.2 MIXED HYPERLIPIDEMIA: ICD-10-CM

## 2017-08-31 DIAGNOSIS — N18.5 CKD (CHRONIC KIDNEY DISEASE) STAGE 5, GFR LESS THAN 15 ML/MIN: Primary | ICD-10-CM

## 2017-08-31 DIAGNOSIS — I10 ESSENTIAL HYPERTENSION: ICD-10-CM

## 2017-08-31 PROBLEM — N17.9 ACUTE RENAL FAILURE SUPERIMPOSED ON STAGE 4 CHRONIC KIDNEY DISEASE: Status: RESOLVED | Noted: 2017-08-22 | Resolved: 2017-08-31

## 2017-08-31 PROBLEM — N18.4 ACUTE RENAL FAILURE SUPERIMPOSED ON STAGE 4 CHRONIC KIDNEY DISEASE: Status: RESOLVED | Noted: 2017-08-22 | Resolved: 2017-08-31

## 2017-08-31 PROBLEM — E87.6 HYPOKALEMIA: Status: RESOLVED | Noted: 2017-08-25 | Resolved: 2017-08-31

## 2017-08-31 PROBLEM — E83.42 HYPOMAGNESEMIA: Status: RESOLVED | Noted: 2017-08-24 | Resolved: 2017-08-31

## 2017-08-31 LAB
ANION GAP SERPL CALC-SCNC: 12 MMOL/L
BASOPHILS # BLD AUTO: 0 K/UL
BASOPHILS NFR BLD: 0 %
BUN SERPL-MCNC: 127 MG/DL
CALCIUM SERPL-MCNC: 8.8 MG/DL
CHLORIDE SERPL-SCNC: 95 MMOL/L
CO2 SERPL-SCNC: 27 MMOL/L
CREAT SERPL-MCNC: 5 MG/DL
DIFFERENTIAL METHOD: ABNORMAL
EOSINOPHIL # BLD AUTO: 0 K/UL
EOSINOPHIL NFR BLD: 0.1 %
ERYTHROCYTE [DISTWIDTH] IN BLOOD BY AUTOMATED COUNT: 13.5 %
EST. GFR  (AFRICAN AMERICAN): 12 ML/MIN/1.73 M^2
EST. GFR  (NON AFRICAN AMERICAN): 11 ML/MIN/1.73 M^2
GLUCOSE SERPL-MCNC: 170 MG/DL
HCT VFR BLD AUTO: 32.7 %
HGB BLD-MCNC: 10.9 G/DL
LYMPHOCYTES # BLD AUTO: 1.6 K/UL
LYMPHOCYTES NFR BLD: 15.1 %
MCH RBC QN AUTO: 30 PG
MCHC RBC AUTO-ENTMCNC: 33.3 G/DL
MCV RBC AUTO: 90 FL
MONOCYTES # BLD AUTO: 1.4 K/UL
MONOCYTES NFR BLD: 13.4 %
NEUTROPHILS # BLD AUTO: 7.5 K/UL
NEUTROPHILS NFR BLD: 70.7 %
PLATELET # BLD AUTO: 252 K/UL
PMV BLD AUTO: 10.2 FL
POTASSIUM SERPL-SCNC: 4.5 MMOL/L
RBC # BLD AUTO: 3.63 M/UL
SODIUM SERPL-SCNC: 134 MMOL/L
WBC # BLD AUTO: 10.55 K/UL

## 2017-08-31 PROCEDURE — 3074F SYST BP LT 130 MM HG: CPT | Mod: S$GLB,,, | Performed by: INTERNAL MEDICINE

## 2017-08-31 PROCEDURE — 1159F MED LIST DOCD IN RCRD: CPT | Mod: S$GLB,,, | Performed by: INTERNAL MEDICINE

## 2017-08-31 PROCEDURE — 1125F AMNT PAIN NOTED PAIN PRSNT: CPT | Mod: S$GLB,,, | Performed by: INTERNAL MEDICINE

## 2017-08-31 PROCEDURE — 1157F ADVNC CARE PLAN IN RCRD: CPT | Mod: S$GLB,,, | Performed by: INTERNAL MEDICINE

## 2017-08-31 PROCEDURE — 85025 COMPLETE CBC W/AUTO DIFF WBC: CPT

## 2017-08-31 PROCEDURE — 80048 BASIC METABOLIC PNL TOTAL CA: CPT

## 2017-08-31 PROCEDURE — 99999 PR PBB SHADOW E&M-EST. PATIENT-LVL IV: CPT | Mod: PBBFAC,,, | Performed by: INTERNAL MEDICINE

## 2017-08-31 PROCEDURE — 3008F BODY MASS INDEX DOCD: CPT | Mod: S$GLB,,, | Performed by: INTERNAL MEDICINE

## 2017-08-31 PROCEDURE — 99205 OFFICE O/P NEW HI 60 MIN: CPT | Mod: S$GLB,,, | Performed by: INTERNAL MEDICINE

## 2017-08-31 PROCEDURE — 36415 COLL VENOUS BLD VENIPUNCTURE: CPT

## 2017-08-31 PROCEDURE — 3078F DIAST BP <80 MM HG: CPT | Mod: S$GLB,,, | Performed by: INTERNAL MEDICINE

## 2017-08-31 RX ORDER — ALPRAZOLAM 0.25 MG/1
0.25 TABLET ORAL EVERY 8 HOURS PRN
Refills: 0 | COMMUNITY
Start: 2017-08-01 | End: 2017-01-01 | Stop reason: SDUPTHER

## 2017-08-31 NOTE — PROGRESS NOTES
[facility]  New Visit Progress Note   Recent Hospital Discharge     PRESENTING HISTORY     Chief Complaint/Reason for Admission:  Follow up Hospital Discharge   No chief complaint on file.    PCP: Kaushal Townsend MD    History of Present Illness:  Mr. Gunner Motley is a 72 y.o. male who was recently admitted to the hospital.    ___________________________________________________________________    Today:  Presents to [facility] for [facility] Follow up.  Recently hospitalized for volume overload and worsening of CKD.  Diuresed successfully but with significantly elevated BUN/Cr.  Patient was discharged with plans for outpatient PD catheter placement.     Following hospital discharge he had an ED visit for acute gout flare to right knee.   He was treated and released; prescribed a course of Prednisone and his symptoms have resolved.     He returned to the hospital as directed for planned PD catheter placement on 8/28/17.  Tolerated the procedure without complications and was released.    He is scheduled to attend session at the dialysis center tomorrow ([facility]) for education and admission paperwork.    Patients cardiac history significant for MI and CABG approximately 12 years ago.  He tells me he has not undergone cardiac evaluation (angiogram or stress testing) since prior to the CABG.  His recent volume overload seems to be cardiorenal related and he would likely benefit from further cardiac evaluation.   Most recent echo with preserved EF but diastolic dysfunction, pulmonary HTN, and mitral/tricuspid valve regurgitation.   A referral will be made to cardiology.     He is ambulatory and independent with ADL's.  No falls reported.  He manages his own medication.  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     Review of Systems  General ROS: negative for chills, fever or weight loss  Psychological ROS: negative for  hallucination, depression or suicidal ideation  Ophthalmic ROS: negative for blurry vision, photophobia or eye pain  ENT ROS: negative for epistaxis, sore throat or rhinorrhea  Respiratory ROS: no cough, shortness of breath, or wheezing  Cardiovascular ROS: no chest pain or dyspnea on exertion  Gastrointestinal ROS:+ recent constipation which has resolved   + abdominal wall pain at site of recent PD catheter placement  Genito-Urinary ROS: no dysuria, trouble voiding, or hematuria  Musculoskeletal ROS: negative for gait disturbance or muscular weakness  + improving right knee pain, recent gout  Neurological ROS: no syncope or seizures; no ataxia  Dermatological ROS: negative for pruritis, rash and jaundice      PAST HISTORY:     Past Medical History:   Diagnosis Date    Anemia     Coronary artery disease     Diabetes mellitus     Gout     Hypertension     Recurrent nephrolithiasis     Unspecified disorder of kidney and ureter     Urinary tract infection        History reviewed. No pertinent surgical history.    Family History   Problem Relation Age of Onset    Cancer Mother     Kidney disease Mother     Heart attack Father        Social History     Social History    Marital status:      Spouse name: N/A    Number of children: N/A    Years of education: N/A     Social History Main Topics    Smoking status: Former Smoker     Packs/day: 2.00     Years: 30.00    Smokeless tobacco: Former User     Quit date: 1/12/1995    Alcohol use None    Drug use: Unknown    Sexual activity: Not Asked     Other Topics Concern    None     Social History Narrative    None       MEDICATIONS & ALLERGIES:     Current Outpatient Prescriptions on File Prior to Visit   Medication Sig Dispense Refill    allopurinol (ZYLOPRIM) 100 MG tablet       amlodipine (NORVASC) 10 MG tablet       aspirin (ECOTRIN) 81 MG EC tablet Take 81 mg by mouth once daily.      benazepril (LOTENSIN) 10 MG tablet Take 1 tablet (10 mg  total) by mouth once daily. 30 tablet 3    calcitRIOL (ROCALTROL) 0.25 MCG Cap Take 1 capsule (0.25 mcg total) by mouth once daily. 30 capsule 3    calcium acetate (PHOSLO) 667 mg capsule Take 1 capsule (667 mg total) by mouth 3 (three) times daily with meals. 30 capsule 3    carvedilol (COREG) 25 MG tablet Take 1 tablet (25 mg total) by mouth 2 (two) times daily. 30 tablet 3    docusate sodium (COLACE) 50 MG capsule Take 2 capsules (100 mg total) by mouth 2 (two) times daily. 56 capsule 0    ergocalciferol (ERGOCALCIFEROL) 50,000 unit Cap Take 1 capsule (50,000 Units total) by mouth every 7 days. 30 capsule 1    furosemide (LASIX) 80 MG tablet Take 2 tablets (160 mg total) by mouth 2 (two) times daily. 60 tablet 3    oxycodone-acetaminophen (PERCOCET)  mg per tablet Take 1 tablet by mouth every 4 (four) hours as needed for Pain. 20 tablet 0    pravastatin (PRAVACHOL) 40 MG tablet       albuterol (PROVENTIL) 2.5 mg /3 mL (0.083 %) nebulizer solution   0    [] predniSONE (DELTASONE) 20 MG tablet Take 3 tablets (60 mg total) by mouth once daily. 9 tablet 0     No current facility-administered medications on file prior to visit.         Review of patient's allergies indicates:  No Known Allergies    OBJECTIVE:     Vital Signs:  Vitals:    17 1438   BP: (!) 119/57   Pulse: 74   Temp: 97.7 °F (36.5 °C)     Wt Readings from Last 1 Encounters:   17 1438 76.6 kg (168 lb 15.7 oz)     Body mass index is 27.27 kg/m².       Physical Exam:  BP (!) 119/57 (BP Location: Right arm, Patient Position: Sitting, BP Method: Small (Automatic))   Pulse 74   Temp 97.7 °F (36.5 °C) (Oral)   Wt 76.6 kg (168 lb 15.7 oz)   BMI 27.27 kg/m²   General appearance: alert, cooperative, no distress  Constitutional:Oriented to person, place, and time +appears well-developed and well-nourished.   HEENT: Normocephalic, atraumatic, neck symmetrical, no nasal discharge   Eyes: conjunctivae/corneas clear, PERRL, EOM's  intact  Lungs: clear to auscultation bilaterally, no dullness to percussion bilaterally  Heart: regular rate and rhythm without rub; no displacement of the PMI   Abdomen: soft, non-tender; bowel sounds normoactive; no organomegaly  + newly placed PD catheter noted  Extremities: extremities symmetric; no clubbing, cyanosis, or edema  Integument: Skin color, texture, turgor normal; no rashes; hair distrubution normal  Neurologic: Alert and oriented X 3, normal strength, normal coordination and gait  Psychiatric: no pressured speech; normal affect; no evidence of impaired cognition     Laboratory  Lab Results   Component Value Date    WBC 10.55 08/31/2017    HGB 10.9 (L) 08/31/2017    HCT 32.7 (L) 08/31/2017    MCV 90 08/31/2017     08/31/2017     BMP  Lab Results   Component Value Date     (L) 08/31/2017    K 4.5 08/31/2017    CL 95 08/31/2017    CO2 27 08/31/2017     (H) 08/31/2017    CREATININE 5.0 (H) 08/31/2017    CALCIUM 8.8 08/31/2017    ANIONGAP 12 08/31/2017    ESTGFRAFRICA 12 (A) 08/31/2017    EGFRNONAA 11 (A) 08/31/2017     Lab Results   Component Value Date    ALT 23 08/27/2017    AST 32 08/27/2017    ALKPHOS 95 08/27/2017    BILITOT 0.5 08/27/2017     Lab Results   Component Value Date    INR 1.1 08/27/2017     Lab Results   Component Value Date    HGBA1C 5.6 08/22/2017     No results for input(s): POCTGLUCOSE in the last 72 hours.    Diagnostic Results:  2 D Echo: 8/22/17  CONCLUSIONS     1 - Severe left atrial enlargement.     2 - Eccentric hypertrophy.     3 - No wall motion abnormalities.     4 - Normal left ventricular systolic function (EF 55-60%).     5 - Restrictive LV filling pattern, indicating markedly elevated LAP (grade 3 diastolic dysfunction).     6 - Normal right ventricular systolic function .     7 - Pulmonary hypertension. The estimated PA systolic pressure is 52 mmHg.     8 - Trivial to mild aortic regurgitation.     9 - Moderate mitral regurgitation.     10 -  Moderate tricuspid regurgitation.     11 - Intermediate central venous pressure.       ASSESSMENT & PLAN:       CKD (chronic kidney disease) stage 5, GFR less than 15 ml/min  - recent admission for volume overload, tolerated aggressive diuresis with elevated BUN/Cr  - BUN/Cr worse today at 127/5.0; but patient asymptomatic and no electrolyte abnormalities   - has appointment at PD center tomorrow- I communicated today's labs to RN at dialysis center and she is contacting his nephrologist- Dr Castaneda @ [facility]; team will consider starting PD tomorrow if deemed necessary    Acute on chronic diastolic heart failure  - hx MI and CABG >12 years ago; per patient no further cardiac evaluation since prior to CABG   - on Bblocker, ACEI, Lasix  -     Ambulatory consult to Cardiology    Essential hypertension  - under good control at present, no changes to meds    Mixed hyperlipidemia  - on statin therapy    I will see patient again in [facility] 9/5/17 or sooner if needed. Labs prior to visit.       Instructions for the patient:      Scheduled Follow-up :  Future Appointments  Date Time Provider Department Center   9/5/2017 2:30 PM Jannet Herman MD [facility] IM [facility]   9/11/2017 2:20 PM MICHELLE Perrin, ANP [facility] CARDIO [facility]   9/14/2017 11:30 AM Jannet Herman MD [facility]       Post Visit Medication List:     Medication List          Accurate as of 8/31/17  3:43 PM. If you have any questions, ask your nurse or doctor.               CONTINUE taking these medications    albuterol 2.5 mg /3 mL (0.083 %) nebulizer solution  Commonly known as:  PROVENTIL     allopurinol 100 MG tablet  Commonly known as:  ZYLOPRIM     alprazolam 0.25 MG tablet  Commonly known as:  XANAX     amlodipine 10 MG tablet  Commonly known as:  NORVASC     aspirin 81 MG EC tablet  Commonly known as:  ECOTRIN     benazepril 10 MG tablet  Commonly known as:  LOTENSIN  Take 1 tablet (10 mg total) by mouth once  daily.     calcitRIOL 0.25 MCG Cap  Commonly known as:  ROCALTROL  Take 1 capsule (0.25 mcg total) by mouth once daily.     calcium acetate 667 mg capsule  Commonly known as:  PHOSLO  Take 1 capsule (667 mg total) by mouth 3 (three) times daily with meals.     carvedilol 25 MG tablet  Commonly known as:  COREG  Take 1 tablet (25 mg total) by mouth 2 (two) times daily.     docusate sodium 50 MG capsule  Commonly known as:  COLACE  Take 2 capsules (100 mg total) by mouth 2 (two) times daily.     ergocalciferol 50,000 unit Cap  Commonly known as:  ERGOCALCIFEROL  Take 1 capsule (50,000 Units total) by mouth every 7 days.     furosemide 80 MG tablet  Commonly known as:  LASIX  Take 2 tablets (160 mg total) by mouth 2 (two) times daily.     oxycodone-acetaminophen  mg per tablet  Commonly known as:  PERCOCET  Take 1 tablet by mouth every 4 (four) hours as needed for Pain.     pravastatin 40 MG tablet  Commonly known as:  PRAVACHOL            Signing Physician:  Jannet Herman MD

## 2017-09-01 ENCOUNTER — TELEPHONE (OUTPATIENT)
Dept: PRIMARY CARE CLINIC | Facility: CLINIC | Age: 72
End: 2017-09-01

## 2017-09-01 ENCOUNTER — TELEPHONE (OUTPATIENT)
Dept: SURGERY | Facility: CLINIC | Age: 72
End: 2017-09-01

## 2017-09-01 NOTE — TELEPHONE ENCOUNTER
Spoke to RN at patient dialysis unit - confirmed that patient was seen at unit today for labs, catheter check, and education. Team did not start dialysis today. Patient will return to unit on Wed, Sept 6 for additional labs. I will see patient again on 9/14/17, sooner if needed.

## 2017-09-01 NOTE — TELEPHONE ENCOUNTER
I returned the patients phone call to let him know that he does not have to come to his appointment on Tuesday the 5th, he needs to keep his appointment on the 14th. I also gave him a list of other appointments that he had. He stated that he would call us back when he got the results of his blood work from dialysis.

## 2017-09-01 NOTE — TELEPHONE ENCOUNTER
----- Message from Belinda Mosquerason sent at 9/1/2017  1:26 PM CDT -----  Contact: [phone] or [phone] / self   Patient states he had dialysis today and would like to know what the next step is. Please advise.         09/01/2017     1538  Contacted pt regarding the above message. Pt stated that Daisy instructed him to let us know that there was lab work done by them, and to find out if there was any appts that he had scheduled. Informed pt that he did not have any follow up appts w/ Dr. Talamantes. Pt verbalized understanding.

## 2017-09-06 ENCOUNTER — TELEPHONE (OUTPATIENT)
Dept: SURGERY | Facility: CLINIC | Age: 72
End: 2017-09-06

## 2017-09-06 ENCOUNTER — TELEPHONE (OUTPATIENT)
Dept: PRIMARY CARE CLINIC | Facility: CLINIC | Age: 72
End: 2017-09-06

## 2017-09-06 ENCOUNTER — NURSE TRIAGE (OUTPATIENT)
Dept: ADMINISTRATIVE | Facility: CLINIC | Age: 72
End: 2017-09-06

## 2017-09-06 RX ORDER — OXYCODONE AND ACETAMINOPHEN 10; 325 MG/1; MG/1
1 TABLET ORAL EVERY 4 HOURS PRN
Qty: 50 TABLET | Refills: 0 | Status: SHIPPED | OUTPATIENT
Start: 2017-09-06 | End: 2017-10-04 | Stop reason: SDUPTHER

## 2017-09-06 NOTE — TELEPHONE ENCOUNTER
"----- Message from Sada Burciaga sent at 9/6/2017 12:08 PM CDT -----  Contact: Self/ [phone] or [phone]  Patient would like to speak with you about getting pain medication. Please advise.     09/06/17     1424  Contacted pt who states "Dr. Talamantes put a hole in my stomach so they could put this dialysis, and I've been going to this clinic. I can't sleep. I can't get comfortable. Now when I first got to the Emergency room, they gave me about 20 Percocet generic 10mg, but they are gone, and that was 10 days ago. I don't think it should hurt that bad. Some times I get a metallic or an aluminum can taste in my mouth when I get flushed and feel like I want to past out. I need them to give me something. You see with Narcotics, they want you to come in. I'll come in to the Emergency Room baby, I can't keep going on like this." Patient informed that Dr. Talamantes was currently in the room with a patient, and that I would give her the message, and someone would be giving him a call back. Pt verbalized understanding.     "

## 2017-09-06 NOTE — TELEPHONE ENCOUNTER
09/06/17      1709  Attempted to contact the patient on both numbers listed to inform that prescription has been sent to the pharmacy, and to schedule clinic visit for evaluation. No answer. Voicemail full. Unable to leave message.     Called patient again, and was able to inform patient of prescription, and clinic visit. Appt made for 09/08/17 at 10am. Date and time confirmed. Pt verbalized understanding.

## 2017-09-06 NOTE — TELEPHONE ENCOUNTER
Returned patients call regarding pain due to his aparoscopic placement of peritoneal dialysis catheter. He states that he is in so much pain that he is not sleeping at night. He says he has a burning sensation. He wants to know if we can prescribe him some medication that will help the pain in his stomach.

## 2017-09-06 NOTE — TELEPHONE ENCOUNTER
"Pt states that he is having pain in his abdomen that has been going on for "a while" and is reliefed with Percocet. Pt is currently out of Percocet and is wanting something else for pain. Advised to follow up with his PCP    Reason for Disposition   Caller requesting a NON-URGENT new prescription or refill and triager unable to refill per unit policy    Protocols used: ST MEDICATION QUESTION CALL-A-AH      "

## 2017-09-06 NOTE — TELEPHONE ENCOUNTER
Spoke to Mr. Motley and he states that no none has called him back from Dr. Montero's office. I told him that we have talked to him a few times and we are waiting on a response from the doctor. He is requesting pain medication or an appointment to get pain medication due to the pain in his stomach from his catheter. I have spoken to Amira regarding Mr. Motley and she stated that she has returned his call and will call him again as well.

## 2017-09-07 ENCOUNTER — TELEPHONE (OUTPATIENT)
Dept: SURGERY | Facility: CLINIC | Age: 72
End: 2017-09-07

## 2017-09-07 NOTE — TELEPHONE ENCOUNTER
----- Message from Beverly Cunha sent at 9/6/2017  1:50 PM CDT -----  Contact: Shara Daughter  [phone]  Patient's daughter is calling to see if her dad has a follow up appointment. Please advice       09/07/2017    1442  Attempted to contact Shara regarding the above message. No answer. Left voicemail.

## 2017-09-08 ENCOUNTER — OFFICE VISIT (OUTPATIENT)
Dept: SURGERY | Facility: CLINIC | Age: 72
End: 2017-09-08
Payer: MEDICARE

## 2017-09-08 VITALS
BODY MASS INDEX: 27.47 KG/M2 | DIASTOLIC BLOOD PRESSURE: 50 MMHG | HEART RATE: 62 BPM | HEIGHT: 66 IN | SYSTOLIC BLOOD PRESSURE: 103 MMHG | WEIGHT: 170.94 LBS | TEMPERATURE: 98 F

## 2017-09-08 DIAGNOSIS — N18.6 ESRD (END STAGE RENAL DISEASE): Primary | ICD-10-CM

## 2017-09-08 PROCEDURE — 99024 POSTOP FOLLOW-UP VISIT: CPT | Mod: S$GLB,,, | Performed by: SURGERY

## 2017-09-08 PROCEDURE — 99999 PR PBB SHADOW E&M-EST. PATIENT-LVL III: CPT | Mod: PBBFAC,,, | Performed by: SURGERY

## 2017-09-08 PROCEDURE — 99499 UNLISTED E&M SERVICE: CPT | Mod: S$GLB,,, | Performed by: SURGERY

## 2017-09-08 NOTE — PROGRESS NOTES
"HPI:  The patient is status post PD catheter placement laparoscopically in the setting of history of diverticulitis with sigmoid resection, he is experiencing intra-abdominal pain with injection however this is nonspecific.  Having bowel movements regularly.  No fevers.  His Percocet prescription was renewed yesterday and since then he feels better.  His no complaints today.  His knee pain that was present preoperatively has resolved.         BP (!) 103/50 (BP Location: Left arm, Patient Position: Sitting)   Pulse 62   Temp 98.2 °F (36.8 °C) (Oral)   Ht 5' 6" (1.676 m)   Wt 77.6 kg (170 lb 15.5 oz)   BMI 27.59 kg/m²       Physical Exam  Cath site c//di      ASSESSMENT:  The patient is doing well after surgery.             PLAN:  Follow up 1 mo  Activity: light duty for 2 weeks, then can resume prior level of activity         "

## 2017-09-08 NOTE — PROGRESS NOTES
Patient Gunner Motley, MRN [id number], was dependent on dialysis (ICD10 Z99.2) at the time of this visit on 9/8/17. This addendum is made to the medical record on 09/08/2017.

## 2017-09-11 ENCOUNTER — OFFICE VISIT (OUTPATIENT)
Dept: CARDIOLOGY | Facility: CLINIC | Age: 72
End: 2017-09-11
Payer: MEDICARE

## 2017-09-11 VITALS
OXYGEN SATURATION: 95 % | SYSTOLIC BLOOD PRESSURE: 106 MMHG | DIASTOLIC BLOOD PRESSURE: 66 MMHG | HEART RATE: 44 BPM | WEIGHT: 159.5 LBS | BODY MASS INDEX: 25.63 KG/M2 | HEIGHT: 66 IN

## 2017-09-11 DIAGNOSIS — I34.0 NON-RHEUMATIC MITRAL REGURGITATION: ICD-10-CM

## 2017-09-11 DIAGNOSIS — I10 ESSENTIAL HYPERTENSION: ICD-10-CM

## 2017-09-11 DIAGNOSIS — I50.33 ACUTE ON CHRONIC DIASTOLIC HEART FAILURE: Primary | ICD-10-CM

## 2017-09-11 DIAGNOSIS — E78.2 MIXED HYPERLIPIDEMIA: ICD-10-CM

## 2017-09-11 DIAGNOSIS — I50.32 CHRONIC DIASTOLIC HEART FAILURE: ICD-10-CM

## 2017-09-11 DIAGNOSIS — N18.5 CKD (CHRONIC KIDNEY DISEASE) STAGE 5, GFR LESS THAN 15 ML/MIN: ICD-10-CM

## 2017-09-11 DIAGNOSIS — I25.10 CORONARY ARTERY DISEASE INVOLVING NATIVE CORONARY ARTERY OF NATIVE HEART WITHOUT ANGINA PECTORIS: ICD-10-CM

## 2017-09-11 DIAGNOSIS — I27.20 PULMONARY HYPERTENSION: ICD-10-CM

## 2017-09-11 PROCEDURE — 1159F MED LIST DOCD IN RCRD: CPT | Mod: S$GLB,,, | Performed by: NURSE PRACTITIONER

## 2017-09-11 PROCEDURE — 3008F BODY MASS INDEX DOCD: CPT | Mod: S$GLB,,, | Performed by: NURSE PRACTITIONER

## 2017-09-11 PROCEDURE — 1125F AMNT PAIN NOTED PAIN PRSNT: CPT | Mod: S$GLB,,, | Performed by: NURSE PRACTITIONER

## 2017-09-11 PROCEDURE — 99999 PR PBB SHADOW E&M-EST. PATIENT-LVL III: CPT | Mod: PBBFAC,,, | Performed by: NURSE PRACTITIONER

## 2017-09-11 PROCEDURE — 1157F ADVNC CARE PLAN IN RCRD: CPT | Mod: S$GLB,,, | Performed by: NURSE PRACTITIONER

## 2017-09-11 PROCEDURE — 99213 OFFICE O/P EST LOW 20 MIN: CPT | Mod: S$GLB,,, | Performed by: NURSE PRACTITIONER

## 2017-09-11 RX ORDER — FUROSEMIDE 80 MG/1
160 TABLET ORAL 2 TIMES DAILY
Qty: 60 TABLET | Refills: 3 | Status: SHIPPED | OUTPATIENT
Start: 2017-09-11 | End: 2017-10-03 | Stop reason: SDUPTHER

## 2017-09-11 RX ORDER — BENAZEPRIL HYDROCHLORIDE 40 MG/1
40 TABLET ORAL DAILY
Qty: 30 TABLET | Refills: 3 | Status: SHIPPED | OUTPATIENT
Start: 2017-09-11 | End: 2017-09-14 | Stop reason: SDUPTHER

## 2017-09-11 RX ORDER — DOCUSATE SODIUM 100 MG/1
100 CAPSULE, LIQUID FILLED ORAL 2 TIMES DAILY
Qty: 30 CAPSULE | Status: SHIPPED | OUTPATIENT
Start: 2017-09-11 | End: 2018-01-01

## 2017-09-11 RX ORDER — CARVEDILOL 25 MG/1
25 TABLET ORAL 2 TIMES DAILY
Qty: 30 TABLET | Refills: 3 | Status: SHIPPED | OUTPATIENT
Start: 2017-09-11 | End: 2017-09-27

## 2017-09-11 NOTE — PROGRESS NOTES
Subjective:    Patient ID:  Gunner Motley is a 72 y.o. male who presents to clinic upon referral [facility] for chronic diastolic heart failure management with recent admission to the hospital with ADHF (diastolic etiology)    73yo male with history of CAD s/p CABG 2004, HTN, PHTN, CKD stage V recent PD catheter placement, DMII and normocytic anemia who was referred to Cardiology CHF clinic by Dr. Herman. Ms. Motley as admitted 8/22/2017-8/25/2017 for ADHF with complaints of worsening FREIRE, scrotal and BLE swelling, PND and orthopnea. Upon admission, his creatinine was 6.5 up from baseline of 3.2 in 2015. He was given Lasix 160mg IV TID with eventual decrease to 80mg IV TID with good response with 4895cc and 5750cc urine output over 48hours with transition to Lasix 160mg po BID with 1.8 liters out. His symptoms improved and he was discharged home on Lasix 160mg BID and underwent PD catheter placement as an outpatient with plans of initiation of PD once catheter matures    Today in clinic, he complains of mild abdominal pain controlled with pain medications since PD catheter placement. He reports intermittent FREIRE that occurs with rushing but does not occur with normal activity. He denies any chest pain, orthopnea or PND. Overall, he feels much improved since discharge last month. He reports his symptoms prior to his CABG in 2004 was jaw pain and has not had any issues since his surgery.             Review of Systems   Constitution: Negative for chills, decreased appetite, diaphoresis, fever and weakness.   Cardiovascular: Negative for chest pain, claudication, cyanosis, dyspnea on exertion, irregular heartbeat, leg swelling, near-syncope, orthopnea, palpitations, paroxysmal nocturnal dyspnea and syncope.   Respiratory: Negative for cough, hemoptysis, shortness of breath and wheezing.    Gastrointestinal: Negative for bloating, abdominal pain, constipation, diarrhea, melena, nausea and vomiting.    Neurological: Negative for dizziness.        Objective:    Physical Exam   Constitutional: He is oriented to person, place, and time. He appears well-developed and well-nourished. No distress.   Cardiovascular: Normal rate and regular rhythm.  Exam reveals no gallop.    No murmur heard.  Pulmonary/Chest: Effort normal and breath sounds normal. No respiratory distress. He has no wheezes.   Abdominal: Soft. Bowel sounds are normal. He exhibits no distension. There is no tenderness.   Neurological: He is alert and oriented to person, place, and time.   Skin: Skin is warm and dry.         Echocardiogram 8/22/2017  Severe left atrial enlargement.   Eccentric hypertrophy.   No wall motion abnormalities.   Normal left ventricular systolic function (EF 55-60%).   Restrictive LV filling pattern, indicating markedly elevated LAP (grade 3 diastolic dysfunction).   Normal right ventricular systolic function .   Pulmonary hypertension. The estimated PA systolic pressure is 52 mmHg.   Trivial to mild aortic regurgitation.   Moderate mitral regurgitation.   Moderate tricuspid regurgitation.   Intermediate central venous pressure    Results for orders placed or performed in visit on 08/31/17   CBC auto differential   Result Value Ref Range    WBC 10.55 3.90 - 12.70 K/uL    RBC 3.63 (L) 4.60 - 6.20 M/uL    Hemoglobin 10.9 (L) 14.0 - 18.0 g/dL    Hematocrit 32.7 (L) 40.0 - 54.0 %    MCV 90 82 - 98 fL    MCH 30.0 27.0 - 31.0 pg    MCHC 33.3 32.0 - 36.0 g/dL    RDW 13.5 11.5 - 14.5 %    Platelets 252 150 - 350 K/uL    MPV 10.2 9.2 - 12.9 fL    Gran # 7.5 1.8 - 7.7 K/uL    Lymph # 1.6 1.0 - 4.8 K/uL    Mono # 1.4 (H) 0.3 - 1.0 K/uL    Eos # 0.0 0.0 - 0.5 K/uL    Baso # 0.00 0.00 - 0.20 K/uL    Gran% 70.7 38.0 - 73.0 %    Lymph% 15.1 (L) 18.0 - 48.0 %    Mono% 13.4 4.0 - 15.0 %    Eosinophil% 0.1 0.0 - 8.0 %    Basophil% 0.0 0.0 - 1.9 %    Differential Method Automated    Basic metabolic panel   Result Value Ref Range    Sodium 134 (L)  136 - 145 mmol/L    Potassium 4.5 3.5 - 5.1 mmol/L    Chloride 95 95 - 110 mmol/L    CO2 27 23 - 29 mmol/L    Glucose 170 (H) 70 - 110 mg/dL    BUN, Bld 127 (H) 8 - 23 mg/dL    Creatinine 5.0 (H) 0.5 - 1.4 mg/dL    Calcium 8.8 8.7 - 10.5 mg/dL    Anion Gap 12 8 - 16 mmol/L    eGFR if African American 12 (A) >60 mL/min/1.73 m^2    eGFR if non African American 11 (A) >60 mL/min/1.73 m^2       Current Cardiac Medications:     amlodipine (NORVASC) 10 MG tablet, , Disp: , Rfl:     aspirin (ECOTRIN) 81 MG EC tablet, Take 81 mg by mouth once daily., Disp: , Rfl:     benazepril (LOTENSIN) 10 MG tablet, Take 1 tablet (10 mg total) by mouth once daily., Disp: 30 tablet, Rfl:     carvedilol (COREG) 25 MG tablet, Take 1 tablet (25 mg total) by mouth 2 (two) times daily., Disp: 30 tablet, Rfl: 3    furosemide (LASIX) 80 MG tablet, Take 2 tablets (160 mg total) by mouth 2 (two) times daily., Disp: 60 tablet, Rfl: 3    pravastatin (PRAVACHOL) 40 MG tablet, , Disp: , Rfl:     Vitals:    09/11/17 1442   BP: 106/66   Pulse: (!) 44     Assessment:       1. Acute on chronic diastolic heart failure    2. Coronary artery disease involving native coronary artery of native heart without angina pectoris    3. Chronic diastolic heart failure    4. Essential hypertension    5. Pulmonary hypertension    6. Mixed hyperlipidemia    7. CKD (chronic kidney disease) stage 5, GFR less than 15 ml/min    8. Non-rheumatic mitral regurgitation         Plan:         No complaints of chest pain or SOB; Endorses NYHA class I HF symptoms currently   Continue current medication regimen and refilled Coreg and Lasix today  Discussed echocardiogram results with particular attention paid to MR and TR; feel it could possibly be functional related given significant volume overload during recent admission; will repeat echo for re evaluation of valve structure-will phone results to patient once completed; if MR remains moderate to severe will likely need  further work up with JEREMIAS and LHC to determine if intervention needed ie Mitral Clip  Discussed importance of medication compliance and patient very receptive  Reviewed all labs and imaging with patient and all questions answered to patient satisfaction  RTC in 3 months or sooner if needed

## 2017-09-11 NOTE — LETTER
September 11, 2017      Jannet Herman MD  [address]  [address]  [place] [address]           [facility]  [address], [address]  [place] [address]  Phone: [phone]          Patient: Gunner Motley   MR Number: [id number]   YOB: 1945   Date of Visit: 9/11/2017       Dear Dr. Jannet Herman:    Thank you for referring Gunner Motley to me for evaluation. Attached you will find relevant portions of my assessment and plan of care.    If you have questions, please do not hesitate to call me. I look forward to following Gunner Motley along with you.    Sincerely,    IMCHELLE Perrin, ANP    Enclosure  CC:  No Recipients    If you would like to receive this communication electronically, please contact [email or web address] or [phone] to request more information on [employer] Link access.    For providers and/or their staff who would like to refer a patient to [facility], please contact us through our one-stop-shop provider referral line, [facility] , at [phone].    If you feel you have received this communication in error or would no longer like to receive these types of communications, please e-mail [email or web address]

## 2017-09-14 ENCOUNTER — LAB VISIT (OUTPATIENT)
Dept: LAB | Facility: HOSPITAL | Age: 72
End: 2017-09-14
Attending: INTERNAL MEDICINE
Payer: MEDICARE

## 2017-09-14 ENCOUNTER — OFFICE VISIT (OUTPATIENT)
Dept: PRIMARY CARE CLINIC | Facility: CLINIC | Age: 72
End: 2017-09-14
Payer: MEDICARE

## 2017-09-14 VITALS
SYSTOLIC BLOOD PRESSURE: 98 MMHG | TEMPERATURE: 98 F | HEIGHT: 66 IN | OXYGEN SATURATION: 94 % | WEIGHT: 158.94 LBS | DIASTOLIC BLOOD PRESSURE: 50 MMHG | BODY MASS INDEX: 25.54 KG/M2 | HEART RATE: 48 BPM

## 2017-09-14 DIAGNOSIS — I50.32 CHRONIC DIASTOLIC HEART FAILURE: ICD-10-CM

## 2017-09-14 DIAGNOSIS — Z99.2 ESRD ON PERITONEAL DIALYSIS: Primary | ICD-10-CM

## 2017-09-14 DIAGNOSIS — N18.6 ESRD ON PERITONEAL DIALYSIS: Primary | ICD-10-CM

## 2017-09-14 DIAGNOSIS — I34.0 NON-RHEUMATIC MITRAL REGURGITATION: ICD-10-CM

## 2017-09-14 DIAGNOSIS — I10 ESSENTIAL HYPERTENSION: ICD-10-CM

## 2017-09-14 DIAGNOSIS — N18.5 CKD (CHRONIC KIDNEY DISEASE) STAGE 5, GFR LESS THAN 15 ML/MIN: ICD-10-CM

## 2017-09-14 LAB
ANION GAP SERPL CALC-SCNC: 11 MMOL/L
BUN SERPL-MCNC: 92 MG/DL
CALCIUM SERPL-MCNC: 9.8 MG/DL
CHLORIDE SERPL-SCNC: 97 MMOL/L
CO2 SERPL-SCNC: 30 MMOL/L
CREAT SERPL-MCNC: 5.7 MG/DL
EST. GFR  (AFRICAN AMERICAN): 10 ML/MIN/1.73 M^2
EST. GFR  (NON AFRICAN AMERICAN): 9 ML/MIN/1.73 M^2
GLUCOSE SERPL-MCNC: 136 MG/DL
POTASSIUM SERPL-SCNC: 4 MMOL/L
SODIUM SERPL-SCNC: 138 MMOL/L

## 2017-09-14 PROCEDURE — 80048 BASIC METABOLIC PNL TOTAL CA: CPT

## 2017-09-14 PROCEDURE — 3008F BODY MASS INDEX DOCD: CPT | Mod: S$GLB,,, | Performed by: INTERNAL MEDICINE

## 2017-09-14 PROCEDURE — 36415 COLL VENOUS BLD VENIPUNCTURE: CPT

## 2017-09-14 PROCEDURE — 1159F MED LIST DOCD IN RCRD: CPT | Mod: S$GLB,,, | Performed by: INTERNAL MEDICINE

## 2017-09-14 PROCEDURE — 99214 OFFICE O/P EST MOD 30 MIN: CPT | Mod: S$GLB,,, | Performed by: INTERNAL MEDICINE

## 2017-09-14 PROCEDURE — 99999 PR PBB SHADOW E&M-EST. PATIENT-LVL III: CPT | Mod: PBBFAC,,, | Performed by: INTERNAL MEDICINE

## 2017-09-14 PROCEDURE — 1126F AMNT PAIN NOTED NONE PRSNT: CPT | Mod: S$GLB,,, | Performed by: INTERNAL MEDICINE

## 2017-09-14 PROCEDURE — 1157F ADVNC CARE PLAN IN RCRD: CPT | Mod: S$GLB,,, | Performed by: INTERNAL MEDICINE

## 2017-09-14 RX ORDER — BENAZEPRIL HYDROCHLORIDE 40 MG/1
20 TABLET ORAL DAILY
Qty: 30 TABLET | Refills: 3 | Status: SHIPPED | OUTPATIENT
Start: 2017-09-14 | End: 2017-09-27 | Stop reason: SDUPTHER

## 2017-09-14 NOTE — PROGRESS NOTES
"Subjective:       Patient ID: Gunner Motley is a 72 y.o. male.    Chief Complaint: Hospital Follow Up    HPI:  [facility] up.  Recently hospitalized for volume overload and worsening of CKD.  Diuresed successfully but with significantly elevated BUN/Cr.  Patient was discharged with plans for outpatient PD catheter placement.      Following hospital discharge he had an ED visit for acute gout flare to right knee.   He was treated and released; prescribed a course of Prednisone and his symptoms have resolved.      He returned to the hospital as directed for planned PD catheter placement on 8/28/17.  Peritoneal dialysis was initiated yesterday, on 9/13/17.  He is returning today for further dialysis.   Plan is ultimately for him to transition to home dialysis.    Today his blood pressure is marginal and he is mildly symptomatic.  Tells me he feels a little "off" but he is ambulating without assistance and is not dizzy or unusually weak.  He is unaccompanied today.  He reports stress due to circumstances surrounding the care of his terminally ill wife who is on an inpatient hospice unit.  He previously cared for her at home and wants to resume caring for her at home.  He is anxious to transition to home dialysis so that he can move his wife back home from the inpatient unit.   He cannot move her until he is done attending daily sessions at the dialysis unit.     Review of Systems  General ROS: negative for chills, fever or weight loss  Psychological ROS: negative for hallucination, depression or suicidal ideation  Ophthalmic ROS: negative for blurry vision, photophobia or eye pain  ENT ROS: negative for epistaxis, sore throat or rhinorrhea  Respiratory ROS: no cough, shortness of breath, or wheezing  Cardiovascular ROS: no chest pain or dyspnea on exertion  Gastrointestinal ROS: no abdominal pain, or black/ bloody stools  + constipation   Genito-Urinary ROS: no dysuria, " trouble voiding, or hematuria  Musculoskeletal ROS: negative for gait disturbance or muscular weakness  Neurological ROS: no syncope or seizures; no ataxia  Dermatological ROS: negative for pruritis, rash and jaundice        Objective:      Vitals:    09/14/17 0930   BP: (!) 98/50   Pulse: (!) 48   Temp: 97.7 °F (36.5 °C)     Physical Exam:  General appearance: alert, cooperative, no distress  Constitutional:Oriented to person, place, and time +appears well-developed and well-nourished.   HEENT: Normocephalic, atraumatic, neck symmetrical, no nasal discharge   Eyes: conjunctivae/corneas clear, PERRL, EOM's intact  Lungs: clear to auscultation bilaterally, no dullness to percussion bilaterally  Heart: regular rate and rhythm without rub; no displacement of the PMI   Abdomen: soft, non-tender; bowel sounds normoactive; no organomegaly  + peritoneal dialysis catheter  Extremities: extremities symmetric; no clubbing, cyanosis, or edema  Integument: Skin color, texture, turgor normal; no rashes; hair distrubution normal  Neurologic: Alert and oriented X 3, normal strength, normal coordination and gait  Psychiatric: no pressured speech; normal affect; no evidence of impaired cognition      Results for SINDI BURTON (MRN [id number]) as of 9/14/2017 13:24   Ref. Range 9/14/2017 09:18   Sodium Latest Ref Range: 136 - 145 mmol/L 138   Potassium Latest Ref Range: 3.5 - 5.1 mmol/L 4.0   Chloride Latest Ref Range: 95 - 110 mmol/L 97   CO2 Latest Ref Range: 23 - 29 mmol/L 30 (H)   Anion Gap Latest Ref Range: 8 - 16 mmol/L 11   BUN, Bld Latest Ref Range: 8 - 23 mg/dL 92 (H)   Creatinine Latest Ref Range: 0.5 - 1.4 mg/dL 5.7 (H)   eGFR if non African American Latest Ref Range: >60 mL/min/1.73 m^2 9 (A)   eGFR if African American Latest Ref Range: >60 mL/min/1.73 m^2 10 (A)   Glucose Latest Ref Range: 70 - 110 mg/dL 136 (H)   Calcium Latest Ref Range: 8.7 - 10.5 mg/dL 9.8     Assessment:       1. Essential hypertension        Plan:        Diagnoses and all orders for this visit:    ESRD on peritoneal dialysis  - peritoneal dialysis initiated yesterday, 9/13/14  - plan is for daily dialysis on site until transition to home dialysis  - copy of today's lab work faxed to [facility]     Essential hypertension  - blood pressure is marginal   - will discontinue Amlodipine, reduce dose of benazepril, and monitor  -     benazepril (LOTENSIN) 40 MG tablet; Take 0.5 tablets (20 mg total) by mouth once daily.  -     Discontinue Amlodipine 10 mg daily     Chronic diastolic heart failure  - stable on BBlocker, ACEI, and high dose of Lasix  - still makes urine  - established care with cardiology; has upcoming 2 D echo    Non-rheumatic mitral regurgitation  - has upcoming 2 D echo to further evaluate     I will see patient again in [facility] 9/27/17, sooner if needed.

## 2017-09-18 ENCOUNTER — TELEPHONE (OUTPATIENT)
Dept: CARDIOLOGY | Facility: CLINIC | Age: 72
End: 2017-09-18

## 2017-09-18 ENCOUNTER — HOSPITAL ENCOUNTER (OUTPATIENT)
Dept: CARDIOLOGY | Facility: HOSPITAL | Age: 72
Discharge: HOME | End: 2017-09-18
Attending: NURSE PRACTITIONER
Payer: MEDICARE

## 2017-09-18 DIAGNOSIS — I25.10 CORONARY ARTERY DISEASE INVOLVING NATIVE CORONARY ARTERY OF NATIVE HEART WITHOUT ANGINA PECTORIS: ICD-10-CM

## 2017-09-18 DIAGNOSIS — I50.33 ACUTE ON CHRONIC DIASTOLIC HEART FAILURE: ICD-10-CM

## 2017-09-18 LAB
AORTIC VALVE REGURGITATION: ABNORMAL
DIASTOLIC DYSFUNCTION: YES
ESTIMATED PA SYSTOLIC PRESSURE: 30.25
MITRAL VALVE MOBILITY: NORMAL
MITRAL VALVE REGURGITATION: ABNORMAL
RETIRED EF AND QEF - SEE NOTES: 55 (ref 55–65)
TRICUSPID VALVE REGURGITATION: ABNORMAL

## 2017-09-18 PROCEDURE — 93306 TTE W/DOPPLER COMPLETE: CPT

## 2017-09-18 PROCEDURE — 93306 TTE W/DOPPLER COMPLETE: CPT | Mod: 26,,, | Performed by: INTERNAL MEDICINE

## 2017-09-18 NOTE — TELEPHONE ENCOUNTER
Called  Tolu in regards to his echocardiogram results from today. His echo shows normal LVEF and mild MR which improved from moderate MR. This confirmed the suspicion of functional MR and it was related to acute volume overload upon admission last month. He reports he is doing well and denies any SOB. He complained of mild weakness at his most recent office visit with Dr. Herman with discontinuation of his Norvasc and decrease of his Benazepril to 20mg from 40mg which is reasonable. He is scheduled to follow up with Dr. Herman next week and will follow up with me in the cardiology clinic in 3 months or sooner if needed. He verbalized complete understanding of his echocardiogram results and recommended follow up

## 2017-09-27 ENCOUNTER — OFFICE VISIT (OUTPATIENT)
Dept: PRIMARY CARE CLINIC | Facility: CLINIC | Age: 72
End: 2017-09-27
Payer: MEDICARE

## 2017-09-27 VITALS
SYSTOLIC BLOOD PRESSURE: 155 MMHG | DIASTOLIC BLOOD PRESSURE: 72 MMHG | HEART RATE: 86 BPM | BODY MASS INDEX: 26.63 KG/M2 | TEMPERATURE: 99 F | WEIGHT: 165 LBS

## 2017-09-27 DIAGNOSIS — I34.0 NON-RHEUMATIC MITRAL REGURGITATION: ICD-10-CM

## 2017-09-27 DIAGNOSIS — I50.32 CHRONIC DIASTOLIC HEART FAILURE: ICD-10-CM

## 2017-09-27 DIAGNOSIS — Z99.2 ESRD ON PERITONEAL DIALYSIS: Primary | ICD-10-CM

## 2017-09-27 DIAGNOSIS — I10 ESSENTIAL HYPERTENSION: ICD-10-CM

## 2017-09-27 DIAGNOSIS — N18.6 ESRD ON PERITONEAL DIALYSIS: Primary | ICD-10-CM

## 2017-09-27 PROCEDURE — 99999 PR PBB SHADOW E&M-EST. PATIENT-LVL III: CPT | Mod: PBBFAC,,, | Performed by: INTERNAL MEDICINE

## 2017-09-27 PROCEDURE — 99214 OFFICE O/P EST MOD 30 MIN: CPT | Mod: S$GLB,,, | Performed by: INTERNAL MEDICINE

## 2017-09-27 PROCEDURE — 1126F AMNT PAIN NOTED NONE PRSNT: CPT | Mod: S$GLB,,, | Performed by: INTERNAL MEDICINE

## 2017-09-27 PROCEDURE — 3008F BODY MASS INDEX DOCD: CPT | Mod: S$GLB,,, | Performed by: INTERNAL MEDICINE

## 2017-09-27 PROCEDURE — 1157F ADVNC CARE PLAN IN RCRD: CPT | Mod: S$GLB,,, | Performed by: INTERNAL MEDICINE

## 2017-09-27 PROCEDURE — 1159F MED LIST DOCD IN RCRD: CPT | Mod: S$GLB,,, | Performed by: INTERNAL MEDICINE

## 2017-09-27 RX ORDER — BENAZEPRIL HYDROCHLORIDE 40 MG/1
40 TABLET ORAL DAILY
Qty: 30 TABLET | Refills: 3 | Status: SHIPPED | OUTPATIENT
Start: 2017-09-27 | End: 2017-01-01 | Stop reason: SINTOL

## 2017-09-27 NOTE — PROGRESS NOTES
Subjective:       Patient ID: Gunner Motley is a 72 y.o. male.    Chief Complaint: Hospital Follow Up    HPI:  [facility].  Recently hospitalized for volume overload and worsening of CKD.  Diuresed successfully but with significantly elevated BUN/Cr.  Patient was discharged with plans for outpatient PD catheter placement.     PD catheter placed as outpatient 8/28/17.  Peritoneal Dialysis initiated as outpatient 9/13/17.  Patient currently in PD training at [facility] #[phone].  Treating Nephrologist is Dr Castaneda with [facility].   Charge RN at dialysis unit is [facility]. Case discussed with Sheryl today via telephone.     Coreg stopped by nephrology team after patient had symptomatic bradycardia (HR in 30's) during dialysis.  Patient and team considering transition to hemodialysis at patients request.   Patient is full time caregiver for his wife who is terminally ill and in hospice. He cares for her at home but she was recently placed in inpatient hospice for respite care while he was ill. She has since been transferred for inpatient hospice to nursing home hospice, currently at [facility].   He wants to bring her home and resume caregiver responsibilities. He will not be able to achieve this goal while doing home peritoneal dialysis on a QID schedule. Transitioning to a three day a week hemodialysis schedule will allow him to care for his wife.   A meeting is scheduled today with the patient, his daughter, and his dialysis team to further discuss this issue.   Today the patient is greatly distressed and tearful regarding his wife's placement in a living center.     Review of Systems  General ROS: negative for chills, fever or weight loss  Psychological ROS: negative for hallucination, depression or suicidal ideation  Ophthalmic ROS: negative for blurry vision, photophobia or eye pain  ENT ROS: negative for epistaxis, sore throat  or rhinorrhea  Respiratory ROS: no cough, shortness of breath, or wheezing  Cardiovascular ROS: no chest pain or dyspnea on exertion  Gastrointestinal ROS: no , change in bowel habits, or black/ bloody stools  + abdominal bloating  Genito-Urinary ROS: no dysuria, trouble voiding, or hematuria  Musculoskeletal ROS: negative for gait disturbance or muscular weakness  Neurological ROS: no syncope or seizures; no ataxia  Dermatological ROS: negative for pruritis, rash and jaundice        Objective:      Vitals:    09/27/17 0932   BP: (!) 155/72   Pulse: 86   Temp: 99.1 °F (37.3 °C)     Physical Exam:  General appearance: alert, cooperative, + emotional distress/sadness regarding wife's situation  Constitutional:Oriented to person, place, and time +appears well-developed and well-nourished.   HEENT: Normocephalic, atraumatic, neck symmetrical, no nasal discharge   Eyes: conjunctivae/corneas clear, PERRL, EOM's intact  Lungs: clear to auscultation bilaterally, no dullness to percussion bilaterally  Heart: regular rate and rhythm without rub; no displacement of the PMI   Abdomen: soft, non-tender; bowel sounds normoactive; no organomegaly  + peritoneal dialysis catheter in place   Extremities: extremities symmetric; no clubbing, cyanosis, or edema  Integument: Skin color, texture, turgor normal; no rashes; hair distrubution normal  Neurologic: Alert and oriented X 3, normal strength, normal coordination and gait  Psychiatric: no pressured speech; normal affect; no evidence of impaired cognition    Assessment:       1. ESRD on peritoneal dialysis    2. Chronic diastolic heart failure    3. Essential hypertension    4. Non-rheumatic mitral regurgitation        Plan:       Diagnoses and all orders for this visit:    ESRD on peritoneal dialysis  - recently started PD, still in training at center, has not transitioned to home yet  - considering transition to HD to facilitate remaining full time caregiver for his terminally ill  wife    Chronic diastolic heart failure  - stable on ACEI, Lasix  - Coreg recently stopped by dialysis team after patient experienced symptomatic bradycardia, HR 50's, while on PD    Essential hypertension  - blood pressure elevated today, will increase ACEI dose  -     benazepril (LOTENSIN) 40 MG tablet; Take 1 tablet (40 mg total) by mouth once daily.    Non-rheumatic mitral regurgitation  - improved per recent Echo  - being follow by cardiology, due to next appointment Nov/Dec 2017      Patients care and care plan discussed via telephone with dialysis RN charge nurse at [facility].   I will see patient again in [facility] 10/3/17, sooner if needed.

## 2017-10-02 ENCOUNTER — TELEPHONE (OUTPATIENT)
Dept: SURGERY | Facility: CLINIC | Age: 72
End: 2017-10-02

## 2017-10-02 DIAGNOSIS — N18.6 ESRD ON PERITONEAL DIALYSIS: Primary | ICD-10-CM

## 2017-10-02 DIAGNOSIS — N18.4 TYPE 2 DIABETES MELLITUS WITH STAGE 4 CHRONIC KIDNEY DISEASE, WITHOUT LONG-TERM CURRENT USE OF INSULIN: ICD-10-CM

## 2017-10-02 DIAGNOSIS — I12.9 BENIGN HYPERTENSIVE KIDNEY DISEASE WITH CHRONIC KIDNEY DISEASE STAGE I THROUGH STAGE IV, OR UNSPECIFIED: ICD-10-CM

## 2017-10-02 DIAGNOSIS — N18.6 ESRD (END STAGE RENAL DISEASE): Primary | ICD-10-CM

## 2017-10-02 DIAGNOSIS — N18.6 ESRD (END STAGE RENAL DISEASE) ON DIALYSIS: ICD-10-CM

## 2017-10-02 DIAGNOSIS — E11.22 TYPE 2 DIABETES MELLITUS WITH STAGE 4 CHRONIC KIDNEY DISEASE, WITHOUT LONG-TERM CURRENT USE OF INSULIN: ICD-10-CM

## 2017-10-02 DIAGNOSIS — Z99.2 ESRD (END STAGE RENAL DISEASE) ON DIALYSIS: ICD-10-CM

## 2017-10-02 DIAGNOSIS — Z99.2 ESRD ON PERITONEAL DIALYSIS: Primary | ICD-10-CM

## 2017-10-02 NOTE — TELEPHONE ENCOUNTER
10/02/17     1535  Contacted patient regarding scheduling vein mapping. Appointment made for 10/05/17 at 4:15pm. Date, time and location confirmed. Patient verbalized understanding.

## 2017-10-02 NOTE — TELEPHONE ENCOUNTER
"----- Message from Amira Lopez LPN sent at 10/2/2017 10:50 AM CDT -----  Having some trouble with the VAS order. States patient current diagnosis will cause his insurance to deny him coverage. Could you take a look at the order?  ----- Message -----  From: Jen Talamantes MD  Sent: 9/29/2017  12:40 PM  To: Albin Li Staff    Can you arrange vein map at [facility] "VAS vein mapping" and then f/up with me to discuss fistula plan?    "

## 2017-10-03 ENCOUNTER — HOSPITAL ENCOUNTER (OUTPATIENT)
Dept: RADIOLOGY | Facility: HOSPITAL | Age: 72
Discharge: HOME | End: 2017-10-03
Attending: INTERNAL MEDICINE
Payer: MEDICARE

## 2017-10-03 DIAGNOSIS — I50.32 CHRONIC DIASTOLIC CHF (CONGESTIVE HEART FAILURE): ICD-10-CM

## 2017-10-03 DIAGNOSIS — I50.32 CHRONIC DIASTOLIC CHF (CONGESTIVE HEART FAILURE): Primary | ICD-10-CM

## 2017-10-03 PROCEDURE — 71020 XR CHEST PA AND LATERAL: CPT | Mod: TC

## 2017-10-03 PROCEDURE — 71020 XR CHEST PA AND LATERAL: CPT | Mod: 26,,, | Performed by: RADIOLOGY

## 2017-10-03 RX ORDER — FUROSEMIDE 80 MG/1
240 TABLET ORAL 2 TIMES DAILY
Qty: 180 TABLET | Refills: 3 | Status: SHIPPED | OUTPATIENT
Start: 2017-10-03 | End: 2017-01-01 | Stop reason: SINTOL

## 2017-10-04 ENCOUNTER — TELEPHONE (OUTPATIENT)
Dept: PRIMARY CARE CLINIC | Facility: CLINIC | Age: 72
End: 2017-10-04

## 2017-10-04 ENCOUNTER — OFFICE VISIT (OUTPATIENT)
Dept: PRIMARY CARE CLINIC | Facility: CLINIC | Age: 72
End: 2017-10-04
Payer: MEDICARE

## 2017-10-04 VITALS
WEIGHT: 160.5 LBS | HEART RATE: 75 BPM | DIASTOLIC BLOOD PRESSURE: 81 MMHG | TEMPERATURE: 97 F | BODY MASS INDEX: 25.9 KG/M2 | SYSTOLIC BLOOD PRESSURE: 166 MMHG

## 2017-10-04 DIAGNOSIS — Z99.2 ESRD ON PERITONEAL DIALYSIS: Primary | ICD-10-CM

## 2017-10-04 DIAGNOSIS — I10 ESSENTIAL HYPERTENSION: ICD-10-CM

## 2017-10-04 DIAGNOSIS — R06.2 WHEEZING: ICD-10-CM

## 2017-10-04 DIAGNOSIS — N18.6 ESRD ON PERITONEAL DIALYSIS: Primary | ICD-10-CM

## 2017-10-04 DIAGNOSIS — R52 PAIN: ICD-10-CM

## 2017-10-04 PROCEDURE — 99999 PR PBB SHADOW E&M-EST. PATIENT-LVL III: CPT | Mod: PBBFAC,,, | Performed by: INTERNAL MEDICINE

## 2017-10-04 PROCEDURE — 99214 OFFICE O/P EST MOD 30 MIN: CPT | Mod: S$GLB,,, | Performed by: INTERNAL MEDICINE

## 2017-10-04 RX ORDER — OXYCODONE AND ACETAMINOPHEN 10; 325 MG/1; MG/1
1 TABLET ORAL EVERY 4 HOURS PRN
Qty: 30 TABLET | Refills: 0 | Status: SHIPPED | OUTPATIENT
Start: 2017-10-04 | End: 2017-01-01 | Stop reason: SDUPTHER

## 2017-10-04 RX ORDER — CALCITRIOL 0.25 UG/1
0.25 CAPSULE ORAL DAILY
Qty: 30 CAPSULE | Refills: 11 | Status: SHIPPED | OUTPATIENT
Start: 2017-10-04 | End: 2018-01-01 | Stop reason: SDUPTHER

## 2017-10-04 RX ORDER — PREDNISONE 20 MG/1
20 TABLET ORAL DAILY
Qty: 5 TABLET | Refills: 0 | Status: SHIPPED | OUTPATIENT
Start: 2017-10-04 | End: 2017-10-09

## 2017-10-04 NOTE — TELEPHONE ENCOUNTER
Patient called in because he needs calcitriol refilled. He went to the pharmacy to have the medication refilled but the providing physician only saw him in the ER so they would authorize the prescription to be refilled. He is requesting a new prescription to be filled at the [facility] in [place].

## 2017-10-05 ENCOUNTER — HOSPITAL ENCOUNTER (OUTPATIENT)
Dept: RADIOLOGY | Facility: HOSPITAL | Age: 72
Discharge: HOME | End: 2017-10-05
Attending: SURGERY
Payer: MEDICARE

## 2017-10-05 DIAGNOSIS — N18.6 ESRD (END STAGE RENAL DISEASE) ON DIALYSIS: ICD-10-CM

## 2017-10-05 DIAGNOSIS — Z99.2 ESRD (END STAGE RENAL DISEASE) ON DIALYSIS: ICD-10-CM

## 2017-10-05 PROCEDURE — G0365 VESSEL MAPPING HEMO ACCESS: HCPCS | Mod: TC

## 2017-10-05 PROCEDURE — G0365 VESSEL MAPPING HEMO ACCESS: HCPCS | Mod: 26,,, | Performed by: RADIOLOGY

## 2017-10-07 PROBLEM — I12.9 BENIGN HYPERTENSIVE CKD: Status: RESOLVED | Noted: 2017-08-28 | Resolved: 2017-10-07

## 2017-10-07 NOTE — PROGRESS NOTES
Subjective:       Patient ID: Gunner Motley is a 72 y.o. male.    Chief Complaint: Hospital Follow Up- ESRD    HPI:  [facility].  Recently hospitalized for volume overload and worsening of CKD.  Diuresed successfully but with significantly elevated BUN/Cr.  Patient was discharged with plans for outpatient PD catheter placement.      PD catheter placed as outpatient 8/28/17.  Peritoneal Dialysis initiated as outpatient 9/13/17.  Patient currently in PD training at [facility] #[phone].  Treating Nephrologist is Dr Castaneda with [facility].   Charge RN at dialysis unit is Sheryl. Case discussed with Sheryl today via telephone.     Plans being made for transition to hemodialysis to accommodate patients goals.  He is primary caregiver for wife who is in hospice.  She is currently in Nursing Home hospice as he is unable to care for her at home due to the demands of the peritoneal dialysis schedule.   Transitioning to the 3 days schedule of hemodialysis will allow him to move his wife back to the home hospice setting and allow him to resume the primary caregiver role.  Vein mapping ultrasound to take place this week.   Has upcoming appointment with surgery, Dr Jen Talamantes.    Communicated with his dialysis team regarding his condition.  He was found to be dyspneic and wheezing yesterday at dialysis unit.   Nephrologist increased his lasix to 240 mg bid and instructed him to use his home nebulizer every 4 hours while awake.  A course of zithromax was also prescribed.   Today the patient is no longer short of breath and he tells me wheezing has resolved.   Chest X Ray and labs reveal no abnormalities with the exception of his known renal impairment.  He noticed a moderate increase in urine output following the higher lasix dose.       Review of Systems  General ROS: negative for chills, fever or weight loss  Psychological ROS: negative for hallucination, or  suicidal ideation  + depression, sadness regarding medical and social situation  Ophthalmic ROS: negative for blurry vision, photophobia or eye pain  ENT ROS: negative for epistaxis, sore throat or rhinorrhea  Respiratory ROS: no cough, + recent shortness of breath and wheezing which have resolved  Cardiovascular ROS: no chest pain or dyspnea on exertion  Gastrointestinal ROS: no  change in bowel habits, or black/ bloody stools  + abdominal wall pain at site of PD catheter placement   Genito-Urinary ROS: no dysuria, trouble voiding, or hematuria  Musculoskeletal ROS: negative for gait disturbance or muscular weakness  Neurological ROS: no syncope or seizures; no ataxia  Dermatological ROS: negative for pruritis, rash and jaundice        Objective:      Vitals:    10/04/17 0947   BP: (!) 166/81   Pulse: 75   Temp: 97.1 °F (36.2 °C)     Physical Exam:  General appearance: alert, cooperative, no distress  Constitutional:Oriented to person, place, and time  + appears well-developed and well-nourished.   HEENT: Normocephalic, atraumatic, neck symmetrical, no nasal discharge   Eyes: conjunctivae/corneas clear, PERRL, EOM's intact  Lungs:  no dullness to percussion bilaterally - no crackles, no rhonchi  + scattered expiratory wheezes  Heart: regular rate and rhythm without rub; no displacement of the PMI   Abdomen: soft, non-tender; bowel sounds normoactive; no organomegaly  + PD catheter in place  Extremities: extremities symmetric; no clubbing, cyanosis, or edema  Integument: Skin color, texture, turgor normal; no rashes; hair distrubution normal  Neurologic: Alert and oriented X 3, normal strength, normal coordination and gait  Psychiatric: no pressured speech; normal affect; no evidence of impaired cognition      Chest X Ray 10/3/17:  Findings: Postoperative changes with sternotomy and evidence of prior CABG.  Sternotomy wires appear stable.  Cardiac silhouette is within normal range.  No evidence of significant  cardiac decompensation.  There is extensive aortic atherosclerosis.  Lungs show no evidence of significant focal consolidation, large effusion, or pneumothorax.  Bones demonstrate no acute abnormality.    Results for SINDI BURTON (MRN [id number]) as of 10/7/2017 16:48   Ref. Range 10/3/2017 14:43   WBC Latest Ref Range: 3.90 - 12.70 K/uL 8.22   RBC Latest Ref Range: 4.60 - 6.20 M/uL 3.85 (L)   Hemoglobin Latest Ref Range: 14.0 - 18.0 g/dL 11.5 (L)   Hematocrit Latest Ref Range: 40.0 - 54.0 % 36.1 (L)   MCV Latest Ref Range: 82 - 98 fL 94   MCH Latest Ref Range: 27.0 - 31.0 pg 29.9   MCHC Latest Ref Range: 32.0 - 36.0 g/dL 31.9 (L)   RDW Latest Ref Range: 11.5 - 14.5 % 13.2   Platelets Latest Ref Range: 150 - 350 K/uL 274   Results for SINDI BURTON (MRN [id number]) as of 10/7/2017 16:48   Ref. Range 10/3/2017 14:43   Sodium Latest Ref Range: 136 - 145 mmol/L 139   Potassium Latest Ref Range: 3.5 - 5.1 mmol/L 5.0   Chloride Latest Ref Range: 95 - 110 mmol/L 98   CO2 Latest Ref Range: 23 - 29 mmol/L 31 (H)   Anion Gap Latest Ref Range: 8 - 16 mmol/L 10   BUN, Bld Latest Ref Range: 8 - 23 mg/dL 68 (H)   Creatinine Latest Ref Range: 0.5 - 1.4 mg/dL 5.4 (H)   eGFR if non African American Latest Ref Range: >60 mL/min/1.73 m^2 10 (A)   eGFR if African American Latest Ref Range: >60 mL/min/1.73 m^2 11 (A)   Glucose Latest Ref Range: 70 - 110 mg/dL 137 (H)   Calcium Latest Ref Range: 8.7 - 10.5 mg/dL 9.7     Assessment:       1. Wheezing    2. Pain        Plan:       Diagnoses and all orders for this visit:    ESRD on peritoneal dialysis    Wheezing  - continue increased lasix dose per nephrology instructions  - complete course of zithromax  - continue Q 4 WA Nebulizer treatments per nephrology instructions  -     predniSONE (DELTASONE) 20 MG tablet; Take 1 tablet (20 mg total) by mouth once daily.    Pain  - refilled per patient request   -     oxycodone-acetaminophen (PERCOCET)  mg per tablet; Take 1 tablet by mouth  every 4 (four) hours as needed for Pain.    Essential hypertension  - reports compliance with meds  - blood pressure not at goal presently  - likely to improve after dialysis today  - will monitor and make medication changes if hypertension persists     Other orders  - refilled per patient request   -     calcitRIOL (ROCALTROL) 0.25 MCG Cap; Take 1 capsule (0.25 mcg total) by mouth once daily.    I will [facility] again [facility] 10/13/17.

## 2017-10-10 NOTE — TELEPHONE ENCOUNTER
10/10/2017          [id number]  Contact pt to inform him, per Dr. Talamantes, the graft is the best way to go and she put in a referral for him to see the vascular surgeon. Pt verbalized understanding.

## 2017-10-10 NOTE — PROGRESS NOTES
Patient s/p PD cath placement successfully however given daily requirements he would like to switch to HD  Vein mapping obtained, he is RIGHT handed. Prior left forearm ceph vein graft for cabg. Upper arm ceph 2mm basilic is > 3mm. Given prior surgery in LUE and small caliber ceph vein, likely should try graft first. Will refer to Dr. Donaldson.

## 2017-10-11 NOTE — PROGRESS NOTES
Pt is on PD but not compliant, needs HD access and eventual fistula eval with vascular surgery given prior surgery and small caliber veins. Case requested for 10/12 perm cath placement.

## 2017-10-12 PROBLEM — N18.6 ESRD (END STAGE RENAL DISEASE): Status: ACTIVE | Noted: 2017-01-01

## 2017-10-12 NOTE — BRIEF OP NOTE
DATE OF PROCEDURE:  10/12/2017    PREOPERATIVE DIAGNOSES:   Acute on chronic renal failure.    POSTOPERATIVE DIAGNOSES:  Acute on chronic renal failure.    PROCEDURE:  Insertion of right internal jugular vein permanent dialysis   catheter.    SURGEON:  Catracho Escudero M.D.    ASSISTANT:  None.    ANESTHESIA:  MAC.    PREP:  Chlorhexidine.    IMPLANT: Bard 16F 23cm double lumen permanent dialysis catheter    SPECIMEN:  None.    ESTIMATED BLOOD LOSS:  Minimal.    INDICATIONS:  The patient is an 72M who was found to have   cardiac dysfunction in the setting of chronic kidney disease with an imminent   need for hemodialysis.  The patient was agreeable to the initiation of hemodialysis.    The risks of the procedure were described to the patient including bleeding,   infection, pain, scarring, wound complications, potential injury to structures   in the neck or chest warranting more extensive surgery and potential need for   further interventions.  The patient demonstrated understanding of these risks   and a consent form was obtained.    PROCEDURE IN DETAIL:  The patient was identified in the Preoperative Unit and   taken back to the Operating Room and laid supine on the operating room table.    IV antibiotics were administered prior to the administration of the anesthesia.    MAC anesthesia was administered without complication.  The patient was then   prepped and draped in a standard sterile fashion.  Timeout procedure was   performed in accordance with hospital protocol.  An ultrasound was used to   identify the right internal jugular vein.  The images were interpreted by me   and stored for further review.  The internal jugular vein was accessed using a   needle. The wire was passed without difficulty, we confirmed appropriate positioning  Using fluoroscopic guidance.  An incision in the right upper chest was   made approximately 0.5 cm using a #15 blade scalpel.  The tunneler was passed   from this incision  towards the neck incision without any issue.  Once the   PermCath cuff was in the appropriate position at the chest site, the initial   dilator was passed over the wire and confirmed with fluoroscopic guidance.  The   internal jugular vein was then sequentially dilated and once the final dilator   was passed, the peel-away sheath was then inserted over the wire using Seldinger   technique.  The wire and dilator were removed.  The catheter was then placed   into the peel-away sheath and peel-away sheath was removed while the catheter   was threaded forward.  Once this was complete, final fluoroscopic image did   confirm that the catheter was in appropriate position in the distal SVC right   atrial junction.  Both ports of the catheter withdrew and flushed very easily.    Each port was then locked with 2.5 mL of 1000 units per mL IV heparin.  The neck   incision was closed using 5-0 Monocryl suture in an interrupted fashion.  The   chest incision was closed using 5-0 Monocryl suture in an interrupted fashion.    The catheter was fixed to the right chest wall using 3-0 nylon suture.  Dry   sterile dressings were then applied.  The patient tolerated the procedure well   and there were no complications.  He was awakened from anesthesia and returned   to the Postoperative Recovery Unit in stable condition.  At the end of the case,   hemostasis was confirmed and sponge, instrument and needle counts were correct   on 2 occasions.  I was present and scrubbed throughout the entirety of the case.    During all vein manipulation, the patient was in the Trendelenburg position.    COMPLICATIONS:  None.    CONDITION:  Stable.

## 2017-10-12 NOTE — PLAN OF CARE
Received back from or via stretcher,sr upx2. Report from Marcelino Gordon. Hr 104, ekg ordered. Dr Olson at bedside. Patient denies SOB,Palpatations.

## 2017-10-12 NOTE — ANESTHESIA POSTPROCEDURE EVALUATION
"Anesthesia Post Evaluation    Patient: Gunner Motley    Procedure(s) Performed: Procedure(s) (LRB):  INSERTION-CATHETER-PERM-A-CATH (Right)    Final Anesthesia Type: MAC  Patient location during evaluation: OPS  Patient participation: Yes- Able to Participate  Level of consciousness: awake and alert and oriented  Post-procedure vital signs: reviewed and stable  Pain management: adequate  Airway patency: patent  PONV status at discharge: No PONV  Anesthetic complications: no      Cardiovascular status: blood pressure returned to baseline ( irregular heart rate post op?? 12 lead EKG ordered and compared to earlier EKG. No significant changes)  Respiratory status: unassisted  Hydration status: euvolemic  Follow-up not needed.        Visit Vitals  /83   Pulse 85   Temp 36.6 °C (97.9 °F) (Skin)   Resp 20   Ht 5' 6" (1.676 m)   Wt 71.2 kg (157 lb)   SpO2 (!) 94%   BMI 25.34 kg/m²       Pain/Beryl Score: Pain Assessment Performed: Yes (10/12/2017  9:55 AM)  Presence of Pain: denies (10/12/2017  9:55 AM)  Beryl Score: 10 (10/12/2017  9:55 AM)      "

## 2017-10-12 NOTE — H&P
Patient ID: Gunner Motley is a 72 y.o. male.    Chief Complaint: No chief complaint on file.      HPI:  72M with ESRD has been on peritoneal dialysis. Doing well but states that he is unable to do home dialysis every day and care for his wife. No difficulties with catheter. Never had HD before.No prior permacath or AVF.         Review of Systems   Constitutional: Negative for activity change and fever.   Eyes: Negative for visual disturbance.   Respiratory: Negative for wheezing.    Cardiovascular: Negative for chest pain.   Gastrointestinal: Negative for abdominal distention, abdominal pain and nausea.   Genitourinary: Negative for decreased urine volume and dysuria.   Skin: Negative for rash and wound.   Neurological: Negative for seizures and numbness.   Hematological: Negative for adenopathy.   Psychiatric/Behavioral: Negative for behavioral problems.       No current facility-administered medications for this encounter.        Review of patient's allergies indicates:  No Known Allergies    Past Medical History:   Diagnosis Date    Anemia     Coronary artery disease     Diabetes mellitus     Gout     Hypertension     Recurrent nephrolithiasis     Unspecified disorder of kidney and ureter     Urinary tract infection        History reviewed. No pertinent surgical history.    Family History   Problem Relation Age of Onset    Cancer Mother     Kidney disease Mother     Heart attack Father        Social History     Social History    Marital status:      Spouse name: N/A    Number of children: N/A    Years of education: N/A     Occupational History    Not on file.     Social History Main Topics    Smoking status: Former Smoker     Packs/day: 2.00     Years: 30.00    Smokeless tobacco: Former User     Quit date: 1/12/1995    Alcohol use Not on file    Drug use: Unknown    Sexual activity: Not on file     Other Topics Concern    Not on file     Social History Narrative    No narrative on  file       Vitals:    10/12/17 0610   BP: 137/70   Pulse: 91   Resp: 20   Temp: 97.5 °F (36.4 °C)       Physical Exam   Constitutional: He is oriented to person, place, and time. He appears well-developed and well-nourished. No distress.   HENT:   Head: Normocephalic.   Eyes: No scleral icterus.   Cardiovascular: Normal rate and regular rhythm.    Pulmonary/Chest: Effort normal. No stridor. No respiratory distress.   Abdominal: Soft. He exhibits no distension. There is no tenderness.   PD catheter in place   Musculoskeletal: He exhibits no edema or deformity.   Lymphadenopathy:     He has no cervical adenopathy.   Neurological: He is alert and oriented to person, place, and time.   Skin: Skin is warm and dry. No rash noted.   Psychiatric: He has a normal mood and affect. His behavior is normal.         Labs reviewed      Assessment & Plan:   72M with ESRD requiring dialysis  To OR for permacath  Prefers to leave PD cath in place for now

## 2017-10-12 NOTE — PLAN OF CARE
Discharge criteria met,voicing desire to go home. Discharge instructions given to patient; verbalized understanding. Discharge to [facility] outpatient dialysis @ [facility] via wheelchair.

## 2017-10-12 NOTE — DISCHARGE SUMMARY
[facility]  Short Stay  Discharge Summary    Admit Date: 10/12/2017    Discharge Date and Time:  10/12/2017 8:12 AM      Discharge Attending Physician: Catracho Escudero MD     Hospital Course (synopsis of major diagnoses, care, treatment, and services provided during the course of the hospital stay): Brought in for elective HD catheter placement. Tolerated procedure well. Discharged home.     Final Diagnoses:    Principal Problem: End stage renal disease on peritoneal dialysis   Secondary Diagnoses:   Active Hospital Problems    Diagnosis  POA    ESRD (end stage renal disease) [N18.6]  Yes      Resolved Hospital Problems    Diagnosis Date Resolved POA   No resolved problems to display.       Discharged Condition: stable    Disposition: Home or Self Care    Follow up/Patient Instructions:    Medications:  Reconciled Home Medications:   Current Discharge Medication List      CONTINUE these medications which have NOT CHANGED    Details   allopurinol (ZYLOPRIM) 100 MG tablet Take 100 mg by mouth once daily.       alprazolam (XANAX) 0.25 MG tablet Take 0.25 mg by mouth every 8 (eight) hours as needed.  Refills: 0      aspirin (ECOTRIN) 81 MG EC tablet Take 81 mg by mouth once daily.      benazepril (LOTENSIN) 40 MG tablet Take 1 tablet (40 mg total) by mouth once daily.  Qty: 30 tablet, Refills: 3    Associated Diagnoses: Essential hypertension      calcitRIOL (ROCALTROL) 0.25 MCG Cap Take 1 capsule (0.25 mcg total) by mouth once daily.  Qty: 30 capsule, Refills: 11      calcium acetate (PHOSLO) 667 mg capsule Take 1 capsule (667 mg total) by mouth 3 (three) times daily with meals.  Qty: 30 capsule, Refills: 3      docusate sodium (COLACE) 100 MG capsule Take 1 capsule (100 mg total) by mouth 2 (two) times daily.  Qty: 30 capsule, Refills: `11      ergocalciferol (ERGOCALCIFEROL) 50,000 unit Cap Take 1 capsule (50,000 Units total) by mouth every 7 days.  Qty: 30 capsule, Refills: 1      furosemide  (LASIX) 80 MG tablet Take 3 tablets (240 mg total) by mouth 2 (two) times daily.  Qty: 180 tablet, Refills: 3    Associated Diagnoses: Chronic diastolic CHF (congestive heart failure)      oxycodone-acetaminophen (PERCOCET)  mg per tablet Take 1 tablet by mouth every 4 (four) hours as needed for Pain.  Qty: 30 tablet, Refills: 0    Associated Diagnoses: Pain      pravastatin (PRAVACHOL) 40 MG tablet Take 40 mg by mouth once daily.       albuterol (PROVENTIL) 2.5 mg /3 mL (0.083 %) nebulizer solution as needed for Shortness of Breath.   Refills: 0           No discharge procedures on file.

## 2017-10-12 NOTE — ANESTHESIA PREPROCEDURE EVALUATION
10/12/2017  Gunner Motley is a 72 y.o., male with ESRD for perma catheter placement    Pre-op Assessment    I have reviewed the Patient Summary Reports.     I have reviewed the Nursing Notes.      Review of Systems  Anesthesia Hx:  No problems with previous Anesthesia  History of prior surgery of interest to airway management or planning:  Denies Personal Hx of Anesthesia complications.   Social:  Former Smoker    Hematology/Oncology:         -- Anemia:   EENT/Dental:EENT/Dental Normal   Cardiovascular:   Exercise tolerance: poor Hypertension Valvular problems/Murmurs (TR), MR CAD (2004, 4 vessel CABG)   CHF (with preserved EF) CONCLUSIONS     1 - Normal left ventricular systolic function (EF 55-60%).     2 - Impaired LV relaxation, increased LVEDP.     3 - Normal right ventricular systolic function .     4 - The estimated PA systolic pressure is 30 mmHg.     5 - Mild aortic regurgitation.     6 - Mild mitral regurgitation.   Pulmonary:     Pulm HTN   Renal/:   Chronic Renal Disease, ESRD, Dialysis    Hepatic/GI:  Hepatic/GI Normal    Neurological:  Neurology Normal    Endocrine:   Diabetes        Physical Exam  General:  Well nourished    Airway/Jaw/Neck:  Airway Findings: Mouth Opening: Normal Tongue: Normal  General Airway Assessment: Adult  Mallampati: II  TM Distance: Normal, at least 6 cm       Chest/Lungs:  Chest/Lungs Findings: Normal Respiratory Rate, Expiratory Wheezes, Mild     Heart/Vascular:  Heart Findings: Rate: Normal  Rhythm: Regular Rhythm  Sounds: Normal        Mental Status:  Mental Status Findings:  Alert and Oriented, Cooperative       Lab Results   Component Value Date    WBC 8.22 10/03/2017    HGB 11.5 (L) 10/03/2017    HCT 36.1 (L) 10/03/2017     10/03/2017    ALT 23 08/27/2017    AST 32 08/27/2017     (L) 10/12/2017    K 3.8 10/12/2017    CL 93 (L) 10/12/2017     CREATININE 5.1 (H) 10/12/2017     (H) 10/12/2017    CO2 25 10/12/2017    TSH 1.557 08/22/2017    INR 1.1 08/27/2017    HGBA1C 5.6 08/22/2017     Wt Readings from Last 3 Encounters:   10/12/17 71.2 kg (157 lb)   10/10/17 70.4 kg (155 lb 1.5 oz)   10/04/17 72.8 kg (160 lb 7.9 oz)     Temp Readings from Last 3 Encounters:   10/12/17 36.4 °C (97.5 °F) (Skin)   10/10/17 36.7 °C (98.1 °F) (Oral)   10/04/17 36.2 °C (97.1 °F) (Oral)     BP Readings from Last 3 Encounters:   10/12/17 137/70   10/10/17 128/72   10/04/17 (!) 166/81     Pulse Readings from Last 3 Encounters:   10/12/17 91   10/10/17 90   10/04/17 75         Anesthesia Plan  Type of Anesthesia, risks & benefits discussed:  Anesthesia Type:  general, MAC  Patient's Preference:   Intra-op Monitoring Plan: standard ASA monitors  Intra-op Monitoring Plan Comments:   Post Op Pain Control Plan: per primary service following discharge from PACU and IV/PO Opioids PRN  Post Op Pain Control Plan Comments:   Induction:   IV  Beta Blocker:  Patient is not currently on a Beta-Blocker (No further documentation required).       Informed Consent: Patient understands risks and agrees with Anesthesia plan.  Questions answered. Anesthesia consent signed with patient.  ASA Score: 4     Day of Surgery Review of History & Physical: I have interviewed and examined the patient. I have reviewed the patient's H&P dated:  There are no significant changes. Significant changes noted: Surgeon notified.          Ready For Surgery From Anesthesia Perspective.

## 2017-10-12 NOTE — DISCHARGE INSTRUCTIONS
DIET: You may resume your home diet. If nausea is present, increase your diet gradually with fluids and bland foods    ACTIVITY LEVEL: You have received sedation or an anesthetic, you may feel sleepy for several hours. Rest until you are more awake. Gradually resume your normal activities    Medications: Pain medication should be taken only if needed and as directed. If antibiotics are prescribed, the medication should be taken until completed. You will be given an updated list of you medications.    No driving, alcoholic beverages or signing legal documents for next 24 hours or while taking pain medication.       CALL THE DOCTOR:    For any obvious bleeding (some dried blood over the incision is normal).      Redness, swelling, foul smell around incision or fever over 101.   Shortness of breath, Coughing up Bloody sputum, Pains or Swelling in your Calves .   Persistent pain or nausea not relieved by medication.    If any unusual problems or difficulties occur contact your doctor. If you cannot contact your doctor but feel your signs and symptoms warrant a physicians attention return to the emergency room.      Caring for Your Central Vein Access    Its important to care for your central venous access device properly. If you dont, it may become infected. Then it cant be used. The tube (catheter) will have to be removed and a new one placed in another vein. Your nurse will show you how to care for your access to help it last.  Follow these tips  · Wash your hands often.  · Try not to touch the catheter.  · Don't let anything (such as clothing) rub or pull on the catheter.  · Don't get your catheter wet.  Watching for problems  Call your healthcare provider right away if you have any of these problems:  · You see a break in the tubing.   · The skin around your access bleeds, oozes, or becomes red or sore.  · You have a fever of 100.4°F (38.0°C) or higher.  · The stitches (sutures) become loose or the catheter  falls out.  · An arm becomes swollen.  Remember. . .  A central vein access is often used only for a short time. Take good care of it.      Important numbers  Healthcare provider:  Name _______________________________ Phone _______________________________  Nurse:  Name _______________________________ Phone _______________________________   Date Last Reviewed: 7/1/2016  © 6349-4202 [employer] [address] [place] [id number]. All rights reserved. This information is not intended as a substitute for professional medical care. Always follow your healthcare professional's instructions.

## 2017-10-12 NOTE — PLAN OF CARE
ekg done as ordered. 0840- Dr Olson at bedside comparing today ekg with previous ekg ; no new orders obtained, Dr Olson states she is ok with ekgs. Patient denies SOB, chest pain or any other discomforts.

## 2017-10-12 NOTE — TELEPHONE ENCOUNTER
Left Message for the pt to call the clinic to schedule a appt with Dr. Rios , Vein Mapping u/s order or in the computer. Please schedule along w/ vas appt.

## 2017-10-12 NOTE — PLAN OF CARE
Patient's daughter-Shara- updated with patient's return to room postop,health status,patient going to outpatient dialysis - [facility] , @[facility] - x3 hours after discharge from hospital ; verbalized understanding. Patient's daughter will be picking up patient to bring home after dialysis treatment.

## 2017-10-12 NOTE — TRANSFER OF CARE
"Anesthesia Transfer of Care Note    Patient: Gunner Motley    Procedure(s) Performed: Procedure(s) (LRB):  INSERTION-CATHETER-PERM-A-CATH (Right)    Patient location: [facility]    Anesthesia Type: MAC    Transport from OR: Transported from OR on room air with adequate spontaneous ventilation    Post pain: adequate analgesia    Post assessment: no apparent anesthetic complications    Post vital signs: stable    Level of consciousness: awake    Nausea/Vomiting: no nausea/vomiting    Complications: none    Transfer of care protocol was followed      Last vitals:   Visit Vitals  /70 (Patient Position: Lying)   Pulse 91   Temp 36.4 °C (97.5 °F) (Skin)   Resp 20   Ht 5' 6" (1.676 m)   Wt 71.2 kg (157 lb)   SpO2 96%   BMI 25.34 kg/m²     "

## 2017-10-13 PROBLEM — Z99.2 ESRD ON HEMODIALYSIS: Status: ACTIVE | Noted: 2017-01-01

## 2017-10-13 PROBLEM — I95.2 HYPOTENSION DUE TO DRUGS: Status: ACTIVE | Noted: 2017-01-01

## 2017-10-13 NOTE — PROGRESS NOTES
Subjective:       Patient ID: Gunner Motley is a 72 y.o. male.    Chief Complaint: Hospital Follow Up    HPI:  [facility].  Recently hospitalized for volume overload and worsening of CKD.  Diuresed successfully but with significantly elevated BUN/Cr.  Patient was discharged with plans for outpatient PD catheter placement.      PD catheter placed as outpatient 8/28/17.  Peritoneal Dialysis initiated as outpatient 9/13/17.  Had PD training at [facility] #[phone].  Had one week of home PD which he did not perform as directed.   Treating Nephrologist is Dr Castaneda with [facility].   Charge RN at HD unit is Sheryl.     Patient now transitioning to hemodialysis to accommodate patients goals.  He is primary caregiver for wife who is in hospice.  She is currently in Nursing Home hospice as he is unable to care for her at home due to the demands of the peritoneal dialysis schedule.   Transitioning to the 3 days schedule of hemodialysis will allow him to move his wife back to the home hospice setting and allow him to resume the primary caregiver role.    Had PermCath successfully placed yesterday.   Had first HD run prior to hospital discharge.  Took all blood pressure medications and lasix this AM, now hypotensive in office with blood pressure 84/51.  Spoke to staff at his new HD unit, [facility], # [phone].  Discussed care with the patients nephrologist.   They will bring him in to HD unit today, likely for ultrafiltration only.  Concern is his lack of any form of dialysis for a week and mild uremic symptoms.   Blood pressure medications and lasix discontinued today.       Review of Systems  General ROS: negative for chills, fever or weight loss  Psychological ROS: negative for hallucination, depression or suicidal ideation  Ophthalmic ROS: negative for blurry vision, photophobia or eye pain  ENT ROS: negative for epistaxis, sore throat or  rhinorrhea  Respiratory ROS: no cough, shortness of breath, or wheezing  Cardiovascular ROS: no chest pain or dyspnea on exertion  Gastrointestinal ROS: no abdominal pain, change in bowel habits, or black/ bloody stools  Genito-Urinary ROS: no dysuria, trouble voiding, or hematuria  Musculoskeletal ROS: negative for gait disturbance or muscular weakness  Neurological ROS: no syncope or seizures; no ataxia  Dermatological ROS: negative for pruritis, rash and jaundice        Objective:      Vitals:    10/13/17 0857   BP: (!) 84/51   Pulse: 102   Temp: 98 °F (36.7 °C)     Physical Exam:  General appearance: alert, cooperative, no distress  Constitutional:Oriented to person, place, and time   +appears well-developed and well-nourished.  HEENT: Normocephalic, atraumatic, neck symmetrical, no nasal discharge   Eyes: conjunctivae/corneas clear, PERRL, EOM's intact  Lungs: + inspiratory wheezing at bases + good inspiratory effort  - no crackles  Heart: regular rate and rhythm without rub; no displacement of the PMI   + Right chest wall PermCath in place  Abdomen: soft, non-tender; bowel sounds normoactive; no organomegaly  + PD catheter in place  Extremities: extremities symmetric; no clubbing, cyanosis, or edema  Integument: Skin color, texture, turgor normal; no rashes; hair distrubution normal  Neurologic: Alert and oriented X 3, normal strength, normal coordination and gait  Psychiatric: no pressured speech; normal affect; no evidence of impaired cognition      Results for SINDI BURTON (MRN [id number]) as of 10/13/2017 10:47   Ref. Range 10/12/2017 06:04   Potassium Latest Ref Range: 3.5 - 5.1 mmol/L 3.8   Results for SINDI BURTON (MRN [id number]) as of 10/13/2017 10:47   Ref. Range 10/12/2017 06:04   BUN, Bld Latest Ref Range: 8 - 23 mg/dL 109 (H)   Creatinine Latest Ref Range: 0.5 - 1.4 mg/dL 5.1 (H)     Assessment:       1. ESRD on hemodialysis    2. Hypotension due to drugs        Plan:       Diagnoses and all orders  for this visit:    ESRD on hemodialysis  - new to HD; had Perm Cath successfully placed yesterday and had first HD run prior to leaving hospital  - further HD to be determined by nephrology team; he does not yet have a permanent schedule  - PD catheter in place; removal in future at discretion of surgery and nephrology teams  - has appt with Vascular Surgery, Dr Rios, 10/24/17 for vascular access evaluation      Hypotension due to drugs  - patient with known long standing HTN, hypotensive today  - will discontinue all blood pressure medications (Norvasc 10 mg, Benazepril 40 mg, Toprol  mg) and lasix (240 mg twice daily)  - patient with known diastolic dysfunction, would like to restart BBlocker and ACEI when he is able to tolerate       I will [facility] again [facility] 10/16/17.

## 2017-10-13 NOTE — TELEPHONE ENCOUNTER
10/13/17   08:36am      Post-op call complete. Patient states he feels fine but did not get much sleep last night due to positioning of neck from perm-a-cath placement. Rates pain 3/10. Percocet effective. Last BM unknown. Patient stated he is not currently taking stool softeners. Patient encouraged to take stool softeners to avoid constipation that could be caused by pain medication. Patient verbalized understanding. No fever. No voiding issues. Dressing to site clean, dry, and intact. Denies redness or swelling to site. No changes in appetite. Post-op appointment scheduled for Thur, Oct 19 @ 9:20am with Dr. DAPHNE Escudero. Reminder given. Patient verbalized understanding. No additional questions/concerns at this time.

## 2017-10-16 PROBLEM — I95.2 HYPOTENSION DUE TO DRUGS: Status: RESOLVED | Noted: 2017-01-01 | Resolved: 2017-01-01

## 2017-10-16 NOTE — PROGRESS NOTES
Subjective:       Patient ID: Gunner Motley is a 72 y.o. male.    Chief Complaint: Hospital Follow Up    HPI:  [facility].  Recently hospitalized for volume overload and worsening of CKD.  Diuresed successfully but with significantly elevated BUN/Cr.  Patient was discharged with plans for outpatient PD catheter placement.      PD catheter placed as outpatient 8/28/17.  Peritoneal Dialysis initiated as outpatient 9/13/17.  Had PD training at [facility] #[phone].  Had one week of home PD which he did not perform as directed.   Treating Nephrologist is Dr Castaneda with [facility].      Patient now transitioning to hemodialysis to accommodate patients goals.  He is primary caregiver for wife who is in hospice.  She is currently in Nursing Home hospice as he is unable to care for her at home due to the demands of the peritoneal dialysis schedule.   Transitioning to the 3 days schedule of hemodialysis will allow him to move his wife back to the home hospice setting and allow him to resume the primary caregiver role.     Had initial 3 sessions of HD since last week. ( First in hospital after placement of catheter, second two sessions at his new dialysis unit).  [facility] HD unit is [facility] at [facility], # [phone].  Tells me he tolerated all sessions without difficulty.   Patient found to be hypotensive at our last office visit and all blood pressure medications and Lasix discontinued.   Blood pressure now elevated, but I will monitor to see his new baseline as he is new to dialysis.   Patient tells me his new schedule will be MWF, afternoon session.    He has upcoming appointment with vascular surgery.     Review of Systems  General ROS: negative for chills, fever or weight loss  Psychological ROS: negative for hallucination, depression or suicidal ideation  Ophthalmic ROS: negative for blurry vision, photophobia or eye pain  ENT ROS: negative for  epistaxis, sore throat or rhinorrhea  Respiratory ROS: no cough, shortness of breath, or wheezing  Cardiovascular ROS: no chest pain or dyspnea on exertion  Gastrointestinal ROS: no abdominal pain, change in bowel habits, or black/ bloody stools  Genito-Urinary ROS: no dysuria, trouble voiding, or hematuria  Musculoskeletal ROS: negative for gait disturbance or muscular weakness  Neurological ROS: no syncope or seizures; no ataxia  Dermatological ROS: negative for pruritis, rash and jaundice        Objective:      Vitals:    10/16/17 1353   BP: (!) 170/70   Pulse: 82   Temp: 98.2 °F (36.8 °C)     Physical Exam:  General appearance: alert, cooperative, no distress  Constitutional:Oriented to person, place, and time   +appears well-developed and well-nourished.  HEENT: Normocephalic, atraumatic, neck symmetrical, no nasal discharge   Eyes: conjunctivae/corneas clear, PERRL, EOM's intact  Lungs: + inspiratory wheezing at bases + good inspiratory effort  - no crackles  Heart: regular rate and rhythm without rub; no displacement of the PMI   + Right chest wall PermCath in place  Abdomen: soft, non-tender; bowel sounds normoactive; no organomegaly  + PD catheter in place  Extremities: extremities symmetric; no clubbing, cyanosis, or edema  Integument: Skin color, texture, turgor normal; no rashes; hair distrubution normal  Neurologic: Alert and oriented X 3, normal strength, normal coordination and gait  Psychiatric: no pressured speech; normal affect; no evidence of impaired cognition      Results for MICHEAL SINDI (MRN [id number]) as of 10/16/2017 14:55   Ref. Range 10/16/2017 12:57   Sodium Latest Ref Range: 136 - 145 mmol/L 141   Potassium Latest Ref Range: 3.5 - 5.1 mmol/L 4.5   Chloride Latest Ref Range: 95 - 110 mmol/L 99   CO2 Latest Ref Range: 23 - 29 mmol/L 32 (H)   Anion Gap Latest Ref Range: 8 - 16 mmol/L 10   BUN, Bld Latest Ref Range: 8 - 23 mg/dL 53 (H)   Creatinine Latest Ref Range: 0.5 - 1.4 mg/dL 4.5 (H)        Assessment:       1. ESRD on hemodialysis    2. Essential hypertension        Plan:       Diagnoses and all orders for this visit:    ESRD on hemodialysis  - now starting his long term schedule of MWF, afternoon session  - getting HD via Right chest wall permcath  - has appointment for vascular surgery evaluation  - PD catheter removal per primary team     Essential hypertension  - previously hypotensive and antihypertensive's/lasix discontinued  - will monitor and restart as needed       I will [facility] again [facility] 11[facility]/17.  Will prepare for [facility] discharge and transition back to primary care team.

## 2017-10-19 NOTE — PROGRESS NOTES
HPI:  The patient is status post insertion of right IJ permacath. On HD without issue MWF here at [facility]. No pain, no fevers      PHYSICAL EXAM:  Physical Exam  Catheter well bandaged, clean  PD cath in placed, bandaged  Ab soft NT ND    ASSESSMENT:    The patient is doing well after surgery.     PLAN:    Follow up PRN.  Activity: regular duty  Has appointment with Dr Donaldson for permanent HD access next week

## 2017-10-26 NOTE — TELEPHONE ENCOUNTER
----- Message from Radha Lin sent at 10/26/2017  8:02 AM CDT -----  Contact: pt   Pt would like to reschedule his appointment he missed on 10/24.      Pt can be reached at [phone](cell) or [phone](home).      Thank you

## 2017-10-31 NOTE — LETTER
October 31, 2017      Jen Talamantes MD  [address]  [address]  [place] [address]           [facility] - Vascular Surgery  [address]  [place] [address]  Phone: [phone]  Fax: [phone]          Patient: Gunner Motley   MR Number: [id number]   YOB: 1945   Date of Visit: 10/31/2017       Dear Dr. Jen Talamantes:    Thank you for referring Gunner Motley to me for evaluation. Attached you will find relevant portions of my assessment and plan of care.    If you have questions, please do not hesitate to call me. I look forward to following Gunner Motley along with you.    Sincerely,    TOM Rios III, MD    Enclosure  CC:  No Recipients    If you would like to receive this communication electronically, please contact [email or web address] or [phone] to request more information on [employer] access.    For providers and/or their staff who would like to refer a patient to [facility], please contact us through our one-stop-shop provider referral line, [facility] [place], at [phone].    If you feel you have received this communication in error or would no longer like to receive these types of communications, please e-mail [email or web address]

## 2017-10-31 NOTE — PROGRESS NOTES
REFERRING PHYSICIAN:  Jen Talamantes M.D.    HISTORY OF PRESENT ILLNESS:  A 72-year-old male with end-stage renal disease who   has been dialyzing via right IJ PermCath over the last three weeks.  He also   tried peritoneal dialysis, but this did not work well for him.    Notably, he has had what appears to be a left radial artery harvest for CABG 12   years ago.  He is right-handed.  He is dialyzing Monday, Wednesday and Friday.    PAST MEDICAL HISTORY:  Coronary artery disease as above, history of congestive   heart failure, hypertension.    PAST SURGICAL HISTORY:  As above.  Appendectomy, CABG, PD catheter placement.    FAMILY HISTORY:  Positive for coronary artery disease.    SOCIAL HISTORY:  Former smoker.    MEDICATIONS:  Include statin and aspirin.  See for full list.    ALLERGIES:  None.    REVIEW OF SYSTEMS:  CARDIOVASCULAR:  Denies postprandial pain or DVT.  All other systems include eyes, ENT, respiratory, musculoskeletal, breast,   psychiatric, lymph, allergy and immune are negative.    PHYSICAL EXAMINATION:  VITAL SIGNS:  See nursing note.  GENERAL:  No acute distress.  CARDIOVASCULAR:  Regular rate of the PMI, no murmur.  VASCULAR:  Left arm shows a palpable radial pulse, despite his presumed radial   artery resection.  Ulnar pulse was also palpable.  Hand  is 5/5.  EXTREMITIES:  There is a ballottable antecubital vein and softly ballottable   upper arm cephalic vein on the left.  NEUROLOGIC:  Cranial nerves VII-XII intact, 5/5 motor strength in all   extremities    IMAGING:  Vein mapping suggested adequate left upper arm cephalic vein 4.0   distally and 3.0 in the proximal arm.  Right cephalic vein is usable.    ASSESSMENT:  He would appear to be a reasonable AV fistula candidate,   brachiocephalic on the left.  Even though he says radial artery exercised on   this side, he has good hand perfusion and I believe that his risk of vascular   steal is not dramatically elevated.    PLAN:  1.  Left  brachiocephalic AV fistula creation, 11/09/2017.  2.  Regional block.    The patient understands the risks and rationale of procedure and wishes to   proceed.      KAMILLE  dd: 10/31/2017 09:10:09 (CDT)  td: 10/31/2017 20:43:04 (CDT)  Doc ID   #[id number]  Job ID #[id number]    CC:

## 2017-11-01 PROBLEM — A41.9 SEPSIS: Status: ACTIVE | Noted: 2017-01-01

## 2017-11-01 NOTE — ASSESSMENT & PLAN NOTE
Echo done a month ago with EF of 55% and DD  2D echo ordered by the ED   BNP: 4102  CXR unremarkable  EKG : ST abnormality and possible inferior abnormality  Cards consulted  Will trend trop and EKGx 3  Continue ASA and statin.   Trops are trending down.

## 2017-11-01 NOTE — ED NOTES
Pt presents to the ED for evaluation of HTN. Pt was sent from dialysis for evaluation of his HTN. Pt reports was not dialysized today because of the HTN. Pt goes to dialysis every M,W,F. Pt reports that for the past month he has been feeling weak and fatigued. Pt reports SOB fpr the past week. Pt reports n/v and reports has not been able to eat for the past few days. Pt denies chest pain but reports he has a constant pain under his rib when he takes a deep breath. Pain 4/10. Pt reports he has been having chills and sweats for the past few days.

## 2017-11-01 NOTE — ASSESSMENT & PLAN NOTE
Echo done a month ago with EF of 55% and DD  2D echo ordered by the ED   Cards consulted  Will trend trop and EKGx 3  Continue ASA and statin.

## 2017-11-01 NOTE — HPI
72 year old male with a PMH of ESRD (on dialysis), CHF, CAD, UTI, Diverticulitis and HTN presented to the ED due to elevated BP noted at his dialysis clinic appointment. Patient states that he has been having fever and chills, nausea and vomiting, a headache and decreased appetite for about a month, but has been worse for the past 2-3 days. He states he has vomited 6 times in the last 24 hours. Vomitus is nonbloody. He also complains of LUQ pain that is dull, non-radiating and has been hurting for about 2 months. He also complains of SOB that has been going on for a month as well as chills and diaphoresis since this morning. He has burning on urination, and has urinary urgency. He has regular bowel movements, most recently was this morning and was normal. He denies sick contacts.  He denies chest pain, hemoptysis, palpitations, productive cough or rash. He has a dialysis catheter in his right anterior chest that was placed one month ago, and he currently has a PD cath, but states it is scheduled to be removed because it is no longer functioning. Since starting dialysis he endorses weight loss. He takes his prescription medications as prescribed.  He did mention he is not taking Lasix anymore as he was told not to take it a month ago.

## 2017-11-01 NOTE — ED PROVIDER NOTES
"Encounter Date: 11/1/2017       History     Chief Complaint   Patient presents with    Hypertension     pt went for HD this morning at [facility] and sent here for further eval and treat of HTN. Pt also reports having chills and general malaise for the past couple of days.      72-year-old male with past medical history of ESRD requiring dialysis, CHF, CAD, UTI, Diverticulitis, and HTN is here for evaluation of elevated BP.  The pt reports that he went to dialysis today and was told to come to the ED for elevated BP.  Of note, the pt is febrile in the ED.  He reports that he has been having chills and decreased appetite for about a month, but it has significantly worsened in the past 2-3 days.  He reports that yesterday he started vomiting and having nonbloody diarrhea.  He reports that he has vomited about six times in the last 24 hours.  He denies known fever, CP, hemoptysis, palpitations, productive cough, abd pain, and rash. He reports "pain under my left rib for over two months" without change.  Pain is worse with movement and certain positions.  He does feel SOB, but he reports "I've felt SOB for over a month, when all this stared with dialysis."  He had his dialysis cath placed in his right anterior chest about one month ago.  He does still have his PD cath, but reports that it is scheduled to be removed because it is no longer functioning.  He reports that since starting dialysis, he has been losing weight.  He reports taking his prescription medications as prescribed.  This is the extent of the patient's complaints at this time.        The history is provided by the patient.   Fever   Primary symptoms of the febrile illness include fever, fatigue, cough, shortness of breath, vomiting, diarrhea, dysuria and myalgias. Primary symptoms do not include headaches, wheezing, abdominal pain, altered mental status or rash. The current episode started more than 1 week ago (worse in the past two days). This is a new " problem. The problem has been gradually worsening.   The maximum temperature recorded prior to his arrival was unknown. The temperature was taken by no thermometer.   The fatigue began more than 1 week ago. The fatigue has been worsening since its onset.   The cough began more than 1 week ago. The cough is chronic. The cough is non-productive.   The vomiting began yesterday. Vomiting occurs 2 to 5 times per day. The emesis contains stomach contents.     The diarrhea began yesterday. The diarrhea is watery. The diarrhea occurs 2 - 4 times per day.   The dysuria began more than 1 week ago. The discomfort is mild. The dysuria is associated with frequency. The dysuria is not associated with discharge, hematuria, urgency, penile pain or scrotal pain.   Myalgias began more than 1 week ago. The myalgias have been gradually worsening since their onset. The myalgias are generalized. The myalgias are aching. The myalgias are not associated with weakness, tenderness or swelling. The myalgia pain is at a severity of 7/10.     Review of patient's allergies indicates:  No Known Allergies  Past Medical History:   Diagnosis Date    Anemia     CHF (congestive heart failure)     Coronary artery disease     Diverticulosis     Gout     Hypertension     Recurrent nephrolithiasis     Unspecified disorder of kidney and ureter     Urinary tract infection      Past Surgical History:   Procedure Laterality Date    APPENDECTOMY      CARDIAC SURGERY  2005    CABG x4 vessels    HERNIA REPAIR  2002    umbilical hernia    KIDNEY STONE SURGERY  1983    abdominal    PERITONEAL CATHETER INSERTION Left 08/28/2017    abdomen     Family History   Problem Relation Age of Onset    Cancer Mother     Kidney disease Mother     Heart attack Father      Social History   Substance Use Topics    Smoking status: Former Smoker     Packs/day: 2.00     Years: 30.00     Quit date: 10/12/1995    Smokeless tobacco: Former User     Quit date:  1/12/1995    Alcohol use No      Comment: quit 2014     Review of Systems   Constitutional: Positive for activity change, appetite change, chills, fatigue and fever.   HENT: Negative for congestion.    Respiratory: Positive for cough and shortness of breath. Negative for wheezing.    Cardiovascular: Negative for chest pain and palpitations.   Gastrointestinal: Positive for diarrhea and vomiting. Negative for abdominal pain, blood in stool and constipation.   Genitourinary: Positive for dysuria and frequency. Negative for hematuria, penile pain and urgency.   Musculoskeletal: Positive for myalgias. Negative for back pain, neck pain and neck stiffness.   Skin: Negative for rash.   Neurological: Negative for dizziness, weakness and headaches.   Hematological: Does not bruise/bleed easily.   Psychiatric/Behavioral: Negative for confusion.       Physical Exam     Initial Vitals   BP Pulse Resp Temp SpO2   11/01/17 1138 11/01/17 1136 11/01/17 1136 11/01/17 1136 11/01/17 1136   (!) 155/52 (!) 130 18 100.2 °F (37.9 °C) (!) 93 %      MAP       11/01/17 1138       86.33         Physical Exam    Nursing note and vitals reviewed.  Constitutional: He appears well-developed and well-nourished. He is active and cooperative.  Non-toxic appearance. He does not have a sickly appearance. He appears ill. No distress.   HENT:   Head: Normocephalic and atraumatic.   Nose: Nose normal.   Mouth/Throat: Mucous membranes are dry.   Eyes: Conjunctivae are normal.   Neck: Normal range of motion.   Cardiovascular: An irregularly irregular rhythm present. Tachycardia present.    Right IJ dialysis cath   Pulmonary/Chest: Effort normal and breath sounds normal.   Abdominal: Soft. Normal appearance and bowel sounds are normal. He exhibits no distension. There is no tenderness. There is no rigidity, no rebound, no guarding and no CVA tenderness.   PD cath with cloudy yellow fluid in tubing.    Neurological: He is alert and oriented to person,  place, and time. GCS eye subscore is 4. GCS verbal subscore is 5. GCS motor subscore is 6.   Skin: Skin is warm, dry and intact. No abrasion, no bruising and no rash noted. No erythema. There is pallor.   Psychiatric: He has a normal mood and affect. His speech is normal and behavior is normal. Judgment and thought content normal. Cognition and memory are normal.         ED Course   Procedures  Labs Reviewed   CBC W/ AUTO DIFFERENTIAL - Abnormal; Notable for the following:        Result Value    RBC 3.41 (*)     Hemoglobin 10.2 (*)     Hematocrit 31.3 (*)     MPV 9.0 (*)     Gran # 8.3 (*)     Lymph # 0.2 (*)     Mono # 0.0 (*)     Gran% 96.6 (*)     Lymph% 2.0 (*)     Mono% 0.5 (*)     All other components within normal limits   COMPREHENSIVE METABOLIC PANEL - Abnormal; Notable for the following:     Sodium 133 (*)     Potassium 3.4 (*)     Chloride 94 (*)     Glucose 139 (*)     BUN, Bld 31 (*)     Creatinine 4.0 (*)     Albumin 2.7 (*)     Total Bilirubin 1.1 (*)     eGFR if  16 (*)     eGFR if non  14 (*)     All other components within normal limits   B-TYPE NATRIURETIC PEPTIDE - Abnormal; Notable for the following:     BNP 4,102 (*)     All other components within normal limits   LACTIC ACID, PLASMA - Abnormal; Notable for the following:     Lactate (Lactic Acid) 3.7 (*)     All other components within normal limits    Narrative:       LACTIC ACID critical result(s) called and verbal readback obtained   from FRANK ELLIOTT RN, 11/01/2017 12:55   PROTIME-INR - Abnormal; Notable for the following:     Prothrombin Time 13.1 (*)     All other components within normal limits   TROPONIN I - Abnormal; Notable for the following:     Troponin I 10.123 (*)     All other components within normal limits   B-TYPE NATRIURETIC PEPTIDE - Abnormal; Notable for the following:     BNP 4,102 (*)     All other components within normal limits   MAGNESIUM - Abnormal; Notable for the following:      Magnesium 1.2 (*)     All other components within normal limits   PHOSPHORUS - Abnormal; Notable for the following:     Phosphorus 2.1 (*)     All other components within normal limits   TROPONIN I - Abnormal; Notable for the following:     Troponin I 10.123 (*)     All other components within normal limits   POCT GLUCOSE - Abnormal; Notable for the following:     POCT Glucose 153 (*)     All other components within normal limits   CULTURE, BLOOD   CULTURE, BLOOD   CULTURE, BLOOD   CULTURE, BLOOD   CULTURE, URINE   APTT   INFLUENZA A AND B ANTIGEN   LIPASE   TSH   PROCALCITONIN   URINALYSIS   CORTISOL, RANDOM   URINALYSIS   LACTIC ACID, PLASMA   POCT GLUCOSE MONITORING CONTINUOUS         Imaging Results          X-Ray Chest 1 View (Final result)  Result time 11/01/17 12:53:21    Final result by Albino Maurer MD (11/01/17 12:53:21)                 Impression:      Overall, grossly stable chronic findings, possibly indicating interstitial edema, no large focal consolidation.      Electronically signed by: ALBINO MAURER MD  Date:     11/01/17  Time:    12:53              Narrative:    Chest one view    Indication:Shortness of breath    Comparison:10/12/2017    Findings: Right central venous catheter stable. The cardiomediastinal silhouette is not enlarged, noting calcification of the aortic arch and postsurgical changes.  There is no pleural effusion.  The trachea is midline.  The lungs are symmetrically expanded bilaterally with mildly prominent interstitial attenuation, similar to the previous exam, could reflect edema.  There is bilateral basilar subsegmental atelectasis. No large focal consolidation seen.  There is no pneumothorax.  The osseous structures are remarkable for degenerative changes.                                   Medical Decision Making:   History:   Old Medical Records: I decided to obtain old medical records.  Initial Assessment:   73yo male here for reported elevated BP.  He also  reports chills, fatigue, nonproductive cough, n/v/d, dysuria, and body aches.  Upon arrival, pt febrile, tachycardic.  Pt appears ill.  MM dry.  HR tachycardic. Lungs with diffuse rhonchi. Abd soft, non-tender.    Differential Diagnosis:   Sepsis, arrhytmia,, STEMI, non-stemi, pleural effusion, electrolyte derangement, infection including UTI and pneumonia, dehydration, influenza  Clinical Tests:   Lab Tests: Ordered and Reviewed  Radiological Study: Ordered and Reviewed  Medical Tests: Ordered and Reviewed  ED Management:  Labs, EKG, CXR, IV fluids, IV zosyn, IV Vancomycin  Other:   I have discussed this case with another health care provider.       <> Summary of the Discussion: [facility] Resident- Anastasiia.  Will admit.   Elevated troponin >10.  Lactic elevated.  WBC normal. Pt will be admitted to [facility] for sepsis-unknown source.               Attending Attestation:     Physician Attestation Statement for NP/PA:   I have conducted a face to face encounter with this patient in addition to the NP/PA, due to NP/PA Request    Other NP/PA Attestation Additions:    History of Present Illness: Agree; 72-year-old male presents emergency department from dialysis who told him his blood pressure was elevated and instructed him to come to the emergency department.  On arrival, he is noted to be febrile.  He does admit to some chills and decreased appetite for a month but that this is worsened over the past 2 or 3 days.  Reports nausea with several episodes of nonbloody, nonbilious emesis as well as nonbloody diarrhea over the past few days.  No other symptoms reported except for some pain over his left rib for 2 months that has not changed.   Physical Exam: agree   Medical Decision Making: Agree; chest x-ray appears clear, however his lab evaluation fever are concerning for fever without a source.  We discussed the case with [facility] internal medicine who will see and admit the patient.  Patient is comfortable with plan at this time.                  ED Course      Clinical Impression:   The primary encounter diagnosis was Sepsis, due to unspecified organism. A diagnosis of SOB (shortness of breath) was also pertinent to this visit.                           SHALINI Bradford  11/01/17 1446       Peyman Palomares MD  11/15/17 0017

## 2017-11-01 NOTE — SUBJECTIVE & OBJECTIVE
Past Medical History:   Diagnosis Date    Anemia     CHF (congestive heart failure)     Coronary artery disease     Diverticulosis     Gout     Hypertension     Recurrent nephrolithiasis     Unspecified disorder of kidney and ureter     Urinary tract infection        Past Surgical History:   Procedure Laterality Date    APPENDECTOMY      CARDIAC SURGERY  2005    CABG x4 vessels    HERNIA REPAIR  2002    umbilical hernia    KIDNEY STONE SURGERY  1983    abdominal    PERITONEAL CATHETER INSERTION Left 08/28/2017    abdomen       Review of patient's allergies indicates:  No Known Allergies    No current facility-administered medications on file prior to encounter.      Current Outpatient Prescriptions on File Prior to Encounter   Medication Sig    albuterol (PROVENTIL) 2.5 mg /3 mL (0.083 %) nebulizer solution as needed for Shortness of Breath.     allopurinol (ZYLOPRIM) 100 MG tablet Take 100 mg by mouth once daily.     alprazolam (XANAX) 0.25 MG tablet Take 0.25 mg by mouth every 8 (eight) hours as needed.    aspirin (ECOTRIN) 81 MG EC tablet Take 81 mg by mouth once daily.    calcitRIOL (ROCALTROL) 0.25 MCG Cap Take 1 capsule (0.25 mcg total) by mouth once daily.    calcium acetate (PHOSLO) 667 mg capsule Take 1 capsule (667 mg total) by mouth 3 (three) times daily with meals.    docusate sodium (COLACE) 100 MG capsule Take 1 capsule (100 mg total) by mouth 2 (two) times daily.    ergocalciferol (ERGOCALCIFEROL) 50,000 unit Cap Take 1 capsule (50,000 Units total) by mouth every 7 days.    oxyCODONE-acetaminophen (PERCOCET)  mg per tablet Take 1 tablet by mouth every 8 (eight) hours as needed for Pain.    pravastatin (PRAVACHOL) 40 MG tablet Take 40 mg by mouth once daily.      Family History     Problem Relation (Age of Onset)    Cancer Mother    Heart attack Father    Kidney disease Mother        Social History Main Topics    Smoking status: Former Smoker     Packs/day: 2.00      Years: 30.00     Quit date: 10/12/1995    Smokeless tobacco: Former User     Quit date: 1/12/1995    Alcohol use No      Comment: quit 2014    Drug use: Unknown    Sexual activity: Not on file     Review of Systems   Constitutional: Positive for chills, diaphoresis, fatigue and fever.   HENT: Negative.    Respiratory: Positive for cough, shortness of breath and wheezing.    Cardiovascular: Negative for chest pain, palpitations and leg swelling.   Gastrointestinal: Positive for abdominal pain, nausea and vomiting.   Genitourinary: Positive for difficulty urinating, dysuria, frequency and urgency.   Musculoskeletal: Negative.    Skin: Negative.    Neurological: Positive for weakness and headaches.   Psychiatric/Behavioral: The patient is nervous/anxious.      Objective:     Vital Signs (Most Recent):  Temp: 98.9 °F (37.2 °C) (11/01/17 1500)  Pulse: (!) 114 (11/01/17 1447)  Resp: 18 (11/01/17 1447)  BP: 120/60 (11/01/17 1447)  SpO2: 96 % (11/01/17 1447) Vital Signs (24h Range):  Temp:  [98.6 °F (37 °C)-100.2 °F (37.9 °C)] 98.9 °F (37.2 °C)  Pulse:  [114-130] 114  Resp:  [18-19] 18  SpO2:  [93 %-97 %] 96 %  BP: (120-155)/(52-60) 120/60     Weight: 70.3 kg (155 lb)  Body mass index is 25.02 kg/m².    Physical Exam   Constitutional: He is oriented to person, place, and time. He appears well-developed and well-nourished. No distress.   HENT:   Head: Normocephalic and atraumatic.   Nose: Nose normal.   Mouth/Throat: No oropharyngeal exudate.   Eyes: Conjunctivae are normal. Right eye exhibits no discharge. Left eye exhibits no discharge.   Neck: Normal range of motion. Neck supple. No JVD present. No tracheal deviation present.   Cardiovascular: Regular rhythm, normal heart sounds and intact distal pulses.  Tachycardia present.  Exam reveals no gallop and no friction rub.    No murmur heard.  Pulmonary/Chest: Effort normal. No accessory muscle usage or stridor. No tachypnea. No respiratory distress. He has wheezes. He  has no rales. He exhibits no tenderness.   Abdominal: Soft. Bowel sounds are normal. He exhibits no distension and no mass. There is no tenderness. There is no guarding.   Musculoskeletal: Normal range of motion. He exhibits no edema, tenderness or deformity.   Neurological: He is alert and oriented to person, place, and time.   Skin: Skin is warm and dry. No rash noted. He is not diaphoretic. No erythema. No pallor.   Nursing note and vitals reviewed.       Significant Labs:   Blood Culture: No results for input(s): LABBLOO in the last 48 hours.  CBC:   Recent Labs  Lab 11/01/17  1212   WBC 8.56   HGB 10.2*   HCT 31.3*        CMP:   Recent Labs  Lab 11/01/17  1212   *   K 3.4*   CL 94*   CO2 27   *   BUN 31*   CREATININE 4.0*   CALCIUM 8.7   PROT 6.1   ALBUMIN 2.7*   BILITOT 1.1*   ALKPHOS 93   AST 40   ALT 10   ANIONGAP 12   EGFRNONAA 14*     Coagulation:   Recent Labs  Lab 11/01/17  1212   INR 1.2   APTT 25.8     Lactic Acid:   Recent Labs  Lab 11/01/17  1212   LACTATE 3.7*     Respiratory Culture: No results for input(s): GSRESP, RESPIRATORYC in the last 48 hours.  Troponin:   Recent Labs  Lab 11/01/17  1212   TROPONINI 10.123*  10.123*     TSH:   Recent Labs  Lab 11/01/17  1212   TSH 0.854     Urine Culture: No results for input(s): LABURIN in the last 48 hours.    Significant Imaging: I have reviewed all pertinent imaging results/findings within the past 24 hours.

## 2017-11-01 NOTE — ASSESSMENT & PLAN NOTE
Presented with tachycardia, mild fever, hypertension.   Sepsis protocol started by the ED   Blood cultures, sputum cultures, gram stain, UA, urine cultures pending  WBC normal upon admission  Vanc and Zosyn started in the ED, will continue upon admission  LA initial one 3.7, will trend x 3   Procalc 14.82  TSH normal   CXR unremarkable  Mg, phos and K low: will replete.   Pt-inr: PT 13.1 and inr 1.2  Influenza: negative.   S/p 500 bolus given in the ED  Will continue to give IVF 30mg/kg  Will give Lasix 80 mg once and f/u with nephro for dialysis

## 2017-11-01 NOTE — PROGRESS NOTES
Vancomycin Dosing and Monitoring Pharmacy Protocol    [unfilled] is a @AGE@ @SEX@    Height: [unfilled]   @LASTWT(1)@  [unfilled]    @LASTTEMP(3)@ @LABBRIEF(wbc:3)@ @LABBRIEF(creatinine:3)@     [unfilled]    [unfilled]  [unfilled]    [unfilled]    Indication:   Bacteremia    Target Level: 15-20 mcg/ml    Hemodialysis:   Yes on unknown    Dosing Weight:   ABW--actual body weight  If ABW is greater than or equal to 30% over Ideal Body Weight, AdjBW will be used to calculate vancomycin dose.    Last Vancomycin dose: 1000 mg   Date/Time given: 4:26 PM           Vancomycin level:  @LAB72(Vancomycin-Trough:3)@  @LAB72(Vancomycin,Random:3)@    Per Protocol Initial/Adjustments Dosin. Initial/Adjustment Dose: Give supplemental vancomycin dose of  500mg to equal TOTAL loading dose of 1500 mg, then followed by Maintenance dose of 500 mg qmwf  2. Vancomycin Random Level will be drawn prior to next HD session on 0400 11/3/2017    Pharmacy will continue to follow.    Please contact if you have any further questions. Thank you.    [unfilled]  [phone]

## 2017-11-01 NOTE — H&P
[facility] Hospital Medicine  History & Physical    Patient Name: Gunner Motley  MRN: [id number]  Admission Date: 11/1/2017  Attending Physician: Rocio Foster MD  Primary Care Provider: Kaushal Townsend MD         Patient information was obtained from patient and ER records.     Subjective:     Principal Problem:Sepsis    Chief Complaint:   Chief Complaint   Patient presents with    Hypertension     pt went for HD this morning at [facility] and sent here for further eval and treat of HTN. Pt also reports having chills and general malaise for the past couple of days.         HPI: 72 year old male with a PMH of ESRD (on dialysis), CHF, CAD, UTI, Diverticulitis and HTN presented to the ED due to elevated BP noted at his dialysis clinic appointment. Patient states that he has been having fever and chills, nausea and vomiting, a headache and decreased appetite for about a month, but has been worse for the past 2-3 days. He states he has vomited 6 times in the last 24 hours. Vomitus is nonbloody. He also complains of LUQ pain that is dull, non-radiating and has been hurting for about 2 months. He also complains of SOB that has been going on for a month as well as chills and diaphoresis since this morning. He has burning on urination, and has urinary urgency. He has regular bowel movements, most recently was this morning and was normal. He denies sick contacts.  He denies chest pain, hemoptysis, palpitations, productive cough or rash. He has a dialysis catheter in his right anterior chest that was placed one month ago, and he currently has a PD cath, but states it is scheduled to be removed because it is no longer functioning. Since starting dialysis he endorses weight loss. He takes his prescription medications as prescribed.  He did mention he is not taking Lasix anymore as he was told not to take it a month ago.     No new subjective & objective note has been filed under this hospital service since the  last note was generated.    Assessment/Plan:     * Sepsis    Presented with tachycardia, mild fever, hypertension.   Sepsis protocol started by the ED   Blood cultures, sputum cultures, gram stain, UA, urine cultures pending  WBC normal upon admission  Vanc and Zosyn started in the ED, will continue upon admission  LA initial one 3.7, will trend x 3   Procalc 14.82  TSH normal   CXR unremarkable  Mg, phos and K low: will replete.   Pt-inr: PT 13.1 and inr 1.2  Influenza: negative.   S/p 500 bolus given in the ED  Will continue to give IVF 30mg/kg  Will give Lasix 80 mg once and f/u with nephro for dialysis                    ESRD on dialysis    Pt on dialysis M/W/F  He missed dialysis today. Consulted nephro for urgent dialysis in-house.  BUN/Cr upon admission 31/4.0.  (close to baseline)            Essential hypertension    Initial BP was 155/90, but upon admission it was stable 120/60  Will continue to monitor            Chronic diastolic heart failure    Echo done a month ago with EF of 55% and DD  2D echo ordered by the ED   BNP: 4102  CXR unremarkable  EKG : ST abnormality and possible inferior abnormality  Cards consulted  Will trend trop and EKGx 3  Continue ASA and statin.             VTE Risk Mitigation     None             Holland Stringer MD  Department of Hospital Medicine   [facility]

## 2017-11-01 NOTE — ASSESSMENT & PLAN NOTE
Pt on dialysis M/W/F  He missed dialysis today. Consulted nephro for urgent dialysis in-house.  BUN/Cr upon admission 31/4.0.  (close to baseline)

## 2017-11-02 NOTE — H&P
[facility] Hospital Medicine  History & Physical    Patient Name: Gunner Motley  MRN: [id number]  Admission Date: 11/1/2017  Attending Physician: Rocio Foster MD   Primary Care Provider: Kaushal Townsend MD         Patient information was obtained from patient and ER records.     Subjective:     Principal Problem:Sepsis    Chief Complaint:   Chief Complaint   Patient presents with    Hypertension     pt went for HD this morning at [facility] and sent here for further eval and treat of HTN. Pt also reports having chills and general malaise for the past couple of days.         HPI: 72 year old male with a PMH of ESRD (on dialysis), CHF, CAD, UTI, Diverticulitis and HTN presented to the ED due to elevated BP noted at his dialysis clinic appointment. Patient states that he has been having fever and chills, nausea and vomiting, a headache and decreased appetite for about a month, but has been worse for the past 2-3 days. He states he has vomited 6 times in the last 24 hours. Vomitus is nonbloody. He also complains of LUQ pain that is dull, non-radiating and has been hurting for about 2 months. He also complains of SOB that has been going on for a month as well as chills and diaphoresis since this morning. He has burning on urination, and has urinary urgency. He has regular bowel movements, most recently was this morning and was normal. He denies sick contacts.  He denies chest pain, hemoptysis, palpitations, productive cough or rash. He has a dialysis catheter in his right anterior chest that was placed one month ago, and he currently has a PD cath, but states it is scheduled to be removed because it is no longer functioning. Since starting dialysis he endorses weight loss. He takes his prescription medications as prescribed.  He did mention he is not taking Lasix anymore as he was told not to take it a month ago.       Review of Systems   Constitutional: Negative for chills, diaphoresis, fatigue and  fever.   HENT: Negative.    Respiratory: Negative for cough, shortness of breath and wheezing.    Cardiovascular: Negative for chest pain, palpitations and leg swelling.   Gastrointestinal: Positive for nausea and vomiting. Negative for abdominal pain.   Genitourinary: Positive for difficulty urinating, dysuria and frequency. Negative for urgency.   Musculoskeletal: Negative.    Skin: Negative.    Neurological: Negative for weakness and headaches.   Psychiatric/Behavioral: The patient is not nervous/anxious.      Objective:     Vital Signs (Most Recent):  Temp: 98.8 °F (37.1 °C) (11/02/17 0755)  Pulse: 103 (11/02/17 0900)  Resp: 19 (11/02/17 0900)  BP: (!) 122/57 (11/02/17 0900)  SpO2: 97 % (11/02/17 0900) Vital Signs (24h Range):  Temp:  [98.6 °F (37 °C)-102.2 °F (39 °C)] 98.8 °F (37.1 °C)  Pulse:  [] 103  Resp:  [11-44] 19  SpO2:  [88 %-100 %] 97 %  BP: (107-180)/(46-94) 122/57       Weight: 69.3 kg (152 lb 12.5 oz)  Body mass index is 24.66 kg/m².    Physical Exam   Constitutional: He is oriented to person, place, and time. He appears well-developed and well-nourished. No distress.   HENT:   Head: Normocephalic and atraumatic.   Nose: Nose normal.   Mouth/Throat: No oropharyngeal exudate.   Eyes: Conjunctivae are normal. Right eye exhibits no discharge. Left eye exhibits no discharge.   Neck: Normal range of motion. Neck supple. No JVD present. No tracheal deviation present.   Cardiovascular: Regular rhythm, normal heart sounds and intact distal pulses.  Tachycardia present.  Exam reveals no gallop and no friction rub.    No murmur heard.  Pulmonary/Chest: Effort normal. No accessory muscle usage or stridor. No tachypnea. No respiratory distress. He has no wheezes. He has no rales. He exhibits no tenderness.   Abdominal: Soft. Bowel sounds are normal. He exhibits no distension and no mass. There is no tenderness. There is no guarding.   Musculoskeletal: Normal range of motion. He exhibits no edema,  tenderness or deformity.   Neurological: He is alert and oriented to person, place, and time.   Skin: Skin is warm and dry. No rash noted. He is not diaphoretic. No erythema. No pallor.   Nursing note and vitals reviewed.      Significant Labs:   CBC:   Recent Labs  Lab 11/01/17  1212    WBC 8.56    HGB 10.2*    HCT 31.3*          CMP:   Recent Labs  Lab 11/01/17  1212    *    K 3.4*    CL 94*    CO2 27    *    BUN 31*    CREATININE 4.0*    CALCIUM 8.7    PROT 6.1    ALBUMIN 2.7*    BILITOT 1.1*    ALKPHOS 93    AST 40    ALT 10    ANIONGAP 12    EGFRNONAA 14*      Lactic Acid:   Recent Labs  Lab 11/01/17  1212 11/01/17  1608   LACTATE 3.7* 1.7     Troponin:   Recent Labs  Lab 11/01/17  1212 11/01/17  1608    TROPONINI 10.123*  10.123* 14.469*  14.469*      Urine Culture: No results for input(s): LABURIN in the last 48 hours.  Urine Studies:   Recent Labs  Lab 11/01/17  1840   COLORU Yellow  Yellow   APPEARANCEUA Clear  Clear   PHUR 6.0  6.0   SPECGRAV 1.020  1.020   PROTEINUA 3+*  3+*   GLUCUA Negative  Negative   KETONESU Negative  Negative   BILIRUBINUA Negative  Negative   OCCULTUA 1+*  1+*   NITRITE Negative  Negative   UROBILINOGEN Negative  Negative   LEUKOCYTESUR Negative  Negative   RBCUA 0   WBCUA 2   BACTERIA Few*   SQUAMEPITHEL 0   HYALINECASTS 0       Significant Imaging: I have reviewed all pertinent imaging results/findings within the past 24 hours.    Assessment/Plan:     Assessment/Plan:          * Sepsis     Presented with tachycardia, mild fever, hypertension.   Sepsis protocol started by the ED   Blood cultures, sputum cultures, gram stain, UA, urine cultures pending  WBC normal upon admission  Vanc and Zosyn started in the ED, will continue upon admission  LA initial one 3.7, will trend x 3   Procalc 14.82  TSH normal   CXR unremarkable  Mg, phos and K low: will replete.   Pt-inr: PT 13.1 and inr 1.2  Influenza: negative.   S/p 500 bolus given in the ED  Will  continue to give IVF 30mg/kg  Will give Lasix 80 mg once and f/u with nephro for dialysis                         ESRD on dialysis     Pt on dialysis M/W/F  He missed dialysis today. Consulted nephro for urgent dialysis in-house.  BUN/Cr upon admission 31/4.0.  (close to baseline)             Essential hypertension     Initial BP was 155/90, but upon admission it was stable 120/60  Will continue to monitor             Chronic diastolic heart failure     Echo done a month ago with EF of 55% and DD  2D echo ordered by the ED   BNP: 4102  CXR unremarkable  EKG : ST abnormality and possible inferior abnormality  Cards consulted  Will trend trop and EKGx 3  Continue ASA and statin.                  VTE Risk Mitigation      None                Holland Stringer MD  Department of Hospital Medicine   [facility]

## 2017-11-02 NOTE — CONSULTS
"[employer] renal fellow HOIV  Consult note    Reason for Consult:     "HD requirements"    Subjective:      History of Present Illness:  Gunner Motley is a 72 y.o. C  male who  has a past medical history of Anemia; CHF (congestive heart failure); Coronary artery disease; Diverticulosis; Gout; Hypertension; Recurrent nephrolithiasis; Unspecified disorder of kidney and ureter; and Urinary tract infection.. The patient presented to the [facility] on 11/1/2017 with a primary complaint of Hypertension (pt went for HD this morning at [facility] and sent here for further eval and treat of HTN. Pt also reports having chills and general malaise for the past couple of days. )    Pt d/c'd from [facility] end of 8/2017 to start urgent start PD at [facility]; pt completed training and unfortunately, with his sick wife for which he was still caring, the pt was unable to successfully complete daily PD; he had a R tunneled cath placed 10/12/17 and was started on outpt HD MWF at [facility]; around the time of his HD cath placement, he began to experience f/c/n/v - sometimes with HD and sometimes after HD and on days off of HD;    About 1 month PTA, the pt states he began to have some left anterior subchondrial pain; unfortunately, his PD cath had not been removed, but he denies purulent discharge or cellulitic skin changes around insertion site; he was not routinely flushing or caring for PD cath.  He underwent HD on 10/30/17 and after began to experience worsening n/v/f/c which prompted his presentation to [facility] ED.        HD Rx: 3 hours MWF; UF 1-1.5 L      ESRD declared 8/2017 8/2017: PD cath placement  10/2017: R CW tunneled cath      Past Medical History:  Past Medical History:   Diagnosis Date    Anemia     CHF (congestive heart failure)     Coronary artery disease     Diverticulosis     Gout     Hypertension     Recurrent nephrolithiasis     Unspecified disorder of kidney and ureter     Urinary tract infection  "       Past Surgical History:  Past Surgical History:   Procedure Laterality Date    APPENDECTOMY      CARDIAC SURGERY  2005    CABG x4 vessels    HERNIA REPAIR  2002    umbilical hernia    KIDNEY STONE SURGERY  1983    abdominal    PERITONEAL CATHETER INSERTION Left 08/28/2017    abdomen       Allergies:  Review of patient's allergies indicates:  No Known Allergies    Medications:   In-Hospital Scheduled Medications:   albuterol sulfate  2.5 mg Nebulization Q4H    aspirin  81 mg Oral Daily    carvedilol  3.125 mg Oral BID    piperacillin-tazobactam 4.5 g in dextrose 5 % 100 mL IVPB (ready to mix system)  4.5 g Intravenous Q12H    pravastatin  40 mg Oral Daily    sodium chloride 0.9%  3 mL Intravenous Q8H    [START ON 11/3/2017] vancomycin (VANCOCIN) IVPB  500 mg Intravenous Every Mon, Wed, Fri      In-Hospital PRN Medications:  acetaminophen, influenza, ondansetron, oxyCODONE-acetaminophen   In-Hospital IV Infusion Medications:   sodium chloride 0.9% 10 mL/hr at 11/01/17 0947      Home Medications:  Prior to Admission medications    Medication Sig Start Date End Date Taking? Authorizing Provider   albuterol (PROVENTIL) 2.5 mg /3 mL (0.083 %) nebulizer solution as needed for Shortness of Breath.  1/22/15  Yes Historical Provider, MD   allopurinol (ZYLOPRIM) 100 MG tablet Take 100 mg by mouth once daily.  11/18/13  Yes Historical Provider, MD   alprazolam (XANAX) 0.25 MG tablet Take 0.25 mg by mouth every 8 (eight) hours as needed. 8/1/17  Yes Historical Provider, MD   aspirin (ECOTRIN) 81 MG EC tablet Take 81 mg by mouth once daily.   Yes Historical Provider, MD   calcitRIOL (ROCALTROL) 0.25 MCG Cap Take 1 capsule (0.25 mcg total) by mouth once daily. 10/4/17  Yes Jannet Herman MD   docusate sodium (COLACE) 100 MG capsule Take 1 capsule (100 mg total) by mouth 2 (two) times daily. 9/11/17  Yes MICHELLE Perrin, MICHEAL   oxyCODONE-acetaminophen (PERCOCET)  mg per tablet Take 1 tablet by  "mouth every 8 (eight) hours as needed for Pain. 10/24/17  Yes Jannet Herman MD   pravastatin (PRAVACHOL) 40 MG tablet Take 40 mg by mouth once daily.  13  Yes Historical Provider, MD       Family History:  Family History   Problem Relation Age of Onset    Cancer Mother     Kidney disease Mother     Heart attack Father        Social History:  Social History   Substance Use Topics    Smoking status: Former Smoker     Packs/day: 2.00     Years: 30.00     Quit date: 10/12/1995    Smokeless tobacco: Former User     Quit date: 1995    Alcohol use No      Comment: quit        Review of Systems:  + for left anterior subchondrial pain/tenderness around R tunneled cath/jaw pain; All other systems are reviewed and are negative.    Health Maintenance:     Immunizations:   Currently on File:   Most Recent Immunizations   Administered Date(s) Administered    PPD Test 2017          Objective:   Last 24 Hour Vital Signs:  BP  Min: 107/58  Max: 180/83  Temp  Av.6 °F (37.6 °C)  Min: 98.6 °F (37 °C)  Max: 102.2 °F (39 °C)  Pulse  Av.5  Min: 84  Max: 157  Resp  Av.5  Min: 11  Max: 44  SpO2  Av.1 %  Min: 88 %  Max: 100 %  Height  Av' 6" (167.6 cm)  Min: 5' 6" (167.6 cm)  Max: 5' 6" (167.6 cm)  Weight  Av.8 kg (153 lb 14.2 oz)  Min: 69.3 kg (152 lb 12.5 oz)  Max: 70.3 kg (155 lb)  I/O last 3 completed shifts:  In: 2929.5 [P.O.:250; I.V.:120.5; IV Piggyback:2559]  Out: 525 [Urine:525]    Physical Examination:  GEN - AAOx4, NAD  NECK - no thyromegaly; no carotid bruits  CHEST  -bibasilar crackles; equal lung expansion  HEART  -tachy; reg rhythm; no m/r/s3/s4  ABD - PD cath LLQ without cellulitic skin changes/induration/purulence at insertion site; some ttp LUQ  EXTR - warm; no edema  NEURO - no asterixis; no focal deficits  SKIN - some B upper anterior thigh excoriations; bruising over site or R tunneled cath    Laboratory Results:    Trended Lab Data:    Recent Labs  Lab " 11/01/17  1212 11/02/17  0420   WBC 8.56 7.55   HGB 10.2* 8.5*   HCT 31.3* 24.7*    175   MCV 92 92   RDW 13.0 13.1   * 132*   K 3.4* 3.5   CL 94* 98   CO2 27 25   BUN 31* 41*   * 111*   PROT 6.1 5.0*   ALBUMIN 2.7* 2.1*   BILITOT 1.1* 1.2*   AST 40 50*   ALKPHOS 93 67   ALT 10 13       Trended Cardiac Data:    Recent Labs  Lab 11/01/17  1212 11/01/17  1608 11/02/17  0047   TROPONINI 10.123*  10.123* 14.469*  14.469* 12.119*  12.119*   BNP 4,102*  4,102*  --   --            Other Results:  EKG (my interpretation):   EKG 11/1/17: sinus tach with frequent PVCs; normal axis; ST depression V5-6    Radiology:  CXR 11/1/17: central vascular prominence; B costophrenic angle blunting       Assessment/Plan     This is a 71 yo C male with ESRD/CAD s/p CABG/HTN/gout who is admitted for sepsis + GNR in blood; L-renal has been consulted for    ESRD  -etiol includes HTN/small vessel Dz; recent initiation 8/2017 PD->switched to HD 10/12/17 [id number] cerna - access R tunneled cath; has an appt next week for AVF placement -> will need to cancel this  -will plan on HD today and tomorrow  -will communicate with HD nurse regarding dosing of Abx  -nepro and nephrocaps added; zemplar with HD    GNR bacteremia  -agree with broad spectrum coverage for now until speciation  -have spoken with primary team about removal of PD cath; will also plan for HD today and tomorrow and then removal of tunneled cath for line holiday over weekend; once BCx documented as cleared, will need placement of another permacath - hopefully Monday  -Hd nurses to Cx blood from CVC    NSTEMI  -cards on board  -DAPT for now; will await recs for intervention VS medical therapy    Normocytic anemia  -will give epo 10K with HD  -hold IV iron for now in face of active infection     Gout  -ok to continue allopurinol 100mg daily    Papito Paniagua II  [employer] renal HO IV  [phone]

## 2017-11-02 NOTE — ASSESSMENT & PLAN NOTE
Compensated from HF standpoint  LVEF 50% per echo without WMA  Continue HD for volume removal  BB re- initiated yesterday; recommend uptitration as tolerated given frequent ectopy   Replace Mg; defer to nephrology given ESRD

## 2017-11-02 NOTE — ANESTHESIA PREPROCEDURE EVALUATION
11/02/2017  Gunner Motley is a 72 y.o., male with a PMH of CAD s/p CABG 2004, HTN, PHTN, ESRD with PD catheter and right CW catheter, DMII and normocytic anemia is here for dialysis catheter removal. He had jaw pain on admission and elevated troponin and is being treated with Plavix for his NSTEMI.     Pre-op Assessment    I have reviewed the Patient Summary Reports.     I have reviewed the Nursing Notes.      Review of Systems  Anesthesia Hx:  No problems with previous Anesthesia  History of prior surgery of interest to airway management or planning:  Denies Personal Hx of Anesthesia complications.   Social:  Former Smoker    Hematology/Oncology:         -- Anemia:   EENT/Dental:EENT/Dental Normal   Cardiovascular:   Exercise tolerance: poor Hypertension Valvular problems/Murmurs (TR), MR CAD (2004, 4 vessel CABG)   CHF (with preserved EF) CONCLUSIONS     1 - Normal left ventricular systolic function (EF 55-60%).     2 - Impaired LV relaxation, increased LVEDP.     3 - Normal right ventricular systolic function .     4 - The estimated PA systolic pressure is 30 mmHg.     5 - Mild aortic regurgitation.     6 - Mild mitral regurgitation.   Pulmonary:     Pulm HTN   Renal/:   Chronic Renal Disease, ESRD, Dialysis    Hepatic/GI:  Hepatic/GI Normal    Neurological:  Neurology Normal    Endocrine:   Diabetes        Physical Exam  General:  Well nourished    Airway/Jaw/Neck:  Airway Findings: Mouth Opening: Normal Tongue: Normal  General Airway Assessment: Adult  Mallampati: II  TM Distance: Normal, at least 6 cm       Chest/Lungs:  Chest/Lungs Findings: Normal Respiratory Rate, Expiratory Wheezes, Mild     Heart/Vascular:  Heart Findings: Rate: Normal  Rhythm: Regular Rhythm  Sounds: Normal        Mental Status:  Mental Status Findings:  Alert and Oriented, Cooperative       Lab Results   Component Value Date     WBC 7.55 11/02/2017    HGB 8.9 (L) 11/02/2017    HGB 8.9 (L) 11/02/2017    HCT 24.7 (L) 11/02/2017     11/02/2017    CHOL 88 (L) 11/02/2017    TRIG 83 11/02/2017    HDL 30 (L) 11/02/2017    ALT 13 11/02/2017    AST 50 (H) 11/02/2017     (L) 11/02/2017    K 3.5 11/02/2017    CL 98 11/02/2017    CREATININE 4.8 (H) 11/02/2017    BUN 41 (H) 11/02/2017    CO2 25 11/02/2017    TSH 0.854 11/01/2017    INR 1.2 11/01/2017    HGBA1C 5.6 08/22/2017     Wt Readings from Last 3 Encounters:   11/01/17 69.3 kg (152 lb 12.5 oz)   10/31/17 70.3 kg (155 lb)   10/19/17 72.4 kg (159 lb 9.8 oz)     Temp Readings from Last 3 Encounters:   11/02/17 36.8 °C (98.2 °F) (Oral)   10/31/17 36.9 °C (98.5 °F) (Oral)   10/19/17 36.3 °C (97.3 °F) (Oral)     BP Readings from Last 3 Encounters:   11/02/17 (!) 115/56   10/31/17 (!) 164/89   10/19/17 119/74     Pulse Readings from Last 3 Encounters:   11/02/17 98   10/31/17 87   10/19/17 93         Anesthesia Plan  Type of Anesthesia, risks & benefits discussed:  Anesthesia Type:  general, MAC  Patient's Preference:   Intra-op Monitoring Plan: standard ASA monitors  Intra-op Monitoring Plan Comments:   Post Op Pain Control Plan: multimodal analgesia  Post Op Pain Control Plan Comments:   Induction:   IV  Beta Blocker:  Patient is not currently on a Beta-Blocker (No further documentation required).  Patient is on a Beta-Blocker and is not required to be redosed perioperatively for the following medical conditions:     Informed Consent: Patient understands risks and agrees with Anesthesia plan.  Questions answered. Anesthesia consent signed with patient.  ASA Score: 4     Day of Surgery Review of History & Physical: I have interviewed and examined the patient. I have reviewed the patient's H&P dated:  There are no significant changes. Significant changes noted: Surgeon notified.          Ready For Surgery From Anesthesia Perspective.

## 2017-11-02 NOTE — PROGRESS NOTES
Requested Dr. Carr for zofran, patient is feeling nauseated. Stated, will place the order for zofran.

## 2017-11-02 NOTE — CHAPLAIN
Patient was alert and verbal, with daughter at bedside. Daughter's name is Shara.  Both patient and daughter said that Advanced Directives have already been completed on last admission and that daughter will be patient's decision maker if patient is not able.  Patient also stated that he does not want to be resusitated.  Daughter has a copy if needed.

## 2017-11-02 NOTE — PROGRESS NOTES
11/01/17 2230   Vital Signs   Temp (!) 102.2 °F (39 °C)   Temp src Oral   Pulse (!) 124   Heart Rate Source Monitor   Resp (!) 28   SpO2 98 %   Pulse Oximetry Type Continuous   Dr. Carr is aware of the temp

## 2017-11-02 NOTE — ASSESSMENT & PLAN NOTE
Pt on dialysis M/W/F  Dialysis planned for today.   BUN/Cr upon admission 31/4.0.  (close to baseline)

## 2017-11-02 NOTE — PLAN OF CARE
Problem: Patient Care Overview  Goal: Plan of Care Review  Outcome: Ongoing (interventions implemented as appropriate)  Pt on RA with sats of 92%. Will continue to monitor.

## 2017-11-02 NOTE — PROGRESS NOTES
Situation Principle Problem:  Sepsis      Reason for Calling:  MD in the unit, addressed concerns verbally    Provider Calling: Dr. Carr   Background Vitals:    11/01/17 2100 11/01/17 2130 11/01/17 2145 11/01/17 2200   BP: (!) 143/71 (!) 153/90 (!) 180/83 (!) 176/94   BP Location:  Right arm Left arm Right arm   Patient Position:  Lying Lying Lying   Pulse: 106 (!) 120 (!) 157    Resp: (!) 43 (!) 30 (!) 25 (!) 28   Temp:       TempSrc:       SpO2: 100% (!) 93% (!) 91% (!) 94%   Weight:       Height:           POCT Glucose   Date Value Ref Range Status   11/01/2017 116 (H) 70 - 110 mg/dL Final   11/01/2017 153 (H) 70 - 110 mg/dL Final       Intake/Output:    Intake/Output Summary (Last 24 hours) at 11/01/17 2211  Last data filed at 11/01/17 2105   Gross per 24 hour   Intake          2740.33 ml   Output              350 ml   Net          2390.33 ml        Assessment What is happening: Patient is shivering severely, hear rate increasing, with frequent multifocal PVC, BP raising, patient denies having chest pain or palpation. Gave norco to control the abd pain. Dr. Carr also is made aware of the electrolyte imbalance, and trop increasing.    Response Provider Response: To continue to monitor the patient. Notify for any other changes.

## 2017-11-02 NOTE — CONSULTS
[facility]  Cardiology  Progress Note    Patient Name: Gunner Motley  MRN: [id number]  Admission Date: 11/1/2017  Hospital Length of Stay: 1 days  Code Status: DNR   Attending Provider: Rocio Foster MD   Consulting Provider: Laron Bhandari NP  Primary Care Physician: Kaushal Townsend MD  Principal Problem:Sepsis    Patient information was obtained from patient, past medical records and ER records.     Inpatient consult to [facility]  Consult performed by: LARON BHANDARI  Consult ordered by: ELTON MEZA        Subjective:     Chief Complaint:  HTN, chills, fever, NVD     HPI:   Gunner Motley is a 72 y.o. male with CAD s/p CABG 2004, HTN, PHTN, ESRD with PD catheter and right CW catheter, DMII and normocytic anemia. Patient was sent to the ED from HD center for evaluation of HTN (BP reported 190s-210s).  Patient noted to be febrile in the ED.  He reports that he has been having chills and decreased appetite for about a month, but it has significantly worsened in the past 2-3 days.  Yesterday he started vomiting and having.  He reports that he has vomited about six times in the last 24 hours.  He denies chest pain, palpitations. Endorses orthopnea which is stable at baseline and pain under left lower rib for over two months.  Pain is worse with supine positioning and he has to sleep in fetal position due to this pain.  He reports his symptoms prior to his CABG in 2004 was jaw pain and has not had any issues since his surgery.    Past Medical History:   Diagnosis Date    Anemia     CHF (congestive heart failure)     Coronary artery disease     Diverticulosis     Gout     Hypertension     Recurrent nephrolithiasis     Unspecified disorder of kidney and ureter     Urinary tract infection        Past Surgical History:   Procedure Laterality Date    APPENDECTOMY      CARDIAC SURGERY  2005    CABG x4 vessels    HERNIA REPAIR  2002    umbilical hernia    KIDNEY STONE  SURGERY  1983    abdominal    PERITONEAL CATHETER INSERTION Left 08/28/2017    abdomen       Review of patient's allergies indicates:  No Known Allergies    No current facility-administered medications on file prior to encounter.      Current Outpatient Prescriptions on File Prior to Encounter   Medication Sig    albuterol (PROVENTIL) 2.5 mg /3 mL (0.083 %) nebulizer solution as needed for Shortness of Breath.     allopurinol (ZYLOPRIM) 100 MG tablet Take 100 mg by mouth once daily.     alprazolam (XANAX) 0.25 MG tablet Take 0.25 mg by mouth every 8 (eight) hours as needed.    aspirin (ECOTRIN) 81 MG EC tablet Take 81 mg by mouth once daily.    calcitRIOL (ROCALTROL) 0.25 MCG Cap Take 1 capsule (0.25 mcg total) by mouth once daily.    docusate sodium (COLACE) 100 MG capsule Take 1 capsule (100 mg total) by mouth 2 (two) times daily.    oxyCODONE-acetaminophen (PERCOCET)  mg per tablet Take 1 tablet by mouth every 8 (eight) hours as needed for Pain.    pravastatin (PRAVACHOL) 40 MG tablet Take 40 mg by mouth once daily.      Family History     Problem Relation (Age of Onset)    Cancer Mother    Heart attack Father    Kidney disease Mother        Social History Main Topics    Smoking status: Former Smoker     Packs/day: 2.00     Years: 30.00     Quit date: 10/12/1995    Smokeless tobacco: Former User     Quit date: 1/12/1995    Alcohol use No      Comment: quit 2014    Drug use: Unknown    Sexual activity: Not on file     Review of Systems   Constitution: Positive for chills, decreased appetite, fever and malaise/fatigue. Negative for diaphoresis.   HENT: Negative.    Eyes: Negative.    Cardiovascular: Positive for dyspnea on exertion and orthopnea. Negative for chest pain, irregular heartbeat, leg swelling, palpitations, paroxysmal nocturnal dyspnea and syncope.        Jaw Pain (anginal equivalent)    Respiratory: Negative for cough, shortness of breath, sputum production and wheezing.     Endocrine: Negative.    Hematologic/Lymphatic: Negative.    Skin: Negative.    Musculoskeletal: Positive for myalgias.   Gastrointestinal: Positive for abdominal pain, diarrhea and nausea. Negative for hematemesis, hematochezia and melena.   Genitourinary: Negative.    Neurological: Positive for headaches.   Psychiatric/Behavioral: Positive for depression and suicidal ideas (Remote history; none at present ). The patient is nervous/anxious.    Allergic/Immunologic: Negative.      Objective:     Vital Signs (Most Recent):  Temp: 98.8 °F (37.1 °C) (11/02/17 0755)  Pulse: 103 (11/02/17 0900)  Resp: 19 (11/02/17 0900)  BP: (!) 122/57 (11/02/17 0900)  SpO2: 97 % (11/02/17 0900) Vital Signs (24h Range):  Temp:  [98.6 °F (37 °C)-102.2 °F (39 °C)] 98.8 °F (37.1 °C)  Pulse:  [] 103  Resp:  [11-44] 19  SpO2:  [88 %-100 %] 97 %  BP: (107-180)/(46-94) 122/57     Weight: 69.3 kg (152 lb 12.5 oz)  Body mass index is 24.66 kg/m².    SpO2: 97 %  O2 Device (Oxygen Therapy): room air      Intake/Output Summary (Last 24 hours) at 11/02/17 0938  Last data filed at 11/02/17 0800   Gross per 24 hour   Intake           3049.5 ml   Output              775 ml   Net           2274.5 ml       Lines/Drains/Airways     Central Venous Catheter Line                 Hemodialysis Catheter 10/12/17 0740 right subclavian 21 days          Airway                 Airway - Non-Surgical 10/12/17 0712 Mask 21 days          Peripheral Intravenous Line                 Peripheral IV - Single Lumen 11/01/17 1456 Right Antecubital less than 1 day         Peripheral IV - Single Lumen 11/01/17 2000 Right Wrist less than 1 day                Physical Exam   Constitutional: He is oriented to person, place, and time. No distress.   HENT:   Head: Normocephalic and atraumatic.   Eyes: Right eye exhibits no discharge. Left eye exhibits no discharge.   Neck: No JVD present.   Cardiovascular: An irregular rhythm present. Frequent extrasystoles are present.  Tachycardia present.  Exam reveals no gallop.    Murmur heard.  Pulmonary/Chest: Effort normal and breath sounds normal. He has no rales. He exhibits no tenderness.   Abdominal: Soft. Bowel sounds are normal.   Musculoskeletal: He exhibits no edema.   Neurological: He is alert and oriented to person, place, and time.   Skin: Skin is warm and dry. He is not diaphoretic.   Psychiatric: His mood appears anxious.       Significant Labs:   ABG:   Recent Labs  Lab 11/01/17  1614   PH 7.453*   PCO2 40.0   HCO3 28.0   POCSATURATED 94*   BE 4   , Blood Culture:   Recent Labs  Lab 11/01/17  1205 11/01/17  1210   LABBLOO Gram stain valorie bottle: Gram negative rods   Results called to and read back by:Paramjit Watson RN  11/02/2017  06:53 Gram stain valorie bottle: Gram negative rods   Results called to and read back by:Paramjit Watson RN 11/02/2017  06:54   , BMP:   Recent Labs  Lab 11/01/17  1212 11/02/17  0420   * 111*   * 132*   K 3.4* 3.5   CL 94* 98   CO2 27 25   BUN 31* 41*   CREATININE 4.0* 4.8*   CALCIUM 8.7 7.6*   MG 1.2* 1.1*   , CMP   Recent Labs  Lab 11/01/17  1212 11/02/17  0420   * 132*   K 3.4* 3.5   CL 94* 98   CO2 27 25   * 111*   BUN 31* 41*   CREATININE 4.0* 4.8*   CALCIUM 8.7 7.6*   PROT 6.1 5.0*   ALBUMIN 2.7* 2.1*   BILITOT 1.1* 1.2*   ALKPHOS 93 67   AST 40 50*   ALT 10 13   ANIONGAP 12 9   ESTGFRAFRICA 16* 13*   EGFRNONAA 14* 11*   , CBC   Recent Labs  Lab 11/01/17  1212 11/02/17  0420   WBC 8.56 7.55   HGB 10.2* 8.5*   HCT 31.3* 24.7*    175   , INR   Recent Labs  Lab 11/01/17  1212   INR 1.2   , Lipid Panel   Recent Labs  Lab 11/02/17  0420   CHOL 88*   HDL 30*   LDLCALC 41.4*   TRIG 83   CHOLHDL 34.1   , Troponin   Recent Labs  Lab 11/01/17  1212 11/01/17  1608 11/02/17  0047   TROPONINI 10.123*  10.123* 14.469*  14.469* 12.119*  12.119*    and All pertinent lab results from the last 24 hours have been reviewed.    Significant Imaging: Echocardiogram:   2D echo with  color flow doppler:   Results for orders placed or performed during the hospital encounter of 11/01/17   2D echo with color flow doppler   Result Value Ref Range    EF 50 55 - 65    Aortic Valve Regurgitation MILD     Est. PA Systolic Pressure 26.83     Pericardial Effusion NONE      Assessment and Plan:   Hospital Course   11/02/2017 Patient admitted to ICU with sepsis, HTN, and elevated Troponin. No chest pain overnight. LUQ pain persists. SR/ST on telemetry with frequent PACs and PVCs. Troponin peaked at >14 with downward trend noted. LVEF 50-55% per echo with LVH and no WMA. Tmax overnight 102.2, on vanc and zosyn - gm - rods with final culture pending. Initial Lactate 3.7- down to 1.7 this AM. Procal 14. Mg 1.2, K+ 3.5. Deferred Mg replacement to Nephrology given ESRD. HH down to 8.5/24.7 from 10/31 yesterday. Unsure if this is hemodilution and will recheck with HD this AM. Patient with episode of jaw pain this AM TWI anteriorly on ECG which has improved on repeat ECG. Plavix initiated for medical management of NSTEMI. Patient is a DNR and verbalized depression. Reported remote suicide attempt to nurse. Patient stated reports that he doesn't want to live and is frustrated with miscommunication between treatment team. Tearful at times, particularly when speaking of his ill wife.       * Sepsis    Blood culture with gm - rods  On Vanc and Zosyn   Source unclear but suspect intra-abdominal etiology; US pending         Essential hypertension    Initially 190s  BP improved 107-120/50-60s  Continue to up titrate Coreg as BP tolerates        Chronic diastolic heart failure    Compensated from HF standpoint  LVEF 50% per echo without WMA  Continue HD for volume removal  BB re- initiated yesterday; recommend uptitration as tolerated given frequent ectopy   Replace Mg; defer to nephrology given ESRD        CAD (coronary artery disease)    S/p 4v CABG at [facility] in 2004 without recurrence of symptoms until this morning.  Patient with episode of jaw pain which has since resolved  ECG with TWI in anterior leads which has improved since admission   On asa, statin, BB- recommend resumption of ACEI as tolerated by BP, up titrate BB as well   Plavix initiated   Troponin elevated 10, 14, 12   Discussed options with patient and daughter; patient is DNR which would need to be revoked in the event angiogram would be needed. Given report from patient's nurse- remote suicide attempt, verbalized desire to die, feel psyc eval needed.   Patient opting for medical management of CAD/NSTEMI but suspect that he has severe untreated depression which may be guiding his decision.              VTE Risk Mitigation         Ordered     Place ANDREZ hose  Until discontinued      11/01/17 1553     Place sequential compression device  Until discontinued      11/01/17 1553     High Risk of VTE  Once      11/01/17 1553     Place ANDREZ hose  Until discontinued      11/01/17 1553     Place sequential compression device  Until discontinued      11/01/17 1553          Thank you for your consult. I will follow-up with patient. Please contact us if you have any additional questions.    Laron Escobar NP  Cardiology   [facility]

## 2017-11-02 NOTE — PROGRESS NOTES
Situation Principle Problem:  Sepsis      Reason for Calling: EKG changes    Provider Calling: Dr. Carr   Background Vitals:    11/01/17 2345 11/02/17 0000 11/02/17 0005 11/02/17 0010   BP:  129/61 129/61    BP Location:       Patient Position:       Pulse: 110 109 108 105   Resp: (!) 25 (!) 23 (!) 26 18   Temp:       TempSrc:       SpO2: 98% 98% 98%    Weight:       Height:           POCT Glucose   Date Value Ref Range Status   11/01/2017 116 (H) 70 - 110 mg/dL Final   11/01/2017 153 (H) 70 - 110 mg/dL Final       Intake/Output:    Intake/Output Summary (Last 24 hours) at 11/02/17 0116  Last data filed at 11/01/17 2105   Gross per 24 hour   Intake          2740.33 ml   Output              350 ml   Net          2390.33 ml        Assessment What is happening: New changes noted on EKG result. MD was notified, Patient denies of any chest pain, heart palpitation, chest tightness.    Response Provider Response:Stated, will come to take a look at the EKG

## 2017-11-02 NOTE — HPI
"Mr. Motley is well known to the surgery service. He is s/p PD catheter placement on 8/28/17 (not used since September) and HD catheter placement on 10/12/17. He presents to the ED from dialysis clinic with hypertension. He was noted to have a fever and endorsed a month of "feeling bad". Over the last two weeks it has gotten worse with fever, chills, nausea, and vomiting at home. Surgery is consulted for removal of the PD catheter. PMH includes s/p CABG 2004 on plavix and ASA, HTN, ESRD on dialysis (M, W, F), DM2, anemia.   "

## 2017-11-02 NOTE — PLAN OF CARE
Problem: Fall Risk (Adult)  Goal: Identify Related Risk Factors and Signs and Symptoms  Related risk factors and signs and symptoms are identified upon initiation of Human Response Clinical Practice Guideline (CPG)    11/02/17 0624   Fall Risk   Related Risk Factors (Fall Risk) fatigue/slow reaction;gait/mobility problems;depression/anxiety       Problem: Patient Care Overview  Goal: Plan of Care Review  Outcome: Ongoing (interventions implemented as appropriate)   11/02/17 0624   Coping/Psychosocial   Plan Of Care Reviewed With Patient: Safety: call light in reach, patient oriented to room & instructed how to notify nurse if assistance is needed, current questions/concerns addressed, bed in lowest position with wheels locked & side rails up X 3. Pt and family were educated regarding fall precaution and taking appropriate action.  Activity: is up with 1 assist.  Neurological: Oriented x4 Respiratory: d/c 2L NC, ON RA, patient start to desate to 87%, placed patient back to NC 2L o2 Sat improved  Cardiac: BP hypotensive, febrile managed with ice packs and tylenol. HR tachy with frequent pvcs. Intake/Output: oguric, dialysis patient, but still produces urine, no bm today, diet for the patient to be maintained NPO per cardiologist. SCDs and teds in place Pain: controlled with prn medication Skin: Intact. Plan: Plan of care reviewed with the patient. All questions and concerns were addressed.          Problem: Infection, Risk/Actual (Adult)  Intervention: Manage Suspected/Actual Infection   11/02/17 0624   Safety Interventions   Isolation Precautions environmental surveillance   Infection Management aseptic technique maintained       Goal: Identify Related Risk Factors and Signs and Symptoms  Related risk factors and signs and symptoms are identified upon initiation of Human Response Clinical Practice Guideline (CPG)   Outcome: Ongoing (interventions implemented as appropriate)   11/02/17 0624   Infection, Risk/Actual    Related Risk Factors (Infection, Risk/Actual) chronic illness/condition;prolonged hospitalization;sleep disturbance   Signs and Symptoms (Infection, Risk/Actual) skin cool/clammy;heart rate increase;pain;respiratory rate increase     Goal: Infection Prevention/Resolution  Patient will demonstrate the desired outcomes by discharge/transition of care.   Outcome: Ongoing (interventions implemented as appropriate)   11/02/17 0624   Infection, Risk/Actual (Adult)   Infection Prevention/Resolution making progress toward outcome       Problem: Sepsis/Septic Shock (Adult)  Intervention: Promote Rest/Minimize Oxygen Consumption   11/02/17 0624   Activity   Activity Type activity adjusted per tolerance;activity clustered for rest period;ROM, active encouraged;sitting, edge of bed   Coping/Psychosocial Interventions   Environmental Support calm environment promoted;environmental consistency promoted;rest periods encouraged     Intervention: Support Psychosocial Response to Life-changing Event/Hospitalization   11/02/17 0624   Coping/Psychosocial Interventions   Supportive Measures active listening utilized;positive reinforcement provided;relaxation techniques promoted;self-care encouraged;verbalization of feelings encouraged   Psychosocial Support   Family/Support System Care involvement promoted;support provided     Intervention: Prevent Infection Progression   11/02/17 0624   Safety Interventions   Infection Prevention environmental surveillance;equipment surfaces disinfected;personal protective equipment utilized;rest/sleep promoted     Intervention: Monitor/Manage Perfusion   11/02/17 0624   Safety Interventions   Medication Review/Management medications reviewed     Intervention: Prevent/Manage DVT/VTE Risk   11/02/17 0624   OTHER   VTE Required Core Measure Sequential compression device initiated/maintained;Pharmacological prophylaxis initiated/maintained   Minimize Embolism Risk   VTE Prevention/Management bleeding  risk factor(s) identified, provider notified;intravenous hydration       Goal: Signs and Symptoms of Listed Potential Problems Will be Absent, Minimized or Managed (Sepsis/Septic Shock)  Signs and symptoms of listed potential problems will be absent, minimized or managed by discharge/transition of care (reference Sepsis/Septic Shock (Adult) CPG).   Outcome: Ongoing (interventions implemented as appropriate)   11/02/17 0624   Sepsis/Septic Shock   Problems Assessed (Sepsis) infection progression   Problems Present (Sepsis) infection progression

## 2017-11-02 NOTE — ASSESSMENT & PLAN NOTE
S/p 4v CABG at [facility] in 2004 without recurrence of symptoms until this morning. Patient with episode of jaw pain which has since resolved  ECG with TWI in anterior leads which has improved since admission   On asa, statin, BB- recommend resumption of ACEI as tolerated by BP, up titrate BB as well   Plavix initiated   Troponin elevated 10, 14, 12   Discussed options with patient and daughter; patient is DNR which would need to be revoked in the event angiogram would be needed. Given report from patient's nurse- remote suicide attempt, verbalized desire to die, feel psyc eval needed.   Patient opting for medical management of CAD/NSTEMI but suspect that he has severe untreated depression which may be guiding his decision.

## 2017-11-02 NOTE — HPI
Gunner Motley is a 72 y.o. male with CAD s/p CABG 2004, HTN, PHTN, ESRD with PD catheter and right CW catheter, DMII and normocytic anemia. Patient was sent to the ED from HD center for evaluation of HTN (BP reported 190s-210s).  Patient noted to be febrile in the ED.  He reports that he has been having chills and decreased appetite for about a month, but it has significantly worsened in the past 2-3 days.  Yesterday he started vomiting and having.  He reports that he has vomited about six times in the last 24 hours.  He denies chest pain, palpitations. Endorses orthopnea which is stable at baseline and pain under left lower rib for over two months.  Pain is worse with supine positioning and he has to sleep in fetal position due to this pain.  He reports his symptoms prior to his CABG in 2004 was jaw pain and has not had any issues since his surgery.

## 2017-11-02 NOTE — SUBJECTIVE & OBJECTIVE
No current facility-administered medications on file prior to encounter.      Current Outpatient Prescriptions on File Prior to Encounter   Medication Sig    albuterol (PROVENTIL) 2.5 mg /3 mL (0.083 %) nebulizer solution as needed for Shortness of Breath.     allopurinol (ZYLOPRIM) 100 MG tablet Take 100 mg by mouth once daily.     alprazolam (XANAX) 0.25 MG tablet Take 0.25 mg by mouth every 8 (eight) hours as needed.    aspirin (ECOTRIN) 81 MG EC tablet Take 81 mg by mouth once daily.    calcitRIOL (ROCALTROL) 0.25 MCG Cap Take 1 capsule (0.25 mcg total) by mouth once daily.    docusate sodium (COLACE) 100 MG capsule Take 1 capsule (100 mg total) by mouth 2 (two) times daily.    oxyCODONE-acetaminophen (PERCOCET)  mg per tablet Take 1 tablet by mouth every 8 (eight) hours as needed for Pain.    pravastatin (PRAVACHOL) 40 MG tablet Take 40 mg by mouth once daily.        Review of patient's allergies indicates:  No Known Allergies    Past Medical History:   Diagnosis Date    Anemia     CHF (congestive heart failure)     Coronary artery disease     Diverticulosis     Gout     Hypertension     Recurrent nephrolithiasis     Unspecified disorder of kidney and ureter     Urinary tract infection      Past Surgical History:   Procedure Laterality Date    APPENDECTOMY      CARDIAC SURGERY  2005    CABG x4 vessels    HERNIA REPAIR  2002    umbilical hernia    KIDNEY STONE SURGERY  1983    abdominal    PERITONEAL CATHETER INSERTION Left 08/28/2017    abdomen     Family History     Problem Relation (Age of Onset)    Cancer Mother    Heart attack Father    Kidney disease Mother        Social History Main Topics    Smoking status: Former Smoker     Packs/day: 2.00     Years: 30.00     Quit date: 10/12/1995    Smokeless tobacco: Former User     Quit date: 1/12/1995    Alcohol use No      Comment: quit 2014    Drug use: Unknown    Sexual activity: Not on file     Review of Systems    Constitutional: Positive for appetite change, chills and fever.   HENT: Negative for trouble swallowing.    Eyes: Negative for visual disturbance.   Cardiovascular: Negative for chest pain.   Gastrointestinal: Positive for abdominal pain, nausea and vomiting. Negative for constipation and diarrhea.   Neurological: Positive for light-headedness.     Objective:     Vital Signs (Most Recent):  Temp: 98.2 °F (36.8 °C) (11/02/17 1155)  Pulse: 102 (11/02/17 1151)  Resp: (!) 24 (11/02/17 1151)  BP: 120/60 (11/02/17 1100)  SpO2: (!) 94 % (11/02/17 1151) Vital Signs (24h Range):  Temp:  [98.2 °F (36.8 °C)-102.2 °F (39 °C)] 98.2 °F (36.8 °C)  Pulse:  [] 102  Resp:  [11-44] 24  SpO2:  [88 %-100 %] 94 %  BP: (107-180)/(46-94) 120/60     Weight: 69.3 kg (152 lb 12.5 oz)  Body mass index is 24.66 kg/m².    Physical Exam   Constitutional: He appears well-developed.   HENT:   Head: Normocephalic and atraumatic.   Eyes: Conjunctivae are normal.   Cardiovascular: Normal rate.    Pulmonary/Chest: Effort normal.   Abdominal: Soft. He exhibits no distension. There is tenderness (mild LUQ).   PD catheter in place left abdomen   Neurological: He is alert.   Skin: Skin is warm and dry.       Significant Labs:  CBC:   Recent Labs  Lab 11/02/17  0420 11/02/17  1141   WBC 7.55  --    RBC 2.69*  --    HGB 8.5* 8.9*  8.9*   HCT 24.7*  --      --    MCV 92  --    MCH 31.6*  --    MCHC 34.4  --      CMP:   Recent Labs  Lab 11/02/17  0420   *   CALCIUM 7.6*   ALBUMIN 2.1*   PROT 5.0*   *   K 3.5   CO2 25   CL 98   BUN 41*   CREATININE 4.8*   ALKPHOS 67   ALT 13   AST 50*   BILITOT 1.2*

## 2017-11-02 NOTE — ANESTHESIA PREPROCEDURE EVALUATION
11/02/2017  Gunner Motley is a 72 y.o., male with ESRD for peritoneal dialysis catheter removal.    Pre-op Assessment    I have reviewed the Patient Summary Reports.     I have reviewed the Nursing Notes.      Review of Systems  Anesthesia Hx:  No problems with previous Anesthesia  History of prior surgery of interest to airway management or planning:  Denies Personal Hx of Anesthesia complications.   Social:  Former Smoker    Hematology/Oncology:         -- Anemia:   EENT/Dental:EENT/Dental Normal   Cardiovascular:   Exercise tolerance: poor Hypertension Valvular problems/Murmurs (TR), MR CAD (2004, 4 vessel CABG)   CHF (with preserved EF) CONCLUSIONS     1 - Normal left ventricular systolic function (EF 55-60%).     2 - Impaired LV relaxation, increased LVEDP.     3 - Normal right ventricular systolic function .     4 - The estimated PA systolic pressure is 30 mmHg.     5 - Mild aortic regurgitation.     6 - Mild mitral regurgitation.   Pulmonary:     Pulm HTN   Renal/:   Chronic Renal Disease, ESRD, Dialysis    Hepatic/GI:  Hepatic/GI Normal    Neurological:  Neurology Normal    Endocrine:   Diabetes        Physical Exam  General:  Well nourished    Airway/Jaw/Neck:  Airway Findings: Mouth Opening: Normal Tongue: Normal  General Airway Assessment: Adult  Mallampati: II  TM Distance: Normal, at least 6 cm       Chest/Lungs:  Chest/Lungs Findings: Normal Respiratory Rate, Expiratory Wheezes, Mild     Heart/Vascular:  Heart Findings: Rate: Normal  Rhythm: Regular Rhythm  Sounds: Normal        Mental Status:  Mental Status Findings:  Alert and Oriented, Cooperative       Lab Results   Component Value Date    WBC 7.55 11/02/2017    HGB 8.9 (L) 11/02/2017    HGB 8.9 (L) 11/02/2017    HCT 24.7 (L) 11/02/2017     11/02/2017    CHOL 88 (L) 11/02/2017    TRIG 83 11/02/2017    HDL 30 (L) 11/02/2017    ALT  13 11/02/2017    AST 50 (H) 11/02/2017     (L) 11/02/2017    K 3.5 11/02/2017    CL 98 11/02/2017    CREATININE 4.8 (H) 11/02/2017    BUN 41 (H) 11/02/2017    CO2 25 11/02/2017    TSH 0.854 11/01/2017    INR 1.2 11/01/2017    HGBA1C 5.6 08/22/2017     Wt Readings from Last 3 Encounters:   11/01/17 69.3 kg (152 lb 12.5 oz)   10/31/17 70.3 kg (155 lb)   10/19/17 72.4 kg (159 lb 9.8 oz)     Temp Readings from Last 3 Encounters:   11/02/17 36.8 °C (98.2 °F) (Oral)   10/31/17 36.9 °C (98.5 °F) (Oral)   10/19/17 36.3 °C (97.3 °F) (Oral)     BP Readings from Last 3 Encounters:   11/02/17 (!) 115/56   10/31/17 (!) 164/89   10/19/17 119/74     Pulse Readings from Last 3 Encounters:   11/02/17 98   10/31/17 87   10/19/17 93         Anesthesia Plan  Type of Anesthesia, risks & benefits discussed:  Anesthesia Type:  general, MAC  Patient's Preference:   Intra-op Monitoring Plan: standard ASA monitors  Intra-op Monitoring Plan Comments:   Post Op Pain Control Plan: per primary service following discharge from PACU and IV/PO Opioids PRN  Post Op Pain Control Plan Comments:   Induction:   IV  Beta Blocker:  Patient is on a Beta-Blocker and has received one dose within the past 24 hours (No further documentation required).       Informed Consent: Patient understands risks and agrees with Anesthesia plan.  Questions answered. Anesthesia consent signed with patient.  ASA Score: 4     Day of Surgery Review of History & Physical: I have interviewed and examined the patient. I have reviewed the patient's H&P dated:  There are no significant changes. Significant changes noted: Surgeon notified.          Ready For Surgery From Anesthesia Perspective.

## 2017-11-02 NOTE — ASSESSMENT & PLAN NOTE
Presented with tachycardia, mild fever, hypertension.   Sepsis protocol started by the ED   Blood cultures, sputum cultures, gram stain, UA, urine cultures pending  WBC normal   Vanc and Zosyn started in the ED, will continue  LA initial one 3.7, and trended down to 1.7  Procalc 14.82 upon admit  TSH normal   CXR unremarkable  Mg, phos and K low: will replete.   Pt-inr: PT 13.1 and inr 1.2  Influenza: negative.   S/p 500 bolus given in the ED  Will continue to give IVF 30mg/kg  Will give Lasix 80 mg once and f/u with nephro for dialysis  Nephro will take him for dialysis today and will obtain cultures from the sites of the access.   Dr. Jen Talamantes was also contacted to remove the PD access and also obtain culture from the same site.   Psych consulted as pt has been depressed and had suicidal thoughts.

## 2017-11-02 NOTE — CONSULTS
"[facility]  General Surgery  Consult Note    Patient Name: Gunner Motley  MRN: [id number]  Code Status: DNR  Admission Date: 11/1/2017  Hospital Length of Stay: 1 days  Attending Physician: Rocio Foster MD  Primary Care Provider: Kaushal Townsend MD    Patient information was obtained from patient, relative(s) and past medical records.     Inpatient consult to General Surgery  Consult performed by: KAMINI CARMONA  Consult ordered by: ELTON MEZA        Subjective:     Principal Problem: Sepsis    History of Present Illness: Mr. Motley is well known to the surgery service. He is s/p PD catheter placement on 8/28/17 (not used since September) and HD catheter placement on 10/12/17. He presents to the ED from dialysis clinic with hypertension. He was noted to have a fever and endorsed a month of "feeling bad". Over the last two weeks it has gotten worse with fever, chills, nausea, and vomiting at home. Surgery is consulted for removal of the PD catheter. PMH includes s/p CABG 2004 on plavix and ASA, HTN, ESRD on dialysis (M, W, F), DM2, anemia.     No current facility-administered medications on file prior to encounter.      Current Outpatient Prescriptions on File Prior to Encounter   Medication Sig    albuterol (PROVENTIL) 2.5 mg /3 mL (0.083 %) nebulizer solution as needed for Shortness of Breath.     allopurinol (ZYLOPRIM) 100 MG tablet Take 100 mg by mouth once daily.     alprazolam (XANAX) 0.25 MG tablet Take 0.25 mg by mouth every 8 (eight) hours as needed.    aspirin (ECOTRIN) 81 MG EC tablet Take 81 mg by mouth once daily.    calcitRIOL (ROCALTROL) 0.25 MCG Cap Take 1 capsule (0.25 mcg total) by mouth once daily.    docusate sodium (COLACE) 100 MG capsule Take 1 capsule (100 mg total) by mouth 2 (two) times daily.    oxyCODONE-acetaminophen (PERCOCET)  mg per tablet Take 1 tablet by mouth every 8 (eight) hours as needed for Pain.    pravastatin (PRAVACHOL) " 40 MG tablet Take 40 mg by mouth once daily.        Review of patient's allergies indicates:  No Known Allergies    Past Medical History:   Diagnosis Date    Anemia     CHF (congestive heart failure)     Coronary artery disease     Diverticulosis     Gout     Hypertension     Recurrent nephrolithiasis     Unspecified disorder of kidney and ureter     Urinary tract infection      Past Surgical History:   Procedure Laterality Date    APPENDECTOMY      CARDIAC SURGERY  2005    CABG x4 vessels    HERNIA REPAIR  2002    umbilical hernia    KIDNEY STONE SURGERY  1983    abdominal    PERITONEAL CATHETER INSERTION Left 08/28/2017    abdomen     Family History     Problem Relation (Age of Onset)    Cancer Mother    Heart attack Father    Kidney disease Mother        Social History Main Topics    Smoking status: Former Smoker     Packs/day: 2.00     Years: 30.00     Quit date: 10/12/1995    Smokeless tobacco: Former User     Quit date: 1/12/1995    Alcohol use No      Comment: quit 2014    Drug use: Unknown    Sexual activity: Not on file     Review of Systems   Constitutional: Positive for appetite change, chills and fever.   HENT: Negative for trouble swallowing.    Eyes: Negative for visual disturbance.   Cardiovascular: Negative for chest pain.   Gastrointestinal: Positive for abdominal pain, nausea and vomiting. Negative for constipation and diarrhea.   Neurological: Positive for light-headedness.     Objective:     Vital Signs (Most Recent):  Temp: 98.2 °F (36.8 °C) (11/02/17 1155)  Pulse: 102 (11/02/17 1151)  Resp: (!) 24 (11/02/17 1151)  BP: 120/60 (11/02/17 1100)  SpO2: (!) 94 % (11/02/17 1151) Vital Signs (24h Range):  Temp:  [98.2 °F (36.8 °C)-102.2 °F (39 °C)] 98.2 °F (36.8 °C)  Pulse:  [] 102  Resp:  [11-44] 24  SpO2:  [88 %-100 %] 94 %  BP: (107-180)/(46-94) 120/60     Weight: 69.3 kg (152 lb 12.5 oz)  Body mass index is 24.66 kg/m².    Physical Exam   Constitutional: He appears  well-developed.   HENT:   Head: Normocephalic and atraumatic.   Eyes: Conjunctivae are normal.   Cardiovascular: Normal rate.    Pulmonary/Chest: Effort normal.   Abdominal: Soft. He exhibits no distension. There is tenderness (mild LUQ).   PD catheter in place left abdomen   Neurological: He is alert.   Skin: Skin is warm and dry.       Significant Labs:  CBC:   Recent Labs  Lab 11/02/17  0420 11/02/17  1141   WBC 7.55  --    RBC 2.69*  --    HGB 8.5* 8.9*  8.9*   HCT 24.7*  --      --    MCV 92  --    MCH 31.6*  --    MCHC 34.4  --      CMP:   Recent Labs  Lab 11/02/17  0420   *   CALCIUM 7.6*   ALBUMIN 2.1*   PROT 5.0*   *   K 3.5   CO2 25   CL 98   BUN 41*   CREATININE 4.8*   ALKPHOS 67   ALT 13   AST 50*   BILITOT 1.2*     Assessment/Plan:     * Sepsis    - NPO  - OR today for PD catheter removal; consent in chart  - Will send cultures          VTE Risk Mitigation         Ordered     Place ANDREZ hose  Until discontinued      11/01/17 1553     Place sequential compression device  Until discontinued      11/01/17 1553     High Risk of VTE  Once      11/01/17 1553     Place ANDREZ hose  Until discontinued      11/01/17 1553     Place sequential compression device  Until discontinued      11/01/17 1553          Thank you for your consult. I will follow-up with patient. Please contact us if you have any additional questions.    Hoang France MD  General Surgery  [facility]

## 2017-11-02 NOTE — SUBJECTIVE & OBJECTIVE
Interval History: Will be dialyzed and surgery consulted for removal of the PD cath that might be the source for the infection.     Review of Systems   Constitutional: Negative for chills, diaphoresis, fatigue and fever.   HENT: Negative.    Respiratory: Negative for cough, shortness of breath and wheezing.    Cardiovascular: Negative for chest pain, palpitations and leg swelling.   Gastrointestinal: Positive for nausea and vomiting. Negative for abdominal pain.   Genitourinary: Positive for difficulty urinating, dysuria and frequency. Negative for urgency.   Musculoskeletal: Negative.    Skin: Negative.    Neurological: Negative for weakness and headaches.   Psychiatric/Behavioral: The patient is not nervous/anxious.      Objective:     Vital Signs (Most Recent):  Temp: 98.8 °F (37.1 °C) (11/02/17 0755)  Pulse: 103 (11/02/17 0900)  Resp: 19 (11/02/17 0900)  BP: (!) 122/57 (11/02/17 0900)  SpO2: 97 % (11/02/17 0900) Vital Signs (24h Range):  Temp:  [98.6 °F (37 °C)-102.2 °F (39 °C)] 98.8 °F (37.1 °C)  Pulse:  [] 103  Resp:  [11-44] 19  SpO2:  [88 %-100 %] 97 %  BP: (107-180)/(46-94) 122/57     Weight: 69.3 kg (152 lb 12.5 oz)  Body mass index is 24.66 kg/m².    Intake/Output Summary (Last 24 hours) at 11/02/17 0948  Last data filed at 11/02/17 0800   Gross per 24 hour   Intake           3049.5 ml   Output              775 ml   Net           2274.5 ml      Physical Exam   Constitutional: He is oriented to person, place, and time. He appears well-developed and well-nourished. No distress.   HENT:   Head: Normocephalic and atraumatic.   Nose: Nose normal.   Mouth/Throat: No oropharyngeal exudate.   Eyes: Conjunctivae are normal. Right eye exhibits no discharge. Left eye exhibits no discharge.   Neck: Normal range of motion. Neck supple. No JVD present. No tracheal deviation present.   Cardiovascular: Regular rhythm, normal heart sounds and intact distal pulses.  Tachycardia present.  Exam reveals no gallop and no  friction rub.    No murmur heard.  Pulmonary/Chest: Effort normal. No accessory muscle usage or stridor. No tachypnea. No respiratory distress. He has no wheezes. He has no rales. He exhibits no tenderness.   Abdominal: Soft. Bowel sounds are normal. He exhibits no distension and no mass. There is no tenderness. There is no guarding.   Musculoskeletal: Normal range of motion. He exhibits no edema, tenderness or deformity.   Neurological: He is alert and oriented to person, place, and time.   Skin: Skin is warm and dry. No rash noted. He is not diaphoretic. No erythema. No pallor.   Nursing note and vitals reviewed.      Significant Labs:   CBC:   Recent Labs  Lab 11/01/17  1212 11/02/17  0420   WBC 8.56 7.55   HGB 10.2* 8.5*   HCT 31.3* 24.7*    175     CMP:   Recent Labs  Lab 11/01/17  1212 11/02/17  0420   * 132*   K 3.4* 3.5   CL 94* 98   CO2 27 25   * 111*   BUN 31* 41*   CREATININE 4.0* 4.8*   CALCIUM 8.7 7.6*   PROT 6.1 5.0*   ALBUMIN 2.7* 2.1*   BILITOT 1.1* 1.2*   ALKPHOS 93 67   AST 40 50*   ALT 10 13   ANIONGAP 12 9   EGFRNONAA 14* 11*     Lactic Acid:   Recent Labs  Lab 11/01/17  1212 11/01/17  1608   LACTATE 3.7* 1.7     Troponin:   Recent Labs  Lab 11/01/17  1212 11/01/17  1608 11/02/17  0047   TROPONINI 10.123*  10.123* 14.469*  14.469* 12.119*  12.119*     Urine Culture: No results for input(s): LABURIN in the last 48 hours.  Urine Studies:   Recent Labs  Lab 11/01/17  1840   COLORU Yellow  Yellow   APPEARANCEUA Clear  Clear   PHUR 6.0  6.0   SPECGRAV 1.020  1.020   PROTEINUA 3+*  3+*   GLUCUA Negative  Negative   KETONESU Negative  Negative   BILIRUBINUA Negative  Negative   OCCULTUA 1+*  1+*   NITRITE Negative  Negative   UROBILINOGEN Negative  Negative   LEUKOCYTESUR Negative  Negative   RBCUA 0   WBCUA 2   BACTERIA Few*   SQUAMEPITHEL 0   HYALINECASTS 0       Significant Imaging: I have reviewed all pertinent imaging results/findings within the past 24 hours.

## 2017-11-02 NOTE — ASSESSMENT & PLAN NOTE
Blood culture with gm - rods  On Vanc and Zosyn   Source unclear but suspect intra-abdominal etiology; US pending

## 2017-11-02 NOTE — PLAN OF CARE
TN met with pt and pt's daughter Shara (POA) [phone]. Pt lives alone and is independent including driving. Pt goes to [facility]  . Pt' s spouse is in [place] on hospice care currently. Pt's daughter stated she can assist pt as needed and will provide transportation home when discharged.      Principal Problem:Sepsis, HTN fever , chills    PMH of ESRD (on dialysis), CHF, CAD, UTI, Diverticulitis and HTN presented to the ED due to elevated BP noted at his dialysis clinic appointment.  PD catheter placed as outpatient 8/28/17,Peritoneal Dialysis initiated as outpatient 9/13/17,Had PD training at [facility] #[phone].  Had one week of home PD which he did not perform as directed.   Pt now  transitioned to HD via PAC. Pt scheduled for  Left brachiocephalic AV fistula creation, 11/09/2017 with DR. Donaldson.    Pt has been going to [facility] for follow up.    TN to follow.       11/02/17 0908   Discharge Assessment   Assessment Type Discharge Planning Assessment   Confirmed/corrected address and phone number on facesheet? Yes   Assessment information obtained from? Patient;Caregiver   Expected Length of Stay (days) 2   Communicated expected length of stay with patient/caregiver yes   Prior to hospitilization cognitive status: Alert/Oriented   Prior to hospitalization functional status: Independent   Current cognitive status: Alert/Oriented   Current Functional Status: Independent   Lives With alone  (spouse in [place] on hospice)   Able to Return to Prior Arrangements yes   Is patient able to care for self after discharge? Unable to determine at this time (comments)   Who are your caregiver(s) and their phone number(s)? Shara Mai (daughter/ POA)  [phone]   Patient's perception of discharge disposition home or selfcare   Readmission Within The Last 30 Days no previous admission in last 30 days   Patient currently being followed by outpatient case management? No   Patient currently receives  any other outside agency services? No   Equipment Currently Used at Home none   Do you have any problems affording any of your prescribed medications? No   Is the patient taking medications as prescribed? yes   Does the patient have transportation home? Yes  (daughter)   Transportation Available car;family or friend will provide   Dialysis Name and Scheduled days [facility]    Does the patient receive services at the Coumadin Clinic? No   Discharge Plan A Home   Discharge Plan B Home;Home Health   Patient/Family In Agreement With Plan yes

## 2017-11-02 NOTE — CONSULTS
Discharge to home when medically stable.    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

## 2017-11-02 NOTE — SUBJECTIVE & OBJECTIVE
Past Medical History:   Diagnosis Date    Anemia     CHF (congestive heart failure)     Coronary artery disease     Diverticulosis     Gout     Hypertension     Recurrent nephrolithiasis     Unspecified disorder of kidney and ureter     Urinary tract infection        Past Surgical History:   Procedure Laterality Date    APPENDECTOMY      CARDIAC SURGERY  2005    CABG x4 vessels    HERNIA REPAIR  2002    umbilical hernia    KIDNEY STONE SURGERY  1983    abdominal    PERITONEAL CATHETER INSERTION Left 08/28/2017    abdomen       Review of patient's allergies indicates:  No Known Allergies    No current facility-administered medications on file prior to encounter.      Current Outpatient Prescriptions on File Prior to Encounter   Medication Sig    albuterol (PROVENTIL) 2.5 mg /3 mL (0.083 %) nebulizer solution as needed for Shortness of Breath.     allopurinol (ZYLOPRIM) 100 MG tablet Take 100 mg by mouth once daily.     alprazolam (XANAX) 0.25 MG tablet Take 0.25 mg by mouth every 8 (eight) hours as needed.    aspirin (ECOTRIN) 81 MG EC tablet Take 81 mg by mouth once daily.    calcitRIOL (ROCALTROL) 0.25 MCG Cap Take 1 capsule (0.25 mcg total) by mouth once daily.    docusate sodium (COLACE) 100 MG capsule Take 1 capsule (100 mg total) by mouth 2 (two) times daily.    oxyCODONE-acetaminophen (PERCOCET)  mg per tablet Take 1 tablet by mouth every 8 (eight) hours as needed for Pain.    pravastatin (PRAVACHOL) 40 MG tablet Take 40 mg by mouth once daily.      Family History     Problem Relation (Age of Onset)    Cancer Mother    Heart attack Father    Kidney disease Mother        Social History Main Topics    Smoking status: Former Smoker     Packs/day: 2.00     Years: 30.00     Quit date: 10/12/1995    Smokeless tobacco: Former User     Quit date: 1/12/1995    Alcohol use No      Comment: quit 2014    Drug use: Unknown    Sexual activity: Not on file     Review of Systems    Constitution: Positive for chills, decreased appetite, fever and malaise/fatigue. Negative for diaphoresis.   HENT: Negative.    Eyes: Negative.    Cardiovascular: Positive for dyspnea on exertion and orthopnea. Negative for chest pain, irregular heartbeat, leg swelling, palpitations, paroxysmal nocturnal dyspnea and syncope.        Jaw Pain (anginal equivalent)    Respiratory: Negative for cough, shortness of breath, sputum production and wheezing.    Endocrine: Negative.    Hematologic/Lymphatic: Negative.    Skin: Negative.    Musculoskeletal: Positive for myalgias.   Gastrointestinal: Positive for abdominal pain, diarrhea and nausea. Negative for hematemesis, hematochezia and melena.   Genitourinary: Negative.    Neurological: Positive for headaches.   Psychiatric/Behavioral: Positive for depression and suicidal ideas (Remote history; none at present ). The patient is nervous/anxious.    Allergic/Immunologic: Negative.      Objective:     Vital Signs (Most Recent):  Temp: 98.8 °F (37.1 °C) (11/02/17 0755)  Pulse: 103 (11/02/17 0900)  Resp: 19 (11/02/17 0900)  BP: (!) 122/57 (11/02/17 0900)  SpO2: 97 % (11/02/17 0900) Vital Signs (24h Range):  Temp:  [98.6 °F (37 °C)-102.2 °F (39 °C)] 98.8 °F (37.1 °C)  Pulse:  [] 103  Resp:  [11-44] 19  SpO2:  [88 %-100 %] 97 %  BP: (107-180)/(46-94) 122/57     Weight: 69.3 kg (152 lb 12.5 oz)  Body mass index is 24.66 kg/m².    SpO2: 97 %  O2 Device (Oxygen Therapy): room air      Intake/Output Summary (Last 24 hours) at 11/02/17 0938  Last data filed at 11/02/17 0800   Gross per 24 hour   Intake           3049.5 ml   Output              775 ml   Net           2274.5 ml       Lines/Drains/Airways     Central Venous Catheter Line                 Hemodialysis Catheter 10/12/17 0740 right subclavian 21 days          Airway                 Airway - Non-Surgical 10/12/17 0712 Mask 21 days          Peripheral Intravenous Line                 Peripheral IV - Single Lumen  11/01/17 1456 Right Antecubital less than 1 day         Peripheral IV - Single Lumen 11/01/17 2000 Right Wrist less than 1 day                Physical Exam   Constitutional: He is oriented to person, place, and time. No distress.   HENT:   Head: Normocephalic and atraumatic.   Eyes: Right eye exhibits no discharge. Left eye exhibits no discharge.   Neck: No JVD present.   Cardiovascular: An irregular rhythm present. Frequent extrasystoles are present. Tachycardia present.  Exam reveals no gallop.    Murmur heard.  Pulmonary/Chest: Effort normal and breath sounds normal. He has no rales. He exhibits no tenderness.   Abdominal: Soft. Bowel sounds are normal.   Musculoskeletal: He exhibits no edema.   Neurological: He is alert and oriented to person, place, and time.   Skin: Skin is warm and dry. He is not diaphoretic.   Psychiatric: His mood appears anxious.       Significant Labs:   ABG:   Recent Labs  Lab 11/01/17  1614   PH 7.453*   PCO2 40.0   HCO3 28.0   POCSATURATED 94*   BE 4   , Blood Culture:   Recent Labs  Lab 11/01/17  1205 11/01/17  1210   LABBLOO Gram stain valorie bottle: Gram negative rods   Results called to and read back by:Paramjit Watson RN  11/02/2017  06:53 Gram stain valorie bottle: Gram negative rods   Results called to and read back by:Paramjit Watson RN 11/02/2017  06:54   , BMP:   Recent Labs  Lab 11/01/17  1212 11/02/17  0420   * 111*   * 132*   K 3.4* 3.5   CL 94* 98   CO2 27 25   BUN 31* 41*   CREATININE 4.0* 4.8*   CALCIUM 8.7 7.6*   MG 1.2* 1.1*   , CMP   Recent Labs  Lab 11/01/17  1212 11/02/17  0420   * 132*   K 3.4* 3.5   CL 94* 98   CO2 27 25   * 111*   BUN 31* 41*   CREATININE 4.0* 4.8*   CALCIUM 8.7 7.6*   PROT 6.1 5.0*   ALBUMIN 2.7* 2.1*   BILITOT 1.1* 1.2*   ALKPHOS 93 67   AST 40 50*   ALT 10 13   ANIONGAP 12 9   ESTGFRAFRICA 16* 13*   EGFRNONAA 14* 11*   , CBC   Recent Labs  Lab 11/01/17  1212 11/02/17  0420   WBC 8.56 7.55   HGB 10.2* 8.5*   HCT 31.3*  24.7*    175   , INR   Recent Labs  Lab 11/01/17  1212   INR 1.2   , Lipid Panel   Recent Labs  Lab 11/02/17  0420   CHOL 88*   HDL 30*   LDLCALC 41.4*   TRIG 83   CHOLHDL 34.1   , Troponin   Recent Labs  Lab 11/01/17  1212 11/01/17  1608 11/02/17  0047   TROPONINI 10.123*  10.123* 14.469*  14.469* 12.119*  12.119*    and All pertinent lab results from the last 24 hours have been reviewed.    Significant Imaging: Echocardiogram:   2D echo with color flow doppler:   Results for orders placed or performed during the hospital encounter of 11/01/17   2D echo with color flow doppler   Result Value Ref Range    EF 50 55 - 65    Aortic Valve Regurgitation MILD     Est. PA Systolic Pressure 26.83     Pericardial Effusion NONE

## 2017-11-02 NOTE — PLAN OF CARE
Problem: Hemodialysis (Adult)  Goal: Signs and Symptoms of Listed Potential Problems Will be Absent, Minimized or Managed (Hemodialysis)  Signs and symptoms of listed potential problems will be absent, minimized or managed by discharge/transition of care (reference Hemodialysis (Adult) CPG).   Outcome: Ongoing (interventions implemented as appropriate)   11/02/17 1424   Hemodialysis   Problems Assessed (Hemodialysis) electrolyte imbalance;fluid imbalance   Problems Present (Hemodialysis) electrolyte imbalance;fluid imbalance   Hd with uf

## 2017-11-02 NOTE — HOSPITAL COURSE
Pt had permacath placed yesterday and HD afterwards. The procedure went well and he has been doing well with the pain management as well. He denies N/V/F/C/CP/SOB/abdominal pain.

## 2017-11-03 PROBLEM — R06.02 SOB (SHORTNESS OF BREATH): Status: ACTIVE | Noted: 2017-01-01

## 2017-11-03 NOTE — PLAN OF CARE
Problem: Hemodialysis (Adult)  Goal: Signs and Symptoms of Listed Potential Problems Will be Absent, Minimized or Managed (Hemodialysis)  Signs and symptoms of listed potential problems will be absent, minimized or managed by discharge/transition of care (reference Hemodialysis (Adult) CPG).   Outcome: Ongoing (interventions implemented as appropriate)   11/03/17 0839   Hemodialysis   Problems Assessed (Hemodialysis) all   Problems Present (Hemodialysis) electrolyte imbalance;fluid imbalance   HD with Uf x 3h today. POC reviewed with pt

## 2017-11-03 NOTE — ASSESSMENT & PLAN NOTE
Presented with tachycardia, mild fever, hypertension.   Sepsis protocol started in the ED  Blood cultures, sputum cultures, gram stain, UA, urine cultures pending  WBC normal   Vanc and Zosyn started in the ED  LA initial one 3.7, and trended down to 1.7  Procalc 14.82 upon admit  TSH normal   CXR unremarkable  Mg, phos and K low: will replete.   Pt-inr: PT 13.1 and inr 1.2  Influenza: negative.   S/p 500 bolus given in the ED  Will continue to give IVF 30mg/kg  Will give Lasix 80 mg once and f/u with nephro for dialysis  Nephro will take him for dialysis today and will obtain cultures from the sites of the access.   Dr. Jen Talamantes was also contacted to remove the PD access and also obtain culture from the same site.   Psych consulted as pt has been depressed and had suicidal thoughts.   PD cath and HD cath removal planned for today. Will follow up on the culture from the 2 sites.   Will also obtain culture one day after the removal.  Discontinuing Vanc and continue gram neg coverage with zosyn; BCx through CVC neg

## 2017-11-03 NOTE — PROGRESS NOTES
[facility] Hospital Medicine  Progress Note    Patient Name: Gunner Motley  MRN: [id number]  Patient Class: IP- Inpatient   Admission Date: 11/1/2017  Length of Stay: 1 days  Attending Physician: Rocio Foster MD  Primary Care Provider: Kaushal Townsend MD        Subjective:     Principal Problem:Sepsis    HPI:  72 year old male with a PMH of ESRD (on dialysis), CHF, CAD, UTI, Diverticulitis and HTN presented to the ED due to elevated BP noted at his dialysis clinic appointment. Patient states that he has been having fever and chills, nausea and vomiting, a headache and decreased appetite for about a month, but has been worse for the past 2-3 days. He states he has vomited 6 times in the last 24 hours. Vomitus is nonbloody. He also complains of LUQ pain that is dull, non-radiating and has been hurting for about 2 months. He also complains of SOB that has been going on for a month as well as chills and diaphoresis since this morning. He has burning on urination, and has urinary urgency. He has regular bowel movements, most recently was this morning and was normal. He denies sick contacts.  He denies chest pain, hemoptysis, palpitations, productive cough or rash. He has a dialysis catheter in his right anterior chest that was placed one month ago, and he currently has a PD cath, but states it is scheduled to be removed because it is no longer functioning. Since starting dialysis he endorses weight loss. He takes his prescription medications as prescribed.  He did mention he is not taking Lasix anymore as he was told not to take it a month ago.     Hospital Course:  Pt was seen and examined this AM. He says he feels much better today and SOB and cough has improved significantly.       Interval History:     Review of Systems   Constitutional: Negative for chills, diaphoresis, fatigue and fever.   HENT: Negative.    Respiratory: Negative for cough, shortness of breath and wheezing.     Cardiovascular: Negative for chest pain, palpitations and leg swelling.   Gastrointestinal: Positive for nausea and vomiting. Negative for abdominal pain.   Genitourinary: Positive for difficulty urinating, dysuria and frequency. Negative for urgency.   Musculoskeletal: Negative.    Skin: Negative.    Neurological: Negative for weakness and headaches.   Psychiatric/Behavioral: The patient is not nervous/anxious.      Objective:     Vital Signs (Most Recent):  Temp: 98.3 °F (36.8 °C) (11/02/17 1726)  Pulse: 98 (11/02/17 1800)  Resp: (!) 23 (11/02/17 1800)  BP: (!) 140/99 (11/02/17 1800)  SpO2: 95 % (11/02/17 1800) Vital Signs (24h Range):  Temp:  [98.2 °F (36.8 °C)-102.2 °F (39 °C)] 98.3 °F (36.8 °C)  Pulse:  [] 98  Resp:  [14-44] 23  SpO2:  [88 %-100 %] 95 %  BP: (101-180)/(51-99) 140/99     Weight: 69.3 kg (152 lb 12.5 oz)  Body mass index is 24.66 kg/m².    Intake/Output Summary (Last 24 hours) at 11/02/17 1901  Last data filed at 11/02/17 1800   Gross per 24 hour   Intake             1220 ml   Output             3125 ml   Net            -1905 ml      Physical Exam   Constitutional: He is oriented to person, place, and time. He appears well-developed and well-nourished. No distress.   HENT:   Head: Normocephalic and atraumatic.   Nose: Nose normal.   Mouth/Throat: No oropharyngeal exudate.   Eyes: Conjunctivae are normal. Right eye exhibits no discharge. Left eye exhibits no discharge.   Neck: Normal range of motion. Neck supple. No JVD present. No tracheal deviation present.   Cardiovascular: Regular rhythm, normal heart sounds and intact distal pulses.  Tachycardia present.  Exam reveals no gallop and no friction rub.    No murmur heard.  Pulmonary/Chest: Effort normal. No accessory muscle usage or stridor. No tachypnea. No respiratory distress. He has no wheezes. He has no rales. He exhibits no tenderness.   Abdominal: Soft. Bowel sounds are normal. He exhibits no distension and no mass. There is no  tenderness. There is no guarding.   Musculoskeletal: Normal range of motion. He exhibits no edema, tenderness or deformity.   Neurological: He is alert and oriented to person, place, and time.   Skin: Skin is warm and dry. No rash noted. He is not diaphoretic. No erythema. No pallor.   Nursing note and vitals reviewed.      Significant Labs:   CBC:   Recent Labs  Lab 11/01/17  1212 11/02/17  0420 11/02/17  1141   WBC 8.56 7.55  --    HGB 10.2* 8.5* 8.9*  8.9*   HCT 31.3* 24.7*  --     175  --      CMP:   Recent Labs  Lab 11/01/17  1212 11/02/17  0420   * 132*   K 3.4* 3.5   CL 94* 98   CO2 27 25   * 111*   BUN 31* 41*   CREATININE 4.0* 4.8*   CALCIUM 8.7 7.6*   PROT 6.1 5.0*   ALBUMIN 2.7* 2.1*   BILITOT 1.1* 1.2*   ALKPHOS 93 67   AST 40 50*   ALT 10 13   ANIONGAP 12 9   EGFRNONAA 14* 11*     Lactic Acid:   Recent Labs  Lab 11/01/17  1212 11/01/17  1608   LACTATE 3.7* 1.7     Urine Culture: No results for input(s): LABURIN in the last 48 hours.  Urine Studies:   Recent Labs  Lab 11/01/17  1840   COLORU Yellow  Yellow   APPEARANCEUA Clear  Clear   PHUR 6.0  6.0   SPECGRAV 1.020  1.020   PROTEINUA 3+*  3+*   GLUCUA Negative  Negative   KETONESU Negative  Negative   BILIRUBINUA Negative  Negative   OCCULTUA 1+*  1+*   NITRITE Negative  Negative   UROBILINOGEN Negative  Negative   LEUKOCYTESUR Negative  Negative   RBCUA 0   WBCUA 2   BACTERIA Few*   SQUAMEPITHEL 0   HYALINECASTS 0       Significant Imaging: I have reviewed all pertinent imaging results/findings within the past 24 hours.    Assessment/Plan:      * Sepsis    Presented with tachycardia, mild fever, hypertension.   Sepsis protocol started by the ED   Blood cultures, sputum cultures, gram stain, UA, urine cultures pending  WBC normal   Vanc and Zosyn started in the ED, will continue  LA initial one 3.7, and trended down to 1.7  Procalc 14.82 upon admit  TSH normal   CXR unremarkable  Mg, phos and K low: will replete.    Pt-inr: PT 13.1 and inr 1.2  Influenza: negative.   S/p 500 bolus given in the ED  Will continue to give IVF 30mg/kg  Will give Lasix 80 mg once and f/u with nephro for dialysis  Nephro will take him for dialysis today and will obtain cultures from the sites of the access.   Dr. Jen Talamantes was also contacted to remove the PD access and also obtain culture from the same site.   Psych consulted as pt has been depressed and had suicidal thoughts.                     ESRD on dialysis    Pt on dialysis M/W/F  Dialysis planned for today.   BUN/Cr upon admission 31/4.0.  (close to baseline)            Essential hypertension    Initial BP was 155/90, but upon admission it was stable 129/60  Will continue to monitor            Chronic diastolic heart failure    Echo done a month ago with EF of 55% and DD  2D echo ordered by the ED   BNP: 4102  CXR unremarkable  EKG : ST abnormality and possible inferior abnormality  Cards consulted  Will trend trop and EKGx 3  Continue ASA and statin.   Trops are trending down.            VTE Risk Mitigation         Ordered     heparin (porcine) injection 4,100 Units  As needed (PRN)     Route:  Intra-Catheter        11/02/17 1509     Place ANDREZ hose  Until discontinued      11/01/17 1553     Place sequential compression device  Until discontinued      11/01/17 1553     High Risk of VTE  Once      11/01/17 1553     Place ANDREZ hose  Until discontinued      11/01/17 1553     Place sequential compression device  Until discontinued      11/01/17 1553          Critical care time spent on the evaluation and treatment of severe organ dysfunction, review of pertinent labs and imaging studies, discussions with consulting providers and discussions with patient/family: >30 minutes.    Holland Stringer MD  Department of Hospital Medicine   [facility]

## 2017-11-03 NOTE — ANESTHESIA POSTPROCEDURE EVALUATION
"Anesthesia Post Evaluation    Patient: Gunner Motley    Procedure(s) Performed: Procedure(s) (LRB):  REMOVAL-CATHETER-DIALYSIS-PERITONEAL (N/A)  REMOVAL-CATHETER-HEMODIALYSIS permacath (Right)    Final Anesthesia Type: MAC  Patient location during evaluation: OPS  Patient participation: Yes- Able to Participate  Level of consciousness: awake and alert and oriented  Post-procedure vital signs: reviewed and stable  Pain management: adequate  Airway patency: patent  PONV status at discharge: No PONV  Anesthetic complications: no      Cardiovascular status: blood pressure returned to baseline  Respiratory status: unassisted  Hydration status: euvolemic  Follow-up not needed.        Visit Vitals  /63   Pulse 80   Temp 36.8 °C (98.3 °F) (Oral)   Resp 20   Ht 5' 6" (1.676 m)   Wt 73.4 kg (161 lb 13.1 oz)   SpO2 (!) 94%   BMI 26.12 kg/m²       Pain/Beryl Score: Pain Assessment Performed: Yes (11/3/2017  4:00 PM)  Presence of Pain: complains of pain/discomfort (11/3/2017  4:00 PM)  Pain Rating Prior to Med Admin: 4 (11/3/2017  8:32 AM)  Pain Rating Post Med Admin: 4 (11/3/2017  4:00 PM)      "

## 2017-11-03 NOTE — PSYCH
"IDENTIFICATION DATA:  This is a 72-year-old  [place] male who was   admitted to ICU due to nausea, vomiting, weakness.  This consult is requested by   Dr. Foster for depression and suicidal thoughts.  The patient is not on a PEC   status.    CHIEF COMPLAINT:  "I was very depressed and a couple of weeks ago I held a   trigger to shoot myself."    HISTORY OF PRESENT ILLNESS:  The patient states that he suffered from some   depression about six years ago.  He got retired and and he wife was sick.   His wife is now in a   nursing home which is scaring him because he is not happy with care over there.    The patient states that his kidney got bad a couple of months ago.  The patient   states that he was told that he would need dialysis about six years ago and   about two months ago, his leg got swollen and they had to remove 6 L of fluid   from his body.  The patient went for peritoneal dialysis, which was very   inconvenient.  He is now on hemodialysis, which is better than the peritoneal   dialysis.  The patient states that his depression is now a daily problem.  He   feels depressed, sad, helpless, anhedonic, demotivated and getting suicidal   thoughts.  Two weeks ago, he got his gun out and pulled the trigger and the slot   was empty, he knew that the police were inside.  He got scared, he did not pull   the trigger the second time.  The patient states that he has lot of anxiety and   he started getting panic attacks.  He states that stress causes him to be in a panic   state and it is going on for the last 4 or 5 years.  He states that he is   worried about his health and his wife.  He states that his children are on drugs   and that is scaring him too.  The patient is compliant with medications here.    The patient denies hearing voices or seeing things.  He is consistent with   medication.  He missed dialysis just for one day.  He described his health is   better and he has no more vomiting.    PAST " PSYCHIATRIC HISTORY:  The patient had marital counseling about 15 years   ago.  He was started on antidepressants and it was filled, but he did not   continue and he believed that he might have taken one.  He denies history of   suicide or homicide.  He has never been in rehab program.    SOCIAL HISTORY:  The patient was born in [place].  He moved to [place] when he was 9.    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  He is .  His wife is in [place]   [employer].  He has two kids.  His children are on opioids and heroin.  He lives   at home.  He is not working.  He denies family history of mental illness or   suicide.    MEDICAL HISTORY:  The patient has coronary artery disease, end-stage renal   disease, congestive heart failure, UTI, diverticulitis, hypertension.  His blood   is 120/60 with 102 pulse.    ALLERGIES:  He is not allergic to any medicine or food.    MEDICATIONS:  He is not on psychotropic.  He is on vancomycin, Zosyn, Coreg,   Lasix, albuterol, allopurinol and hemodialysis.    MENTAL STATUS EXAMINATION:  This is a 72-year-old male Hebrew  who looks about his   stated age.  He is alert, cooperative and oriented to day, date, month and year.    Mood is depressed with sad affect.  He has tearful eyes.  He is very   emotional.  He has regrets about his suicidal thoughts and wanted to give   himself one more chance.  The patient is willing to take depression medicine,   but he heard that it takes long time.  The patient is attentive and organized.    He is able to recall 3 objects out of 3 immediately, 3 out of 3 after 5 minutes   and events of the past.  He has no intention to harm to self or others.  Insight   and judgment are fair.  He is of average intelligence person.    PSYCHIATRIC DIAGNOSES:  AXIS I:  Major depressive disorder, recurrent without psychotic features, panic   disorder without agoraphobia.  AXIS II:  No diagnoses.  AXIS III:  End-stage renal disease, congestive heart failure, hemodialysis,    hyperlipidemia, coronary artery disease, gout.  AXIS V:  35.    RECOMMENDATIONS:  1.  We will start this patient on Zoloft 25 mg p.o. q.a.m. x3 days, then 50 mg   p.o. q.a.m. for depression.  2.  We will initiate Ativan 0.5 mg p.r.n. q. 12 for panic attacks.  3.  Education about medication provided.  4.  Brief supportive therapy done.    The patient was given the option to see Dr. Chandler on an outpatient basis if he   cannot find any psychiatrist.      ABDI  dd: 11/02/2017 13:05:22 (CDT)  td: 11/03/2017 01:12:53 (CDT)  Doc ID   #[id number]  Job ID #[id number]    CC:

## 2017-11-03 NOTE — ASSESSMENT & PLAN NOTE
Pt on dialysis M/W/F  Dialysis planned for today. He had one done yesterday  BUN/Cr upon admission 31/4.0.  (close to baseline)  Will have PD and permacath removed and will place a new cath and next dialysis on Monday.

## 2017-11-03 NOTE — PLAN OF CARE
Progress notes reviewed:  Will have PD and permacath removed and will place a new cath and next dialysis on Monday.     Pt lives alone and is independent including driving. Pt goes to [facility]  . Pt' s spouse is in [place] on hospice care currently. Pt's daughter, Shara (POA) [phone], can assist pt as needed and will provide transportation home when discharged.       11/03/17 1107   Discharge Reassessment   Assessment Type Discharge Planning Reassessment   Provided patient/caregiver education on the expected discharge date and the discharge plan Yes   Do you have any problems affording any of your prescribed medications? No   Discharge Plan A Home   Discharge Plan B Home;Home Health   Patient choice form signed by patient/caregiver N/A   Can the patient answer the patient profile reliably? Yes, cognitively intact   How does the patient rate their overall health at the present time? Fair   Describe the patient's ability to walk at the present time. Major restrictions/daily assistance from another person   How often would a person be available to care for the patient? Occasionally   Number of comorbid conditions (as recorded on the chart) Four   During the past month, has the patient often been bothered by feeling down, depressed or hopeless?  yes   During the past month, has the patient often been bothered by little interest or pleasure in doing things? yes

## 2017-11-03 NOTE — ASSESSMENT & PLAN NOTE
Echo done a month ago with EF of 55% and DD  LVEF 50% per echo without WMA  BNP: 4102  CXR unremarkable  EKG : ST abnormality and possible inferior abnormality  Cards consulted  Will trend trop and EKGx 3  Continue ASA and statin.   Trops are trending down.

## 2017-11-03 NOTE — PROGRESS NOTES
[facility] renal fellow [facility]      Subjective:      Had a good night sleep; denies cp/sob     Objective:   Last 24 Hour Vital Signs:  BP  Min: 93/55  Max: 140/99  Temp  Av.5 °F (36.9 °C)  Min: 98.2 °F (36.8 °C)  Max: 98.9 °F (37.2 °C)  Pulse  Av.9  Min: 82  Max: 105  Resp  Av.4  Min: 16  Max: 55  SpO2  Av.3 %  Min: 91 %  Max: 100 %  I/O last 3 completed shifts:  In: 1500 [P.O.:420; I.V.:280; Other:300; IV Piggyback:500]  Out: 3425 [Urine:1125; Other:2300]    Physical Examination:  GEN - AAOx4, NAD  CHEST - R lung base with crackles  HEART - rrr, no m/r/s3/s4  ABD - soft; nonttp  EXTR - warm; no edema  NEURO  - no asterixis or focal deficits      Laboratory:  Laboratory   Pertinent Findings:    Recent Labs  Lab 17  1212 17  0420 17  1141 17  0516   WBC 8.56 7.55  --  4.95   HGB 10.2* 8.5* 8.9*  8.9* 8.6*   HCT 31.3* 24.7*  --  26.3*    175  --  137*   MCV 92 92  --  91   RDW 13.0 13.1  --  13.0   * 132*  --  134*   K 3.4* 3.5  --  3.4*   CL 94* 98  --  101   CO2 27 25  --  24   BUN 31* 41*  --  29*   * 111*  --  96   PROT 6.1 5.0*  --  5.3*   ALBUMIN 2.7* 2.1*  --  2.1*   BILITOT 1.1* 1.2*  --  1.0   AST 40 50*  --  48*   ALKPHOS 93 67  --  79   ALT 10 13  --  14             Current Medications:     Infusions:   sodium chloride 0.9% 10 mL/hr at 17 8729        Scheduled:   sodium chloride 0.9%   Intravenous Once    sodium chloride 0.9%   Intravenous Once    albuterol sulfate  2.5 mg Nebulization Q4H    allopurinol  100 mg Oral Daily    aspirin  81 mg Oral Daily    carvedilol  3.125 mg Oral BID    clopidogrel  75 mg Oral Daily    piperacillin-tazobactam 4.5 g in dextrose 5 % 100 mL IVPB (ready to mix system)  4.5 g Intravenous Q12H    pravastatin  40 mg Oral Daily    sertraline  25 mg Oral Daily    [START ON 2017] sertraline  50 mg Oral Daily    sodium chloride 0.9%  3 mL Intravenous Q8H    vancomycin (VANCOCIN) IVPB  500 mg Intravenous  Every Mon, Wed, Fri    vitamin renal formula (B-complex-vitamin c-folic acid)  1 capsule Oral Daily        PRN:  sodium chloride 0.9%, sodium chloride 0.9%, acetaminophen, heparin (porcine), influenza, LORazepam, ondansetron, oxyCODONE-acetaminophen, ramelteon        Assessment/Plan     This is a 71 yo C male with ESRD/CAD s/p CABG/HTN/gout who is admitted for sepsis + GNR in blood; [facility] has been consulted for     ESRD  -etiol includes HTN/small vessel Dz; recent initiation 8/2017 PD->switched to HD 10/12/17 MWF [facility] - access R tunneled cath; has an appt next week for AVF placement -> will need to cancel this  -torrie HD yesterday with UF of 2.3L; will plan on HD today to continue MWF; UO approx 500mL  -nepro and nephrocaps added; zemplar with HD  -will need new HD cath placed Monday after repeat BCx neg     GNR bacteremia  -rec stopping Vanc and continue gram neg coverage; BCx through CVC neg  -HD cath and PD cath to be removed after HD today; will have HD nurse send PD fluid for cell count and culture; have spoken with Gen surgeons about Cx of PD cath tip  -please repeat peripheral BCx in AM to document clearance     NSTEMI  -cards on board  -DAPT for now; will await recs for intervention VS medical therapy     Normocytic anemia  -will give epo 10K with HD  -hold IV iron for now in face of active infection      Gout  -ok to continue allopurinol 100mg daily     Papito Paniagua II  [facility] renal [facility]  [phone]

## 2017-11-03 NOTE — OP NOTE
[facility]  Surgery Department  Operative Note    SUMMARY     Date of Procedure: 11/3/2017     Procedure: Procedure(s) (LRB):  REMOVAL-CATHETER-DIALYSIS-PERITONEAL (N/A)  REMOVAL-CATHETER-HEMODIALYSIS permacath (Right)     Surgeon(s) and Role:     * Catracho Escudero MD - Primary    Assisting Surgeon: None    Pre-Operative Diagnosis: Sepsis, due to unspecified organism [A41.9]    Post-Operative Diagnosis: Post-Op Diagnosis Codes:     * Sepsis, due to unspecified organism [A41.9]    Anesthesia: Local MAC    Technical Procedures Used: Patient was brought into OR and placed in supine position. The abdomen was prepped and draped using chlorhexidine. The previous incison below the PD catheter was incised and dissected down to the fascia. The catheter was palpated and brought up through that incision. It was then divided and the portion exiting the skin was sent to pathology for ID.  The distal portion of the catheter was dissected free using cautery and was removed. It was sent for cultures along with a sample of abdominal fluid. The skin was closed using vicryl and monocryl. Dressings were applied.   Next the Right IJ permacath was inspected. Dressings were removed. Sutures were cut. The catheter was easily removed and the distal portion was sent for cultures. Pressure was held for 5 minutes at the IJ site. Sterile dressings were applied.     Description of the Findings of the Procedure: No evidence of purulent abdominal fluid or erythema at PD cath site.  No erythema or purulence at right IJ permacath site.    Significant Surgical Tasks Conducted by the Assistant(s), if Applicable:     Complications: No    Estimated Blood Loss (EBL): 5mL           Implants: * No implants in log *    Specimens:   Specimen (12h ago through future)    None      Peritoneal dialysis catheter for aerobic, anaerobic, gram stain  Permacath for aerobic, anaerobic and gram stain              Condition: Good    Disposition:  ICU - extubated and stable.    Attestation: I was present and scrubbed for the entire procedure.

## 2017-11-03 NOTE — SUBJECTIVE & OBJECTIVE
Interval History:     Review of Systems   Constitutional: Negative for chills, diaphoresis, fatigue and fever.   HENT: Negative.    Respiratory: Negative for cough, shortness of breath and wheezing.    Cardiovascular: Negative for chest pain, palpitations and leg swelling.   Gastrointestinal: Positive for nausea and vomiting. Negative for abdominal pain.   Genitourinary: Positive for difficulty urinating, dysuria and frequency. Negative for urgency.   Musculoskeletal: Negative.    Skin: Negative.    Neurological: Negative for weakness and headaches.   Psychiatric/Behavioral: The patient is not nervous/anxious.      Objective:     Vital Signs (Most Recent):  Temp: 98.3 °F (36.8 °C) (11/02/17 1726)  Pulse: 98 (11/02/17 1800)  Resp: (!) 23 (11/02/17 1800)  BP: (!) 140/99 (11/02/17 1800)  SpO2: 95 % (11/02/17 1800) Vital Signs (24h Range):  Temp:  [98.2 °F (36.8 °C)-102.2 °F (39 °C)] 98.3 °F (36.8 °C)  Pulse:  [] 98  Resp:  [14-44] 23  SpO2:  [88 %-100 %] 95 %  BP: (101-180)/(51-99) 140/99     Weight: 69.3 kg (152 lb 12.5 oz)  Body mass index is 24.66 kg/m².    Intake/Output Summary (Last 24 hours) at 11/02/17 1901  Last data filed at 11/02/17 1800   Gross per 24 hour   Intake             1220 ml   Output             3125 ml   Net            -1905 ml      Physical Exam   Constitutional: He is oriented to person, place, and time. He appears well-developed and well-nourished. No distress.   HENT:   Head: Normocephalic and atraumatic.   Nose: Nose normal.   Mouth/Throat: No oropharyngeal exudate.   Eyes: Conjunctivae are normal. Right eye exhibits no discharge. Left eye exhibits no discharge.   Neck: Normal range of motion. Neck supple. No JVD present. No tracheal deviation present.   Cardiovascular: Regular rhythm, normal heart sounds and intact distal pulses.  Tachycardia present.  Exam reveals no gallop and no friction rub.    No murmur heard.  Pulmonary/Chest: Effort normal. No accessory muscle usage or stridor.  No tachypnea. No respiratory distress. He has no wheezes. He has no rales. He exhibits no tenderness.   Abdominal: Soft. Bowel sounds are normal. He exhibits no distension and no mass. There is no tenderness. There is no guarding.   Musculoskeletal: Normal range of motion. He exhibits no edema, tenderness or deformity.   Neurological: He is alert and oriented to person, place, and time.   Skin: Skin is warm and dry. No rash noted. He is not diaphoretic. No erythema. No pallor.   Nursing note and vitals reviewed.      Significant Labs:   CBC:   Recent Labs  Lab 11/01/17  1212 11/02/17  0420 11/02/17  1141   WBC 8.56 7.55  --    HGB 10.2* 8.5* 8.9*  8.9*   HCT 31.3* 24.7*  --     175  --      CMP:   Recent Labs  Lab 11/01/17  1212 11/02/17  0420   * 132*   K 3.4* 3.5   CL 94* 98   CO2 27 25   * 111*   BUN 31* 41*   CREATININE 4.0* 4.8*   CALCIUM 8.7 7.6*   PROT 6.1 5.0*   ALBUMIN 2.7* 2.1*   BILITOT 1.1* 1.2*   ALKPHOS 93 67   AST 40 50*   ALT 10 13   ANIONGAP 12 9   EGFRNONAA 14* 11*     Lactic Acid:   Recent Labs  Lab 11/01/17  1212 11/01/17  1608   LACTATE 3.7* 1.7     Urine Culture: No results for input(s): LABURIN in the last 48 hours.  Urine Studies:   Recent Labs  Lab 11/01/17  1840   COLORU Yellow  Yellow   APPEARANCEUA Clear  Clear   PHUR 6.0  6.0   SPECGRAV 1.020  1.020   PROTEINUA 3+*  3+*   GLUCUA Negative  Negative   KETONESU Negative  Negative   BILIRUBINUA Negative  Negative   OCCULTUA 1+*  1+*   NITRITE Negative  Negative   UROBILINOGEN Negative  Negative   LEUKOCYTESUR Negative  Negative   RBCUA 0   WBCUA 2   BACTERIA Few*   SQUAMEPITHEL 0   HYALINECASTS 0       Significant Imaging: I have reviewed all pertinent imaging results/findings within the past 24 hours.

## 2017-11-03 NOTE — PROGRESS NOTES
[facility] Hospital Medicine  Progress Note    Patient Name: Gunner Motley  MRN: [id number]  Patient Class: IP- Inpatient   Admission Date: 11/1/2017  Length of Stay: 2 days  Attending Physician: Rocio Foster MD  Primary Care Provider: Kaushal Townsend MD        Subjective:     Principal Problem:Sepsis    HPI:  72 year old male with a PMH of ESRD (on dialysis), CHF, CAD, UTI, Diverticulitis and HTN presented to the ED due to elevated BP noted at his dialysis clinic appointment. Patient states that he has been having fever and chills, nausea and vomiting, a headache and decreased appetite for about a month, but has been worse for the past 2-3 days. He states he has vomited 6 times in the last 24 hours. Vomitus is nonbloody. He also complains of LUQ pain that is dull, non-radiating and has been hurting for about 2 months. He also complains of SOB that has been going on for a month as well as chills and diaphoresis since this morning. He has burning on urination, and has urinary urgency. He has regular bowel movements, most recently was this morning and was normal. He denies sick contacts.  He denies chest pain, hemoptysis, palpitations, productive cough or rash. He has a dialysis catheter in his right anterior chest that was placed one month ago, and he currently has a PD cath, but states it is scheduled to be removed because it is no longer functioning. Since starting dialysis he endorses weight loss. He takes his prescription medications as prescribed.  He did mention he is not taking Lasix anymore as he was told not to take it a month ago.     Hospital Course:  Pt was seen and examined this AM. He was supposed to go for the PD cath removal but it was delayed that made the patient frustrated. He is feeling better health wise but has been agitated and frustrated with being in the hospital.     Interval History: PD and HD cath removal planned for today, dialysis today.     Review of Systems    Constitutional: Negative for chills, diaphoresis, fatigue and fever.   HENT: Negative.    Respiratory: Negative for cough, shortness of breath and wheezing.    Cardiovascular: Negative for chest pain, palpitations and leg swelling.   Gastrointestinal: Negative for abdominal pain, nausea and vomiting.   Genitourinary: Negative for difficulty urinating, dysuria, frequency and urgency.   Musculoskeletal: Negative.    Skin: Negative.    Neurological: Negative for weakness and headaches.   Psychiatric/Behavioral: The patient is not nervous/anxious.      Objective:     Vital Signs (Most Recent):  Temp: 98.1 °F (36.7 °C) (11/03/17 0825)  Pulse: 88 (11/03/17 0915)  Resp: (!) 30 (11/03/17 0915)  BP: (!) 84/54 (11/03/17 0915)  SpO2: 95 % (11/03/17 0915) Vital Signs (24h Range):  Temp:  [98.1 °F (36.7 °C)-98.9 °F (37.2 °C)] 98.1 °F (36.7 °C)  Pulse:  [] 88  Resp:  [16-55] 30  SpO2:  [91 %-100 %] 95 %  BP: ()/(48-99) 84/54     Weight: 69.3 kg (152 lb 12.5 oz)  Body mass index is 24.66 kg/m².    Intake/Output Summary (Last 24 hours) at 11/03/17 0932  Last data filed at 11/03/17 0600   Gross per 24 hour   Intake             1170 ml   Output             2800 ml   Net            -1630 ml      Physical Exam   Constitutional: He is oriented to person, place, and time. He appears well-developed and well-nourished. No distress.   HENT:   Head: Normocephalic and atraumatic.   Nose: Nose normal.   Mouth/Throat: No oropharyngeal exudate.   Eyes: Conjunctivae are normal. Right eye exhibits no discharge. Left eye exhibits no discharge.   Neck: Normal range of motion. Neck supple. No JVD present. No tracheal deviation present.   Cardiovascular: Normal rate, regular rhythm, normal heart sounds and intact distal pulses.  Exam reveals no gallop and no friction rub.    No murmur heard.  Pulmonary/Chest: Effort normal. No accessory muscle usage or stridor. No tachypnea. No respiratory distress. He has no wheezes. He has no rales. He  exhibits no tenderness.   Abdominal: Soft. Bowel sounds are normal. He exhibits no distension and no mass. There is no tenderness. There is no guarding.   Musculoskeletal: Normal range of motion. He exhibits no edema, tenderness or deformity.   Neurological: He is alert and oriented to person, place, and time.   Skin: Skin is warm and dry. No rash noted. He is not diaphoretic. No erythema. No pallor.   Nursing note and vitals reviewed.      Significant Labs:   CBC:   Recent Labs  Lab 11/01/17  1212 11/02/17  0420 11/02/17  1141 11/03/17  0516   WBC 8.56 7.55  --  4.95   HGB 10.2* 8.5* 8.9*  8.9* 8.6*   HCT 31.3* 24.7*  --  26.3*    175  --  137*     CMP:   Recent Labs  Lab 11/01/17  1212 11/02/17  0420 11/03/17  0516   * 132* 134*   K 3.4* 3.5 3.4*   CL 94* 98 101   CO2 27 25 24   * 111* 96   BUN 31* 41* 29*   CREATININE 4.0* 4.8* 3.6*   CALCIUM 8.7 7.6* 8.1*   PROT 6.1 5.0* 5.3*   ALBUMIN 2.7* 2.1* 2.1*   BILITOT 1.1* 1.2* 1.0   ALKPHOS 93 67 79   AST 40 50* 48*   ALT 10 13 14   ANIONGAP 12 9 9   EGFRNONAA 14* 11* 16*     Lactic Acid:   Recent Labs  Lab 11/01/17  1212 11/01/17  1608   LACTATE 3.7* 1.7     Magnesium:   Recent Labs  Lab 11/01/17  1212 11/02/17  0420 11/03/17  0516   MG 1.2* 1.1* 1.8     Urine Culture:   Recent Labs  Lab 11/01/17  1840   LABURIN No growth     Urine Studies:   Recent Labs  Lab 11/01/17  1840   COLORU Yellow  Yellow   APPEARANCEUA Clear  Clear   PHUR 6.0  6.0   SPECGRAV 1.020  1.020   PROTEINUA 3+*  3+*   GLUCUA Negative  Negative   KETONESU Negative  Negative   BILIRUBINUA Negative  Negative   OCCULTUA 1+*  1+*   NITRITE Negative  Negative   UROBILINOGEN Negative  Negative   LEUKOCYTESUR Negative  Negative   RBCUA 0   WBCUA 2   BACTERIA Few*   SQUAMEPITHEL 0   HYALINECASTS 0       Significant Imaging: I have reviewed all pertinent imaging results/findings within the past 24 hours.    Assessment/Plan:      * Sepsis    Presented with tachycardia, mild  fever, hypertension.   Sepsis protocol started in the ED  Blood cultures, sputum cultures, gram stain, UA, urine cultures pending  WBC normal   Vanc and Zosyn started in the ED  LA initial one 3.7, and trended down to 1.7  Procalc 14.82 upon admit  TSH normal   CXR unremarkable  Mg, phos and K low: will replete.   Pt-inr: PT 13.1 and inr 1.2  Influenza: negative.   S/p 500 bolus given in the ED  Will continue to give IVF 30mg/kg  Will give Lasix 80 mg once and f/u with nephro for dialysis  Nephro will take him for dialysis today and will obtain cultures from the sites of the access.   Dr. Jen Talamantes was also contacted to remove the PD access and also obtain culture from the same site.   Psych consulted as pt has been depressed and had suicidal thoughts.   PD cath and HD cath removal planned for today. Will follow up on the culture from the 2 sites.   Will also obtain culture one day after the removal.  Discontinuing Vanc and continue gram neg coverage with zosyn; BCx through CVC neg                    ESRD on dialysis    Pt on dialysis M/W/F  Dialysis planned for today. He had one done yesterday  BUN/Cr upon admission 31/4.0.  (close to baseline)  Will have PD and permacath removed and will place a new cath and next dialysis on Monday.             Essential hypertension    Initial BP was 155/90, but upon admission: Stable now 125/56  Will continue to monitor            Chronic diastolic heart failure    Echo done a month ago with EF of 55% and DD  LVEF 50% per echo without WMA  BNP: 4102  CXR unremarkable  EKG : ST abnormality and possible inferior abnormality  Cards consulted  Will trend trop and EKGx 3  Continue ASA and statin.   Trops are trending down.           CAD (coronary artery disease)                VTE Risk Mitigation         Ordered     heparin (porcine) injection 4,100 Units  As needed (PRN)     Route:  Intra-Catheter        11/02/17 1507     Place ANDREZ hose  Until discontinued      11/01/17 3890      Place sequential compression device  Until discontinued      11/01/17 1553     High Risk of VTE  Once      11/01/17 1553     Place ANDREZ hose  Until discontinued      11/01/17 1553     Place sequential compression device  Until discontinued      11/01/17 1553          Critical care time spent on the evaluation and treatment of severe organ dysfunction, review of pertinent labs and imaging studies, discussions with consulting providers and discussions with patient/family: >30 minutes.    Holland Stringer MD  Department of Hospital Medicine   [facility]

## 2017-11-03 NOTE — SUBJECTIVE & OBJECTIVE
Interval History: PD and HD cath removal planned for today, dialysis today.     Review of Systems   Constitutional: Negative for chills, diaphoresis, fatigue and fever.   HENT: Negative.    Respiratory: Negative for cough, shortness of breath and wheezing.    Cardiovascular: Negative for chest pain, palpitations and leg swelling.   Gastrointestinal: Negative for abdominal pain, nausea and vomiting.   Genitourinary: Negative for difficulty urinating, dysuria, frequency and urgency.   Musculoskeletal: Negative.    Skin: Negative.    Neurological: Negative for weakness and headaches.   Psychiatric/Behavioral: The patient is not nervous/anxious.      Objective:     Vital Signs (Most Recent):  Temp: 98.1 °F (36.7 °C) (11/03/17 0825)  Pulse: 88 (11/03/17 0915)  Resp: (!) 30 (11/03/17 0915)  BP: (!) 84/54 (11/03/17 0915)  SpO2: 95 % (11/03/17 0915) Vital Signs (24h Range):  Temp:  [98.1 °F (36.7 °C)-98.9 °F (37.2 °C)] 98.1 °F (36.7 °C)  Pulse:  [] 88  Resp:  [16-55] 30  SpO2:  [91 %-100 %] 95 %  BP: ()/(48-99) 84/54     Weight: 69.3 kg (152 lb 12.5 oz)  Body mass index is 24.66 kg/m².    Intake/Output Summary (Last 24 hours) at 11/03/17 0932  Last data filed at 11/03/17 0600   Gross per 24 hour   Intake             1170 ml   Output             2800 ml   Net            -1630 ml      Physical Exam   Constitutional: He is oriented to person, place, and time. He appears well-developed and well-nourished. No distress.   HENT:   Head: Normocephalic and atraumatic.   Nose: Nose normal.   Mouth/Throat: No oropharyngeal exudate.   Eyes: Conjunctivae are normal. Right eye exhibits no discharge. Left eye exhibits no discharge.   Neck: Normal range of motion. Neck supple. No JVD present. No tracheal deviation present.   Cardiovascular: Normal rate, regular rhythm, normal heart sounds and intact distal pulses.  Exam reveals no gallop and no friction rub.    No murmur heard.  Pulmonary/Chest: Effort normal. No accessory  muscle usage or stridor. No tachypnea. No respiratory distress. He has no wheezes. He has no rales. He exhibits no tenderness.   Abdominal: Soft. Bowel sounds are normal. He exhibits no distension and no mass. There is no tenderness. There is no guarding.   Musculoskeletal: Normal range of motion. He exhibits no edema, tenderness or deformity.   Neurological: He is alert and oriented to person, place, and time.   Skin: Skin is warm and dry. No rash noted. He is not diaphoretic. No erythema. No pallor.   Nursing note and vitals reviewed.      Significant Labs:   CBC:   Recent Labs  Lab 11/01/17  1212 11/02/17  0420 11/02/17  1141 11/03/17  0516   WBC 8.56 7.55  --  4.95   HGB 10.2* 8.5* 8.9*  8.9* 8.6*   HCT 31.3* 24.7*  --  26.3*    175  --  137*     CMP:   Recent Labs  Lab 11/01/17  1212 11/02/17  0420 11/03/17  0516   * 132* 134*   K 3.4* 3.5 3.4*   CL 94* 98 101   CO2 27 25 24   * 111* 96   BUN 31* 41* 29*   CREATININE 4.0* 4.8* 3.6*   CALCIUM 8.7 7.6* 8.1*   PROT 6.1 5.0* 5.3*   ALBUMIN 2.7* 2.1* 2.1*   BILITOT 1.1* 1.2* 1.0   ALKPHOS 93 67 79   AST 40 50* 48*   ALT 10 13 14   ANIONGAP 12 9 9   EGFRNONAA 14* 11* 16*     Lactic Acid:   Recent Labs  Lab 11/01/17  1212 11/01/17  1608   LACTATE 3.7* 1.7     Magnesium:   Recent Labs  Lab 11/01/17 1212 11/02/17  0420 11/03/17  0516   MG 1.2* 1.1* 1.8     Urine Culture:   Recent Labs  Lab 11/01/17  1840   LABURIN No growth     Urine Studies:   Recent Labs  Lab 11/01/17  1840   COLORU Yellow  Yellow   APPEARANCEUA Clear  Clear   PHUR 6.0  6.0   SPECGRAV 1.020  1.020   PROTEINUA 3+*  3+*   GLUCUA Negative  Negative   KETONESU Negative  Negative   BILIRUBINUA Negative  Negative   OCCULTUA 1+*  1+*   NITRITE Negative  Negative   UROBILINOGEN Negative  Negative   LEUKOCYTESUR Negative  Negative   RBCUA 0   WBCUA 2   BACTERIA Few*   SQUAMEPITHEL 0   HYALINECASTS 0       Significant Imaging: I have reviewed all pertinent imaging  results/findings within the past 24 hours.

## 2017-11-03 NOTE — PLAN OF CARE
Problem: Patient Care Overview  Goal: Plan of Care Review  Outcome: Ongoing (interventions implemented as appropriate)  Pt on RA with sats of  99%, Will continue to monitor.

## 2017-11-03 NOTE — TRANSFER OF CARE
"Anesthesia Transfer of Care Note    Patient: Gunner Motley    Procedure(s) Performed: Procedure(s) (LRB):  REMOVAL-CATHETER-DIALYSIS-PERITONEAL (N/A)  REMOVAL-CATHETER-HEMODIALYSIS permacath (Right)    Patient location: ICU    Anesthesia Type: MAC    Transport from OR: Transported from OR on room air with adequate spontaneous ventilation. Continuos invasive BP monitoring in transport. Continuous SpO2 monitoring in transport. Continuous ECG monitoring in transport    Post pain: adequate analgesia    Post assessment: no apparent anesthetic complications and tolerated procedure well    Post vital signs: stable    Level of consciousness: awake, alert and oriented    Nausea/Vomiting: no nausea/vomiting    Complications: none    Transfer of care protocol was followed      Last vitals:   Visit Vitals  BP (!) 111/55 (BP Location: Left arm, Patient Position: Lying)   Pulse 72   Temp 36.7 °C (98 °F) (Oral)   Resp 20   Ht 5' 6" (1.676 m)   Wt 73.4 kg (161 lb 13.1 oz)   SpO2 (!) 91%   BMI 26.12 kg/m²     "

## 2017-11-03 NOTE — PLAN OF CARE
Problem: Patient Care Overview  Goal: Plan of Care Review  Outcome: Ongoing (interventions implemented as appropriate)  Pt updated on POC, pt instructed on NPO status after MN for surgery in the AM and verbalizes understanding.

## 2017-11-04 NOTE — PROGRESS NOTES
[facility] Hospital Medicine  Progress Note    Patient Name: Gunner Motley  MRN: [id number]  Patient Class: IP- Inpatient   Admission Date: 11/1/2017  Length of Stay: 3 days  Attending Physician: Rocio Foster MD  Primary Care Provider: Kaushal Townsend MD        Subjective:     Principal Problem:Sepsis    Interval History: patient stepped to floor overnight. Today, patient has no complaints today. He denies chest pain, palpations, sob, cough, ab pain, n/v/d/c, fever or chills.     Review of Systems   Constitutional: Negative for activity change, chills, fatigue and fever.   HENT: Negative for congestion, rhinorrhea and sore throat.    Eyes: Negative for visual disturbance.   Respiratory: Negative for cough, chest tightness and shortness of breath.    Cardiovascular: Negative for chest pain, palpitations and leg swelling.   Gastrointestinal: Negative for abdominal pain, constipation, diarrhea, nausea and vomiting.   Genitourinary: Negative for dysuria and hematuria.   Musculoskeletal: Negative for arthralgias and myalgias.   Skin: Negative for rash.   Neurological: Negative for dizziness, light-headedness and headaches.   Psychiatric/Behavioral: Negative for agitation. The patient is not nervous/anxious.      Objective:     Vital Signs (Most Recent):  Temp: 97.7 °F (36.5 °C) (11/04/17 0443)  Pulse: 82 (11/04/17 0443)  Resp: 14 (11/04/17 0443)  BP: (!) 148/70 (11/04/17 0443)  SpO2: 96 % (11/04/17 0438) Vital Signs (24h Range):  Temp:  [97.7 °F (36.5 °C)-98.3 °F (36.8 °C)] 97.7 °F (36.5 °C)  Pulse:  [72-95] 82  Resp:  [14-37] 14  SpO2:  [91 %-100 %] 96 %  BP: ()/(48-81) 148/70     Weight: 73.4 kg (161 lb 13.1 oz)  Body mass index is 26.12 kg/m².    Intake/Output Summary (Last 24 hours) at 11/04/17 0724  Last data filed at 11/04/17 0500   Gross per 24 hour   Intake          2006.72 ml   Output             1825 ml   Net           181.72 ml      Physical Exam  Physical Exam   Constitutional:   Awake, alert and oriented x 3, NAD  HENT: ncat, mmm, perrla, eomi, neck normal rom and supple   Cardiovascular: RRR no mrg  Pulmonary/Chest: Effort normal, CTA b/l no wheezes, rales, rhonchi   Abdominal: Soft. +bs, ntnd  Musculoskeletal: Normal range of motion.  no edema, tenderness or deformity.   Neurological:  AAOx3, no focal deficits noted, CN II-XII grossly intact,  normal reflexes.   Skin: Skin is warm and dry. not diaphoretic.   Psychiatric:  normal mood and affect.  behavior is normal. Judgment and thought content normal.   Nursing note and vitals reviewed.    Significant Labs:   LABS  CBC    Recent Labs  Lab 11/02/17 0420 11/02/17  1141 11/03/17  0516 11/04/17  0434   WBC 7.55  --  4.95 6.08   RBC 2.69*  --  2.89* 2.78*   HGB 8.5* 8.9*  8.9* 8.6* 8.2*   HCT 24.7*  --  26.3* 25.3*     --  137* 166   MCV 92  --  91 91   MCH 31.6*  --  29.8 29.5   MCHC 34.4  --  32.7 32.4     BMP    Recent Labs  Lab 11/02/17 0420 11/03/17  0516 11/04/17  0434   * 134* 136   K 3.5 3.4* 3.7   CO2 25 24 22*   CL 98 101 106   BUN 41* 29* 22   CREATININE 4.8* 3.6* 3.0*   * 96 96         Recent Labs  Lab 11/02/17 0420 11/03/17  0516 11/04/17  0434   CALCIUM 7.6* 8.1* 8.1*   MG 1.1* 1.8 1.7   PHOS 3.2 3.1 2.1*     LFT    Recent Labs  Lab 11/02/17 0420 11/03/17  0516 11/04/17  0434   PROT 5.0* 5.3* 5.1*   ALBUMIN 2.1* 2.1* 1.9*   BILITOT 1.2* 1.0 0.8   AST 50* 48* 34   ALKPHOS 67 79 93   ALT 13 14 11       COAGS    Recent Labs  Lab 11/01/17  1212   INR 1.2   APTT 25.8     CE    Recent Labs  Lab 11/03/17  1751 11/03/17  2324 11/04/17  0434   TROPONINI 6.880* 7.534* 6.804*     BNP    Recent Labs  Lab 11/01/17  1212   BNP 4,102*  4,102*     LAST HbA1c  Lab Results   Component Value Date    HGBA1C 5.6 08/22/2017     11/3: IV cath cx x2: pending  11/3: gram stain: pending  11/3: aerobic cx abdominal fluid; no growth  11/3  Anaerobic cx abdominal fluid: pending  11/1 blood cx x 2 : Proteus mirabilis susceptibility  pending  11/2 bcx: no growth  11/4 bcx x 2 pending  11/1: ucx: no growth    Assessment/Plan:    72 y.o. male with CAD s/p CABG 2004, HTN, PHTN, ESRD with PD catheter and right CW catheter, DMII and normocytic anemia admitted for sepsis and found to have proteus bacteriemia also with elevated troponin and elevated by Cards with no plan for intervention 2/2 DNR status.     * Sepsis    - Presented with tachycardia, mild fever, hypertension.   - Sepsis protocol started in the ED  - S/p 500 bolus given in the ED then continued and now at 10ml/hr  - Blood cultures positive for proteus  repeat cx ordered today  - WBC normal, afebril  - Vanc and Zosyn started in the ED  Now on zosyn  - LA initial one 3.7, and trended down to 1.7  - Procalc 14.82 upon admit  - CXR unremarkable  - Dr. Jen Talamantes consulted and  PD cath and HD cath removal planned yesterday. Will follow up on the culture from the 2 sites. Plan for new HD cath placement on Monday.       ESRD on dialysis    Pt on dialysis M/W/F  S/p HD 11/2 and 11/3   BUN/Cr upon admission 31/4.0.  (close to baseline)  Will have PD and permacath removed and will place a new cath and next dialysis on Monday.           Essential hypertension    154/74,   Will continue to monitor        Chronic diastolic heart failure    Echo done a month ago with EF of 55% and DD  LVEF 50% per echo without WMA  BNP: 4102  CXR unremarkable  EKG : ST abnormality and possible inferior abnormality  Cards consulted  Will trend trop and EKGx 3  Continue ASA and statin.   Trops are trending down.           Coronary artery disease involving native coronary artery of native heart without angina pectoris    - elevated trop 14 on admission  - cards consulted  - no intervention at this time given DRTAMMY status       Psychiatric disorders  - hx of SI in the past  - psyc consulted with dx of MDD with psychotic features, panic d/o with agoraphobia and recs for zoloft 25 TID x 3 days then 50mg Qday, ativan 0.5 mg  prn for panic attacks.        VTE Risk Mitigation         Ordered     heparin (porcine) injection 4,100 Units  As needed (PRN)     Route:  Intra-Catheter        11/02/17 1509     High Risk of VTE  Once      11/01/17 1553     Place BRAN hose  Until discontinued      11/01/17 1553     Place sequential compression device  Until discontinued      11/01/17 1553        Code: DNR  Diet: renal diet  Ppx: heparin and bran hose  Dispo: pending HD access and HD on monday    Flores Gamboa D.O.  [facility] HO-2  11/04/2017

## 2017-11-04 NOTE — PLAN OF CARE
Problem: Patient Care Overview  Goal: Plan of Care Review  Outcome: Ongoing (interventions implemented as appropriate)  Reviewed plan of care with pt. Pt reported feeling anxious today. Ativan was administered to the pt. Pt complained about pain not being controlled. Pain medication was changed from q8h to q6h. Pt denies any shortness of breath and also refuses his breathing treatment scheduled at 4 pm. Pt is aware of perma cath placement and dialysis scheduled for Monday. Safety measures are in place, bed low and in lock position, call light in reach, bed alarm is on, and family remains at the bedside. Pt verbalizes full understanding of their plan of care.

## 2017-11-04 NOTE — NURSING
Notified the team that the pt complained of his pain medication being PRN q8h. Pt would like something for pain sooner. The team said that they would make the appropriate changes.

## 2017-11-04 NOTE — PROGRESS NOTES
[facility]  Progress Note    Patient Name: Gunner Motley  MRN: [id number]  Patient Class: IP- Inpatient   Admission Date: 11/1/2017  Length of Stay: 3 days  Attending Physician: Rocio Foster MD  Primary Care Provider: Kaushal Townsend MD        Subjective:     Principal Problem:Sepsis    Interval History: patient stepped to floor overnight. Today, patient has no complaints today. He denies chest pain, palpations, sob, cough, ab pain, n/v/d/c, fever or chills.     Review of Systems   Constitutional: Negative for activity change, chills, fatigue and fever.   HENT: Negative for congestion, rhinorrhea and sore throat.    Eyes: Negative for visual disturbance.   Respiratory: Negative for cough, chest tightness and shortness of breath.    Cardiovascular: Negative for chest pain, palpitations and leg swelling.   Gastrointestinal: Negative for abdominal pain, constipation, diarrhea, nausea and vomiting.   Genitourinary: Negative for dysuria and hematuria.   Musculoskeletal: Negative for arthralgias and myalgias.   Skin: Negative for rash.   Neurological: Negative for dizziness, light-headedness and headaches.   Psychiatric/Behavioral: Negative for agitation. The patient is not nervous/anxious.      Objective:     Vital Signs (Most Recent):  Temp: 97.7 °F (36.5 °C) (11/04/17 0443)  Pulse: 82 (11/04/17 0443)  Resp: 14 (11/04/17 0443)  BP: (!) 148/70 (11/04/17 0443)  SpO2: 96 % (11/04/17 0438) Vital Signs (24h Range):  Temp:  [97.7 °F (36.5 °C)-98.3 °F (36.8 °C)] 97.7 °F (36.5 °C)  Pulse:  [72-95] 82  Resp:  [14-37] 14  SpO2:  [91 %-100 %] 96 %  BP: ()/(48-81) 148/70     Weight: 73.4 kg (161 lb 13.1 oz)  Body mass index is 26.12 kg/m².    Intake/Output Summary (Last 24 hours) at 11/04/17 0724  Last data filed at 11/04/17 0500   Gross per 24 hour   Intake          2006.72 ml   Output             1825 ml   Net           181.72 ml      Physical Exam  Physical Exam   Constitutional:   Awake, alert and oriented x 3, NAD  HENT: ncat, mmm, perrla, eomi, neck normal rom and supple   Cardiovascular: RRR no mrg  Pulmonary/Chest: Effort normal, CTA b/l no wheezes, rales, rhonchi   Abdominal: Soft. +bs, ntnd  Musculoskeletal: Normal range of motion.  no edema, tenderness or deformity.   Neurological:  AAOx3, no focal deficits noted, CN II-XII grossly intact,  normal reflexes.   Skin: Skin is warm and dry. not diaphoretic.   Psychiatric:  normal mood and affect.  behavior is normal. Judgment and thought content normal.   Nursing note and vitals reviewed.    Significant Labs:   LABS  CBC    Recent Labs  Lab 11/02/17 0420 11/02/17  1141 11/03/17  0516 11/04/17  0434   WBC 7.55  --  4.95 6.08   RBC 2.69*  --  2.89* 2.78*   HGB 8.5* 8.9*  8.9* 8.6* 8.2*   HCT 24.7*  --  26.3* 25.3*     --  137* 166   MCV 92  --  91 91   MCH 31.6*  --  29.8 29.5   MCHC 34.4  --  32.7 32.4     BMP    Recent Labs  Lab 11/02/17 0420 11/03/17  0516 11/04/17  0434   * 134* 136   K 3.5 3.4* 3.7   CO2 25 24 22*   CL 98 101 106   BUN 41* 29* 22   CREATININE 4.8* 3.6* 3.0*   * 96 96         Recent Labs  Lab 11/02/17 0420 11/03/17  0516 11/04/17  0434   CALCIUM 7.6* 8.1* 8.1*   MG 1.1* 1.8 1.7   PHOS 3.2 3.1 2.1*     LFT    Recent Labs  Lab 11/02/17 0420 11/03/17  0516 11/04/17  0434   PROT 5.0* 5.3* 5.1*   ALBUMIN 2.1* 2.1* 1.9*   BILITOT 1.2* 1.0 0.8   AST 50* 48* 34   ALKPHOS 67 79 93   ALT 13 14 11       COAGS    Recent Labs  Lab 11/01/17  1212   INR 1.2   APTT 25.8     CE    Recent Labs  Lab 11/03/17  1751 11/03/17  2324 11/04/17  0434   TROPONINI 6.880* 7.534* 6.804*     BNP    Recent Labs  Lab 11/01/17  1212   BNP 4,102*  4,102*     LAST HbA1c  Lab Results   Component Value Date    HGBA1C 5.6 08/22/2017     11/3: IV cath cx x2: pending  11/3: gram stain: pending  11/3: aerobic cx abdominal fluid; no growth  11/3  Anaerobic cx abdominal fluid: pending  11/1 blood cx x 2 : Proteus mirabilis susceptibility  pending  11/2 bcx: no growth  11/4 bcx x 2 pending  11/1: ucx: no growth    Assessment/Plan:    72 y.o. male with CAD s/p CABG 2004, HTN, PHTN, ESRD with PD catheter and right CW catheter, DMII and normocytic anemia admitted for sepsis and found to have proteus bacteriemia also with elevated troponin and elevated by Cards with no plan for intervention 2/2 DNR status.     * Sepsis    - Presented with tachycardia, mild fever, hypertension.   - Sepsis protocol started in the ED  - S/p 500 bolus given in the ED then continued and now at 10ml/hr  - Blood cultures positive for proteus  repeat cx ordered today  - WBC normal, afebril  - Vanc and Zosyn started in the ED  Now on zosyn  - LA initial one 3.7, and trended down to 1.7  - Procalc 14.82 upon admit  - CXR unremarkable  - Dr. Jen Talamantes consulted and  PD cath and HD cath removal planned yesterday. Will follow up on the culture from the 2 sites. Plan for new HD cath placement on Monday.       ESRD on dialysis    Pt on dialysis M/W/F  S/p HD 11/2 and 11/3   BUN/Cr upon admission 31/4.0.  (close to baseline)  Will have PD and permacath removed and will place a new cath and next dialysis on Monday.           Essential hypertension    154/74,   Will continue to monitor        Chronic diastolic heart failure    Echo done a month ago with EF of 55% and DD  LVEF 50% per echo without WMA  BNP: 4102  CXR unremarkable  EKG : ST abnormality and possible inferior abnormality  Cards consulted  Will trend trop and EKGx 3  Continue ASA and statin.   Trops are trending down.           Coronary artery disease involving native coronary artery of native heart without angina pectoris    - elevated trop 14 on admission  - cards consulted  - no intervention at this time given DRN status            VTE Risk Mitigation         Ordered     heparin (porcine) injection 4,100 Units  As needed (PRN)     Route:  Intra-Catheter        11/02/17 1509     High Risk of VTE  Once      11/01/17 1553      Place BRAN hose  Until discontinued      11/01/17 1553     Place sequential compression device  Until discontinued      11/01/17 1553        Code: DNR  Diet: renal diet  Ppx: heparin and bran hose  Dispo: pending HD access and HD on monday    Flores Gamboa D.O.  [facility] HO-2  11/04/2017

## 2017-11-04 NOTE — PLAN OF CARE
Problem: Patient Care Overview  Goal: Plan of Care Review  Outcome: Ongoing (interventions implemented as appropriate)  Pt on RA with sats of 96. The proper method of use, as well as anticipated side effects, of this aerosol treatment are discussed and demonstrated to the patient. Will continue to monitor.

## 2017-11-04 NOTE — SUBJECTIVE & OBJECTIVE
Interval History: patient stepped to floor yesterday. Today...    Review of Systems   Constitutional: Negative for activity change, chills, diaphoresis, fatigue and fever.   HENT: Negative.  Negative for congestion, rhinorrhea and sore throat.    Eyes: Negative for visual disturbance.   Respiratory: Negative for cough, chest tightness, shortness of breath and wheezing.    Cardiovascular: Negative for chest pain, palpitations and leg swelling.   Gastrointestinal: Negative for abdominal pain, constipation, diarrhea, nausea and vomiting.   Genitourinary: Negative for difficulty urinating, dysuria, frequency, hematuria and urgency.   Musculoskeletal: Negative.  Negative for arthralgias and myalgias.   Skin: Negative.  Negative for rash.   Neurological: Negative for dizziness, weakness, light-headedness and headaches.   Psychiatric/Behavioral: Negative for agitation. The patient is not nervous/anxious.      Objective:     Vital Signs (Most Recent):  Temp: 97.7 °F (36.5 °C) (11/04/17 0443)  Pulse: 82 (11/04/17 0443)  Resp: 14 (11/04/17 0443)  BP: (!) 148/70 (11/04/17 0443)  SpO2: 96 % (11/04/17 0438) Vital Signs (24h Range):  Temp:  [97.7 °F (36.5 °C)-98.3 °F (36.8 °C)] 97.7 °F (36.5 °C)  Pulse:  [72-95] 82  Resp:  [14-37] 14  SpO2:  [91 %-100 %] 96 %  BP: ()/(48-81) 148/70     Weight: 73.4 kg (161 lb 13.1 oz)  Body mass index is 26.12 kg/m².    Intake/Output Summary (Last 24 hours) at 11/04/17 0724  Last data filed at 11/04/17 0500   Gross per 24 hour   Intake          2006.72 ml   Output             1825 ml   Net           181.72 ml      Physical Exam   Constitutional: He is oriented to person, place, and time. He appears well-developed and well-nourished. No distress.   HENT:   Head: Normocephalic and atraumatic.   Nose: Nose normal.   Mouth/Throat: No oropharyngeal exudate.   Eyes: Conjunctivae are normal. Right eye exhibits no discharge. Left eye exhibits no discharge.   Neck: Normal range of motion. Neck  supple. No JVD present. No tracheal deviation present.   Cardiovascular: Normal rate, regular rhythm, normal heart sounds and intact distal pulses.  Exam reveals no gallop and no friction rub.    No murmur heard.  Pulmonary/Chest: Effort normal. No accessory muscle usage or stridor. No tachypnea. No respiratory distress. He has no wheezes. He has no rales. He exhibits no tenderness.   Abdominal: Soft. Bowel sounds are normal. He exhibits no distension and no mass. There is no tenderness. There is no guarding.   Musculoskeletal: Normal range of motion. He exhibits no edema, tenderness or deformity.   Neurological: He is alert and oriented to person, place, and time.   Skin: Skin is warm and dry. No rash noted. He is not diaphoretic. No erythema. No pallor.        Sites of the cath removal packed with clean bandages with no induration, erythema and edema.    Nursing note and vitals reviewed.      Significant Labs:   LABS  CBC    Recent Labs  Lab 11/02/17 0420 11/02/17  1141 11/03/17 0516 11/04/17 0434   WBC 7.55  --  4.95 6.08   RBC 2.69*  --  2.89* 2.78*   HGB 8.5* 8.9*  8.9* 8.6* 8.2*   HCT 24.7*  --  26.3* 25.3*     --  137* 166   MCV 92  --  91 91   MCH 31.6*  --  29.8 29.5   MCHC 34.4  --  32.7 32.4     BMP    Recent Labs  Lab 11/02/17 0420 11/03/17  0516 11/04/17  0434   * 134* 136   K 3.5 3.4* 3.7   CO2 25 24 22*   CL 98 101 106   BUN 41* 29* 22   CREATININE 4.8* 3.6* 3.0*   * 96 96         Recent Labs  Lab 11/02/17 0420 11/03/17  0516 11/04/17  0434   CALCIUM 7.6* 8.1* 8.1*   MG 1.1* 1.8 1.7   PHOS 3.2 3.1 2.1*     LFT    Recent Labs  Lab 11/02/17 0420 11/03/17  0516 11/04/17  0434   PROT 5.0* 5.3* 5.1*   ALBUMIN 2.1* 2.1* 1.9*   BILITOT 1.2* 1.0 0.8   AST 50* 48* 34   ALKPHOS 67 79 93   ALT 13 14 11       COAGS    Recent Labs  Lab 11/01/17  1212   INR 1.2   APTT 25.8     CE    Recent Labs  Lab 11/03/17  1751 11/03/17  2324 11/04/17  0434   TROPONINI 6.880* 7.534* 6.804*      BNP    Recent Labs  Lab 11/01/17  1212   BNP 4,102*  4,102*     LAST HbA1c  Lab Results   Component Value Date    HGBA1C 5.6 08/22/2017         Significant Imaging: no new images

## 2017-11-04 NOTE — PROGRESS NOTES
[employer] renal fellow SHANIQUE      Subjective:      Had a good night sleep; denies cp/sob    States his pain is not adequately controlled      Objective:   Last 24 Hour Vital Signs:  BP  Min: 103/52  Max: 166/79  Temp  Av.1 °F (36.7 °C)  Min: 97.7 °F (36.5 °C)  Max: 98.3 °F (36.8 °C)  Pulse  Av.2  Min: 72  Max: 98  Resp  Av.6  Min: 14  Max: 26  SpO2  Av.7 %  Min: 92 %  Max: 98 %  I/O last 3 completed shifts:  In: 2286.7 [P.O.:985; I.V.:501.7; Other:400; IV Piggyback:400]  Out: 2125 [Urine:825; Other:1300]    Physical Examination:  GEN - AAOx4, NAD  CHEST - Bibasilar crackles  HEART - rrr, no m/r/s3/s4  ABD - soft; nonttp  EXTR - warm; no edema  NEURO  - no asterixis or focal deficits      Laboratory:  Laboratory   Pertinent Findings:    Recent Labs  Lab 17  0420 17  1141 17  0516 17  0434   WBC 7.55  --  4.95 6.08   HGB 8.5* 8.9*  8.9* 8.6* 8.2*   HCT 24.7*  --  26.3* 25.3*     --  137* 166   MCV 92  --  91 91   RDW 13.1  --  13.0 13.2   *  --  134* 136   K 3.5  --  3.4* 3.7   CL 98  --  101 106   CO2 25  --  24 22*   BUN 41*  --  29* 22   *  --  96 96   PROT 5.0*  --  5.3* 5.1*   ALBUMIN 2.1*  --  2.1* 1.9*   BILITOT 1.2*  --  1.0 0.8   AST 50*  --  48* 34   ALKPHOS 67  --  79 93   ALT 13  --  14 11             Current Medications:     Infusions:   sodium chloride 0.9% 10 mL/hr at 17 0231        Scheduled:   albuterol sulfate  2.5 mg Nebulization Q4H    allopurinol  100 mg Oral Daily    aspirin  81 mg Oral Daily    carvedilol  3.125 mg Oral BID    clopidogrel  75 mg Oral Daily    docusate sodium  100 mg Oral Daily    paricalcitol  0.1 mcg/kg Intravenous Every Mon, Wed, Fri    piperacillin-tazobactam 4.5 g in dextrose 5 % 100 mL IVPB (ready to mix system)  4.5 g Intravenous Q12H    pravastatin  40 mg Oral Daily    [START ON 2017] sertraline  50 mg Oral Daily    sodium chloride 0.9%  3 mL Intravenous Q8H    vitamin renal formula  (B-complex-vitamin c-folic acid)  1 capsule Oral Daily        PRN:  heparin (porcine), influenza, LORazepam, ondansetron, oxyCODONE-acetaminophen, ramelteon, sodium chloride 0.9%        Assessment/Plan     This is a 71 yo C male with ESRD/CAD s/p CABG/HTN/gout who is admitted for sepsis + GNR in blood; L-renal has been consulted for     ESRD  -etiol includes HTN/small vessel Dz; recent initiation 8/2017 PD->switched to HD 10/12/17 MWF [facility] - access R tunneled cath; has an appt next week for AVF placement -> will need to cancel this  -torrie HD yesterday with UF of 1.3L  -nepro and nephrocaps added; zemplar with HD  -will need new HD cath placed Monday after repeat BCx neg from today     GNR bacteremia  -pan sensitive proteus bacteremia; BCx through CVC neg; peritoneal fluid neg  -HD cath and PD cath to be removed 11/3/17  -rec de-escalating from zosyn to more narrow spectrum Abx     NSTEMI  -cards on board  -DAPT for now; will await recs for intervention VS medical therapy     Normocytic anemia  -will give epo 10K with HD  -hold IV iron for now in face of active infection      Gout  -ok to continue allopurinol 100mg daily     Papito Paniagua II  [employer] renal HO IV  [phone]

## 2017-11-04 NOTE — PLAN OF CARE
Problem: Patient Care Overview  Goal: Plan of Care Review  Outcome: Ongoing (interventions implemented as appropriate)  Anxiety improved with pain management and anti anxiety medication.  No evidence of pressure injury.  Dialysis performed without incident.  No further evidence of sepsis noted.  No evidence of infection progression. Cults sent of catheter and peritoneal fluid.  No fall injury.  POC glucose stable.

## 2017-11-04 NOTE — NURSING TRANSFER
Nursing Transfer Note      11/3/2017     Transfer To: [id number]    Transfer via wheelchair    Transfer with cardiac monitoring    Transported by Celena Rn/Jeane RN    Medicines sent: none in bin    Chart send with patient: Yes    Notified: daughter    Patient reassessed at: 11/03/2017, 2230     Upon arrival to floor: cardiac monitor applied, patient oriented to room, call bell in reach and bed in lowest position.  Nurse Ely at bedside.

## 2017-11-05 PROBLEM — R78.81 BACTEREMIA: Status: ACTIVE | Noted: 2017-01-01

## 2017-11-05 NOTE — PLAN OF CARE
Problem: Fall Risk (Adult)  Goal: Absence of Falls  Patient will demonstrate the desired outcomes by discharge/transition of care.   Outcome: Ongoing (interventions implemented as appropriate)  Bed alarm on and bed in lowest position. Call bell in reach. Instructed to call for assistance before getting out of bed. Verbalized understanding.

## 2017-11-05 NOTE — ASSESSMENT & PLAN NOTE
Initial BP was 155/90, but upon admission:  now 184/89.  Hydralazine 10 mg prn given for SBP >165  Will continue to monitor

## 2017-11-05 NOTE — PROGRESS NOTES
[employer] renal fellow SHANIQUE      Subjective:      Had a good night sleep; denies cp/sob    States his pain is now dequately controlled      Objective:   Last 24 Hour Vital Signs:  BP  Min: 139/63  Max: 190/93  Temp  Av.5 °F (36.4 °C)  Min: 96.7 °F (35.9 °C)  Max: 98 °F (36.7 °C)  Pulse  Av.4  Min: 65  Max: 88  Resp  Av.4  Min: 13  Max: 22  SpO2  Av.6 %  Min: 93 %  Max: 98 %  I/O last 3 completed shifts:  In: 934 [P.O.:825; I.V.:9; IV Piggyback:100]  Out: 525 [Urine:525]    Physical Examination:  GEN - AAOx4, NAD  CHEST - Bibasilar crackles  HEART - rrr, no m/r/s3/s4  ABD - soft; nonttp  EXTR - warm; no edema  NEURO  - no asterixis or focal deficits      Laboratory:  Laboratory   Pertinent Findings:    Recent Labs  Lab 17  0516 17  0434 17  0603   WBC 4.95 6.08 6.76   HGB 8.6* 8.2* 8.9*   HCT 26.3* 25.3* 26.9*   * 166 188   MCV 91 91 90   RDW 13.0 13.2 13.2   * 136 137   K 3.4* 3.7 3.7    106 106   CO2 24 22* 20*   BUN 29* 22 32*   GLU 96 96 87   PROT 5.3* 5.1* 5.5*   ALBUMIN 2.1* 1.9* 2.2*   BILITOT 1.0 0.8 0.6   AST 48* 34 26   ALKPHOS 79 93 125   ALT 14 11 12             Current Medications:     Infusions:        Scheduled:   albuterol sulfate  2.5 mg Nebulization Q6H WAKE    allopurinol  100 mg Oral Daily    amoxicillin-clavulanate 875-125mg  1 tablet Oral Q12H    aspirin  81 mg Oral Daily    carvedilol  3.125 mg Oral BID    clopidogrel  75 mg Oral Daily    docusate sodium  100 mg Oral Daily    paricalcitol  0.1 mcg/kg Intravenous Every Mon, Wed, Fri    pravastatin  40 mg Oral Daily    sertraline  50 mg Oral Daily    sodium chloride 0.9%  3 mL Intravenous Q8H    vitamin renal formula (B-complex-vitamin c-folic acid)  1 capsule Oral Daily        PRN:  heparin (porcine), influenza, LORazepam, ondansetron, oxyCODONE-acetaminophen, ramelteon, sodium chloride 0.9%        Assessment/Plan     This is a 71 yo C male with ESRD/CAD s/p CABG/HTN/gout who is  admitted for sepsis + GNR in blood; L-renal has been consulted for     ESRD  -etiol includes HTN/small vessel Dz; recent initiation 8/2017 PD->switched to HD 10/12/17 MWF [facility] - access R tunneled cath; has an appt next week for AVF placement -> will need to cancel this  -will need new HD cath placed Monday after repeat BCx neg from 11/4/17; plan on HD in AM once permacath placed   -nepro and nephrocaps added; zemplar with HD     GNR bacteremia  -pan sensitive proteus bacteremia; BCx through CVC neg; peritoneal fluid neg  -HD cath and PD cath to be removed 11/3/17  -cont Abx per primary     NSTEMI  -cards on board  -DAPT for now; will await recs for intervention VS medical therapy     Normocytic anemia  -will give epo 10K with HD  -hold IV iron for now in face of active infection      Gout  -ok to continue allopurinol 100mg daily     Papito Paniagua II  [employer] renal HO IV  [phone]

## 2017-11-05 NOTE — ASSESSMENT & PLAN NOTE
Upon admission: Presented with tachycardia, mild fever, hypertension.   Sepsis protocol started in the ED  Blood cultures, sputum cultures, gram stain, UA, urine cultures pending  WBC normal   Vanc and Zosyn started in the ED  LA initial one 3.7, and trended down to 1.7  Procalc 14.82 upon admit  TSH normal   CXR unremarkable  Mg, phos and K low: will replete.   Pt-inr: PT 13.1 and inr 1.2  Influenza: negative.   S/p 500 bolus given in the ED  Will continue to give IVF 30mg/kg  Lasix 80 mg given once   PD cath and HD cath removed yesterday.   Culture was positive for Proteus which is pansensitive.   Antibiotics switched to Augmentin  Second set of cultures after the cath removal are pending.

## 2017-11-05 NOTE — PROGRESS NOTES
[facility] Hospital Medicine  Progress Note    Patient Name: Gunner Motley  MRN: [id number]  Patient Class: IP- Inpatient   Admission Date: 11/1/2017  Length of Stay: 4 days  Attending Physician: Rocio Foster MD  Primary Care Provider: Kaushal Townsend MD        Subjective:     Principal Problem:Sepsis    HPI:  72 year old male with a PMH of ESRD (on dialysis), CHF, CAD, UTI, Diverticulitis and HTN presented to the ED due to elevated BP noted at his dialysis clinic appointment. Patient states that he has been having fever and chills, nausea and vomiting, a headache and decreased appetite for about a month, but has been worse for the past 2-3 days. He states he has vomited 6 times in the last 24 hours. Vomitus is nonbloody. He also complains of LUQ pain that is dull, non-radiating and has been hurting for about 2 months. He also complains of SOB that has been going on for a month as well as chills and diaphoresis since this morning. He has burning on urination, and has urinary urgency. He has regular bowel movements, most recently was this morning and was normal. He denies sick contacts.  He denies chest pain, hemoptysis, palpitations, productive cough or rash. He has a dialysis catheter in his right anterior chest that was placed one month ago, and he currently has a PD cath, but states it is scheduled to be removed because it is no longer functioning. Since starting dialysis he endorses weight loss. He takes his prescription medications as prescribed.  He did mention he is not taking Lasix anymore as he was told not to take it a month ago.     Hospital Course:  Pt had PD and permacath removed yesterday and has been doing well afterwards. His cultures grew positive for proteus and it was pansensitive, so he is started on Augmentin.  Pt was complaining of pain in the site of the PD cath, which has been resolved now with the pain regimen. Pt denies N/V/F/C/CP/SOB/abdominal pain.     Interval  History: patient stepped to floor yesterday. Today...    Review of Systems   Constitutional: Negative for activity change, chills, diaphoresis, fatigue and fever.   HENT: Negative.  Negative for congestion, rhinorrhea and sore throat.    Eyes: Negative for visual disturbance.   Respiratory: Negative for cough, chest tightness, shortness of breath and wheezing.    Cardiovascular: Negative for chest pain, palpitations and leg swelling.   Gastrointestinal: Negative for abdominal pain, constipation, diarrhea, nausea and vomiting.   Genitourinary: Negative for difficulty urinating, dysuria, frequency, hematuria and urgency.   Musculoskeletal: Negative.  Negative for arthralgias and myalgias.   Skin: Negative.  Negative for rash.   Neurological: Negative for dizziness, weakness, light-headedness and headaches.   Psychiatric/Behavioral: Negative for agitation. The patient is not nervous/anxious.      Objective:     Vital Signs (Most Recent):  Temp: 97.7 °F (36.5 °C) (11/04/17 0443)  Pulse: 82 (11/04/17 0443)  Resp: 14 (11/04/17 0443)  BP: (!) 148/70 (11/04/17 0443)  SpO2: 96 % (11/04/17 0438) Vital Signs (24h Range):  Temp:  [97.7 °F (36.5 °C)-98.3 °F (36.8 °C)] 97.7 °F (36.5 °C)  Pulse:  [72-95] 82  Resp:  [14-37] 14  SpO2:  [91 %-100 %] 96 %  BP: ()/(48-81) 148/70     Weight: 73.4 kg (161 lb 13.1 oz)  Body mass index is 26.12 kg/m².    Intake/Output Summary (Last 24 hours) at 11/04/17 0724  Last data filed at 11/04/17 0500   Gross per 24 hour   Intake          2006.72 ml   Output             1825 ml   Net           181.72 ml      Physical Exam   Constitutional: He is oriented to person, place, and time. He appears well-developed and well-nourished. No distress.   HENT:   Head: Normocephalic and atraumatic.   Nose: Nose normal.   Mouth/Throat: No oropharyngeal exudate.   Eyes: Conjunctivae are normal. Right eye exhibits no discharge. Left eye exhibits no discharge.   Neck: Normal range of motion. Neck supple. No JVD  present. No tracheal deviation present.   Cardiovascular: Normal rate, regular rhythm, normal heart sounds and intact distal pulses.  Exam reveals no gallop and no friction rub.    No murmur heard.  Pulmonary/Chest: Effort normal. No accessory muscle usage or stridor. No tachypnea. No respiratory distress. He has no wheezes. He has no rales. He exhibits no tenderness.   Abdominal: Soft. Bowel sounds are normal. He exhibits no distension and no mass. There is no tenderness. There is no guarding.   Musculoskeletal: Normal range of motion. He exhibits no edema, tenderness or deformity.   Neurological: He is alert and oriented to person, place, and time.   Skin: Skin is warm and dry. No rash noted. He is not diaphoretic. No erythema. No pallor.        Sites of the cath removal packed with clean bandages with no induration, erythema and edema.    Nursing note and vitals reviewed.      Significant Labs:   LABS  CBC    Recent Labs  Lab 11/02/17 0420 11/02/17  1141 11/03/17 0516 11/04/17  0434   WBC 7.55  --  4.95 6.08   RBC 2.69*  --  2.89* 2.78*   HGB 8.5* 8.9*  8.9* 8.6* 8.2*   HCT 24.7*  --  26.3* 25.3*     --  137* 166   MCV 92  --  91 91   MCH 31.6*  --  29.8 29.5   MCHC 34.4  --  32.7 32.4     BMP    Recent Labs  Lab 11/02/17 0420 11/03/17  0516 11/04/17  0434   * 134* 136   K 3.5 3.4* 3.7   CO2 25 24 22*   CL 98 101 106   BUN 41* 29* 22   CREATININE 4.8* 3.6* 3.0*   * 96 96         Recent Labs  Lab 11/02/17 0420 11/03/17  0516 11/04/17  0434   CALCIUM 7.6* 8.1* 8.1*   MG 1.1* 1.8 1.7   PHOS 3.2 3.1 2.1*     LFT    Recent Labs  Lab 11/02/17 0420 11/03/17  0516 11/04/17  0434   PROT 5.0* 5.3* 5.1*   ALBUMIN 2.1* 2.1* 1.9*   BILITOT 1.2* 1.0 0.8   AST 50* 48* 34   ALKPHOS 67 79 93   ALT 13 14 11       COAGS    Recent Labs  Lab 11/01/17  1212   INR 1.2   APTT 25.8     CE    Recent Labs  Lab 11/03/17  1751 11/03/17  2324 11/04/17  0434   TROPONINI 6.880* 7.534* 6.804*     BNP    Recent Labs  Lab  11/01/17  1212   BNP 4,102*  4,102*     LAST HbA1c  Lab Results   Component Value Date    HGBA1C 5.6 08/22/2017         Significant Imaging: no new images    Assessment/Plan:      * Bacteremia    Upon admission: Presented with tachycardia, mild fever, hypertension.   Sepsis protocol started in the ED  Blood cultures, sputum cultures, gram stain, UA, urine cultures pending  WBC normal   Vanc and Zosyn started in the ED  LA initial one 3.7, and trended down to 1.7  Procalc 14.82 upon admit  TSH normal   CXR unremarkable  Mg, phos and K low: will replete.   Pt-inr: PT 13.1 and inr 1.2  Influenza: negative.   S/p 500 bolus given in the ED  Will continue to give IVF 30mg/kg  Lasix 80 mg given once   PD cath and HD cath removed yesterday.   Culture was positive for Proteus which is pansensitive.   Antibiotics switched to Augmentin  Second set of cultures after the cath removal are pending.                     ESRD on dialysis    Pt on dialysis M/W/F  Permacath and PD cath removed.   Dialysis planned for [facility] after he gets the Permacath placed.   BUN/Cr upon admission 32/4.1.  (close to baseline)              Essential hypertension    Initial BP was 155/90, but upon admission:  now 184/89.  Hydralazine 10 mg prn given for SBP >165  Will continue to monitor            Chronic diastolic heart failure    Echo done a month ago with EF of 55% and DD  LVEF 50% per echo without WMA  BNP: 4102  CXR unremarkable  EKG : ST abnormality and possible inferior abnormality  Cards consulted  Will trend trop and EKGx 3  Continue ASA and statin.   Trops are trending down.           Coronary artery disease involving native coronary artery of native heart without angina pectoris                VTE Risk Mitigation         Ordered     heparin (porcine) injection 4,100 Units  As needed (PRN)     Route:  Intra-Catheter        11/02/17 1509     High Risk of VTE  Once      11/01/17 1553     Place ANDREZ hose  Until discontinued      11/01/17 6007      Place sequential compression device  Until discontinued      11/01/17 1553              Holland Stringer MD  Department of Hospital Medicine   [facility]

## 2017-11-05 NOTE — ANESTHESIA PREPROCEDURE EVALUATION
11/05/2017  Gunner Motley is a 72 y.o., male with ESRD scheduled for dialysis catheter placement under local/MAC.  -Since admission on 11/1/17, pt has been treated for bacteremia, also had NSTEMI and is being treated with DAPT.   -last had dialysis on Friday    PMH:  ESRD, previously doing PD- had line infection and since been removed  HTN  CAD s/p CABG x4 vessels   -NSTEMI this admission  CHF  Anemia  Gout    Past Surgical History:   Procedure Laterality Date    APPENDECTOMY      CARDIAC SURGERY  2005    CABG x4 vessels    HERNIA REPAIR  2002    umbilical hernia    KIDNEY STONE SURGERY  1983    abdominal    PERITONEAL CATHETER INSERTION Left 08/28/2017    abdomen     Review of patient's allergies indicates:  No Known Allergies      Anesthesia Evaluation    I have reviewed the Patient Summary Reports.    I have reviewed the Nursing Notes.      Review of Systems  Anesthesia Hx:  No problems with previous Anesthesia  History of prior surgery of interest to airway management or planning:  Denies Personal Hx of Anesthesia complications.   Social:  Former Smoker    Hematology/Oncology:         -- Anemia:   EENT/Dental:EENT/Dental Normal   Cardiovascular:   Exercise tolerance: poor Hypertension Valvular problems/Murmurs (mild aortic regurg) Past MI CAD (2004, 4 vessel CABG)   CHF (with preserved EF)    Pulmonary:     Pulm HTN   Renal/:   Chronic Renal Disease, ESRD, Dialysis    Hepatic/GI:  Hepatic/GI Normal    Neurological:  Neurology Normal    Endocrine:   Diabetes        Physical Exam  General:  Well nourished    Airway/Jaw/Neck:  Airway Findings: Mouth Opening: Normal Tongue: Normal  General Airway Assessment: Adult  Mallampati: II  TM Distance: Normal, at least 6 cm         Dental:  DENTAL FINDINGS: Normal   Chest/Lungs:  Chest/Lungs Findings: Normal Respiratory Rate     Heart/Vascular:  Heart  Findings: Rate: Normal  Rhythm: Regular Rhythm  Sounds: Normal     Abdomen:  Abdomen Findings: Normal      Mental Status:  Mental Status Findings:  Alert and Oriented, Cooperative       Lab Results   Component Value Date    WBC 6.76 11/05/2017    HGB 8.9 (L) 11/05/2017    HCT 26.9 (L) 11/05/2017    MCV 90 11/05/2017     11/05/2017       Chemistry        Component Value Date/Time     11/05/2017 0603    K 3.7 11/05/2017 0603     11/05/2017 0603    CO2 20 (L) 11/05/2017 0603    BUN 32 (H) 11/05/2017 0603    CREATININE 4.1 (H) 11/05/2017 0603    GLU 87 11/05/2017 0603        Component Value Date/Time    CALCIUM 8.5 (L) 11/05/2017 0603    ALKPHOS 125 11/05/2017 0603    AST 26 11/05/2017 0603    ALT 12 11/05/2017 0603    BILITOT 0.6 11/05/2017 0603    ESTGFRAFRICA 15 (A) 11/05/2017 0603    EGFRNONAA 13 (A) 11/05/2017 0603          Wt Readings from Last 3 Encounters:   11/03/17 73.4 kg (161 lb 13.1 oz)   10/31/17 70.3 kg (155 lb)   10/19/17 72.4 kg (159 lb 9.8 oz)     Temp Readings from Last 3 Encounters:   11/05/17 36.4 °C (97.5 °F) (Oral)   10/31/17 36.9 °C (98.5 °F) (Oral)   10/19/17 36.3 °C (97.3 °F) (Oral)     BP Readings from Last 3 Encounters:   11/05/17 (!) 184/89   10/31/17 (!) 164/89   10/19/17 119/74     Pulse Readings from Last 3 Encounters:   11/05/17 86   10/31/17 87   10/19/17 93     2D Echo 11/1/2017  CONCLUSIONS     1 - Low normal to mildly depressed left ventricular systolic function (EF 50-55%).     2 - No wall motion abnormalities.     3 - Concentric hypertrophy.     4 - Normal right ventricular systolic function .     5 - The estimated PA systolic pressure is greater than 27 mmHg.     6 - Mild aortic regurgitation.     EKG 11/2/17  Sinus rhythm with occasional Premature ventricular complexes and Premature  atrial complexes  Septal infarct (cited on or before 01-NOV-2017)  Prolonged QT  Abnormal ECG  When compared with ECG of 02-NOV-2017 01:02,      Anesthesia Plan  Type of  Anesthesia, risks & benefits discussed:  Anesthesia Type:  MAC  Patient's Preference:   Intra-op Monitoring Plan: standard ASA monitors  Intra-op Monitoring Plan Comments:   Post Op Pain Control Plan: per primary service following discharge from PACU and IV/PO Opioids PRN  Post Op Pain Control Plan Comments:   Induction:   IV  Beta Blocker:  Patient is on a Beta-Blocker and has received one dose within the past 24 hours (No further documentation required).       Informed Consent: Patient understands risks and agrees with Anesthesia plan.  Questions answered. Anesthesia consent signed with patient.  ASA Score: 4     Day of Surgery Review of History & Physical: I have interviewed and examined the patient. I have reviewed the patient's H&P dated:  There are no significant changes.          Ready For Surgery From Anesthesia Perspective.

## 2017-11-05 NOTE — PLAN OF CARE
Problem: Patient Care Overview  Goal: Plan of Care Review  Outcome: Ongoing (interventions implemented as appropriate)  Reviewed plan of care with pt. Will continue to monitor blood pressure and manage pain. Pt is aware of scheduled surgery tomorrow for Perma cath placement. Pt denies any chest pain or sob. Safety measures are in place, bed low and in lock position, call light in reach, and bed alarm is on. Pt verbalizes full understanding of their plan of care.

## 2017-11-05 NOTE — PLAN OF CARE
Problem: Pain, Chronic (Adult)  Goal: Acceptable Pain Control/Comfort Level  Patient will demonstrate the desired outcomes by discharge/transition of care.   Assess for pain and medicate as needed.

## 2017-11-05 NOTE — PLAN OF CARE
No true red alarms noted on telemetry.c/o pain during shift, but refused to take medication because of ordered frequency.Bp elevated this am MD notified and stated I will change the.frequency on oxycodone.

## 2017-11-05 NOTE — ASSESSMENT & PLAN NOTE
Pt on dialysis M/W/F  Permacath and PD cath removed.   Dialysis planned for [facility] after he gets the Permacath placed.   BUN/Cr upon admission 32/4.1.  (close to baseline)

## 2017-11-06 NOTE — PLAN OF CARE
""Ok to release to room", per Dr Painter. Report called to Kristen Huerta RN, with time allotted for questions. Pt aaox3 states pain getting better. Transported to room by paulina Patel.   "

## 2017-11-06 NOTE — PLAN OF CARE
Problem: Physical Therapy Goal  Goal: Physical Therapy Goal  Outcome: Outcome(s) achieved Date Met: 11/05/17  PT evaluation was completed. Pt is independent with bed mobility, transfers and gait without AD up to 200 ft. Furthermore pt  doesn't exhibit any strength or balance deficits warranting PT services at this time.

## 2017-11-06 NOTE — SUBJECTIVE & OBJECTIVE
Interval History:     Review of Systems   Constitutional: Negative for activity change, chills, diaphoresis, fatigue and fever.   HENT: Negative.  Negative for congestion, rhinorrhea and sore throat.    Eyes: Negative for visual disturbance.   Respiratory: Negative for cough, chest tightness, shortness of breath and wheezing.    Cardiovascular: Negative for chest pain, palpitations and leg swelling.   Gastrointestinal: Negative for abdominal pain, constipation, diarrhea, nausea and vomiting.   Genitourinary: Negative for difficulty urinating, dysuria, frequency, hematuria and urgency.   Musculoskeletal: Negative.  Negative for arthralgias and myalgias.   Skin: Negative.  Negative for rash.   Neurological: Negative for dizziness, weakness, light-headedness and headaches.   Psychiatric/Behavioral: Negative for agitation. The patient is not nervous/anxious.      Objective:     Vital Signs (Most Recent):  Temp: 97.6 °F (36.4 °C) (11/06/17 0736)  Pulse: 76 (11/06/17 0736)  Resp: 20 (11/06/17 0736)  BP: (!) 180/77 (11/06/17 0736)  SpO2: 95 % (11/06/17 0725) Vital Signs (24h Range):  Temp:  [97.5 °F (36.4 °C)-98 °F (36.7 °C)] 97.6 °F (36.4 °C)  Pulse:  [75-83] 76  Resp:  [14-20] 20  SpO2:  [91 %-96 %] 95 %  BP: (130-194)/(77-98) 180/77     Weight: 73.4 kg (161 lb 13.1 oz)  Body mass index is 26.12 kg/m².    Intake/Output Summary (Last 24 hours) at 11/06/17 0947  Last data filed at 11/06/17 0822   Gross per 24 hour   Intake              246 ml   Output             1050 ml   Net             -804 ml      Physical Exam   Constitutional: He is oriented to person, place, and time. He appears well-developed and well-nourished. No distress.   HENT:   Head: Normocephalic and atraumatic.   Nose: Nose normal.   Mouth/Throat: No oropharyngeal exudate.   Eyes: Conjunctivae are normal. Right eye exhibits no discharge. Left eye exhibits no discharge.   Neck: Normal range of motion. Neck supple. No JVD present. No tracheal deviation  present.   Cardiovascular: Normal rate, regular rhythm, normal heart sounds and intact distal pulses.  Exam reveals no gallop and no friction rub.    No murmur heard.  Pulmonary/Chest: Effort normal. No accessory muscle usage or stridor. No tachypnea. No respiratory distress. He has no wheezes. He has no rales. He exhibits no tenderness.   Abdominal: Soft. Bowel sounds are normal. He exhibits no distension and no mass. There is no tenderness. There is no guarding.   Musculoskeletal: Normal range of motion. He exhibits no edema, tenderness or deformity.   Neurological: He is alert and oriented to person, place, and time.   Skin: Skin is warm and dry. No rash noted. He is not diaphoretic. No erythema. No pallor.        Sites of the cath removal packed with clean bandages with no induration, erythema and edema.    Nursing note and vitals reviewed.      Significant Labs:   CBC:   Recent Labs  Lab 11/05/17  0603 11/06/17  0600   WBC 6.76 7.13   HGB 8.9* 8.9*   HCT 26.9* 27.3*    196     CMP:   Recent Labs  Lab 11/05/17  0603 11/06/17  0600    135*   K 3.7 3.5    105   CO2 20* 21*   GLU 87 100   BUN 32* 38*   CREATININE 4.1* 4.4*   CALCIUM 8.5* 8.2*   PROT 5.5* 5.3*   ALBUMIN 2.2* 2.2*   BILITOT 0.6 0.5   ALKPHOS 125 133   AST 26 20   ALT 12 12   ANIONGAP 11 9   EGFRNONAA 13* 12*       Significant Imaging: I have reviewed all pertinent imaging results/findings within the past 24 hours.

## 2017-11-06 NOTE — PROGRESS NOTES
.Pharmacy New Medication Education    Patient accepted medication education.    Pharmacy educated patient on name and purpose of medications and possible side effects, using the teach-back method.     Albuterol  Allopurinol  Augmentin  Asa  Coreg  Clopidogrel  Colace  Heparin  Hydralazine  Lorazepam  Zofran  Perccoet  Zemplar  Pravastatin  Ramelteon  Sertraline  nephrocap    Learners of pharmacy medication education included:  patient    Patient +/- learner response:  teachback

## 2017-11-06 NOTE — PLAN OF CARE
Problem: Infection, Risk/Actual (Adult)  Goal: Infection Prevention/Resolution  Patient will demonstrate the desired outcomes by discharge/transition of care.   Administer antibiotic as ordered.

## 2017-11-06 NOTE — ANESTHESIA POSTPROCEDURE EVALUATION
"Anesthesia Post Evaluation    Patient: Gunner Motley    Procedure(s) Performed: Procedure(s) (LRB):  INSERTION-CATHETER-DIALYSIS (N/A)    Final Anesthesia Type: general  Patient location during evaluation: PACU  Patient participation: Yes- Able to Participate  Level of consciousness: awake and alert, oriented and awake  Post-procedure vital signs: reviewed and stable  Pain management: adequate  Airway patency: patent  PONV status at discharge: No PONV  Anesthetic complications: no      Cardiovascular status: blood pressure returned to baseline  Respiratory status: unassisted and room air  Hydration status: euvolemic  Follow-up not needed.        Visit Vitals  BP (!) 147/72   Pulse (!) 52   Temp 36.2 °C (97.2 °F) (Skin)   Resp 13   Ht 5' 6" (1.676 m)   Wt 73.4 kg (161 lb 13.1 oz)   SpO2 97%   BMI 26.12 kg/m²       Pain/Beryl Score: Pain Assessment Performed: Yes (11/6/2017 10:22 AM)  Presence of Pain: complains of pain/discomfort (11/6/2017 11:00 AM)  Pain Rating Prior to Med Admin: 6 (11/6/2017 10:53 AM)  Beryl Score: 10 (11/6/2017 11:00 AM)      "

## 2017-11-06 NOTE — PLAN OF CARE
Problem: Patient Care Overview  Goal: Plan of Care Review  Administer nebulizer treatments as ordered and encourage deep breathing / cough.  Continued monitoring of patient's oxygenation status.

## 2017-11-06 NOTE — PLAN OF CARE
Problem: Patient Care Overview  Goal: Plan of Care Review  Outcome: Ongoing (interventions implemented as appropriate)  Pt safety maintained. Bed in low and locked position. No acute distress noted. Pt went for hemodialysis catheter placement and had a 3hr treatment. Three liters of fluid was removed. Pt went to CT of the abdomen. Pt NSR with frequent PVCs. Pt received pain medication this shift. Pt dm being managed with medications and diet. Will continue to monitor.

## 2017-11-06 NOTE — PLAN OF CARE
Problem: Patient Care Overview  Goal: Plan of Care Review  No more c/o pain.NPO for permacath placement today. Troponin result keep trending down.no true red alarm noted on telemetry. HR 72-88 noted. Will continue to monitor.

## 2017-11-06 NOTE — PROGRESS NOTES
[facility] Hospital Medicine  Progress Note    Patient Name: Gunner Motley  MRN: [id number]  Patient Class: IP- Inpatient   Admission Date: 11/1/2017  Length of Stay: 5 days  Attending Physician: Rocio Foster MD  Primary Care Provider: Kaushal Townsend MD        Subjective:     Principal Problem:Sepsis    HPI:  72 year old male with a PMH of ESRD (on dialysis), CHF, CAD, UTI, Diverticulitis and HTN presented to the ED due to elevated BP noted at his dialysis clinic appointment. Patient states that he has been having fever and chills, nausea and vomiting, a headache and decreased appetite for about a month, but has been worse for the past 2-3 days. He states he has vomited 6 times in the last 24 hours. Vomitus is nonbloody. He also complains of LUQ pain that is dull, non-radiating and has been hurting for about 2 months. He also complains of SOB that has been going on for a month as well as chills and diaphoresis since this morning. He has burning on urination, and has urinary urgency. He has regular bowel movements, most recently was this morning and was normal. He denies sick contacts.  He denies chest pain, hemoptysis, palpitations, productive cough or rash. He has a dialysis catheter in his right anterior chest that was placed one month ago, and he currently has a PD cath, but states it is scheduled to be removed because it is no longer functioning. Since starting dialysis he endorses weight loss. He takes his prescription medications as prescribed.  He did mention he is not taking Lasix anymore as he was told not to take it a month ago.     Hospital Course:  Pt is signed up to get his permacath today. He has been in a good state of health. He was complaining of mild abdominal pain this AM. His cultures grew positive for proteus and it was pansensitive, so he is started on Augmentin.  Pt was complaining of pain in the site of the PD cath, which has been resolved now with the pain regimen.  Pt denies N/V/F/C/CP/SOB/abdominal pain.     Interval History:     Review of Systems   Constitutional: Negative for activity change, chills, diaphoresis, fatigue and fever.   HENT: Negative.  Negative for congestion, rhinorrhea and sore throat.    Eyes: Negative for visual disturbance.   Respiratory: Negative for cough, chest tightness, shortness of breath and wheezing.    Cardiovascular: Negative for chest pain, palpitations and leg swelling.   Gastrointestinal: Negative for abdominal pain, constipation, diarrhea, nausea and vomiting.   Genitourinary: Negative for difficulty urinating, dysuria, frequency, hematuria and urgency.   Musculoskeletal: Negative.  Negative for arthralgias and myalgias.   Skin: Negative.  Negative for rash.   Neurological: Negative for dizziness, weakness, light-headedness and headaches.   Psychiatric/Behavioral: Negative for agitation. The patient is not nervous/anxious.      Objective:     Vital Signs (Most Recent):  Temp: 97.6 °F (36.4 °C) (11/06/17 0736)  Pulse: 76 (11/06/17 0736)  Resp: 20 (11/06/17 0736)  BP: (!) 180/77 (11/06/17 0736)  SpO2: 95 % (11/06/17 0725) Vital Signs (24h Range):  Temp:  [97.5 °F (36.4 °C)-98 °F (36.7 °C)] 97.6 °F (36.4 °C)  Pulse:  [75-83] 76  Resp:  [14-20] 20  SpO2:  [91 %-96 %] 95 %  BP: (130-194)/(77-98) 180/77     Weight: 73.4 kg (161 lb 13.1 oz)  Body mass index is 26.12 kg/m².    Intake/Output Summary (Last 24 hours) at 11/06/17 0947  Last data filed at 11/06/17 0822   Gross per 24 hour   Intake              246 ml   Output             1050 ml   Net             -804 ml      Physical Exam   Constitutional: He is oriented to person, place, and time. He appears well-developed and well-nourished. No distress.   HENT:   Head: Normocephalic and atraumatic.   Nose: Nose normal.   Mouth/Throat: No oropharyngeal exudate.   Eyes: Conjunctivae are normal. Right eye exhibits no discharge. Left eye exhibits no discharge.   Neck: Normal range of motion. Neck  supple. No JVD present. No tracheal deviation present.   Cardiovascular: Normal rate, regular rhythm, normal heart sounds and intact distal pulses.  Exam reveals no gallop and no friction rub.    No murmur heard.  Pulmonary/Chest: Effort normal. No accessory muscle usage or stridor. No tachypnea. No respiratory distress. He has no wheezes. He has no rales. He exhibits no tenderness.   Abdominal: Soft. Bowel sounds are normal. He exhibits no distension and no mass. There is no tenderness. There is no guarding.   Musculoskeletal: Normal range of motion. He exhibits no edema, tenderness or deformity.   Neurological: He is alert and oriented to person, place, and time.   Skin: Skin is warm and dry. No rash noted. He is not diaphoretic. No erythema. No pallor.        Sites of the cath removal packed with clean bandages with no induration, erythema and edema.    Nursing note and vitals reviewed.      Significant Labs:   CBC:   Recent Labs  Lab 11/05/17  0603 11/06/17  0600   WBC 6.76 7.13   HGB 8.9* 8.9*   HCT 26.9* 27.3*    196     CMP:   Recent Labs  Lab 11/05/17  0603 11/06/17  0600    135*   K 3.7 3.5    105   CO2 20* 21*   GLU 87 100   BUN 32* 38*   CREATININE 4.1* 4.4*   CALCIUM 8.5* 8.2*   PROT 5.5* 5.3*   ALBUMIN 2.2* 2.2*   BILITOT 0.6 0.5   ALKPHOS 125 133   AST 26 20   ALT 12 12   ANIONGAP 11 9   EGFRNONAA 13* 12*       Significant Imaging: I have reviewed all pertinent imaging results/findings within the past 24 hours.    Assessment/Plan:      * Bacteremia    Upon admission: Presented with tachycardia, mild fever, hypertension.   Sepsis protocol started in the ED  Blood cultures, sputum cultures, gram stain, UA, urine cultures pending  WBC normal   Vanc and Zosyn started in the ED  LA initial one 3.7, and trended down to 1.7  Procalc 14.82 upon admit  TSH normal   CXR unremarkable  Mg, phos and K low: will replete.   Pt-inr: PT 13.1 and inr 1.2  Influenza: negative.   S/p 500 bolus given in  the ED  Will continue to give IVF 30mg/kg  Lasix 80 mg given once   PD cath and HD cath removed yesterday.   Culture was positive for Proteus which is pansensitive.   Antibiotics switched to Augmentin  Second set of cultures after the cath removal are pending.   Pt will get his temp permacath today and HD afterwards.                     Bacteremia              ESRD on dialysis    Pt on dialysis M/W/F  Permacath and PD cath removed.   Dialysis planned for today after he gets the Permacath placed.   BUN/Cr upon admission 38/4.4.  (close to baseline)              Essential hypertension    Initial BP was 155/90, but upon admission:  now 180/76  Hydralazine 10 mg prn given for SBP >165  Will continue to monitor            Chronic diastolic heart failure    Echo done a month ago with EF of 55% and DD  LVEF 50% per echo without WMA  BNP: 4102  CXR unremarkable  EKG : ST abnormality and possible inferior abnormality  Cards consulted  Will trend trop and EKGx 3  Continue ASA and statin.   Trops are trending down.           Coronary artery disease involving native coronary artery of native heart without angina pectoris                VTE Risk Mitigation         Ordered     heparin (porcine) injection  As needed (PRN)      11/06/17 0911     heparin (porcine) injection 4,100 Units  As needed (PRN)     Route:  Intra-Catheter        11/02/17 1509     High Risk of VTE  Once      11/01/17 1553     Place ANDREZ hose  Until discontinued      11/01/17 1553     Place sequential compression device  Until discontinued      11/01/17 1553              Holland Stringer MD  Department of Hospital Medicine   [facility]

## 2017-11-06 NOTE — PLAN OF CARE
Problem: Patient Care Overview  Goal: Plan of Care Review  Pt received on RA , spo2 96%. No apparent distress noted. Will continue to monitor.

## 2017-11-06 NOTE — NURSING
Primary team notified of PVC frequency. Cardiology already consulted. No new orders at this time.

## 2017-11-06 NOTE — OP NOTE
DATE OF PROCEDURE:  11/06/2017    PREOPERATIVE DIAGNOSES:   Acute on chronic renal failure.    POSTOPERATIVE DIAGNOSES:  Acute on chronic renal failure.    PROCEDURE:  Insertion of left internal jugular vein permanent dialysis   catheter.    SURGEON:  Catracho Escudero M.D.    ASSISTANT:  None.    ANESTHESIA:  MAC.    PREP:  Chlorhexidine.    IMPLANT: 16F 24cm double lumen permanent dialysis catheter    SPECIMEN:  None.    ESTIMATED BLOOD LOSS:  Minimal.    INDICATIONS:  The patient is an 72M on HD recently admitted for bacteremia, fevers.  His PD catheter and right tunnelled IJ HDC were removed for concern of source of infection. He was placed on abx over the weekend and scheduled for replacement HDC.  The patient was agreeable to the initiation of hemodialysis.    The risks of the procedure were described to the patient including bleeding,   infection, pain, scarring, wound complications, potential injury to structures   in the neck or chest warranting more extensive surgery and potential need for   further interventions.  The patient demonstrated understanding of these risks   and a consent form was obtained.    PROCEDURE IN DETAIL:  The patient was identified in the Preoperative Unit and   taken back to the Operating Room and laid supine on the operating room table.    IV antibiotics were administered prior to the administration of the anesthesia.    MAC anesthesia was administered without complication.  The patient was then   prepped and draped in a standard sterile fashion.  Timeout procedure was   performed in accordance with hospital protocol.  An ultrasound was used to   identify the right internal jugular vein.  The images were interpreted by me   and stored for further review.  The internal jugular vein was accessed using a   needle. The wire was passed without difficulty, we confirmed appropriate positioning  Using fluoroscopic guidance.  An incision in the right upper chest was   made approximately 0.5  cm using a #15 blade scalpel.  The tunneler was passed   from this incision towards the neck incision without any issue.  Once the   PermCath cuff was in the appropriate position at the chest site, the initial   dilator was passed over the wire and confirmed with fluoroscopic guidance.  The   internal jugular vein was then sequentially dilated and once the final dilator   was passed, the peel-away sheath was then inserted over the wire using Seldinger   technique.  The wire and dilator were removed.  The catheter was then placed   into the peel-away sheath and peel-away sheath was removed while the catheter   was threaded forward.  Once this was complete, final fluoroscopic image did   confirm that the catheter was in appropriate position in the distal SVC right   atrial junction.  Both ports of the catheter withdrew and flushed very easily.    Each port was then locked with 2.5 mL of 1000 units per mL IV heparin.  The neck   incision was closed using 5-0 Monocryl suture in an interrupted fashion.  The   chest incision was closed using 5-0 Monocryl suture in an interrupted fashion.    The catheter was fixed to the right chest wall using 3-0 nylon suture.  Dry   sterile dressings were then applied.  The patient tolerated the procedure well   and there were no complications.  He was awakened from anesthesia and returned   to the Postoperative Recovery Unit in stable condition.  At the end of the case,   hemostasis was confirmed and sponge, instrument and needle counts were correct   on 2 occasions.  I was present and scrubbed throughout the entirety of the case.    During all vein manipulation, the patient was in the Trendelenburg position.    COMPLICATIONS:  None.    CONDITION:  Stable.

## 2017-11-06 NOTE — PLAN OF CARE
"C/o of spasm like pain to left flank. States " It feels like when I had kidney stones before. Dr Carr called and notified." will monitor for now if it gets worse I will order an Ultrasound".  "

## 2017-11-06 NOTE — ASSESSMENT & PLAN NOTE
Initial BP was 155/90, but upon admission:  now 180/76  Hydralazine 10 mg prn given for SBP >165  Will continue to monitor

## 2017-11-06 NOTE — PT/OT/SLP EVAL
Occupational Therapy  Evaluation/Discharge Summary    Gunner Motley   MRN: [id number]   Admitting Diagnosis: Sepsis    OT Date of Treatment: 11/05/17   OT Start Time: 1002  OT Stop Time: 1017  OT Total Time (min): 15 min    Billable Minutes:  Evaluation 15  Total Time 15    Diagnosis: Sepsis       Past Medical History:   Diagnosis Date    Anemia     CHF (congestive heart failure)     Coronary artery disease     Diverticulosis     Gout     Hypertension     Recurrent nephrolithiasis     Unspecified disorder of kidney and ureter     Urinary tract infection       Past Surgical History:   Procedure Laterality Date    APPENDECTOMY      CARDIAC SURGERY  2005    CABG x4 vessels    HERNIA REPAIR  2002    umbilical hernia    KIDNEY STONE SURGERY  1983    abdominal    PERITONEAL CATHETER INSERTION Left 08/28/2017    abdomen       Referring physician:Cristian   Date referred to OT: 11/4/2017    General Precautions: Standard, fall  Orthopedic Precautions: N/A  Braces: N/A    Do you have any cultural, spiritual, Latter day conflicts, given your current situation?: none     Patient History:  Living Environment  Lives With: alone (pt.'s wife in [place] since 8/2017)  Living Arrangements: house  Home Accessibility: stairs to enter home, stairs within home  Home Layout: Bathroom on 2nd floor, Bedroom on 2nd floor  Number of Stairs to Enter Home: 1  Number of Stairs Within Home: 13  Stair Railings at Home: inside, present on left side  Transportation Available: family or friend will provide, car  Living Environment Comment: Pt. lives in 2  with 1 step to enter with no rail and 13 to second floor with L HR.  Has 1/2 bath downstairs, full bath and hios bedroom upstairs. Pt. has tub/shower combo with grab bars. Pt.'s wife in NH since 8/2017, since pt unable to care for her due to his recent illnesses.  he is very depressed over not having her at home to care for her.  Pt. is indep all ADLS except Kareem bathing using wall grab bars.  pt. been sleeping on downstairs sofa since wife been ill. Pt drives , cooks, takes self to dialysis 3 days a week.  Has a shower chair, HB, WC, BSC at home that belongs to his wife.   Equipment Currently Used at Home: grab bar    Prior level of function:   Bed Mobility/Transfers: needs device  Grooming: independent  Bathing: needs device  Upper Body Dressing: independent  Lower Body Dressing: independent  Toileting: independent  Home Management Skills: independent  Homemaking Responsibilities: Yes  Meal Prep Responsibility: Primary  Laundry Responsibility: Primary  Cleaning Responsibility: Primary  Bill Paying/Finance Responsibility: Primary  Shopping Responsibility: Primary  Driving License: Yes  Mode of Transportation: Car     Dominant hand: right    Subjective:  Communicated with nurse prior to session.    Chief Complaint: none  Patient/Family stated goals: none    Pain/Comfort  Pain Rating 1: 0/10  Location - Side 1: Left  Location - Orientation 1: lateral  Location 1: back  Pain Addressed 1: Pre-medicate for activity  Pain Rating Post-Intervention 1: 0/10    Objective:  Patient found with: telemetry, bed alarm    Cognitive Exam:  Oriented to: Person, Place, Time and Situation  Follows Commands/attention: Follows two-step commands  Communication: clear/fluent  Memory:  No Deficits noted  Safety awareness/insight to disability: intact  Coping skills/emotional control: depressed mood over wife being in NH and he can't care for her.  Pt. Reportedly seen by psych while here.    Visual/perceptual:  Intact    Physical Exam:  Postural examination/scapula alignment: No postural abnormalities identified  Skin integrity: Visible skin intact  Edema: None noted BUE    Sensation:   Intact    Upper Extremity Range of Motion:  Right Upper Extremity: WFL  Left Upper Extremity: WFL    Upper Extremity Strength:  Right Upper Extremity: WFL  Left Upper Extremity: WFL   Strength: WFL    Fine motor coordination:   Intact    Gross  "motor coordination: WFL    Functional Mobility:  Bed Mobility:  Rolling/Turning to Left: Independent  Rolling/Turning Right: Independent  Scooting/Bridging: Independent  Supine to Sit: Independent  Sit to Supine: Independent    Transfers:  Sit <> Stand Assistance: Independent  Sit <> Stand Assistive Device: No Assistive Device  Bed <> Chair Technique: Stand Pivot  Bed <> Chair Transfer Assistance: Independent  Toilet Transfer Assistance: Independent  Toilet Transfer Assistive Device: No Assistive Device    Functional Ambulation: indep    Activities of Daily Living:  Feeding Level of Assistance: Independent  UE Dressing Level of Assistance: Independent  LE Dressing Level of Assistance: Independent (socks)  Grooming Position: Standing  Grooming Level of Assistance: Independent  Toileting Where Assessed: Toilet (simulated 2/2 did not have to use)  Toileting Level of Assistance: Independent            Balance:   Static Sit: NORMAL: No deviations seen in posture held statically  Dynamic Sit: NORMAL: No deviations seen in posture held dynamically  Static Stand: NORMAL: No deviations seen in posture held statically  Dynamic stand: NORMAL: No deviations seen in posture held dynamically    Therapeutic Activities and Exercises:  Role of OT and POC    AM-PAC 6 CLICK ADL  How much help from another person does this patient currently need?  1 = Unable, Total/Dependent Assistance  2 = A lot, Maximum/Moderate Assistance  3 = A little, Minimum/Contact Guard/Supervision  4 = None, Modified [place]/Independent    Putting on and taking off regular lower body clothing? : 4  Bathing (including washing, rinsing, drying)?: 4  Toileting, which includes using toilet, bedpan, or urinal? : 4  Putting on and taking off regular upper body clothing?: 4  Eating meals?: 4    AM-PAC Raw Score CMS "G-Code Modifier Level of Impairment Assistance   6 % Total / Unable   7 - 9 CM 80 - 100% Maximal Assist   10-14 CL 60 - 80% Moderate Assist "   15 - 19 CK 40 - 60% Moderate Assist   20 - 22 CJ 20 - 40% Minimal Assist   23 CI 1-20% SBA / CGA   24 CH 0% Independent/ Mod I       Patient left supine with all lines intact, call button in reach, bed alarm on and nurse notified    Assessment:  Gunner Motley is a 72 y.o. male with a medical diagnosis of Sepsis and presents with no deficis that warrant skilled OT intervention. Pt. indep ADLs and fx ambulation in room.  Bed alarm activited when in bed 2/2 pt. Fall risk from pain meds per nurse.  No OT goals.  DC OT at this time.      Rehab identified problem list/impairments:      Rehab potential is good.    Activity tolerance: Good    Discharge recommendations: Discharge Facility/Level Of Care Needs: home     Barriers to discharge: Barriers to Discharge: None    Equipment recommendations: none     GOALS:    Occupational Therapy Goals     Not on file          Multidisciplinary Problems (Resolved)        Problem: Occupational Therapy Goal    Goal Priority Disciplines Outcome Interventions   Occupational Therapy Goal   (Resolved)     OT, PT/OT Outcome(s) achieved                    PLAN:  Patient to be seen   to address the above listed problems via    Plan of Care expires:    Plan of Care reviewed with: patient    OT G-codes  Functional Assessment Tool Used: -PAC  Score: 24  Functional Limitation: Self care  Self Care Current Status ():   Self Care Goal Status ():   Self Care Discharge Status (): JOSE MANUEL Wiggins OT  11/05/2017

## 2017-11-06 NOTE — ASSESSMENT & PLAN NOTE
Pt on dialysis M/W/F  Permacath and PD cath removed.   Dialysis planned for today after he gets the Permacath placed.   BUN/Cr upon admission 38/4.4.  (close to baseline)

## 2017-11-06 NOTE — PROGRESS NOTES
[employer] renal fellow HOIV      Subjective:      denies cp/sob; still with L abd quadrant pain      Objective:   Last 24 Hour Vital Signs:  BP  Min: 130/85  Max: 196/78  Temp  Av.8 °F (36.6 °C)  Min: 97.2 °F (36.2 °C)  Max: 98.6 °F (37 °C)  Pulse  Av.5  Min: 48  Max: 83  Resp  Avg: 15.9  Min: 10  Max: 20  SpO2  Av.2 %  Min: 95 %  Max: 99 %  I/O last 3 completed shifts:  In: 465 [P.O.:450; I.V.:15]  Out: 1000 [Urine:1000]    Physical Examination:  GEN - AAOx4, NAD  CHEST - Bibasilar crackles  HEART - rrr, no m/r/s3/s4  ABD - soft; ttp LLQ  EXTR - warm; no edema  NEURO  - no asterixis or focal deficits      Laboratory:  Laboratory   Pertinent Findings:    Recent Labs  Lab 17  0434 17  0603 17  0600   WBC 6.08 6.76 7.13   HGB 8.2* 8.9* 8.9*   HCT 25.3* 26.9* 27.3*    188 196   MCV 91 90 89   RDW 13.2 13.2 13.1    137 135*   K 3.7 3.7 3.5    106 105   CO2 22* 20* 21*   BUN 22 32* 38*   GLU 96 87 100   PROT 5.1* 5.5* 5.3*   ALBUMIN 1.9* 2.2* 2.2*   BILITOT 0.8 0.6 0.5   AST 34 26 20   ALKPHOS 93 125 133   ALT 11 12 12             Current Medications:     Infusions:        Scheduled:   albuterol sulfate  2.5 mg Nebulization Q6H WAKE    allopurinol  100 mg Oral Daily    amoxicillin-clavulanate 875-125mg  1 tablet Oral Q12H    aspirin  81 mg Oral Daily    carvedilol  3.125 mg Oral BID    clopidogrel  75 mg Oral Daily    docusate sodium  100 mg Oral Daily    hydromorphone (PF)        paricalcitol  0.1 mcg/kg Intravenous Every Mon, Wed, Fri    pravastatin  40 mg Oral Daily    sertraline  50 mg Oral Daily    sodium chloride 0.9%  3 mL Intravenous Q8H    vitamin renal formula (B-complex-vitamin c-folic acid)  1 capsule Oral Daily        PRN:  heparin (porcine), hydrALAZINE, influenza, LORazepam, ondansetron, oxyCODONE-acetaminophen, ramelteon, sodium chloride 0.9%        Assessment/Plan     This is a 73 yo C male with ESRD/CAD s/p CABG/HTN/gout who is admitted for  sepsis + GNR in blood; L-renal has been consulted for     ESRD  -etiol includes HTN/small vessel Dz; recent initiation 8/2017 PD->switched to HD 10/12/17 [id number] cerna - access R tunneled cath; has an appt next week for AVF placement -> will need to cancel this  -permacath to be placed today; HD following placement with 3L goal UF   -nepro and nephrocaps added; zemplar with HD     GNR bacteremia  -pan sensitive proteus bacteremia; BCx through CVC neg; peritoneal fluid neg  -HD cath and PD cath to be removed 11/3/17  -cont Abx per primary; repeat BCx ngtd     NSTEMI  -cards on board  -DAPT for now; will await recs for intervention VS medical therapy     Normocytic anemia  -will give epo 10K with HD  -hold IV iron for now in face of active infection      Gout  -ok to continue allopurinol 100mg daily    abd pain  -would rec Abd CT with contrast to rule out intra abdominal etiol of pain     Papito Paniagua II  [facility] renal HO IV  [phone]

## 2017-11-06 NOTE — PLAN OF CARE
Pt in OR- Insertion of left internal jugular vein permanent dialysis     Pt's sister at bedside. Plan for pt to return home upon d/c and daughter Shara to help as needed. Pt goes to [facility].       11/06/17 1222   Discharge Reassessment   Assessment Type Discharge Planning Reassessment   Provided patient/caregiver education on the expected discharge date and the discharge plan Yes   Do you have any problems affording any of your prescribed medications? No   Discharge Plan A Home   Discharge Plan B Home;Home Health   Patient choice form signed by patient/caregiver N/A   Can the patient answer the patient profile reliably? Yes, cognitively intact   How does the patient rate their overall health at the present time? Fair   Describe the patient's ability to walk at the present time. Minor restrictions or changes   How often would a person be available to care for the patient? Occasionally   Number of comorbid conditions (as recorded on the chart) Four   During the past month, has the patient often been bothered by feeling down, depressed or hopeless? No   During the past month, has the patient often been bothered by little interest or pleasure in doing things? No

## 2017-11-06 NOTE — PLAN OF CARE
Problem: Occupational Therapy Goal  Goal: Occupational Therapy Goal  Outcome: Outcome(s) achieved Date Met: 11/05/17  OT initial eval completed.  Pt. indep ADLs and fx ambulation in room.  Bed alarm activited when in bed 2/2 pt. Fall risk from pain meds per nurse.  No OT goals.  DC OT at this time.

## 2017-11-06 NOTE — PT/OT/SLP EVAL
Physical Therapy  Evaluation and Discharge Summary    Gunner Motley   MRN: [id number]   Admitting Diagnosis: Sepsis    PT Received On: 11/05/17  PT Start Time: 1045     PT Stop Time: 1110    PT Total Time (min): 25 min       Billable Minutes:  Evaluation 25    Diagnosis: Sepsis      Past Medical History:   Diagnosis Date    Anemia     CHF (congestive heart failure)     Coronary artery disease     Diverticulosis     Gout     Hypertension     Recurrent nephrolithiasis     Unspecified disorder of kidney and ureter     Urinary tract infection       Past Surgical History:   Procedure Laterality Date    APPENDECTOMY      CARDIAC SURGERY  2005    CABG x4 vessels    HERNIA REPAIR  2002    umbilical hernia    KIDNEY STONE SURGERY  1983    abdominal    PERITONEAL CATHETER INSERTION Left 08/28/2017    abdomen       Referring physician: Dr. Foster  Date referred to PT: 11/4/2017    General Precautions: Standard, fall  Orthopedic Precautions: N/A   Braces:         Do you have any cultural, spiritual, Gnosticism conflicts, given your current situation?: None    Patient History:  Lives With: alone (Pt's wife has been in a NH since 8/2017)  Living Arrangements: house  Home Accessibility: stairs to enter home, stairs within home  Home Layout: Bathroom on 2nd floor, Bedroom on 2nd floor  Number of Stairs to Enter Home: 1  Number of Stairs Within Home: 13  Stair Railings at Home: inside, present on left side  Transportation Available: car, family or friend will provide  Living Environment Comment: Pt lives in 2nd story home, one step to enter, 13 steps with one rail to 2nd floor. Pt was the caregiver to his wife who is now living in a NH since 8/2017.  Pt was (I) with ADLS and gait at community level without DME including driving, cooking and housekeeping.  Equipment Currently Used at Home: none (Pt has hospital bed, w/c, BSC and shower chair that belongs to his wife.)  DME owned (not currently used): bedside commode, shower  chair, wheelchair and hospital bed (DME belongs to pt's wife)    Previous Level of Function:  Ambulation Skills: independent  Transfer Skills: independent  ADL Skills: independent  Work/Leisure Activity: independent    Subjective:  Communicated with RN prior to session.    Chief Complaint: no c/o  Patient goals: to return home    Pain/Comfort  Pain Rating 1: 0/10  Pain Rating Post-Intervention 1: 0/10      Objective:   Patient found with: telemetry, peripheral IV     Cognitive Exam:  Oriented to: Person, Place, Time and Situation    Follows Commands/attention: Follows multistep  commands  Communication: clear/fluent  Safety awareness/insight to disability: intact    Physical Exam:  Postural examination/scapula alignment: No postural abnormalities identified      Lower Extremity Range of Motion:  Right Lower Extremity: WFL  Left Lower Extremity: WFL    Lower Extremity Strength:  Right Lower Extremity: 5/5  Left Lower Extremity: 5/5     Fine motor coordination:  Intact    Gross motor coordination: WFL    Functional Mobility:  Bed Mobility:  Rolling/Turning to Left: Independent  Rolling/Turning Right: Independent  Scooting/Bridging: Independent  Supine to Sit: Independent  Sit to Supine: Independent    Transfers:  Sit <> Stand Assistance: Independent  Sit <> Stand Assistive Device: No Assistive Device    Gait:   Gait Distance: 200ft  Assistance 1: Independent  Gait Assistive Device: No device  Gait Pattern: reciprocal  Gait Deviation(s):  (no significant deviations)    Stairs:  N/A    Balance:   Static Sit: NORMAL: No deviations seen in posture held statically  Dynamic Sit: NORMAL: No deviations seen in posture held dynamically  Static Stand: GOOD+: Takes MAXIMAL challenges from all directions  Dynamic stand: GOOD+: Independent gait (with or without assistive device)    Therapeutic Activities and Exercises:  See above for bed mobility, transfer and gait status    AM-PAC 6 CLICK MOBILITY  How much help from another  person does this patient currently need?   1 = Unable, Total/Dependent Assistance  2 = A lot, Maximum/Moderate Assistance  3 = A little, Minimum/Contact Guard/Supervision  4 = None, Modified [place]/Independent    Turning over in bed (including adjusting bedclothes, sheets and blankets)?: 4  Sitting down on and standing up from a chair with arms (e.g., wheelchair, bedside commode, etc.): 4  Moving from lying on back to sitting on the side of the bed?: 4  Moving to and from a bed to a chair (including a wheelchair)?: 4  Need to walk in hospital room?: 4  Climbing 3-5 steps with a railing?: 4  Total Score: 24     AM-PAC Raw Score CMS G-Code Modifier Level of Impairment Assistance   6 % Total / Unable   7 - 9 CM 80 - 100% Maximal Assist   10 - 14 CL 60 - 80% Moderate Assist   15 - 19 CK 40 - 60% Moderate Assist   20 - 22 CJ 20 - 40% Minimal Assist   23 CI 1-20% SBA / CGA   24 CH 0% Independent/ Mod I     Patient left supine with call button in reach and RN notified.    Assessment:   Gunner Motley is a 72 y.o. male with a medical diagnosis of Sepsis and presents with no significant mobility, strength or balance deficits. Pt is independent with bed mobility, sit<>stand transfer and gait 200 ft without AD. Therefore pt does not warrant PT services at this time..    Rehab identified problem list/impairments: Rehab identified problem list/impairments:  (no significant impairments)      Activity tolerance: Good    Discharge recommendations: Discharge Facility/Level Of Care Needs: home     Barriers to discharge: Barriers to Discharge: None    Equipment recommendations: Equipment Needed After Discharge: none     GOALS:    Physical Therapy Goals     Not on file          Multidisciplinary Problems (Resolved)        Problem: Physical Therapy Goal    Goal Priority Disciplines Outcome Goal Variances Interventions   Physical Therapy Goal   (Resolved)     PT/OT, PT Outcome(s) achieved                     PLAN:     Patient to be seen  (Pt does not require PT services at this time.)    Plan of Care expires: 11/05/17  Plan of Care reviewed with: patient    Functional Assessment Tool Used: Am Pac  Score: 24  Functional Limitation: Mobility: Walking and moving around  Mobility: Walking and Moving Around Current Status ():   Mobility: Walking and Moving Around Goal Status ():   Mobility: Walking and Moving Around Discharge Status (): JOSE MANUEL Menjivar, PT  11/05/2017

## 2017-11-06 NOTE — ASSESSMENT & PLAN NOTE
Upon admission: Presented with tachycardia, mild fever, hypertension.   Sepsis protocol started in the ED  Blood cultures, sputum cultures, gram stain, UA, urine cultures pending  WBC normal   Vanc and Zosyn started in the ED  LA initial one 3.7, and trended down to 1.7  Procalc 14.82 upon admit  TSH normal   CXR unremarkable  Mg, phos and K low: will replete.   Pt-inr: PT 13.1 and inr 1.2  Influenza: negative.   S/p 500 bolus given in the ED  Will continue to give IVF 30mg/kg  Lasix 80 mg given once   PD cath and HD cath removed yesterday.   Culture was positive for Proteus which is pansensitive.   Antibiotics switched to Augmentin  Second set of cultures after the cath removal are pending.   Pt will get his temp permacath today and HD afterwards.

## 2017-11-06 NOTE — PLAN OF CARE
Problem: Fall Risk (Adult)  Goal: Absence of Falls  Patient will demonstrate the desired outcomes by discharge/transition of care.   Bed alarm on and bed in lowest position. Call bell in reach. Instructed to call for assistance before getting out of bed verbalized understanding.

## 2017-11-07 NOTE — SUBJECTIVE & OBJECTIVE
Interval History:   Review of Systems   Constitutional: Negative for activity change, chills, diaphoresis, fatigue and fever.   HENT: Negative.  Negative for congestion, rhinorrhea and sore throat.    Eyes: Negative for visual disturbance.   Respiratory: Negative for cough, chest tightness, shortness of breath and wheezing.    Cardiovascular: Negative for chest pain, palpitations and leg swelling.   Gastrointestinal: Negative for abdominal pain, constipation, diarrhea, nausea and vomiting.   Genitourinary: Negative for difficulty urinating, dysuria, frequency, hematuria and urgency.   Musculoskeletal: Negative.  Negative for arthralgias and myalgias.   Skin: Negative.  Negative for rash.   Neurological: Negative for dizziness, weakness, light-headedness and headaches.   Psychiatric/Behavioral: Negative for agitation. The patient is not nervous/anxious.      Objective:     Vital Signs (Most Recent):  Temp: 97.3 °F (36.3 °C) (11/07/17 0747)  Pulse: 79 (11/07/17 0747)  Resp: 18 (11/07/17 0747)  BP: (!) 156/69 (11/07/17 0747)  SpO2: 95 % (11/07/17 0435) Vital Signs (24h Range):  Temp:  [97.2 °F (36.2 °C)-98.7 °F (37.1 °C)] 97.3 °F (36.3 °C)  Pulse:  [48-86] 79  Resp:  [10-20] 18  SpO2:  [93 %-99 %] 95 %  BP: (111-196)/(66-97) 156/69     Weight: 73.4 kg (161 lb 13.1 oz)  Body mass index is 26.12 kg/m².    Intake/Output Summary (Last 24 hours) at 11/07/17 0925  Last data filed at 11/07/17 0600   Gross per 24 hour   Intake              475 ml   Output             4400 ml   Net            -3925 ml      Physical Exam   Constitutional: He is oriented to person, place, and time. He appears well-developed and well-nourished. No distress.   HENT:   Head: Normocephalic and atraumatic.   Nose: Nose normal.   Mouth/Throat: No oropharyngeal exudate.   Eyes: Conjunctivae are normal. Right eye exhibits no discharge. Left eye exhibits no discharge.   Neck: Normal range of motion. Neck supple. No JVD present. No tracheal deviation  present.   Cardiovascular: Normal rate, regular rhythm, normal heart sounds and intact distal pulses.  Exam reveals no gallop and no friction rub.    No murmur heard.  Pulmonary/Chest: Effort normal. No accessory muscle usage or stridor. No tachypnea. No respiratory distress. He has no wheezes. He has no rales. He exhibits no tenderness.   Abdominal: Soft. Bowel sounds are normal. He exhibits no distension and no mass. There is no tenderness. There is no guarding.   Musculoskeletal: Normal range of motion. He exhibits no edema, tenderness or deformity.   Neurological: He is alert and oriented to person, place, and time.   Skin: Skin is warm and dry. No rash noted. He is not diaphoretic. No erythema. No pallor.        Sites of the cath removal packed with clean bandages with no induration, erythema and edema.    Nursing note and vitals reviewed.      Significant Labs:   CBC:   Recent Labs  Lab 11/06/17  0600 11/07/17  0512   WBC 7.13 11.47   HGB 8.9* 9.9*   HCT 27.3* 30.3*    262     CMP:   Recent Labs  Lab 11/06/17  0600 11/06/17  1305 11/07/17  0512   * 136 137   K 3.5 3.8 3.6    106 102   CO2 21* 21* 26    96 77   BUN 38* 38* 23   CREATININE 4.4* 4.5* 3.3*   CALCIUM 8.2* 8.5* 8.4*   PROT 5.3*  --  6.0   ALBUMIN 2.2*  --  2.4*   BILITOT 0.5  --  0.4   ALKPHOS 133  --  145*   AST 20  --  20   ALT 12  --  9*   ANIONGAP 9 9 9   EGFRNONAA 12* 12* 17*       Significant Imaging: I have reviewed all pertinent imaging results/findings within the past 24 hours.

## 2017-11-07 NOTE — ASSESSMENT & PLAN NOTE
Upon admission: Presented with tachycardia, mild fever, hypertension.   Sepsis protocol started in the ED  Blood cultures, sputum cultures, gram stain, UA, urine cultures pending  WBC normal   Vanc and Zosyn started in the ED  LA initial one 3.7, and trended down to 1.7  Procalc 14.82 upon admit  TSH normal   CXR unremarkable  Mg, phos and K low: will replete.   Pt-inr: PT 13.1 and inr 1.2  Influenza: negative.   S/p 500 bolus given in the ED  Will continue to give IVF 30mg/kg  Lasix 80 mg given once   PD cath and HD cath removed yesterday.   Culture was positive for Proteus which is pansensitive.   Antibiotics switched to Augmentin  Second set of cultures after the cath removal: no growth  Will be sent home with Augmentin for total of 14 days

## 2017-11-07 NOTE — PROGRESS NOTES
.Pharmacy New Medication Education    Patient accepted medication education.    Pharmacy educated patient on name and purpose of medications and possible side effects, using the teach-back method.     lisinopril    Learners of pharmacy medication education included:  patient    Patient +/- learner response:  teachback

## 2017-11-07 NOTE — NURSING
MD made aware of lab reporting blood culture gram negative rods from aerobic bottle collected on 11/2/2017 at 5pm.

## 2017-11-07 NOTE — ASSESSMENT & PLAN NOTE
Pt on dialysis M/W/F  Permacath and PD cath removed.   Dialysis was done yesterday after he got the Permacath placed.   BUN/Cr upon admission 23/3.3.  (close to baseline)

## 2017-11-07 NOTE — ASSESSMENT & PLAN NOTE
Initial BP was 155/90, but upon admission: Last /63  Hydralazine 10 mg prn given for SBP >165  Will give Lisinopril and send him home with it.  Will continue to monitor

## 2017-11-07 NOTE — PLAN OF CARE
"TN called MD team, stated ok to schedule cardiology follow-up.    Patient discharged to home, no needs noted upon discharge. Therapy recommending home, no DME.    TN reviewed follow-up appointments with patient, verbalized understanding. [facility] brochure also given.    TN attempted to call daughter x2, no response.    --TN met in room with Dr. Stringer and nurse Yvette. Patient inquiring if TN spoke with [place] [facility] [place] [facility] regarding verifying time for dialysis. He stated he has been going at 3 pm, but went at 11:00 am the other day. TN notified patient we did not change dialysis times for him. TN did call [facility] several times, but no answer. TN informed patient of this. Dr. Stringer also wanted TN to change surgery scheduled for patient. She was notified  does not do this, doctor would would have to change surgery scheduled. TN informed patient as well. Patient stated "I'll take care of it, I'll just care take of everything."     TN also notified patient was unable to get in touch with daughter, he stated she is probably on her way right now. Patient's cellphone at bedside.    Future Appointments  Date Time Provider Department Center   11/16/2017 9:00 AM Catracho Escudero MD [facility] G[facility]UR [facility]   11/16/2017 2:00 PM Jannet Herman MD [facility] IM KEO [facility]   11/21/2017 3:20 PM Laron Escobar NP [facility] CARDIO [facility]     Follow-up With  Details  Why  Contact Info   Jannet Herman MD  On 11/16/2017  time: 2:00 pm, [facility]  [address]  [address]  [place] [address]  [phone]   Laron Escobar NP  On 11/21/2017  at 3:20 pm--Cardiology Follow-Up  [address]  [place] [address]  [phone]           11/07/17 1545   Final Note   Assessment Type Final Discharge Note   Discharge Disposition Home   What phone number can be called within the next 1-3 days to see how you are doing after discharge? [phone]   Hospital Follow Up  Appt(s) " scheduled? Yes   Discharge plans and expectations educations in teach back method with documentation complete? Yes   Right Care Referral Info   Post Acute Recommendation No Care     Fabiola Faye RN  Transition Navigator  [phone]

## 2017-11-07 NOTE — PROGRESS NOTES
[facility] Hospital Medicine  Progress Note    Patient Name: Gunner Motley  MRN: [id number]  Patient Class: IP- Inpatient   Admission Date: 11/1/2017  Length of Stay: 6 days  Attending Physician: Rocio Foster MD  Primary Care Provider: Kaushal Townsend MD        Subjective:     Principal Problem:Chronic diastolic heart failure    HPI:  72 year old male with a PMH of ESRD (on dialysis), CHF, CAD, UTI, Diverticulitis and HTN presented to the ED due to elevated BP noted at his dialysis clinic appointment. Patient states that he has been having fever and chills, nausea and vomiting, a headache and decreased appetite for about a month, but has been worse for the past 2-3 days. He states he has vomited 6 times in the last 24 hours. Vomitus is nonbloody. He also complains of LUQ pain that is dull, non-radiating and has been hurting for about 2 months. He also complains of SOB that has been going on for a month as well as chills and diaphoresis since this morning. He has burning on urination, and has urinary urgency. He has regular bowel movements, most recently was this morning and was normal. He denies sick contacts.  He denies chest pain, hemoptysis, palpitations, productive cough or rash. He has a dialysis catheter in his right anterior chest that was placed one month ago, and he currently has a PD cath, but states it is scheduled to be removed because it is no longer functioning. Since starting dialysis he endorses weight loss. He takes his prescription medications as prescribed.  He did mention he is not taking Lasix anymore as he was told not to take it a month ago.     Hospital Course:  Pt had permacath placed yesterday and HD afterwards. The procedure went well and he has been doing well with the pain management as well. He denies N/V/F/C/CP/SOB/abdominal pain.     Interval History:   Review of Systems   Constitutional: Negative for activity change, chills, diaphoresis, fatigue and fever.    HENT: Negative.  Negative for congestion, rhinorrhea and sore throat.    Eyes: Negative for visual disturbance.   Respiratory: Negative for cough, chest tightness, shortness of breath and wheezing.    Cardiovascular: Negative for chest pain, palpitations and leg swelling.   Gastrointestinal: Negative for abdominal pain, constipation, diarrhea, nausea and vomiting.   Genitourinary: Negative for difficulty urinating, dysuria, frequency, hematuria and urgency.   Musculoskeletal: Negative.  Negative for arthralgias and myalgias.   Skin: Negative.  Negative for rash.   Neurological: Negative for dizziness, weakness, light-headedness and headaches.   Psychiatric/Behavioral: Negative for agitation. The patient is not nervous/anxious.      Objective:     Vital Signs (Most Recent):  Temp: 97.3 °F (36.3 °C) (11/07/17 0747)  Pulse: 79 (11/07/17 0747)  Resp: 18 (11/07/17 0747)  BP: (!) 156/69 (11/07/17 0747)  SpO2: 95 % (11/07/17 0435) Vital Signs (24h Range):  Temp:  [97.2 °F (36.2 °C)-98.7 °F (37.1 °C)] 97.3 °F (36.3 °C)  Pulse:  [48-86] 79  Resp:  [10-20] 18  SpO2:  [93 %-99 %] 95 %  BP: (111-196)/(66-97) 156/69     Weight: 73.4 kg (161 lb 13.1 oz)  Body mass index is 26.12 kg/m².    Intake/Output Summary (Last 24 hours) at 11/07/17 0925  Last data filed at 11/07/17 0600   Gross per 24 hour   Intake              475 ml   Output             4400 ml   Net            -3925 ml      Physical Exam   Constitutional: He is oriented to person, place, and time. He appears well-developed and well-nourished. No distress.   HENT:   Head: Normocephalic and atraumatic.   Nose: Nose normal.   Mouth/Throat: No oropharyngeal exudate.   Eyes: Conjunctivae are normal. Right eye exhibits no discharge. Left eye exhibits no discharge.   Neck: Normal range of motion. Neck supple. No JVD present. No tracheal deviation present.   Cardiovascular: Normal rate, regular rhythm, normal heart sounds and intact distal pulses.  Exam reveals no gallop and  no friction rub.    No murmur heard.  Pulmonary/Chest: Effort normal. No accessory muscle usage or stridor. No tachypnea. No respiratory distress. He has no wheezes. He has no rales. He exhibits no tenderness.   Abdominal: Soft. Bowel sounds are normal. He exhibits no distension and no mass. There is no tenderness. There is no guarding.   Musculoskeletal: Normal range of motion. He exhibits no edema, tenderness or deformity.   Neurological: He is alert and oriented to person, place, and time.   Skin: Skin is warm and dry. No rash noted. He is not diaphoretic. No erythema. No pallor.        Sites of the cath removal packed with clean bandages with no induration, erythema and edema.    Nursing note and vitals reviewed.      Significant Labs:   CBC:   Recent Labs  Lab 11/06/17  0600 11/07/17  0512   WBC 7.13 11.47   HGB 8.9* 9.9*   HCT 27.3* 30.3*    262     CMP:   Recent Labs  Lab 11/06/17  0600 11/06/17  1305 11/07/17  0512   * 136 137   K 3.5 3.8 3.6    106 102   CO2 21* 21* 26    96 77   BUN 38* 38* 23   CREATININE 4.4* 4.5* 3.3*   CALCIUM 8.2* 8.5* 8.4*   PROT 5.3*  --  6.0   ALBUMIN 2.2*  --  2.4*   BILITOT 0.5  --  0.4   ALKPHOS 133  --  145*   AST 20  --  20   ALT 12  --  9*   ANIONGAP 9 9 9   EGFRNONAA 12* 12* 17*       Significant Imaging: I have reviewed all pertinent imaging results/findings within the past 24 hours.    Assessment/Plan:      * Chronic diastolic heart failure    Echo done a month ago with EF of 55% and DD  LVEF 50% per echo without WMA  BNP: 4102  CXR unremarkable  EKG : ST abnormality and possible inferior abnormality  Cards consulted  Will trend trop and EKGx 3: all trended down  Continue ASA and statin.   Trops are trending down.           Bacteremia    Upon admission: Presented with tachycardia, mild fever, hypertension.   Sepsis protocol started in the ED  Blood cultures, sputum cultures, gram stain, UA, urine cultures pending  WBC normal   Vanc and Zosyn  started in the ED  LA initial one 3.7, and trended down to 1.7  Procalc 14.82 upon admit  TSH normal   CXR unremarkable  Mg, phos and K low: will replete.   Pt-inr: PT 13.1 and inr 1.2  Influenza: negative.   S/p 500 bolus given in the ED  Will continue to give IVF 30mg/kg  Lasix 80 mg given once   PD cath and HD cath removed yesterday.   Culture was positive for Proteus which is pansensitive.   Antibiotics switched to Augmentin  Second set of cultures after the cath removal: no growth  Will be sent home with Augmentin for total of 14 days                    Bacteremia              SOB (shortness of breath)              ESRD on dialysis    Pt on dialysis M/W/F  Permacath and PD cath removed.   Dialysis was done yesterday after he got the Permacath placed.   BUN/Cr upon admission 23/3.3.  (close to baseline)              Essential hypertension    Initial BP was 155/90, but upon admission: Last /63  Hydralazine 10 mg prn given for SBP >165  Will give Lisinopril and send him home with it.  Will continue to monitor            Coronary artery disease involving native coronary artery of native heart without angina pectoris                VTE Risk Mitigation         Ordered     heparin (porcine) injection 4,100 Units  As needed (PRN)     Route:  Intra-Catheter        11/02/17 1509     High Risk of VTE  Once      11/01/17 1553     Place ANDREZ hose  Until discontinued      11/01/17 1553     Place sequential compression device  Until discontinued      11/01/17 1553              Holland Stringer MD  Department of Hospital Medicine   [facility]

## 2017-11-07 NOTE — PLAN OF CARE
Future Appointments  Date Time Provider Department Center   11/16/2017 9:00 AM Catracho Escudero MD [facility]   11/16/2017 2:00 PM Jannet Herman MD [facility]     Follow-up With  Details  Why  Contact Info   Jannet Herman MD  On 11/16/2017  time: 2:00 pm, [facility]  [address]  [address]  [facility] [address]  [phone]     Fabiola Faye RN  Transition Navigator  [phone]

## 2017-11-07 NOTE — TELEPHONE ENCOUNTER
11/7/17  08:10am    Post-op call complete. Patient stated he is feeling fine. Stated he does not feel any pain from surgical site. Patient is currently taking pain medication and stool softeners. Last BM 11/1/17. Patient encouraged to continue taking stool softeners and advised to try Miralax. Verbalized understanding. No fever or voiding isues. Dressing clean, dry, and intact. Slight decrease in appetite but able to tolerate food without nausea/vomoting. Post-op appointment scheduled for Thurs, Nov 16 with Dr. DAPHNE Escudero. Reminder given. Verbalized understanding. Patient requested reminder in mail also. No additional questions/concerns at this time.

## 2017-11-07 NOTE — ASSESSMENT & PLAN NOTE
Echo done a month ago with EF of 55% and DD  LVEF 50% per echo without WMA  BNP: 4102  CXR unremarkable  EKG : ST abnormality and possible inferior abnormality  Cards consulted  Will trend trop and EKGx 3: all trended down  Continue ASA and statin.   Trops are trending down.

## 2017-11-07 NOTE — NURSING
Report received from off going nurse, patient in bed AAO x4, denies any pain or discomfort at this time, no respiratory distress noted, sinus rhythm 84 with occasional PVC per cardiac monitor, left IJ dialysis catheter with dressing clean and intact, right arm peripheral line patent, dressing clean and intact. Educated on the importance of calling as needed, voices understanding, bed in low position, call light within his reach, continue to monitor.

## 2017-11-08 NOTE — PATIENT INSTRUCTIONS

## 2017-11-08 NOTE — PHYSICIAN QUERY
PT Name: Gunner Motley  MR #: [id number]     Physician Query Form - Diagnosis Clarification      CDS/: Isabella Slade RN              Contact information:[email or web address]    This form is a permanent document in the medical record.     Query Date: November 8, 2017    By submitting this query, we are merely seeking further clarification of documentation.  Please utilize your independent clinical judgment when addressing the question(s) below.     The medical record contains the following:      Findings Supporting Clinical Information Location in Medical Record   Sepsis Presented with tachycardia, mild fever, HTN.  Sepsis protocol started by the ED.    GNR bacteremia - recommend stopping Vanc and continue gram neg coverage    Proteus Mirabilis in blood stream on culture- will continue IV Zosyn while here    -pan sensitive proteus bacteremia; BCx through CVC neg; peritoneal fluid neg       H & P      Progress note 11/3      Progress note 11/4      Progress note 11/4     Please clarify if the ____Sepsis__________________ diagnosis has been:    [ x ] Ruled In  [  ] Ruled In, Now Resolved  [  ] Resolved Prior to My Assessment  [  ] Ruled Out  [  ] Clinically insignificant  [  ] Clinically undetermined  [  ] Other/Clarification of findings (please specify)__positive blood cultures (proteus Mirabilis_____________________________    Please document in your progress notes daily for the duration of treatment, until resolved, and include in your discharge summary.

## 2017-11-08 NOTE — PLAN OF CARE
Problem: Patient Care Overview  Goal: Plan of Care Review  Outcome: Outcome(s) achieved Date Met: 11/07/17 11/07/17 2560   Coping/Psychosocial   Plan Of Care Reviewed With patient   Care plan reviewed with patient, voices understanding, discharged instructions provided, voices understanding, sinus rhythm 74 per cardiac monitor, patient denies any pain or discomfort, no respiratory distress noted, no apparent distress noted, states pain to back decreased after pain medication administration, vital signs stables, telemetry monitor discontinued, right arm peripheral line discontinued, tip intact, no bleeding present.

## 2017-11-11 PROBLEM — I25.2 OLD MI (MYOCARDIAL INFARCTION): Status: ACTIVE | Noted: 2017-01-01

## 2017-11-11 PROBLEM — T82.7XXA: Status: ACTIVE | Noted: 2017-01-01

## 2017-11-11 NOTE — ED PROVIDER NOTES
Encounter Date: 11/11/2017       History     Chief Complaint   Patient presents with    Hemodialysis Access     Pt has a Perm-a-cath this his left chest wall that is bleeding with sero-sang fluid. Pt had HD yesterday and reports that he woke up this morning and it was bleeding. Pt was recently hospitalized for an infected access on the left side.      The patient presents emergency department with bleeding to his dialysis access site.  The patient has a Jones catheter in his left anterior chest.  The patient was discharged 5 days ago for infected shin to his right Jones catheter.  The catheter was removed and he was started on IV antibiotics.  Cultures and sensitivities returned that it was sensitive to Augmentin and he was discharged with Augmentin which she has been taking for the past 5 days.  The patient also had a peritoneal catheter removed.  He denies any abdomen pain or redness to the site the patient states that he has had chills today, no fever.          Review of patient's allergies indicates:  No Known Allergies  Past Medical History:   Diagnosis Date    Anemia     CHF (congestive heart failure)     Coronary artery disease     Diverticulosis     Gout     Hypertension     Recurrent nephrolithiasis     Unspecified disorder of kidney and ureter     Urinary tract infection      Past Surgical History:   Procedure Laterality Date    APPENDECTOMY      CARDIAC SURGERY  2005    CABG x4 vessels    HERNIA REPAIR  2002    umbilical hernia    KIDNEY STONE SURGERY  1983    abdominal    PERITONEAL CATHETER INSERTION Left 08/28/2017    abdomen     Family History   Problem Relation Age of Onset    Cancer Mother     Kidney disease Mother     Heart attack Father      Social History   Substance Use Topics    Smoking status: Former Smoker     Packs/day: 2.00     Years: 30.00     Quit date: 10/12/1995    Smokeless tobacco: Former User     Quit date: 1/12/1995    Alcohol use No      Comment: quit 2014      Review of Systems   Constitutional: Positive for chills. Negative for activity change, appetite change, fatigue and fever.   HENT: Negative for sore throat.    Respiratory: Negative for shortness of breath.    Cardiovascular: Negative for chest pain.   Gastrointestinal: Negative for nausea.   Genitourinary: Negative for dysuria.   Musculoskeletal: Negative for back pain.   Skin: Negative for rash.   Neurological: Negative for weakness.   Hematological: Does not bruise/bleed easily.       Physical Exam     Initial Vitals [11/11/17 1325]   BP Pulse Resp Temp SpO2   (!) 166/74 63 18 98.4 °F (36.9 °C) 96 %      MAP       104.67         Physical Exam    Nursing note and vitals reviewed.  Constitutional: He appears well-developed and well-nourished.   HENT:   Head: Normocephalic and atraumatic.   Eyes: EOM are normal. Pupils are equal, round, and reactive to light.   Neck: Normal range of motion. Neck supple.   Cardiovascular: Normal rate, regular rhythm, normal heart sounds and intact distal pulses.   Pulmonary/Chest: Breath sounds normal. He exhibits tenderness (Left anterior chest wall with blood clot around the Jones, there is erythema surrounding the site. No exudatative drainage.).   Abdominal: Soft. Bowel sounds are normal. He exhibits no distension. There is no tenderness. There is no rebound and no guarding.   Musculoskeletal: Normal range of motion. He exhibits no edema.   Neurological: He is alert and oriented to person, place, and time.   Skin: Skin is warm and dry.   Psychiatric: He has a normal mood and affect. His behavior is normal. Judgment and thought content normal.             ED Course   Procedures  Labs Reviewed   CBC W/ AUTO DIFFERENTIAL - Abnormal; Notable for the following:        Result Value    RBC 3.35 (*)     Hemoglobin 9.8 (*)     Hematocrit 31.0 (*)     MCHC 31.6 (*)     All other components within normal limits   COMPREHENSIVE METABOLIC PANEL - Abnormal; Notable for the following:      Glucose 117 (*)     Creatinine 3.3 (*)     Calcium 8.3 (*)     Albumin 2.8 (*)     ALT 8 (*)     eGFR if  20 (*)     eGFR if non  17 (*)     All other components within normal limits   TROPONIN I - Abnormal; Notable for the following:     Troponin I 0.809 (*)     All other components within normal limits   CULTURE, BLOOD   CULTURE, BLOOD   INFLUENZA A AND B ANTIGEN   LACTIC ACID, PLASMA   PROTIME-INR     EKG Readings: (Independently Interpreted)   Initial Reading: No STEMI. Rhythm: Normal Sinus Rhythm. Heart Rate: 71. Ectopy: PVCs.          Medical Decision Making:   Clinical Tests:   Lab Tests: Ordered and Reviewed  The following lab test(s) were unremarkable: CBC and CMP  Radiological Study: Ordered and Reviewed  ED Management:  The patient is here with chills today.  He recently had infection to his right Jones catheter and has a new left chest wall catheter in place.  There is been some bleeding to the site.  Case will be discussed with Dr. Hoang, on call for Dr. Escudero. She recommends admission to medicine for IV antibiotics.                   ED Course as of Nov 11 1711   Sat Nov 11, 2017   1709 Troponin I: (!) 0.809 [ST]      ED Course User Index  [ST] Bárbara Partida MD     Clinical Impression:   The encounter diagnosis was Hemodialysis catheter infection, initial encounter.                           Bárbara Partida MD  11/11/17 1807       Bárbara Partida MD  11/11/17 1838       Bárbara Partida MD  11/11/17 184

## 2017-11-11 NOTE — ED NOTES
Patient has verified the spelling of their name and  on armband  LOC: The patient is awake, alert, and aware of environment with an appropriate affect, the patient is oriented x 4 and speaking appropriately.   APPEARANCE: Patient resting comfortably and in no acute distress, patient is clean and well groomed, patient's clothing is properly fastened.   SKIN: The skin is warm and dry, color consistent with ethnicity, patient has normal skin turgor and moist mucus membranes, dialysis access to left upper chest wall, multiple purplish bruises noted to upper extrem pt states due to blood pressure and iv in pass.   : dialysis pt  MUSCULOSKELETAL: Patient moving all extremities spontaneously, no obvious swelling or deformities noted, complaints of some weakness, pt drove himself to ED,pt is able to ambulate without difficulty.   RESPIRATORY: Airway is open and patent, respirations are spontaneous, patient has a normal effort and rate, no accessory muscle use noted, bilateral breath sounds clear, complaints of SOB   ABDOMEN: Soft and non tender to palpation, no distention noted, normoactive bowel sounds present in all four quadrants.   CARDIAC: Normal rate and rhythm, no peripheral edema noted, less then 3 second capillary refill, denies chest pain  COMPLAINT: pt went to dialysis on yest and noticed bleeding to left dialysis access chest wall, pt placed drsg to area and noticed that it did not stop bleeding attempted to have dialysis redress area but they told pt to come into ED

## 2017-11-11 NOTE — DISCHARGE SUMMARY
Discharge Summary        Admit Date: 11/1/2017     Discharge Date and Time: 11/06/2017     Attending Physician: Rocio Foster MD      Discharge Physician: Uvaldo Flor     Principal Diagnoses: Chronic diastolic heart failure  The primary encounter diagnosis was ESRD on dialysis. Diagnoses of SOB (shortness of breath), Sepsis, due to unspecified organism, CAD (coronary artery disease), Sepsis, Coronary artery disease involving native coronary artery of native heart without angina pectoris, Chronic diastolic heart failure, Essential hypertension, Bacteremia, and Pain were also pertinent to this visit.         History of Present Illness:    72 year old male with a PMH of ESRD (on dialysis), CHF, CAD, UTI, Diverticulitis and HTN presented to the ED due to elevated BP noted at his dialysis clinic appointment. Patient states that he has been having fever and chills, nausea and vomiting, a headache and decreased appetite for about a month, but has been worse for the past 2-3 days. He states he has vomited 6 times in the last 24 hours. Vomitus is nonbloody. He also complains of LUQ pain that is dull, non-radiating and has been hurting for about 2 months. He also complains of SOB that has been going on for a month as well as chills and diaphoresis since this morning. He has burning on urination, and has urinary urgency. He has regular bowel movements, most recently was this morning and was normal. He denies sick contacts.  He denies chest pain, hemoptysis, palpitations, productive cough or rash. He has a dialysis catheter in his right anterior chest that was placed one month ago, and he currently has a PD cath, but states it is scheduled to be removed because it is no longer functioning. Since starting dialysis he endorses weight loss. He takes his prescription medications as prescribed.  He did mention he is not taking Lasix anymore as he was told not to take it a month ago.      Discharged Condition:  stable     Hospital Course: Gunner Motley is a 72 y.o. male with pmh  has a past medical history of Anemia; CHF (congestive heart failure); Coronary artery disease; Diverticulosis; Gout; Hypertension; Recurrent nephrolithiasis; Unspecified disorder of kidney and ureter; and Urinary tract infection. who presented with Chronic diastolic heart failure. The following is the hospital course by system:         Bacteremia     Upon admission: Presented with tachycardia, mild fever, hypertension.   Sepsis protocol started in the ED  Blood cultures, sputum cultures, gram stain, UA, urine cultures pending  WBC normal   Vanc and Zosyn started in the ED  LA initial one 3.7, and trended down to 1.7  Procalc 14.82 upon admit  TSH normal   CXR unremarkable  Pt-inr: PT 13.1 and inr 1.2  Influenza: negative.   Lasix 80 mg given once   PD cath and HD cath removed 11/3.   Culture was positive for Proteus which is pansensitive.   Antibiotics switched to Augmentin  Second set of cultures after the cath removal were all negative                                          ESRD on dialysis     Pt on dialysis M/W/F  Permacath and PD cath removed on 11/3.   Permacath placed on 11/6.   BUN/Cr upon admission 38/4.4.  (close to baseline)                Essential hypertension     Initial BP was 155/90, but upon admission  Pt's home regimen was restarted and bp was stable upon DC  Hydralazine 10 mg prn given for SBP >165                Chronic diastolic heart failure     Echo done a month ago with EF of 55% and DD  LVEF 50% per echo without WMA  BNP: 4102  CXR unremarkable  EKG : ST abnormality and possible inferior abnormality  Cards consulted  Will trend trop and EKGx 3  Continue ASA and statin.   Trops are trending down.           Coronary artery disease involving native coronary artery of native heart without angina pectoris         Consults: IP CONSULT TO NEPHROLOGY  IP CONSULT TO CARDIOLOGY-[facility]  IP CONSULT TO SOCIAL WORK/CASE  MANAGEMENT  IP CONSULT TO SPIRITUAL CARE  IP CONSULT TO GENERAL SURGERY  IP CONSULT TO PSYCHIATRY     Significant Diagnostic Studies:      Treatments: antibiotics: vancomycin and Zosyn and dialysis: Hemodialysis     Disposition: Home or Self Care     Patient Instructions:        Current Discharge Medication List            CONTINUE these medications which have NOT CHANGED     Details   albuterol (PROVENTIL) 2.5 mg /3 mL (0.083 %) nebulizer solution as needed for Shortness of Breath.   Refills: 0       allopurinol (ZYLOPRIM) 100 MG tablet Take 100 mg by mouth once daily.        alprazolam (XANAX) 0.25 MG tablet Take 0.25 mg by mouth every 8 (eight) hours as needed.  Refills: 0       aspirin (ECOTRIN) 81 MG EC tablet Take 81 mg by mouth once daily.       calcitRIOL (ROCALTROL) 0.25 MCG Cap Take 1 capsule (0.25 mcg total) by mouth once daily.  Qty: 30 capsule, Refills: 11       docusate sodium (COLACE) 100 MG capsule Take 1 capsule (100 mg total) by mouth 2 (two) times daily.  Qty: 30 capsule, Refills: `11       oxyCODONE-acetaminophen (PERCOCET)  mg per tablet Take 1 tablet by mouth every 8 (eight) hours as needed for Pain.  Qty: 20 tablet, Refills: 0     Associated Diagnoses: Pain       pravastatin (PRAVACHOL) 40 MG tablet Take 40 mg by mouth once daily.                Time spent: >30 mins         Holland Stringer M.D.  11/06/2017  1:00 PM

## 2017-11-12 NOTE — SUBJECTIVE & OBJECTIVE
No current facility-administered medications on file prior to encounter.      Current Outpatient Prescriptions on File Prior to Encounter   Medication Sig    albuterol (PROVENTIL) 2.5 mg /3 mL (0.083 %) nebulizer solution as needed for Shortness of Breath.     allopurinol (ZYLOPRIM) 100 MG tablet Take 100 mg by mouth once daily.     alprazolam (XANAX) 0.25 MG tablet Take 0.25 mg by mouth every 8 (eight) hours as needed.    amoxicillin-clavulanate 875-125mg (AUGMENTIN) 875-125 mg per tablet Take 1 tablet by mouth every 12 (twelve) hours.    aspirin (ECOTRIN) 81 MG EC tablet Take 81 mg by mouth once daily.    calcitRIOL (ROCALTROL) 0.25 MCG Cap Take 1 capsule (0.25 mcg total) by mouth once daily.    carvedilol (COREG) 3.125 MG tablet Take 1 tablet (3.125 mg total) by mouth 2 (two) times daily.    clopidogrel (PLAVIX) 75 mg tablet Take 1 tablet (75 mg total) by mouth once daily.    docusate sodium (COLACE) 100 MG capsule Take 1 capsule (100 mg total) by mouth 2 (two) times daily.    lisinopril (PRINIVIL,ZESTRIL) 40 MG tablet Take 1 tablet (40 mg total) by mouth once daily.    metoprolol succinate (TOPROL-XL) 100 MG 24 hr tablet Take 1 tablet (100 mg total) by mouth once daily.    ondansetron (ZOFRAN) 4 MG tablet Take 1 tablet (4 mg total) by mouth every 8 (eight) hours as needed for Nausea.    oxyCODONE-acetaminophen (PERCOCET)  mg per tablet Take 1 tablet by mouth every 8 (eight) hours as needed for Pain.    oxyCODONE-acetaminophen (PERCOCET)  mg per tablet Take 1 tablet by mouth every 8 (eight) hours as needed.    pravastatin (PRAVACHOL) 40 MG tablet Take 40 mg by mouth once daily.     sertraline (ZOLOFT) 50 MG tablet Take 1 tablet (50 mg total) by mouth once daily.       Review of patient's allergies indicates:  No Known Allergies    Past Medical History:   Diagnosis Date    Anemia     CHF (congestive heart failure)     Coronary artery disease     Diverticulosis     Gout      Hypertension     Recurrent nephrolithiasis     Unspecified disorder of kidney and ureter     Urinary tract infection      Past Surgical History:   Procedure Laterality Date    APPENDECTOMY      CARDIAC SURGERY  2005    CABG x4 vessels    HERNIA REPAIR  2002    umbilical hernia    KIDNEY STONE SURGERY  1983    abdominal    PERITONEAL CATHETER INSERTION Left 08/28/2017    abdomen     Family History     Problem Relation (Age of Onset)    Cancer Mother    Heart attack Father    Kidney disease Mother        Social History Main Topics    Smoking status: Former Smoker     Packs/day: 2.00     Years: 30.00     Quit date: 10/12/1995    Smokeless tobacco: Former User     Quit date: 1/12/1995    Alcohol use No      Comment: quit 2014    Drug use: Unknown    Sexual activity: Not on file     Review of Systems   All systems were reviewed and are negative, except that mentioned in the HPI.    Objective:     Vital Signs (Most Recent):  Temp: 98.4 °F (36.9 °C) (11/12/17 0801)  Pulse: 72 (11/12/17 0801)  Resp: 20 (11/12/17 0801)  BP: (!) 180/82 (11/12/17 0801)  SpO2: 95 % (11/12/17 1125) Vital Signs (24h Range):  Temp:  [97.8 °F (36.6 °C)-98.9 °F (37.2 °C)] 98.4 °F (36.9 °C)  Pulse:  [61-98] 72  Resp:  [11-22] 20  SpO2:  [95 %-97 %] 95 %  BP: (144-185)/(53-90) 180/82     Weight: 68 kg (149 lb 14.6 oz)  Body mass index is 24.2 kg/m².    Physical Exam   Constitutional: He is oriented to person, place, and time. He appears well-developed and well-nourished. No distress.   HENT:   Head: Normocephalic and atraumatic.   Eyes: Conjunctivae and EOM are normal. Pupils are equal, round, and reactive to light. No scleral icterus.   Neck: Normal range of motion. Neck supple. No JVD present.   Cardiovascular: Normal rate and regular rhythm.    Pulmonary/Chest: Effort normal and breath sounds normal. No respiratory distress.   Abdominal: Soft. Bowel sounds are normal. He exhibits no distension.   Well-healed abdominal wounds    Musculoskeletal: Normal range of motion. He exhibits no edema or tenderness.   Neurological: He is alert and oriented to person, place, and time. No cranial nerve deficit.   Skin: Skin is warm and dry. He is not diaphoretic.   LIJ permacath site has dried blood crusts in placed (removed), and what appears to be a healing blister/ecchymosis at the superior part of the wound with dried skin at the periphery and shiny new skin overlay (see photo). Biopatch placed and clear occlusive dressing placed. No pus or fluctuance.   Psychiatric: He has a normal mood and affect.       Appears to be a healing blister.      Redness marked, biopatch and occlusive dressing applied.      Significant Labs:  CBC:   Recent Labs  Lab 11/12/17  0537   WBC 7.45   RBC 2.98*   HGB 8.7*   HCT 27.8*      MCV 93   MCH 29.2   MCHC 31.3*     CMP:   Recent Labs  Lab 11/12/17  0537   GLU 88   CALCIUM 7.8*   ALBUMIN 2.4*   PROT 5.4*      K 3.5   CO2 23      BUN 27*   CREATININE 3.4*   ALKPHOS 98   ALT 7*   AST 15   BILITOT 0.4     Coagulation:   Recent Labs  Lab 11/11/17  1614   LABPROT 11.8   INR 1.1     Cardiac markers:   Recent Labs  Lab 11/12/17  0537   TROPONINI 0.638*     Microbiology Results (last 7 days)     Procedure Component Value Units Date/Time    Blood culture [[id number]]     Order Status:  Canceled Specimen:  Blood     Blood culture #1 [[id number]] Collected:  11/11/17 1600    Order Status:  Completed Specimen:  Blood from Peripheral, Wrist, Right Updated:  11/12/17 0315     Blood Culture, Routine No Growth to date    Narrative:       Blood Culture #1    Blood culture #2 [[id number]] Collected:  11/11/17 1614    Order Status:  Completed Specimen:  Blood from Peripheral, Forearm, Right Updated:  11/12/17 0315     Blood Culture, Routine No Growth to date    Narrative:       Blood Culture #2          Significant Diagnostics:  CXR 11/11/17 images and report personally reviewed.  AP portable upright view of the chest.  Monitoring leads overlie the chest. No detrimental change. Left IJ CVC tip projects over the mid to distal SVC. Cardiomediastinal silhouette is stable without evidence of failure. No large consolidation or new focal opacity. No large pleural effusion or pneumothorax. No acute osseous process seen. The bilateral lungs are well expanded without large consolidation.  No large pleural effusion or pneumothorax.  The heart and mediastinal contours are within normal limits for age.  No acute osseous process seen.   PA and lateral views can be obtained.

## 2017-11-12 NOTE — ED NOTES
Informed pt that Md will come down to see him in about 15mins and they will address pain at that time

## 2017-11-12 NOTE — PLAN OF CARE
Seen by dr Pacheco. Perma cath dressing changed by dr Nunes.medicated for pain sbury, biopatch placed ,followed with occlusive dressing. Medicated for pain as requested.Continued on telemetry and fall risk.No True red alarm ectopy outside pvcs

## 2017-11-12 NOTE — ED NOTES
Spoke with [facility] they are aware of pt consult. Informed of pt complaints of pain. They will come to assess pt in 15 mins

## 2017-11-12 NOTE — HOSPITAL COURSE
Pt was admitted to medicine although Dr. Steele with General Surgery was notified.  Surgery saw the patient and said he does not have an infection and would not need any procedure to be done. He was doing well today and denies any fever, chills, nausea, vomiting, chest pain, SOB.

## 2017-11-12 NOTE — ASSESSMENT & PLAN NOTE
Start IV Clinda 500 mg q 8 hours (no need for renal dosing)  Surgery consulted  Dressing over inflamed area  Dressing over old HD insertion site  Cultures were drawn from 2 sites, NGTD currently

## 2017-11-12 NOTE — PROGRESS NOTES
"[facility] Hospital Medicine  Progress Note    Patient Name: Gunner Motley  MRN: [id number]  Patient Class: OP- Observation   Admission Date: 11/11/2017  Length of Stay: 0 days  Attending Physician: Vlad Mondragon MD  Primary Care Provider: Kaushal Townsend MD    Subjective:     Principal Problem:Hemodialysis catheter infection, initial encounter    HPI:  Pt is a 71 yo male w PMH of ESRD, DMT2 (HgbA1C 5.8 8/2017) CHF (11/1 EF 50% W/ MILD AR), CAD, recent hx of UTI, Diverticulitis and HTN presented to the ED who presents to the ED for infected hemodialysis catheter in left chest.     He recently was admitted with SOB and elevated BP and was found to have pan-sensitive proteus present in his blood cultures and, thus his HD catheter in his right  chest and already non-functioning PD cath were removed.  He was treated with antibiotics and subsequent cultures were NGTD. A new line was placed in his left chest, but he noticed erythema around the site yesterday and today starting having chills, but no documented fever.    Pt received HD MWF and has been seen by [facility] nephrology while hospitalized in the past.  He is despondant about his medical conditions and his wife's poor health as well and wishes to be allowed to die.  He denies active SI but did sign DNR paperwork during his last admission.      Hospital Course:  Pt was admitted to medicine although Dr. Steele with General Surgery was notified.  He was placed on IV fluids, made NPO at midnight in case of surgical intervention and was started on Clindamycin 600 mg q 8 hours (does not need to be renally dosed).  Nephrology was also consulted in case pt is still in-house for dialysis on Monday.    Interval History: NAEON. Pt afebrile and VSS with some hypertension. Pain and erythema continues at HD site. Describes "worse then metallic" taste in mouth after IV clinida. Pt NPO in case of needed surgical intervention for line. Pt with dysphoric mood " since his wife cannot care for herself and he has had such difficulty keeping up with dialysis.    Review of Systems   Constitutional: Positive for chills and fatigue. Negative for appetite change, diaphoresis, fever and unexpected weight change.   HENT: Negative for congestion, rhinorrhea and trouble swallowing.    Eyes: Negative for visual disturbance.   Respiratory: Negative for chest tightness, shortness of breath and wheezing.    Cardiovascular: Negative for chest pain and palpitations.   Gastrointestinal: Negative for abdominal distention, abdominal pain, constipation, diarrhea, nausea and vomiting.   Genitourinary: Negative for decreased urine volume, difficulty urinating and frequency.   Musculoskeletal: Negative for arthralgias, back pain and joint swelling.   Skin: Positive for color change (erythema around insertion site for catheter). Negative for wound.   Neurological: Negative for dizziness, seizures, weakness and light-headedness.   Psychiatric/Behavioral: Positive for dysphoric mood. Negative for agitation and decreased concentration.   All other systems reviewed and are negative.    Objective:     Vital Signs (Most Recent):  Temp: 98.4 °F (36.9 °C) (11/12/17 0801)  Pulse: 72 (11/12/17 0801)  Resp: 20 (11/12/17 0801)  BP: (!) 180/82 (11/12/17 0801)  SpO2: 96 % (11/12/17 0801) Vital Signs (24h Range):  Temp:  [97.8 °F (36.6 °C)-98.9 °F (37.2 °C)] 98.4 °F (36.9 °C)  Pulse:  [61-98] 72  Resp:  [11-22] 20  SpO2:  [95 %-97 %] 96 %  BP: (144-185)/(53-90) 180/82     Weight: 68 kg (149 lb 14.6 oz)  Body mass index is 24.2 kg/m².    Intake/Output Summary (Last 24 hours) at 11/12/17 0828  Last data filed at 11/12/17 0542   Gross per 24 hour   Intake             1133 ml   Output              150 ml   Net              983 ml      Physical Exam   Constitutional: He is oriented to person, place, and time. He appears well-developed and well-nourished. No distress.   Thin, pleasant   HENT:   Head: Normocephalic and  atraumatic.   Mouth/Throat: No oropharyngeal exudate.   Eyes: Conjunctivae and EOM are normal. Pupils are equal, round, and reactive to light.   Neck: Normal range of motion. Neck supple. No JVD present.   Cardiovascular: Normal rate and normal heart sounds.    No murmur heard.  PVC's in room   Pulmonary/Chest: Effort normal and breath sounds normal. No respiratory distress. He has no wheezes. He has no rales.   Abdominal: Soft. Bowel sounds are normal. He exhibits no distension and no mass. There is no tenderness.   Well healing wound from PD catheter in lower abdom, no erythema, no discharge   Musculoskeletal: Normal range of motion. He exhibits no edema.   Neurological: He is alert and oriented to person, place, and time. No cranial nerve deficit.   Skin: Skin is warm and dry. Capillary refill takes less than 2 seconds. He is not diaphoretic.   Erythema surrounding left HD catheter insertion site, no discharge, +warmth; old right HD cathter bandage in place, no surrounding erythema    Bruising throughout extremities, thin skin   Psychiatric: He has a normal mood and affect. His behavior is normal.   Nursing note and vitals reviewed.    Significant Labs:   CBC:   Recent Labs  Lab 11/11/17  1613 11/12/17  0537   WBC 8.73 7.45   HGB 9.8* 8.7*   HCT 31.0* 27.8*    220     CMP:   Recent Labs  Lab 11/11/17  1613 11/12/17  0537    142   K 3.8 3.5    107   CO2 26 23   * 88   BUN 23 27*   CREATININE 3.3* 3.4*   CALCIUM 8.3* 7.8*   PROT 6.4 5.4*   ALBUMIN 2.8* 2.4*   BILITOT 0.5 0.4   ALKPHOS 118 98   AST 17 15   ALT 8* 7*   ANIONGAP 12 12   EGFRNONAA 17* 17*     Significant Imaging: I have reviewed all pertinent imaging results/findings within the past 24 hours.    Assessment/Plan:      * Hemodialysis catheter infection, initial encounter     Start IV Clinda 500 mg q 8 hours (no need for renal dosing)  Surgery consulted  Dressing over inflamed area  Dressing over old HD insertion site  Cultures  were drawn from 2 sites, NGTD currently          ESRD on dialysis    Stable  On MWF schedule for dialysis, [facility] nephro consulted  Electrolytes largely wnl (Cr 3.4), GFR 17          Chronic diastolic heart failure    Echo 11/1/17 with EF 50%, mild aortic regurg  Stable, no lower extrem swelling            Type 2 diabetes mellitus with stage 4 chronic kidney disease, without long-term current use of insulin    Last HgbA1C stable at 5.6 8/22/17  On Corrective Insulin Dosing. Will monitor  Fasting glucose 88          Coronary artery disease involving native coronary artery of native heart without angina pectoris    On ASA, plavix  Stable, no chest pain, no SOB  Trop 0.809 on admission.  Follow-up 0.669 > 0.638. Will trend        SOB (shortness of breath)    Chronic, stable  Pulse O2 95-97%  No edema, no crackles on auscultation            VTE Risk Mitigation         Ordered     heparin (porcine) injection 5,000 Units  Every 8 hours     Route:  Subcutaneous        11/11/17 2037     Medium Risk of VTE  Once      11/11/17 2037              Chi Sim MD  Department of Hospital Medicine   [facility]

## 2017-11-12 NOTE — ASSESSMENT & PLAN NOTE
On ASA, plavix  Stable, no chest pain, no SOB  Trop 0.809 on admission.  Follow-up 0.669 > 0.638. Will trend

## 2017-11-12 NOTE — ASSESSMENT & PLAN NOTE
Last HgbA1C stable at 5.6 8/22/17  On Corrective Insulin Dosing. Will monitor  Fasting glucose 88

## 2017-11-12 NOTE — H&P
[facility] Hospital Medicine  History & Physical    Patient Name: Gunner Motley  MRN: [id number]  Admission Date: 11/11/2017  Attending Physician: Vlad Mondragon MD   Primary Care Provider: Kaushal Townsend MD         Patient information was obtained from patient and ER records.     Subjective:     Principal Problem:Hemodialysis catheter infection, initial encounter    Chief Complaint:   Chief Complaint   Patient presents with    Hemodialysis Access     Pt has a Perm-a-cath this his left chest wall that is bleeding with sero-sang fluid. Pt had HD yesterday and reports that he woke up this morning and it was bleeding. Pt was recently hospitalized for an infected access on the left side.         HPI: Pt is a 73 yo male w PMH of ESRD, DMT2 (HgbA1C 5.8 8/2017) CHF (11/1 EF 50% W/ MILD AR), CAD, recent hx of UTI, Diverticulitis and HTN presented to the ED who presents to the ED for infected hemodialysis catheter in left chest.     He recently was admitted with SOB and elevated BP and was found to have pan-sensitive proteus present in his blood cultures and, thus his HD catheter in his right  chest and already non-functioning PD cath were removed.  He was treated with antibiotics and subsequent cultures were NGTD. A new line was placed in his left chest, but he noticed erythema around the site yesterday and today starting having chills, but no documented fever.    Pt received HD MWF and has been seen by [facility] nephrology while hospitalized in the past.  He is despondant about his medical conditions and his wife's poor health as well and wishes to be allowed to die.  He denies active SI but did sign DNR paperwork during his last admission.      Past Medical History:   Diagnosis Date    Anemia     CHF (congestive heart failure)     Coronary artery disease     Diverticulosis     Gout     Hypertension     Recurrent nephrolithiasis     Unspecified disorder of kidney and ureter     Urinary tract  infection      Past Surgical History:   Procedure Laterality Date    APPENDECTOMY      CARDIAC SURGERY  2005    CABG x4 vessels    HERNIA REPAIR  2002    umbilical hernia    KIDNEY STONE SURGERY  1983    abdominal    PERITONEAL CATHETER INSERTION Left 08/28/2017    abdomen     Review of patient's allergies indicates:  No Known Allergies    No current facility-administered medications on file prior to encounter.      Current Outpatient Prescriptions on File Prior to Encounter   Medication Sig    albuterol (PROVENTIL) 2.5 mg /3 mL (0.083 %) nebulizer solution as needed for Shortness of Breath.     allopurinol (ZYLOPRIM) 100 MG tablet Take 100 mg by mouth once daily.     alprazolam (XANAX) 0.25 MG tablet Take 0.25 mg by mouth every 8 (eight) hours as needed.    amoxicillin-clavulanate 875-125mg (AUGMENTIN) 875-125 mg per tablet Take 1 tablet by mouth every 12 (twelve) hours.    aspirin (ECOTRIN) 81 MG EC tablet Take 81 mg by mouth once daily.    calcitRIOL (ROCALTROL) 0.25 MCG Cap Take 1 capsule (0.25 mcg total) by mouth once daily.    carvedilol (COREG) 3.125 MG tablet Take 1 tablet (3.125 mg total) by mouth 2 (two) times daily.    clopidogrel (PLAVIX) 75 mg tablet Take 1 tablet (75 mg total) by mouth once daily.    docusate sodium (COLACE) 100 MG capsule Take 1 capsule (100 mg total) by mouth 2 (two) times daily.    lisinopril (PRINIVIL,ZESTRIL) 40 MG tablet Take 1 tablet (40 mg total) by mouth once daily.    metoprolol succinate (TOPROL-XL) 100 MG 24 hr tablet Take 1 tablet (100 mg total) by mouth once daily.    ondansetron (ZOFRAN) 4 MG tablet Take 1 tablet (4 mg total) by mouth every 8 (eight) hours as needed for Nausea.    oxyCODONE-acetaminophen (PERCOCET)  mg per tablet Take 1 tablet by mouth every 8 (eight) hours as needed for Pain.    oxyCODONE-acetaminophen (PERCOCET)  mg per tablet Take 1 tablet by mouth every 8 (eight) hours as needed.    pravastatin (PRAVACHOL) 40 MG tablet  Take 40 mg by mouth once daily.     sertraline (ZOLOFT) 50 MG tablet Take 1 tablet (50 mg total) by mouth once daily.     Family History     Problem Relation (Age of Onset)    Cancer Mother    Heart attack Father    Kidney disease Mother        Social History Main Topics    Smoking status: Former Smoker     Packs/day: 2.00     Years: 30.00     Quit date: 10/12/1995    Smokeless tobacco: Former User     Quit date: 1/12/1995    Alcohol use No      Comment: quit 2014    Drug use: Unknown    Sexual activity: Not on file     Review of Systems   Constitutional: Positive for chills and fatigue. Negative for appetite change, diaphoresis, fever and unexpected weight change.   HENT: Negative for congestion, rhinorrhea and trouble swallowing.    Eyes: Negative for visual disturbance.   Respiratory: Negative for chest tightness, shortness of breath and wheezing.    Cardiovascular: Negative for chest pain and palpitations.   Gastrointestinal: Negative for abdominal distention, abdominal pain, constipation, diarrhea, nausea and vomiting.   Genitourinary: Negative for decreased urine volume, difficulty urinating and frequency.   Musculoskeletal: Negative for arthralgias, back pain and joint swelling.   Skin: Positive for color change (erythema around insertion site for catheter). Negative for wound.   Neurological: Negative for dizziness, seizures, weakness and light-headedness.   Psychiatric/Behavioral: Positive for dysphoric mood. Negative for agitation and decreased concentration.   All other systems reviewed and are negative.    Objective:     Vital Signs (Most Recent):  Temp: 98.7 °F (37.1 °C) (11/11/17 2042)  Pulse: 98 (11/11/17 2042)  Resp: 16 (11/11/17 2042)  BP: (!) 185/88 (11/11/17 2042)  SpO2: 96 % (11/11/17 2042) Vital Signs (24h Range):  Temp:  [98.4 °F (36.9 °C)-98.7 °F (37.1 °C)] 98.7 °F (37.1 °C)  Pulse:  [61-98] 98  Resp:  [11-22] 16  SpO2:  [95 %-97 %] 96 %  BP: (144-185)/(53-90) 185/88     Weight: 68 kg  (149 lb 14.6 oz)  Body mass index is 24.2 kg/m².    Physical Exam   Constitutional: He is oriented to person, place, and time. He appears well-developed and well-nourished. No distress.   Thin, pleasant   HENT:   Head: Normocephalic and atraumatic.   Mouth/Throat: No oropharyngeal exudate.   Eyes: Conjunctivae and EOM are normal. Pupils are equal, round, and reactive to light.   Neck: Normal range of motion. Neck supple. No JVD present.   Cardiovascular: Normal rate and normal heart sounds.    No murmur heard.  PVC's in room   Pulmonary/Chest: Effort normal and breath sounds normal. No respiratory distress. He has no wheezes. He has no rales.   Abdominal: Soft. Bowel sounds are normal. He exhibits no distension and no mass. There is no tenderness.   Well healing wound from PD catheter in lower abdom, no erythema, no discharge   Musculoskeletal: Normal range of motion. He exhibits no edema.   Neurological: He is alert and oriented to person, place, and time. No cranial nerve deficit.   Skin: Skin is warm and dry. Capillary refill takes less than 2 seconds. He is not diaphoretic.   Erythema surrounding left HD catheter insertion site, no discharge, +warmth; old right HD cathter bandage in place, no surrounding erythema   Psychiatric: He has a normal mood and affect. His behavior is normal.   Nursing note and vitals reviewed.       Significant Labs:   A1C:   Recent Labs  Lab 08/22/17  0925   HGBA1C 5.6     CBC:   Recent Labs  Lab 11/11/17  1613   WBC 8.73   HGB 9.8*   HCT 31.0*        CMP:   Recent Labs  Lab 11/11/17  1613      K 3.8      CO2 26   *   BUN 23   CREATININE 3.3*   CALCIUM 8.3*   PROT 6.4   ALBUMIN 2.8*   BILITOT 0.5   ALKPHOS 118   AST 17   ALT 8*   ANIONGAP 12   EGFRNONAA 17*     Lab 11/11/17  1613   LACTATE 0.7     Troponin:   Recent Labs  Lab 11/11/17  1614   TROPONINI 0.809*       Significant Imaging: I have reviewed all pertinent imaging results/findings within the past 24  hours.    Assessment/Plan:     * Hemodialysis catheter infection, initial encounter    Start IV Clinda 500 mg q 8 hours (no need for renal dosing)  Cultures pending, including culture from catheter site  Surgery consulted  Pt NPO in case of intervention, new line placement  Dialysis not needed until monday        ESRD on dialysis    Stable  On MWF schedule for dialysis, [facility] nephro consulted        Chronic diastolic heart failure/ CAD    Echo 11/1/17 with EF 50%, mild aortic regurg  On fluids 125 cc, will monitor for overload  On ASA plavix        Type 2 diabetes mellitus with stage 4 chronic kidney disease, without long-term current use of insulin    Last HgbA1C stable at 5.6 8/22/17  On Corrective Insulin Dosing. Will monitor        SOB (shortness of breath)    Chronic, stable  Pulse O2 95-97%  No edema, no crackles on auscultation          VTE Risk Mitigation         Ordered     heparin (porcine) injection 5,000 Units  Every 8 hours     Route:  Subcutaneous        11/11/17 2037     Medium Risk of VTE  Once      11/11/17 2037           Chi Sim MD  Department of Hospital Medicine   [facility]

## 2017-11-12 NOTE — SUBJECTIVE & OBJECTIVE
Past Medical History:   Diagnosis Date    Anemia     CHF (congestive heart failure)     Coronary artery disease     Diverticulosis     Gout     Hypertension     Recurrent nephrolithiasis     Unspecified disorder of kidney and ureter     Urinary tract infection      Past Surgical History:   Procedure Laterality Date    APPENDECTOMY      CARDIAC SURGERY  2005    CABG x4 vessels    HERNIA REPAIR  2002    umbilical hernia    KIDNEY STONE SURGERY  1983    abdominal    PERITONEAL CATHETER INSERTION Left 08/28/2017    abdomen     Review of patient's allergies indicates:  No Known Allergies    No current facility-administered medications on file prior to encounter.      Current Outpatient Prescriptions on File Prior to Encounter   Medication Sig    albuterol (PROVENTIL) 2.5 mg /3 mL (0.083 %) nebulizer solution as needed for Shortness of Breath.     allopurinol (ZYLOPRIM) 100 MG tablet Take 100 mg by mouth once daily.     alprazolam (XANAX) 0.25 MG tablet Take 0.25 mg by mouth every 8 (eight) hours as needed.    amoxicillin-clavulanate 875-125mg (AUGMENTIN) 875-125 mg per tablet Take 1 tablet by mouth every 12 (twelve) hours.    aspirin (ECOTRIN) 81 MG EC tablet Take 81 mg by mouth once daily.    calcitRIOL (ROCALTROL) 0.25 MCG Cap Take 1 capsule (0.25 mcg total) by mouth once daily.    carvedilol (COREG) 3.125 MG tablet Take 1 tablet (3.125 mg total) by mouth 2 (two) times daily.    clopidogrel (PLAVIX) 75 mg tablet Take 1 tablet (75 mg total) by mouth once daily.    docusate sodium (COLACE) 100 MG capsule Take 1 capsule (100 mg total) by mouth 2 (two) times daily.    lisinopril (PRINIVIL,ZESTRIL) 40 MG tablet Take 1 tablet (40 mg total) by mouth once daily.    metoprolol succinate (TOPROL-XL) 100 MG 24 hr tablet Take 1 tablet (100 mg total) by mouth once daily.    ondansetron (ZOFRAN) 4 MG tablet Take 1 tablet (4 mg total) by mouth every 8 (eight) hours as needed for Nausea.     oxyCODONE-acetaminophen (PERCOCET)  mg per tablet Take 1 tablet by mouth every 8 (eight) hours as needed for Pain.    oxyCODONE-acetaminophen (PERCOCET)  mg per tablet Take 1 tablet by mouth every 8 (eight) hours as needed.    pravastatin (PRAVACHOL) 40 MG tablet Take 40 mg by mouth once daily.     sertraline (ZOLOFT) 50 MG tablet Take 1 tablet (50 mg total) by mouth once daily.     Family History     Problem Relation (Age of Onset)    Cancer Mother    Heart attack Father    Kidney disease Mother        Social History Main Topics    Smoking status: Former Smoker     Packs/day: 2.00     Years: 30.00     Quit date: 10/12/1995    Smokeless tobacco: Former User     Quit date: 1/12/1995    Alcohol use No      Comment: quit 2014    Drug use: Unknown    Sexual activity: Not on file     Review of Systems   Constitutional: Positive for chills and fatigue. Negative for appetite change, diaphoresis, fever and unexpected weight change.   HENT: Negative for congestion, rhinorrhea and trouble swallowing.    Eyes: Negative for visual disturbance.   Respiratory: Negative for chest tightness, shortness of breath and wheezing.    Cardiovascular: Negative for chest pain and palpitations.   Gastrointestinal: Negative for abdominal distention, abdominal pain, constipation, diarrhea, nausea and vomiting.   Genitourinary: Negative for decreased urine volume, difficulty urinating and frequency.   Musculoskeletal: Negative for arthralgias, back pain and joint swelling.   Skin: Positive for color change (erythema around insertion site for catheter). Negative for wound.   Neurological: Negative for dizziness, seizures, weakness and light-headedness.   Psychiatric/Behavioral: Positive for dysphoric mood. Negative for agitation and decreased concentration.   All other systems reviewed and are negative.    Objective:     Vital Signs (Most Recent):  Temp: 98.7 °F (37.1 °C) (11/11/17 2042)  Pulse: 98 (11/11/17 2042)  Resp: 16  (11/11/17 2042)  BP: (!) 185/88 (11/11/17 2042)  SpO2: 96 % (11/11/17 2042) Vital Signs (24h Range):  Temp:  [98.4 °F (36.9 °C)-98.7 °F (37.1 °C)] 98.7 °F (37.1 °C)  Pulse:  [61-98] 98  Resp:  [11-22] 16  SpO2:  [95 %-97 %] 96 %  BP: (144-185)/(53-90) 185/88     Weight: 68 kg (149 lb 14.6 oz)  Body mass index is 24.2 kg/m².    Physical Exam   Constitutional: He is oriented to person, place, and time. He appears well-developed and well-nourished. No distress.   Thin, pleasant   HENT:   Head: Normocephalic and atraumatic.   Mouth/Throat: No oropharyngeal exudate.   Eyes: Conjunctivae and EOM are normal. Pupils are equal, round, and reactive to light.   Neck: Normal range of motion. Neck supple. No JVD present.   Cardiovascular: Normal rate and normal heart sounds.    No murmur heard.  PVC's in room   Pulmonary/Chest: Effort normal and breath sounds normal. No respiratory distress. He has no wheezes. He has no rales.   Abdominal: Soft. Bowel sounds are normal. He exhibits no distension and no mass. There is no tenderness.   Well healing wound from PD catheter in lower abdom, no erythema, no discharge   Musculoskeletal: Normal range of motion. He exhibits no edema.   Neurological: He is alert and oriented to person, place, and time. No cranial nerve deficit.   Skin: Skin is warm and dry. Capillary refill takes less than 2 seconds. He is not diaphoretic.   Erythema surrounding left HD catheter insertion site, no discharge, +warmth; old right HD cathter bandage in place, no surrounding erythema   Psychiatric: He has a normal mood and affect. His behavior is normal.   Nursing note and vitals reviewed.       Significant Labs:   A1C:   Recent Labs  Lab 08/22/17  0925   HGBA1C 5.6     CBC:   Recent Labs  Lab 11/11/17  1613   WBC 8.73   HGB 9.8*   HCT 31.0*        CMP:   Recent Labs  Lab 11/11/17  1613      K 3.8      CO2 26   *   BUN 23   CREATININE 3.3*   CALCIUM 8.3*   PROT 6.4   ALBUMIN 2.8*    BILITOT 0.5   ALKPHOS 118   AST 17   ALT 8*   ANIONGAP 12   EGFRNONAA 17*     Lab 11/11/17  1613   LACTATE 0.7     Troponin:   Recent Labs  Lab 11/11/17  1614   TROPONINI 0.809*       Significant Imaging: I have reviewed all pertinent imaging results/findings within the past 24 hours.

## 2017-11-12 NOTE — SUBJECTIVE & OBJECTIVE
"Interval History: NAEON. Pt afebrile and VSS with some hypertension. Pain and erythema continues at HD site. Describes "worse then metallic" taste in mouth after IV clinida. Pt NPO in case of needed surgical intervention for line. Pt with dysphoric mood since his wife cannot care for herself and he has had such difficulty keeping up with dialysis.    Review of Systems   Constitutional: Positive for chills and fatigue. Negative for appetite change, diaphoresis, fever and unexpected weight change.   HENT: Negative for congestion, rhinorrhea and trouble swallowing.    Eyes: Negative for visual disturbance.   Respiratory: Negative for chest tightness, shortness of breath and wheezing.    Cardiovascular: Negative for chest pain and palpitations.   Gastrointestinal: Negative for abdominal distention, abdominal pain, constipation, diarrhea, nausea and vomiting.   Genitourinary: Negative for decreased urine volume, difficulty urinating and frequency.   Musculoskeletal: Negative for arthralgias, back pain and joint swelling.   Skin: Positive for color change (erythema around insertion site for catheter). Negative for wound.   Neurological: Negative for dizziness, seizures, weakness and light-headedness.   Psychiatric/Behavioral: Positive for dysphoric mood. Negative for agitation and decreased concentration.   All other systems reviewed and are negative.    Objective:     Vital Signs (Most Recent):  Temp: 98.4 °F (36.9 °C) (11/12/17 0801)  Pulse: 72 (11/12/17 0801)  Resp: 20 (11/12/17 0801)  BP: (!) 180/82 (11/12/17 0801)  SpO2: 96 % (11/12/17 0801) Vital Signs (24h Range):  Temp:  [97.8 °F (36.6 °C)-98.9 °F (37.2 °C)] 98.4 °F (36.9 °C)  Pulse:  [61-98] 72  Resp:  [11-22] 20  SpO2:  [95 %-97 %] 96 %  BP: (144-185)/(53-90) 180/82     Weight: 68 kg (149 lb 14.6 oz)  Body mass index is 24.2 kg/m².    Intake/Output Summary (Last 24 hours) at 11/12/17 0828  Last data filed at 11/12/17 0542   Gross per 24 hour   Intake             " 1133 ml   Output              150 ml   Net              983 ml      Physical Exam   Constitutional: He is oriented to person, place, and time. He appears well-developed and well-nourished. No distress.   Thin, pleasant   HENT:   Head: Normocephalic and atraumatic.   Mouth/Throat: No oropharyngeal exudate.   Eyes: Conjunctivae and EOM are normal. Pupils are equal, round, and reactive to light.   Neck: Normal range of motion. Neck supple. No JVD present.   Cardiovascular: Normal rate and normal heart sounds.    No murmur heard.  PVC's in room   Pulmonary/Chest: Effort normal and breath sounds normal. No respiratory distress. He has no wheezes. He has no rales.   Abdominal: Soft. Bowel sounds are normal. He exhibits no distension and no mass. There is no tenderness.   Well healing wound from PD catheter in lower abdom, no erythema, no discharge   Musculoskeletal: Normal range of motion. He exhibits no edema.   Neurological: He is alert and oriented to person, place, and time. No cranial nerve deficit.   Skin: Skin is warm and dry. Capillary refill takes less than 2 seconds. He is not diaphoretic.   Erythema surrounding left HD catheter insertion site, no discharge, +warmth; old right HD cathter bandage in place, no surrounding erythema    Bruising throughout extremities, thin skin   Psychiatric: He has a normal mood and affect. His behavior is normal.   Nursing note and vitals reviewed.    Significant Labs:   CBC:   Recent Labs  Lab 11/11/17  1613 11/12/17  0537   WBC 8.73 7.45   HGB 9.8* 8.7*   HCT 31.0* 27.8*    220     CMP:   Recent Labs  Lab 11/11/17  1613 11/12/17  0537    142   K 3.8 3.5    107   CO2 26 23   * 88   BUN 23 27*   CREATININE 3.3* 3.4*   CALCIUM 8.3* 7.8*   PROT 6.4 5.4*   ALBUMIN 2.8* 2.4*   BILITOT 0.5 0.4   ALKPHOS 118 98   AST 17 15   ALT 8* 7*   ANIONGAP 12 12   EGFRNONAA 17* 17*     Significant Imaging: I have reviewed all pertinent imaging results/findings within the  past 24 hours.

## 2017-11-12 NOTE — ASSESSMENT & PLAN NOTE
Stable  On MWF schedule for dialysis, [facility] nephro consulted  Electrolytes largely wnl (Cr 3.4), GFR 17

## 2017-11-12 NOTE — CONSULTS
Consult Note  [facility]    Consult Requested By: Vlad Mondragon MD    Reason for Consult: possible dialysis in am    SUBJECTIVE:     History of Present Illness:  Patient is a 72 y.o. male with PMHx of ESKD on HD via a left IJ permacath M/W/F with dr. Castaneda at [facility] presents to ER for possible infected LIJ permacath. He states that he has been experiencing pain and discomfort at the permcacath site. He did have history of infected RIJ permacath and at that time his BCx grew proteus pan sensitive. His BCx are NTD on 11/4-11/7-11/11. General surgery assest the catheter exit site and it was felt it was erythematous because of rough dressing removal. Patient denies fever, chills, chest pain, SOB, palp.    Past Medical History:   Diagnosis Date    Anemia     CHF (congestive heart failure)     Coronary artery disease     Diverticulosis     Gout     Hypertension     Recurrent nephrolithiasis     Unspecified disorder of kidney and ureter     Urinary tract infection      Past Surgical History:   Procedure Laterality Date    APPENDECTOMY      CARDIAC SURGERY  2005    CABG x4 vessels    HERNIA REPAIR  2002    umbilical hernia    KIDNEY STONE SURGERY  1983    abdominal    PERITONEAL CATHETER INSERTION Left 08/28/2017    abdomen     Family History   Problem Relation Age of Onset    Cancer Mother     Kidney disease Mother     Heart attack Father      Social History   Substance Use Topics    Smoking status: Former Smoker     Packs/day: 2.00     Years: 30.00     Quit date: 10/12/1995    Smokeless tobacco: Former User     Quit date: 1/12/1995    Alcohol use No      Comment: quit 2014       Review of patient's allergies indicates:  No Known Allergies     Review of Systems:  As per HPI    OBJECTIVE:     Vital Signs (Most Recent)  Temp: 98.4 °F (36.9 °C) (11/12/17 1237)  Pulse: 75 (11/12/17 1237)  Resp: 18 (11/12/17 1237)  BP: (!) 172/82 (11/12/17 1300)  SpO2: 95 % (11/12/17 1125)    Vital Signs  Range (Last 24H):  Temp:  [97.8 °F (36.6 °C)-98.9 °F (37.2 °C)]   Pulse:  [61-98]   Resp:  [11-22]   BP: (144-188)/(53-90)   SpO2:  [95 %-97 %]     Physical Exam:  NAD  No jvd  No pus or significant erythema at the catheter exit site.  No murmurs or gallops  Lungs are clear  Abdomen is soft   Ext no edema    Laboratory:  CBC:   Recent Labs  Lab 11/12/17  0537   WBC 7.45   RBC 2.98*   HGB 8.7*   HCT 27.8*      MCV 93   MCH 29.2   MCHC 31.3*     BMP:   Recent Labs  Lab 11/12/17  0537   GLU 88      K 3.5      CO2 23   BUN 27*   CREATININE 3.4*   CALCIUM 7.8*   MG 1.6     CMP:   Recent Labs  Lab 11/12/17  0537   GLU 88   CALCIUM 7.8*   ALBUMIN 2.4*   PROT 5.4*      K 3.5   CO2 23      BUN 27*   CREATININE 3.4*   ALKPHOS 98   ALT 7*   AST 15   BILITOT 0.4     LFTs:   Recent Labs  Lab 11/12/17  0537   ALT 7*   AST 15   ALKPHOS 98   BILITOT 0.4   PROT 5.4*   ALBUMIN 2.4*     Coagulation:   Recent Labs  Lab 11/11/17  1614   LABPROT 11.8   INR 1.1         ASSESSMENT/PLAN:     Active Hospital Problems    Diagnosis  POA    *Hemodialysis catheter infection, initial encounter [T82.7XXA]  Yes    Old MI (myocardial infarction) [I25.2]  Not Applicable    SOB (shortness of breath) [R06.02]  Yes    ESRD on dialysis [N18.6, Z99.2]  Not Applicable    Chronic diastolic heart failure [I50.32]  Yes    Pulmonary hypertension [I27.20]  Yes    Type 2 diabetes mellitus with stage 4 chronic kidney disease, without long-term current use of insulin [E11.22, N18.4]  Yes    Coronary artery disease involving native coronary artery of native heart without angina pectoris [I25.10]  Yes      Resolved Hospital Problems    Diagnosis Date Resolved POA   No resolved problems to display.       Plan:     -- HD tomorrow x 3 hrs    -- target weight 66 kg    -- UF goal is 1-2 liters    -- Epogen and iron    -- zemplar with HD    -- resume home BP meds and increase coreg 12.5 mg p.o BID and could add norvasc 5 mg p.o  qdaily    -- could use hydralazine 10 mg IV q6hr     Lia Sparrow MD  [facility] nephrology PGY-5

## 2017-11-12 NOTE — ASSESSMENT & PLAN NOTE
Pt states he is not a diabetic, but developed transient hyperglycemia when treated with steroids in the past.   He does not take any DM meds.  Lab Results   Component Value Date    HGBA1C 5.6 08/22/2017   He would like this removed from his chart.  This was discussed with the primary team and they will address this.

## 2017-11-12 NOTE — PLAN OF CARE
No acute distress. No true red alarms on tele. Sinus rhythm on tele.  NPO after midnight. NS infusing at 125 ml/h for 20 hours ( end 1700 11/11/2017). Patient refused bed alarm. Fall agreement obtained and placed in the chart. Patient verbalized understanding the plan of care. Room near nursing station, bed in low and locked position, side rails up x 2, call light within reach. Continue to monitor

## 2017-11-12 NOTE — ASSESSMENT & PLAN NOTE
Skin abnormality around LIJ permacath exit site likely secondary to ecchymosis and blister from prematurely removed postop dressing.  Biopatch and clear occlusive dressing placed.  Discussed with patient, daughter and primary team  Leave clear occlusive dressing in place (until there is a reason to remove it) until patient's visit with Dr. Escudero this Thursday.  Blood cx NTD  OK to d/c home on previous antibiotics

## 2017-11-12 NOTE — PLAN OF CARE
Left CW  dialysis access reddened and edematous.,Gauze dressing in place . Patient refusing to have dressing touched until surgeon sees the site . Hypertensive,bp 180/81,hydralazine given as ordered. Dr Steele and [facility] nephrology notified of consult

## 2017-11-12 NOTE — HPI
"73yo pt known to Dr. Escudero presents with concerns of possible permacath infection and bleeding from permacath exit site. He underwent removal of RIJ permacath and PD catheter 11/3.  He then had new LIJ permacath placed 11/6. Blood cultures from 11/4, 11/7 and 11/11 are negative to date.  He has a PMH of ESRD, DMT2 (HgbA1C 5.8 8/2017), CHF (11/1 EF 50% W/ MILD AR), CAD, recent hx of UTI, Diverticulitis and HTN.  He presented to the ED who presents to the ED for infected hemodialysis catheter in left chest 11/1/17. At that time, he was admitted with SOB and elevated BP and was found to have pan-sensitive proteus present in his blood cultures and, thus his HD catheter in his right  chest and already non-functioning PD cath were removed.  He was treated with antibiotics and subsequent cultures were NGTD. A new line was placed in his left chest 11/6.  He noticed erythema around the site yesterday and today starting having chills, but no documented fever and was concerned for recurrent infection.  He states that the OR dressing remained in place until 11/10/17, when the [facility] HD nurse "roughly" removed the dressing. He reports pain at that time.  He also reports that there was no Biopatch in place. No current f/c. Feels well and wound like to eat. Daughter present during encounter.  "

## 2017-11-12 NOTE — CONSULTS
"[facility]  General Surgery  Consult Note    Patient Name: Gunner Motley  MRN: [id number]  Code Status: DNR  Admission Date: 11/11/2017  Hospital Length of Stay: 0 days  Attending Physician: Vlad Mondragon MD  Primary Care Provider: Kaushal Townsend MD    Patient information was obtained from patient, relative(s), past medical records and ER records.     General Surgery  Consult performed by: RANDALL HEBERT  Consult ordered by: BRENDA ANTUNEZ        Subjective:     Principal Problem: Hemodialysis catheter infection, initial encounter    History of Present Illness: 71yo pt known to Dr. Escudero presents with concerns of possible permacath infection and bleeding from permacath exit site. He underwent removal of RIJ permacath and PD catheter 11/3.  He then had new LIJ permacath placed 11/6. Blood cultures from 11/4, 11/7 and 11/11 are negative to date.  He has a PMH of ESRD, DMT2 (HgbA1C 5.8 8/2017), CHF (11/1 EF 50% W/ MILD AR), CAD, recent hx of UTI, Diverticulitis and HTN.  He presented to the ED who presents to the ED for infected hemodialysis catheter in left chest 11/1/17. At that time, he was admitted with SOB and elevated BP and was found to have pan-sensitive proteus present in his blood cultures and, thus his HD catheter in his right  chest and already non-functioning PD cath were removed.  He was treated with antibiotics and subsequent cultures were NGTD. A new line was placed in his left chest 11/6.  He noticed erythema around the site yesterday and today starting having chills, but no documented fever and was concerned for recurrent infection.  He states that the OR dressing remained in place until 11/10/17, when the [facility] HD nurse "roughly" removed the dressing. He reports pain at that time.  He also reports that there was no Biopatch in place. No current f/c. Feels well and wound like to eat. Daughter present during encounter.    No current facility-administered " medications on file prior to encounter.      Current Outpatient Prescriptions on File Prior to Encounter   Medication Sig    albuterol (PROVENTIL) 2.5 mg /3 mL (0.083 %) nebulizer solution as needed for Shortness of Breath.     allopurinol (ZYLOPRIM) 100 MG tablet Take 100 mg by mouth once daily.     alprazolam (XANAX) 0.25 MG tablet Take 0.25 mg by mouth every 8 (eight) hours as needed.    amoxicillin-clavulanate 875-125mg (AUGMENTIN) 875-125 mg per tablet Take 1 tablet by mouth every 12 (twelve) hours.    aspirin (ECOTRIN) 81 MG EC tablet Take 81 mg by mouth once daily.    calcitRIOL (ROCALTROL) 0.25 MCG Cap Take 1 capsule (0.25 mcg total) by mouth once daily.    carvedilol (COREG) 3.125 MG tablet Take 1 tablet (3.125 mg total) by mouth 2 (two) times daily.    clopidogrel (PLAVIX) 75 mg tablet Take 1 tablet (75 mg total) by mouth once daily.    docusate sodium (COLACE) 100 MG capsule Take 1 capsule (100 mg total) by mouth 2 (two) times daily.    lisinopril (PRINIVIL,ZESTRIL) 40 MG tablet Take 1 tablet (40 mg total) by mouth once daily.    metoprolol succinate (TOPROL-XL) 100 MG 24 hr tablet Take 1 tablet (100 mg total) by mouth once daily.    ondansetron (ZOFRAN) 4 MG tablet Take 1 tablet (4 mg total) by mouth every 8 (eight) hours as needed for Nausea.    oxyCODONE-acetaminophen (PERCOCET)  mg per tablet Take 1 tablet by mouth every 8 (eight) hours as needed for Pain.    oxyCODONE-acetaminophen (PERCOCET)  mg per tablet Take 1 tablet by mouth every 8 (eight) hours as needed.    pravastatin (PRAVACHOL) 40 MG tablet Take 40 mg by mouth once daily.     sertraline (ZOLOFT) 50 MG tablet Take 1 tablet (50 mg total) by mouth once daily.       Review of patient's allergies indicates:  No Known Allergies    Past Medical History:   Diagnosis Date    Anemia     CHF (congestive heart failure)     Coronary artery disease     Diverticulosis     Gout     Hypertension     Recurrent  nephrolithiasis     Unspecified disorder of kidney and ureter     Urinary tract infection      Past Surgical History:   Procedure Laterality Date    APPENDECTOMY      CARDIAC SURGERY  2005    CABG x4 vessels    HERNIA REPAIR  2002    umbilical hernia    KIDNEY STONE SURGERY  1983    abdominal    PERITONEAL CATHETER INSERTION Left 08/28/2017    abdomen     Family History     Problem Relation (Age of Onset)    Cancer Mother    Heart attack Father    Kidney disease Mother        Social History Main Topics    Smoking status: Former Smoker     Packs/day: 2.00     Years: 30.00     Quit date: 10/12/1995    Smokeless tobacco: Former User     Quit date: 1/12/1995    Alcohol use No      Comment: quit 2014    Drug use: Unknown    Sexual activity: Not on file     Review of Systems   All systems were reviewed and are negative, except that mentioned in the HPI.    Objective:     Vital Signs (Most Recent):  Temp: 98.4 °F (36.9 °C) (11/12/17 0801)  Pulse: 72 (11/12/17 0801)  Resp: 20 (11/12/17 0801)  BP: (!) 180/82 (11/12/17 0801)  SpO2: 95 % (11/12/17 1125) Vital Signs (24h Range):  Temp:  [97.8 °F (36.6 °C)-98.9 °F (37.2 °C)] 98.4 °F (36.9 °C)  Pulse:  [61-98] 72  Resp:  [11-22] 20  SpO2:  [95 %-97 %] 95 %  BP: (144-185)/(53-90) 180/82     Weight: 68 kg (149 lb 14.6 oz)  Body mass index is 24.2 kg/m².    Physical Exam   Constitutional: He is oriented to person, place, and time. He appears well-developed and well-nourished. No distress.   HENT:   Head: Normocephalic and atraumatic.   Eyes: Conjunctivae and EOM are normal. Pupils are equal, round, and reactive to light. No scleral icterus.   Neck: Normal range of motion. Neck supple. No JVD present.   Cardiovascular: Normal rate and regular rhythm.    Pulmonary/Chest: Effort normal and breath sounds normal. No respiratory distress.   Abdominal: Soft. Bowel sounds are normal. He exhibits no distension.   Well-healed abdominal wounds   Musculoskeletal: Normal range of  motion. He exhibits no edema or tenderness.   Neurological: He is alert and oriented to person, place, and time. No cranial nerve deficit.   Skin: Skin is warm and dry. He is not diaphoretic.   LIJ permacath site has dried blood crusts in placed (removed), and what appears to be a healing blister/ecchymosis at the superior part of the wound with dried skin at the periphery and shiny new skin overlay (see photo). Biopatch placed and clear occlusive dressing placed. No pus or fluctuance.   Psychiatric: He has a normal mood and affect.       Appears to be a healing blister.      Redness marked, biopatch and occlusive dressing applied.      Significant Labs:  CBC:   Recent Labs  Lab 11/12/17  0537   WBC 7.45   RBC 2.98*   HGB 8.7*   HCT 27.8*      MCV 93   MCH 29.2   MCHC 31.3*     CMP:   Recent Labs  Lab 11/12/17  0537   GLU 88   CALCIUM 7.8*   ALBUMIN 2.4*   PROT 5.4*      K 3.5   CO2 23      BUN 27*   CREATININE 3.4*   ALKPHOS 98   ALT 7*   AST 15   BILITOT 0.4     Coagulation:   Recent Labs  Lab 11/11/17  1614   LABPROT 11.8   INR 1.1     Cardiac markers:   Recent Labs  Lab 11/12/17  0537   TROPONINI 0.638*     Microbiology Results (last 7 days)     Procedure Component Value Units Date/Time    Blood culture [[id number]]     Order Status:  Canceled Specimen:  Blood     Blood culture #1 [[id number]] Collected:  11/11/17 1600    Order Status:  Completed Specimen:  Blood from Peripheral, Wrist, Right Updated:  11/12/17 0315     Blood Culture, Routine No Growth to date    Narrative:       Blood Culture #1    Blood culture #2 [[id number]] Collected:  11/11/17 1614    Order Status:  Completed Specimen:  Blood from Peripheral, Forearm, Right Updated:  11/12/17 0315     Blood Culture, Routine No Growth to date    Narrative:       Blood Culture #2          Significant Diagnostics:  CXR 11/11/17 images and report personally reviewed.  AP portable upright view of the chest. Monitoring leads overlie the chest. No  detrimental change. Left IJ CVC tip projects over the mid to distal SVC. Cardiomediastinal silhouette is stable without evidence of failure. No large consolidation or new focal opacity. No large pleural effusion or pneumothorax. No acute osseous process seen. The bilateral lungs are well expanded without large consolidation.  No large pleural effusion or pneumothorax.  The heart and mediastinal contours are within normal limits for age.  No acute osseous process seen.   PA and lateral views can be obtained.    Assessment/Plan:     * Hemodialysis catheter infection, initial encounter    Skin abnormality around LIJ permacath exit site likely secondary to ecchymosis and blister from prematurely removed postop dressing.  Biopatch and clear occlusive dressing placed.  Discussed with patient, daughter and primary team  Leave clear occlusive dressing in place (until there is a reason to remove it) until patient's visit with Dr. Escudero this Thursday.  Blood cx NTD  OK to d/c home on previous antibiotics        Type 2 diabetes mellitus with stage 4 chronic kidney disease, without long-term current use of insulin    Pt states he is not a diabetic, but developed transient hyperglycemia when treated with steroids in the past.   He does not take any DM meds.  Lab Results   Component Value Date    HGBA1C 5.6 08/22/2017   He would like this removed from his chart.  This was discussed with the primary team and they will address this.          Coronary artery disease involving native coronary artery of native heart without angina pectoris    Cont medical management        ESRD on dialysis    Per renal          VTE Risk Mitigation         Ordered     heparin (porcine) injection 5,000 Units  Every 8 hours     Route:  Subcutaneous        11/11/17 2037     Medium Risk of VTE  Once      11/11/17 2037          Thank you for your consult. OK to d/c pt home with planned f/u this Thursday with Dr. Escudero.    Gloria Steele,  DO  General Surgery  [facility]

## 2017-11-12 NOTE — HPI
Pt is a 71 yo male w PMH of ESRD, DMT2 (HgbA1C 5.8 8/2017) CHF (11/1 EF 50% W/ MILD AR), CAD, recent hx of UTI, Diverticulitis and HTN presented to the ED who presents to the ED for infected hemodialysis catheter in left chest.     He recently was admitted with SOB and elevated BP and was found to have pan-sensitive proteus present in his blood cultures and, thus his HD catheter in his right  chest and already non-functioning PD cath were removed.  He was treated with antibiotics and subsequent cultures were NGTD. A new line was placed in his left chest, but he noticed erythema around the site yesterday and today starting having chills, but no documented fever.    Pt received HD MWF and has been seen by [facility] nephrology while hospitalized in the past.  He is despondant about his medical conditions and his wife's poor health as well and wishes to be allowed to die.  He denies active SI but did sign DNR paperwork during his last admission.

## 2017-11-13 NOTE — PLAN OF CARE
Future Appointments  Date Time Provider Department Center   11/16/2017 9:00 AM Catracho Escudero MD [facility] GENSUR [facility]   11/16/2017 2:00 PM Jannet Herman MD [facility] IM [facility]O [facility]   11/21/2017 3:20 PM Laron Escobar NP [facility] CARDIO [place] [facility]     Fabiola Faye RN  Transition Navigator  [phone]

## 2017-11-13 NOTE — PLAN OF CARE
"Problem: Patient Care Overview  Goal: Plan of Care Review  Pt afebrile. No c/o pain this shift.no result yet from Miralax." Feels my stomach growling "no true red alarm noted on telemetry.NSR with HR in the 60s -70s.      "

## 2017-11-13 NOTE — PLAN OF CARE
PT is being discharged. Removed tele, no ectopy. Removed IV, tip intact, tolerated well. Went over discharge instructions with pt, verbalized understanding.

## 2017-11-13 NOTE — ASSESSMENT & PLAN NOTE
Stable  On MWF schedule for dialysis, [facility] nephro consulted  Electrolytes largely wnl (Cr 3.4), GFR 17  Will get dialysis today and possible DC afterwards as his permacath is not infected

## 2017-11-13 NOTE — PLAN OF CARE
Patient discharged to home. No needs noted upon discharge.   Follow-up appointments previously scheduled.    Future Appointments  Date Time Provider Department Center   11/16/2017 9:00 AM Catracho Escudero MD [facility]UR [facility]   11/16/2017 2:00 PM Jannet Herman MD [facility] IM [facility]O [facility]   11/21/2017 3:20 PM Laron Escobar NP [facility] CARDIO [place] [facility]        11/13/17 1041   Final Note   Assessment Type Final Discharge Note   Discharge Disposition Home   What phone number can be called within the next 1-3 days to see how you are doing after discharge? [phone]   Hospital Follow Up  Appt(s) scheduled? Yes   Discharge plans and expectations educations in teach back method with documentation complete? Yes   Right Care Referral Info   Post Acute Recommendation No Care     Fabiola Faye RN  Transition Navigator  [phone]

## 2017-11-13 NOTE — ASSESSMENT & PLAN NOTE
IV Clinda 500 mg q 8 hours (no need for renal dosing) discontinued and Augmentin started.   Surgery consulted-no intervention needed as the site is not infected.   Dressing over inflamed area  Dressing over old HD insertion site  Cultures were drawn from 2 sites, NGTD currently

## 2017-11-13 NOTE — PLAN OF CARE
Problem: Hemodialysis (Adult)  Goal: Signs and Symptoms of Listed Potential Problems Will be Absent, Minimized or Managed (Hemodialysis)  Signs and symptoms of listed potential problems will be absent, minimized or managed by discharge/transition of care (reference Hemodialysis (Adult) CPG).    11/13/17 1123   Hemodialysis   Problems Assessed (Hemodialysis) electrolyte imbalance;fluid imbalance   Problems Present (Hemodialysis) electrolyte imbalance;fluid imbalance   HD 3hrs with UF

## 2017-11-13 NOTE — ASSESSMENT & PLAN NOTE
Last HgbA1C stable at 5.6 8/22/17  On Corrective Insulin Dosing. Will monitor  Fasting glucose 93

## 2017-11-13 NOTE — PLAN OF CARE
Problem: Infection, Risk/Actual (Adult)  Goal: Infection Prevention/Resolution  Patient will demonstrate the desired outcomes by discharge/transition of care.   Administer antibiotics as ordered.

## 2017-11-13 NOTE — PLAN OF CARE
"Problem: Fall Risk (Adult)  Intervention: Patient Rounds  Bed alarm on and bed in lowest position. Call bell in reach. " Call for any reason to get out of bed" verbalized understanding.        "

## 2017-11-13 NOTE — SUBJECTIVE & OBJECTIVE
Interval History: Will get HD today    Review of Systems   Constitutional: Negative for appetite change, chills, diaphoresis, fatigue, fever and unexpected weight change.   HENT: Negative for congestion, rhinorrhea and trouble swallowing.    Eyes: Negative for visual disturbance.   Respiratory: Negative for chest tightness, shortness of breath and wheezing.    Cardiovascular: Negative for chest pain and palpitations.   Gastrointestinal: Negative for abdominal distention, abdominal pain, constipation, diarrhea, nausea and vomiting.   Genitourinary: Negative for decreased urine volume, difficulty urinating and frequency.   Musculoskeletal: Negative for arthralgias, back pain and joint swelling.   Skin: Positive for color change (erythema around insertion site for catheter). Negative for wound.   Neurological: Negative for dizziness, seizures, weakness and light-headedness.   Psychiatric/Behavioral: Negative for agitation, decreased concentration and dysphoric mood.   All other systems reviewed and are negative.    Objective:     Vital Signs (Most Recent):  Temp: 98 °F (36.7 °C) (11/13/17 0840)  Pulse: 73 (11/13/17 0840)  Resp: 18 (11/13/17 0840)  BP: (!) 173/84 (11/13/17 0840)  SpO2: 99 % (11/13/17 0840) Vital Signs (24h Range):  Temp:  [98 °F (36.7 °C)-98.8 °F (37.1 °C)] 98 °F (36.7 °C)  Pulse:  [73-82] 73  Resp:  [16-20] 18  SpO2:  [93 %-99 %] 99 %  BP: (161-188)/(72-84) 173/84     Weight: 68 kg (149 lb 14.6 oz)  Body mass index is 24.2 kg/m².    Intake/Output Summary (Last 24 hours) at 11/13/17 0905  Last data filed at 11/12/17 1930   Gross per 24 hour   Intake             1500 ml   Output              450 ml   Net             1050 ml      Physical Exam   Constitutional: He is oriented to person, place, and time. He appears well-developed and well-nourished. No distress.   Thin, pleasant   HENT:   Head: Normocephalic and atraumatic.   Mouth/Throat: No oropharyngeal exudate.   Eyes: Conjunctivae and EOM are normal.  Pupils are equal, round, and reactive to light.   Neck: Normal range of motion. Neck supple. No JVD present.   Cardiovascular: Normal rate and normal heart sounds.    No murmur heard.  PVC's in room   Pulmonary/Chest: Effort normal and breath sounds normal. No respiratory distress. He has no wheezes. He has no rales.   Abdominal: Soft. Bowel sounds are normal. He exhibits no distension and no mass. There is no tenderness.   Well healing wound from PD catheter in lower abdom, no erythema, no discharge   Musculoskeletal: Normal range of motion. He exhibits no edema.   Neurological: He is alert and oriented to person, place, and time. No cranial nerve deficit.   Skin: Skin is warm and dry. Capillary refill takes less than 2 seconds. He is not diaphoretic.   Erythema surrounding left HD catheter insertion site, no discharge, +warmth; old right HD cathter bandage in place, no surrounding erythema    Bruising throughout extremities, thin skin   Psychiatric: He has a normal mood and affect. His behavior is normal.   Nursing note and vitals reviewed.      Significant Labs:   Blood Culture:   Recent Labs  Lab 11/11/17  1600 11/11/17  1614   LABBLOO No Growth to date  No Growth to date No Growth to date  No Growth to date     CBC:   Recent Labs  Lab 11/11/17  1613 11/12/17  0537 11/13/17  0515   WBC 8.73 7.45 7.79   HGB 9.8* 8.7* 8.6*   HCT 31.0* 27.8* 26.8*    220 229     CMP:   Recent Labs  Lab 11/11/17  1613 11/12/17  0537 11/13/17  0515    142 138   K 3.8 3.5 3.9    107 105   CO2 26 23 25   * 88 93   BUN 23 27* 30*   CREATININE 3.3* 3.4* 3.6*   CALCIUM 8.3* 7.8* 8.2*   PROT 6.4 5.4* 5.7*   ALBUMIN 2.8* 2.4* 2.4*   BILITOT 0.5 0.4 0.4   ALKPHOS 118 98 96   AST 17 15 13   ALT 8* 7* 7*   ANIONGAP 12 12 8   EGFRNONAA 17* 17* 16*       Significant Imaging: I have reviewed all pertinent imaging results/findings within the past 24 hours.

## 2017-11-13 NOTE — PROGRESS NOTES
[facility] Hospital Medicine  Progress Note    Patient Name: Gunner Motley  MRN: [id number]  Patient Class: OP- Observation   Admission Date: 11/11/2017  Length of Stay: 0 days  Attending Physician: Vlad Mondragon MD  Primary Care Provider: Kaushal Townsend MD        Subjective:     Principal Problem:Hemodialysis catheter infection, initial encounter    HPI:  Pt is a 73 yo male w PMH of ESRD, DMT2 (HgbA1C 5.8 8/2017) CHF (11/1 EF 50% W/ MILD AR), CAD, recent hx of UTI, Diverticulitis and HTN presented to the ED who presents to the ED for infected hemodialysis catheter in left chest.     He recently was admitted with SOB and elevated BP and was found to have pan-sensitive proteus present in his blood cultures and, thus his HD catheter in his right  chest and already non-functioning PD cath were removed.  He was treated with antibiotics and subsequent cultures were NGTD. A new line was placed in his left chest, but he noticed erythema around the site yesterday and today starting having chills, but no documented fever.    Pt received HD MWF and has been seen by [facility] while hospitalized in the past.  He is despondant about his medical conditions and his wife's poor health as well and wishes to be allowed to die.  He denies active SI but did sign DNR paperwork during his last admission.      Hospital Course:  Pt was admitted to medicine although Dr. Steele with General Surgery was notified.  Surgery saw the patient and said he does not have an infection and would not need any procedure to be done. He was doing well today and denies any fever, chills, nausea, vomiting, chest pain, SOB.     Interval History: Will get HD today    Review of Systems   Constitutional: Negative for appetite change, chills, diaphoresis, fatigue, fever and unexpected weight change.   HENT: Negative for congestion, rhinorrhea and trouble swallowing.    Eyes: Negative for visual disturbance.   Respiratory:  Negative for chest tightness, shortness of breath and wheezing.    Cardiovascular: Negative for chest pain and palpitations.   Gastrointestinal: Negative for abdominal distention, abdominal pain, constipation, diarrhea, nausea and vomiting.   Genitourinary: Negative for decreased urine volume, difficulty urinating and frequency.   Musculoskeletal: Negative for arthralgias, back pain and joint swelling.   Skin: Positive for color change (erythema around insertion site for catheter). Negative for wound.   Neurological: Negative for dizziness, seizures, weakness and light-headedness.   Psychiatric/Behavioral: Negative for agitation, decreased concentration and dysphoric mood.   All other systems reviewed and are negative.    Objective:     Vital Signs (Most Recent):  Temp: 98 °F (36.7 °C) (11/13/17 0840)  Pulse: 73 (11/13/17 0840)  Resp: 18 (11/13/17 0840)  BP: (!) 173/84 (11/13/17 0840)  SpO2: 99 % (11/13/17 0840) Vital Signs (24h Range):  Temp:  [98 °F (36.7 °C)-98.8 °F (37.1 °C)] 98 °F (36.7 °C)  Pulse:  [73-82] 73  Resp:  [16-20] 18  SpO2:  [93 %-99 %] 99 %  BP: (161-188)/(72-84) 173/84     Weight: 68 kg (149 lb 14.6 oz)  Body mass index is 24.2 kg/m².    Intake/Output Summary (Last 24 hours) at 11/13/17 0905  Last data filed at 11/12/17 1930   Gross per 24 hour   Intake             1500 ml   Output              450 ml   Net             1050 ml      Physical Exam   Constitutional: He is oriented to person, place, and time. He appears well-developed and well-nourished. No distress.   Thin, pleasant   HENT:   Head: Normocephalic and atraumatic.   Mouth/Throat: No oropharyngeal exudate.   Eyes: Conjunctivae and EOM are normal. Pupils are equal, round, and reactive to light.   Neck: Normal range of motion. Neck supple. No JVD present.   Cardiovascular: Normal rate and normal heart sounds.    No murmur heard.  PVC's in room   Pulmonary/Chest: Effort normal and breath sounds normal. No respiratory distress. He has no  wheezes. He has no rales.   Abdominal: Soft. Bowel sounds are normal. He exhibits no distension and no mass. There is no tenderness.   Well healing wound from PD catheter in lower abdom, no erythema, no discharge   Musculoskeletal: Normal range of motion. He exhibits no edema.   Neurological: He is alert and oriented to person, place, and time. No cranial nerve deficit.   Skin: Skin is warm and dry. Capillary refill takes less than 2 seconds. He is not diaphoretic.   Erythema surrounding left HD catheter insertion site, no discharge, +warmth; old right HD cathter bandage in place, no surrounding erythema    Bruising throughout extremities, thin skin   Psychiatric: He has a normal mood and affect. His behavior is normal.   Nursing note and vitals reviewed.      Significant Labs:   Blood Culture:   Recent Labs  Lab 11/11/17  1600 11/11/17  1614   LABBLOO No Growth to date  No Growth to date No Growth to date  No Growth to date     CBC:   Recent Labs  Lab 11/11/17  1613 11/12/17  0537 11/13/17  0515   WBC 8.73 7.45 7.79   HGB 9.8* 8.7* 8.6*   HCT 31.0* 27.8* 26.8*    220 229     CMP:   Recent Labs  Lab 11/11/17  1613 11/12/17  0537 11/13/17  0515    142 138   K 3.8 3.5 3.9    107 105   CO2 26 23 25   * 88 93   BUN 23 27* 30*   CREATININE 3.3* 3.4* 3.6*   CALCIUM 8.3* 7.8* 8.2*   PROT 6.4 5.4* 5.7*   ALBUMIN 2.8* 2.4* 2.4*   BILITOT 0.5 0.4 0.4   ALKPHOS 118 98 96   AST 17 15 13   ALT 8* 7* 7*   ANIONGAP 12 12 8   EGFRNONAA 17* 17* 16*       Significant Imaging: I have reviewed all pertinent imaging results/findings within the past 24 hours.    Assessment/Plan:      * Hemodialysis catheter infection, initial encounter    IV Clinda 500 mg q 8 hours (no need for renal dosing) discontinued and Augmentin started.   Surgery consulted-no intervention needed as the site is not infected.   Dressing over inflamed area  Dressing over old HD insertion site  Cultures were drawn from 2 sites, TD  currently          Old MI (myocardial infarction)              SOB (shortness of breath)    Chronic, stable  Pulse O2 95-97%  No edema, no crackles on auscultation          Non-rheumatic mitral regurgitation              ESRD on dialysis    Stable  On MWF schedule for dialysis, [facility] nephro consulted  Electrolytes largely wnl (Cr 3.4), GFR 17  Will get dialysis today and possible DC afterwards as his permacath is not infected          Pulmonary hypertension              Chronic diastolic heart failure    Echo 11/1/17 with EF 50%, mild aortic regurg  Stable, no lower extrem swelling            Type 2 diabetes mellitus with stage 4 chronic kidney disease, without long-term current use of insulin    Last HgbA1C stable at 5.6 8/22/17  On Corrective Insulin Dosing. Will monitor  Fasting glucose 93          Coronary artery disease involving native coronary artery of native heart without angina pectoris    On ASA, plavix  Stable, no chest pain, no SOB  Trop 0.809 on admission.  Follow-up 0.669 > 0.638. Will trend          VTE Risk Mitigation         Ordered     heparin (porcine) injection 5,000 Units  Every 8 hours     Route:  Subcutaneous        11/11/17 2037     Medium Risk of VTE  Once      11/11/17 2037              Holland Stringer MD  Department of Hospital Medicine   [facility]

## 2017-11-13 NOTE — PROGRESS NOTES
[facility] renal fellow HOIV      Subjective:      Pt states he feels deconditioned     Objective:   Last 24 Hour Vital Signs:  BP  Min: 161/76  Max: 188/81  Temp  Av.3 °F (36.8 °C)  Min: 98 °F (36.7 °C)  Max: 98.8 °F (37.1 °C)  Pulse  Av.7  Min: 73  Max: 82  Resp  Av.7  Min: 16  Max: 20  SpO2  Av.7 %  Min: 93 %  Max: 99 %  I/O last 3 completed shifts:  In: 2633 [P.O.:825; I.V.:1033; Other:625; IV Piggyback:150]  Out: 600 [Urine:600]    Physical Examination:  GEN - AAOx4, NAD  CHEST - bibasilar decreased SB; scattered wheezes B; L sided chest wall permacath without cellulitic skin changes  HEART - rrr, no m/r/s3/s4  ABD - soft; nonttp  EXTR - warm; no edema    Laboratory:  Laboratory   Pertinent Findings:    Recent Labs  Lab 17  1613 17  0537 17  0515   WBC 8.73 7.45 7.79   HGB 9.8* 8.7* 8.6*   HCT 31.0* 27.8* 26.8*    220 229   MCV 93 93 92   RDW 14.1 14.0 14.0    142 138   K 3.8 3.5 3.9    107 105   CO2 26 23 25   BUN 23 27* 30*   * 88 93   PROT 6.4 5.4* 5.7*   ALBUMIN 2.8* 2.4* 2.4*   BILITOT 0.5 0.4 0.4   AST 17 15 13   ALKPHOS 118 98 96   ALT 8* 7* 7*             Current Medications:     Infusions:        Scheduled:   sodium chloride 0.9%   Intravenous Once    allopurinol  100 mg Oral Daily    [START ON 2017] amLODIPine  10 mg Oral Daily    amoxicillin-clavulanate 875-125mg  1 tablet Oral Q12H    aspirin  81 mg Oral Daily    calcitRIOL  0.25 mcg Oral Daily    carvedilol  6.25 mg Oral BID    clindamycin (CLEOCIN) IVPB  600 mg Intravenous Q8H    clopidogrel  75 mg Oral Daily    docusate sodium  100 mg Oral BID    epoetin fredi (PROCRIT) injection  10,000 Units Subcutaneous Once    heparin (porcine)  5,000 Units Subcutaneous Q8H    iron sucrose  100 mg Intravenous Once in dialysis    lisinopril  40 mg Oral Daily    paricalcitol  5 mcg Intravenous Once in dialysis    pravastatin  40 mg Oral Daily    sertraline  50 mg Oral Daily         PRN:  sodium chloride 0.9%, acetaminophen, ALPRAZolam, dextrose 50%, dextrose 50%, labetalol, oxyCODONE-acetaminophen, ramelteon, sodium chloride 0.9%        Assessment/Plan     This is a 73 yo  Male with HTM/ESRD who was admitted for possible CVC line infection; L-renal has been consulted for    ESRD  -etiol includes HTN/small vessel Dz; recent initiation 8/2017 PD->switched to HD 10/12/17 MWF william [facility]  -access L chest wall permacath  -will cont outpt MWF while in house; epo/zemplar on HD    Pain associated with L chest wall permacath  -evaluated by surgery who thinks not infectious etiol  -BCx NGTD  -would rec stopping any Abx if infection has been effectively ruled out  -would continue Abx course from prior hospitalization for proteus bacteremia     Papito Paniagua II  [facility] renal HO [facility]  [phone]

## 2017-11-13 NOTE — PLAN OF CARE
Problem: Patient Care Overview  Goal: Plan of Care Review  Outcome: Ongoing (interventions implemented as appropriate)  Received pt on RA; no distress noted. Will cont to monitor

## 2017-11-13 NOTE — PLAN OF CARE
TN went to meet with patient. Patient is independent and lives alone. He does not have any home health or medicals equipment at home. He still drives to all follow-up appointments, but his daughter will provide transport home. Patient [facility]. All follow-up appointments already scheduled previously.     Future Appointments  Date Time Provider Department Center   11/16/2017 9:00 AM Catracho Escudero MD [facility] GENSUR [facility]   11/16/2017 2:00 PM Jannet Herman MD [facility] IM KEO [facility]   11/21/2017 3:20 PM Laron Escobar NP [facility] CARDIO [facility]      11/13/17 1037   Discharge Assessment   Assessment Type Discharge Planning Assessment   Confirmed/corrected address and phone number on facesheet? Yes   Assessment information obtained from? Patient   Expected Length of Stay (days) 1   Communicated expected length of stay with patient/caregiver yes   Prior to hospitilization cognitive status: Alert/Oriented   Prior to hospitalization functional status: Independent   Current cognitive status: Alert/Oriented   Current Functional Status: Independent   Facility Arrived From: Home   Lives With alone   Able to Return to Prior Arrangements yes   Is patient able to care for self after discharge? Yes   Who are your caregiver(s) and their phone number(s)? [phone]   Patient's perception of discharge disposition home or selfcare   Readmission Within The Last 30 Days no previous admission in last 30 days   Equipment Currently Used at Home none   Do you have any problems affording any of your prescribed medications? No   Is the patient taking medications as prescribed? yes   Does the patient have transportation home? Yes   Transportation Available family or friend will provide   Dialysis Name and Scheduled days [facility]   Does the patient receive services at the [facility]mad[facility] Clinic? No   Discharge Plan A Home with family   Discharge Plan B Home with family;Home Health    Patient/Family In Agreement With Plan yes     Fabiola Faye RN  Transition Navigator  [phone]

## 2017-11-14 NOTE — ASSESSMENT & PLAN NOTE
Last HgbA1C stable at 5.6 8/22/17  On Corrective Insulin Dosing. Will monitor  Fasting glucose 93 on the day of dC

## 2017-11-14 NOTE — ASSESSMENT & PLAN NOTE
On ASA, plavix  Stable, no chest pain, no SOB  Trop 0.809 on admission.  Follow-up 0.669 > 0.638. All trended down

## 2017-11-14 NOTE — DISCHARGE SUMMARY
[facility] Hospital Medicine  Discharge Summary      Patient Name: Gunner Motley  MRN: [id number]  Admission Date: 11/11/2017  Hospital Length of Stay: 0 days  Discharge Date and Time: 11/13/2017  4:45 PM  Attending Physician: Bar Brock MD  Discharging Provider: Holland Stringer MD  Primary Care Provider: Kaushal Townsend MD      HPI:   Pt is a 71 yo male w PMH of ESRD, DMT2 (HgbA1C 5.8 8/2017) CHF (11/1 EF 50% W/ MILD AR), CAD, recent hx of UTI, Diverticulitis and HTN presented to the ED who presents to the ED for infected hemodialysis catheter in left chest.     He recently was admitted with SOB and elevated BP and was found to have pan-sensitive proteus present in his blood cultures and, thus his HD catheter in his right  chest and already non-functioning PD cath were removed.  He was treated with antibiotics and subsequent cultures were NGTD. A new line was placed in his left chest, but he noticed erythema around the site yesterday and today starting having chills, but no documented fever.    Pt received HD MWF and has been seen by [facility] nephrology while hospitalized in the past.  He is despondant about his medical conditions and his wife's poor health as well and wishes to be allowed to die.  He denies active SI but did sign DNR paperwork during his last admission.      * No surgery found *      Hospital Course:   Pt was admitted to medicine although Dr. Steele with General Surgery was notified.  Surgery saw the patient and said he does not have an infection and would not need any procedure to be done. He was doing well today and denies any fever, chills, nausea, vomiting, chest pain, SOB.      Consults:   Consults         Status Ordering Provider     General Surgery  Once     Provider:  Gloria Steele,     Completed BRENDA ANTUNEZ     Inpatient consult to Nephrology-[facility]  Once     Provider:  (Not yet assigned)    Completed MANDY OSLIS          * Hemodialysis catheter  infection, initial encounter    IV Clinda 500 mg q 8 hours (no need for renal dosing) discontinued and Augmentin started.   Surgery consulted-no intervention needed as the site is not infected.   Dressing over inflamed area  Dressing over old HD insertion site  Cultures were drawn from 2 sites, NGTD currently          ESRD on dialysis    Stable  On MWF schedule for dialysis, [facility] nephro consulted  Electrolytes largely wnl (Cr 3.4), GFR 17  Will get dialysis today and possible DC afterwards as his permacath is not infected          Type 2 diabetes mellitus with stage 4 chronic kidney disease, without long-term current use of insulin    Last HgbA1C stable at 5.6 8/22/17  On Corrective Insulin Dosing. Will monitor  Fasting glucose 93 on the day of dC        Coronary artery disease involving native coronary artery of native heart without angina pectoris    On ASA, plavix  Stable, no chest pain, no SOB  Trop 0.809 on admission.  Follow-up 0.669 > 0.638.  All trended down          Final Active Diagnoses:    Diagnosis Date Noted POA    PRINCIPAL PROBLEM:  Hemodialysis catheter infection, initial encounter [T82.7XXA] 11/11/2017 Yes    SOB (shortness of breath) [R06.02] 11/03/2017 Yes    ESRD on dialysis [N18.6, Z99.2]  Not Applicable    Chronic diastolic heart failure [I50.32] 08/23/2017 Yes    Type 2 diabetes mellitus with stage 4 chronic kidney disease, without long-term current use of insulin [E11.22, N18.4] 08/22/2017 Yes    Coronary artery disease involving native coronary artery of native heart without angina pectoris [I25.10] 05/01/2015 Yes      Problems Resolved During this Admission:    Diagnosis Date Noted Date Resolved POA       Discharged Condition: stable    Disposition: Home or Self Care    Follow Up:  Follow-up Information     Kaushal Townsend MD. Schedule an appointment as soon as possible for a visit in 3 days.    Specialty:  Internal Medicine  Contact information:  [address]  [address]  [place] [address]  [phone]                 Patient Instructions:     Diet renal     Diet Cardiac     Diet Low Sodium, 2gm     Activity as tolerated     Call MD for:  temperature >100.4     Call MD for:  persistent nausea and vomiting or diarrhea     Call MD for:  severe uncontrolled pain     Call MD for:  redness, tenderness, or signs of infection (pain, swelling, redness, odor or green/yellow discharge around incision site)     Call MD for:  difficulty breathing or increased cough     Call MD for:  severe persistent headache     Call MD for:  worsening rash     Call MD for:  persistent dizziness, light-headedness, or visual disturbances     Call MD for:  increased confusion or weakness         Significant Diagnostic Studies: Labs:   CMP   Recent Labs  Lab 11/12/17  0537 11/13/17  0515    138   K 3.5 3.9    105   CO2 23 25   GLU 88 93   BUN 27* 30*   CREATININE 3.4* 3.6*   CALCIUM 7.8* 8.2*   PROT 5.4* 5.7*   ALBUMIN 2.4* 2.4*   BILITOT 0.4 0.4   ALKPHOS 98 96   AST 15 13   ALT 7* 7*   ANIONGAP 12 8   ESTGFRAFRICA 19* 18*   EGFRNONAA 17* 16*    and CBC   Recent Labs  Lab 11/12/17  0537 11/13/17  0515   WBC 7.45 7.79   HGB 8.7* 8.6*   HCT 27.8* 26.8*    229       Pending Diagnostic Studies:     None         Medications:  Reconciled Home Medications:   Discharge Medication List as of 11/13/2017  4:04 PM      START taking these medications    Details   amLODIPine (NORVASC) 5 MG tablet Take 1 tablet (5 mg total) by mouth once daily., Starting Tue 11/14/2017, Until Wed 11/14/2018, Normal         CONTINUE these medications which have NOT CHANGED    Details   albuterol (PROVENTIL) 2.5 mg /3 mL (0.083 %) nebulizer solution as needed for Shortness of Breath. , Starting Thu 1/22/2015, Historical Med      allopurinol (ZYLOPRIM) 100 MG tablet Take 100 mg by mouth once daily. , Starting Mon 11/18/2013, Historical Med      alprazolam (XANAX) 0.25 MG tablet Take 0.25 mg by mouth every 8 (eight) hours  as needed., Starting Tue 8/1/2017, Historical Med      amoxicillin-clavulanate 875-125mg (AUGMENTIN) 875-125 mg per tablet Take 1 tablet by mouth every 12 (twelve) hours., Starting Tue 11/7/2017, Until Fri 11/17/2017, Normal      aspirin (ECOTRIN) 81 MG EC tablet Take 81 mg by mouth once daily., Historical Med      calcitRIOL (ROCALTROL) 0.25 MCG Cap Take 1 capsule (0.25 mcg total) by mouth once daily., Starting Wed 10/4/2017, Normal      clopidogrel (PLAVIX) 75 mg tablet Take 1 tablet (75 mg total) by mouth once daily., Starting Tue 11/7/2017, Until Wed 11/7/2018, Normal      docusate sodium (COLACE) 100 MG capsule Take 1 capsule (100 mg total) by mouth 2 (two) times daily., Starting Mon 9/11/2017, Print      lisinopril (PRINIVIL,ZESTRIL) 40 MG tablet Take 1 tablet (40 mg total) by mouth once daily., Starting Wed 11/8/2017, Until Thu 11/8/2018, Normal      metoprolol succinate (TOPROL-XL) 100 MG 24 hr tablet Take 1 tablet (100 mg total) by mouth once daily., Starting Wed 11/8/2017, Until Thu 11/8/2018, Normal      ondansetron (ZOFRAN) 4 MG tablet Take 1 tablet (4 mg total) by mouth every 8 (eight) hours as needed for Nausea., Starting Tue 11/7/2017, Normal      oxyCODONE-acetaminophen (PERCOCET)  mg per tablet Take 1 tablet by mouth every 8 (eight) hours as needed for Pain., Starting Tue 10/24/2017, Normal      pravastatin (PRAVACHOL) 40 MG tablet Take 40 mg by mouth once daily. , Starting Tue 8/13/2013, Historical Med      sertraline (ZOLOFT) 50 MG tablet Take 1 tablet (50 mg total) by mouth once daily., Starting Tue 11/7/2017, Until Wed 11/7/2018, Normal         STOP taking these medications       carvedilol (COREG) 3.125 MG tablet Comments:   Reason for Stopping:               Indwelling Lines/Drains at time of discharge:   Lines/Drains/Airways          No matching active lines, drains, or airways          Time spent on the discharge of patient: >30 minutes  Patient was seen and examined on the date of  discharge and determined to be suitable for discharge.         Holland Stringer MD  Department of Hospital Medicine  [facility]

## 2017-11-16 PROBLEM — R06.02 SOB (SHORTNESS OF BREATH): Status: RESOLVED | Noted: 2017-01-01 | Resolved: 2017-01-01

## 2017-11-16 PROBLEM — A41.9 SEPSIS: Status: RESOLVED | Noted: 2017-01-01 | Resolved: 2017-01-01

## 2017-11-16 PROBLEM — R78.81 BACTEREMIA: Status: RESOLVED | Noted: 2017-01-01 | Resolved: 2017-01-01

## 2017-11-16 NOTE — PROGRESS NOTES
[facility]  New Visit Progress Note   Recent Hospital Discharge     PRESENTING HISTORY     Chief Complaint/Reason for Admission:  Follow up Hospital Discharge     PCP: Kaushal Townsend MD    History of Present Illness:  Mr. Gunner Motley is a 72 y.o. male who was recently admitted to the hospital.    __________________________________________________________________    Today:  [facility].  Recently hospitalized for sepsis related to Proteus bacteremia.  Bacteremia was secondary to infected right chest wall Perm Cath.  Infected Right chest wall Perm Cath was removed. New Perm Cath placed Left chest wall.  Peritoneal dialysis catheter, which was no longer being used as patient had transitioned from peritoneal to hemodialysis, was also removed during hospitalization.   Patient responded well to ABX and supportive care.  Discharged to home to complete a course of Augmentin and resume routine outpatient hemodialysis MWF.    He was subsequently re-admitted to the hospital for possible line infection to the newly placed Left Chest Wall catheter. Empiric ABX were continued and he was seen in consultation with General Surgery. It was determined that the catheter was NOT infected. He was discharged to continue his previously prescribed course of Augmentin.     Patient unaccompanied today.  Ambulatory and independent with ADL's.  Has resumed outpatient HD on MWF schedule.  Missed his vascular surgery follow up due to hospitalization and intends to reschedule.  Left chest wall Perm Cath is functioning well.    Patient tells me his blood pressure is consistently elevated.   Has long standing hx HTN, but recently had marginal blood pressure and medications were decreased in this setting.     Still has goal of bringing his wife home with hospice rather than have her remain in skilled nursing nursing home hospice. He is hoping to stabilize his medical condition so that he can resume the  roll of primary caregiver for her in the home.     Review of Systems  General ROS: negative for chills, fever or weight loss  Psychological ROS: negative for hallucination, depression or suicidal ideation  + increased anxiety  Ophthalmic ROS: negative for blurry vision, photophobia or eye pain  ENT ROS: negative for epistaxis, sore throat or rhinorrhea  Respiratory ROS: no cough, shortness of breath, or wheezing  Cardiovascular ROS: no chest pain or dyspnea on exertion  Gastrointestinal ROS: no abdominal pain, change in bowel habits, or black/ bloody stools  + intermittent nausea  Genito-Urinary ROS: no dysuria, trouble voiding, or hematuria  Musculoskeletal ROS: negative for gait disturbance or muscular weakness  Neurological ROS: no syncope or seizures; no ataxia  Dermatological ROS: negative for pruritis, rash and jaundice      PAST HISTORY:     Past Medical History:   Diagnosis Date    Anemia     Bacteremia associated with intravascular line     CHF (congestive heart failure)     Coronary artery disease     Diverticulosis     ESRD (end stage renal disease) on dialysis     Gout     Hypertension     Recurrent nephrolithiasis     Urinary tract infection        Past Surgical History:   Procedure Laterality Date    APPENDECTOMY      CARDIAC SURGERY  2005    CABG x4 vessels    HERNIA REPAIR  2002    umbilical hernia    KIDNEY STONE SURGERY  1983    abdominal    PERITONEAL CATHETER INSERTION Left 08/28/2017    abdomen       Family History   Problem Relation Age of Onset    Cancer Mother     Kidney disease Mother     Heart attack Father        Social History     Social History    Marital status:      Spouse name: N/A    Number of children: N/A    Years of education: N/A     Social History Main Topics    Smoking status: Former Smoker     Packs/day: 2.00     Years: 30.00     Quit date: 10/12/1995    Smokeless tobacco: Former User     Quit date: 1/12/1995    Alcohol use No      Comment: quit  2014    Drug use: Unknown    Sexual activity: Not on file     Other Topics Concern    Not on file     Social History Narrative    No narrative on file       MEDICATIONS & ALLERGIES:     Current Outpatient Prescriptions on File Prior to Visit   Medication Sig Dispense Refill    albuterol (PROVENTIL) 2.5 mg /3 mL (0.083 %) nebulizer solution as needed for Shortness of Breath.   0    allopurinol (ZYLOPRIM) 100 MG tablet Take 100 mg by mouth once daily.       alprazolam (XANAX) 0.25 MG tablet Take 0.25 mg by mouth every 8 (eight) hours as needed.  0    amLODIPine (NORVASC) 5 MG tablet Take 1 tablet (5 mg total) by mouth once daily. 30 tablet 11    amoxicillin-clavulanate 875-125mg (AUGMENTIN) 875-125 mg per tablet Take 1 tablet by mouth every 12 (twelve) hours. 20 tablet 0    aspirin (ECOTRIN) 81 MG EC tablet Take 81 mg by mouth once daily.      calcitRIOL (ROCALTROL) 0.25 MCG Cap Take 1 capsule (0.25 mcg total) by mouth once daily. 30 capsule 11    clopidogrel (PLAVIX) 75 mg tablet Take 1 tablet (75 mg total) by mouth once daily. 30 tablet 11    docusate sodium (COLACE) 100 MG capsule Take 1 capsule (100 mg total) by mouth 2 (two) times daily. 30 capsule `11    lisinopril (PRINIVIL,ZESTRIL) 40 MG tablet Take 1 tablet (40 mg total) by mouth once daily. 90 tablet 3    metoprolol succinate (TOPROL-XL) 100 MG 24 hr tablet Take 1 tablet (100 mg total) by mouth once daily. 30 tablet 11    ondansetron (ZOFRAN) 4 MG tablet Take 1 tablet (4 mg total) by mouth every 8 (eight) hours as needed for Nausea. 10 tablet 0    oxyCODONE-acetaminophen (PERCOCET)  mg per tablet Take 1 tablet by mouth every 8 (eight) hours as needed for Pain. 20 tablet 0    pravastatin (PRAVACHOL) 40 MG tablet Take 40 mg by mouth once daily.       sertraline (ZOLOFT) 50 MG tablet Take 1 tablet (50 mg total) by mouth once daily. 30 tablet 11     No current facility-administered medications on file prior to visit.         Review of  patient's allergies indicates:  No Known Allergies    OBJECTIVE:     Vital Signs:  Vitals:    11/16/17 0944   BP: (!) 158/75   Pulse: 69   Temp: 98.7 °F (37.1 °C)     Wt Readings from Last 1 Encounters:   11/16/17 0903 70.3 kg (154 lb 15.7 oz)     Body mass index is 24.73 kg/m².       Physical Exam:  BP (!) 158/75 (BP Location: Right arm, Patient Position: Sitting, BP Method: Small (Automatic))   Pulse 69   Temp 98.7 °F (37.1 °C) (Oral)   Wt 69.5 kg (153 lb 3.5 oz)   BMI 24.73 kg/m²   General appearance: alert, cooperative, no distress  Constitutional:Oriented to person, place, and time.appears well-developed and well-nourished.   HEENT: Normocephalic, atraumatic, neck symmetrical, no nasal discharge   Eyes: conjunctivae/corneas clear, PERRL, EOM's intact  Lungs: clear to auscultation bilaterally, no dullness to percussion bilaterally  + left chest wall Perm Cath  Heart: regular rate and rhythm without rub; no displacement of the PMI   Abdomen: soft, non-tender; bowel sounds normoactive; no organomegaly  Extremities: extremities symmetric; no clubbing, cyanosis, or edema  Integument: Skin color, texture, turgor normal; no rashes; hair distrubution normal  Neurologic: Alert and oriented X 3, normal strength, normal coordination and gait  Psychiatric: no pressured speech; normal affect; no evidence of impaired cognition     Laboratory  Lab Results   Component Value Date    WBC 7.79 11/13/2017    HGB 8.6 (L) 11/13/2017    HCT 26.8 (L) 11/13/2017    MCV 92 11/13/2017     11/13/2017     BMP  Lab Results   Component Value Date     11/13/2017    K 3.9 11/13/2017     11/13/2017    CO2 25 11/13/2017    BUN 30 (H) 11/13/2017    CREATININE 3.6 (H) 11/13/2017    CALCIUM 8.2 (L) 11/13/2017    ANIONGAP 8 11/13/2017    ESTGFRAFRICA 18 (A) 11/13/2017    EGFRNONAA 16 (A) 11/13/2017     Lab Results   Component Value Date    ALT 7 (L) 11/13/2017    AST 13 11/13/2017    ALKPHOS 96 11/13/2017    BILITOT 0.4  11/13/2017     Lab Results   Component Value Date    INR 1.1 11/11/2017    INR 1.2 11/01/2017    INR 1.1 08/27/2017     Lab Results   Component Value Date    HGBA1C 5.6 08/22/2017     No results for input(s): POCTGLUCOSE in the last 72 hours.      TRANSITION OF CARE:     [facility] Contact Note: 11/8/17    Family and/or Caretaker present at visit?  No.  Diagnostic tests reviewed/disposition: I have reviewed all completed as well as pending diagnostic tests at the time of discharge.  Disease/illness education: Yes  Home health/community services discussion/referrals: Patient does not have home health established from hospital visit.  They do not need home health.  If needed, we will set up home health for the patient.   Establishment or re-establishment of referral orders for community resources: No other necessary community resources.   Discussion with other health care providers: No discussion with other health care providers necessary.     ASSESSMENT & PLAN:       Bacteremia associated with intravascular line, sequela  - recent hospitalization for sepsis related to Proteus bacteremia from Perm Cath line infection  - infected line removed, new dialysis line placed  - patient completing prescribed course of outpatient Augmentin  - repeat blood cultures No Growth    ESRD (end stage renal disease) on dialysis  - transitioned from peritoneal dialysis to hemodialysis recently  - MWF HD via Left chest wall Perm Cath  - he will re-schedule vascular surgery appt for permanent access- missed last appt due to hospitalization     Essential hypertension  - previously with marginal blood pressure and medications adjusted accordingly  - blood pressure now consistently elevated  - will increase Norvasc from 5 mg to 10 mg daily and assess response   -     amLODIPine (NORVASC) 10 MG tablet; Take 1 tablet (10 mg total) by mouth once daily.  Dispense: 30 tablet; Refill: 11    Anxiety  -     ALPRAZolam (XANAX) 0.25 MG tablet;  Take 1 tablet (0.25 mg total) by mouth every 8 (eight) hours as needed.  Dispense: 60 tablet; Refill: 0    Nausea  -     ondansetron (ZOFRAN) 4 MG tablet; Take 1 tablet (4 mg total) by mouth every 8 (eight) hours as needed for Nausea.  Dispense: 30 tablet; Refill: 0      I will [facility] 11[facility]30/17, sooner if needed.   Instructions for the patient:      Scheduled Follow-up :  Future Appointments  Date Time Provider Department Center   11/16/2017 2:00 PM Jannet Herman MD [facility] IM [facility]   11/21/2017 3:20 PM Laron Escobar NP [facility] CARDIO [facility]   11/30/2017 11:00 AM Jannet Herman MD [facility]       Post Visit Medication List:     Medication List          Accurate as of 11/16/17 11:43 AM. If you have any questions, ask your nurse or doctor.               CHANGE how you take these medications    amLODIPine 10 MG tablet  Commonly known as:  NORVASC  Take 1 tablet (10 mg total) by mouth once daily.  What changed:  · medication strength  · how much to take  Changed by:  Jannet Herman MD        CONTINUE taking these medications    albuterol 2.5 mg /3 mL (0.083 %) nebulizer solution  Commonly known as:  PROVENTIL     allopurinol 100 MG tablet  Commonly known as:  ZYLOPRIM     ALPRAZolam 0.25 MG tablet  Commonly known as:  XANAX  Take 1 tablet (0.25 mg total) by mouth every 8 (eight) hours as needed.     amoxicillin-clavulanate 875-125mg 875-125 mg per tablet  Commonly known as:  AUGMENTIN  Take 1 tablet by mouth every 12 (twelve) hours.     aspirin 81 MG EC tablet  Commonly known as:  ECOTRIN     calcitRIOL 0.25 MCG Cap  Commonly known as:  ROCALTROL  Take 1 capsule (0.25 mcg total) by mouth once daily.     clopidogrel 75 mg tablet  Commonly known as:  PLAVIX  Take 1 tablet (75 mg total) by mouth once daily.     docusate sodium 100 MG capsule  Commonly known as:  COLACE  Take 1 capsule (100 mg total) by mouth 2 (two) times daily.     lisinopril 40  MG tablet  Commonly known as:  PRINIVIL,ZESTRIL  Take 1 tablet (40 mg total) by mouth once daily.     metoprolol succinate 100 MG 24 hr tablet  Commonly known as:  TOPROL-XL  Take 1 tablet (100 mg total) by mouth once daily.     ondansetron 4 MG tablet  Commonly known as:  ZOFRAN  Take 1 tablet (4 mg total) by mouth every 8 (eight) hours as needed for Nausea.     pravastatin 40 MG tablet  Commonly known as:  PRAVACHOL     sertraline 50 MG tablet  Commonly known as:  ZOLOFT  Take 1 tablet (50 mg total) by mouth once daily.        STOP taking these medications    oxyCODONE-acetaminophen  mg per tablet  Commonly known as:  PERCOCET  Stopped by:  Jannet Herman MD           Where to Get Your Medications      These medications were sent to [facility] - [address]  [address], [place] [address]    Phone:  [phone]   · ALPRAZolam 0.25 MG tablet  · ondansetron 4 MG tablet     Information about where to get these medications is not yet available    Ask your nurse or doctor about these medications  · amLODIPine 10 MG tablet         Signing Physician:  Jannte Herman MD

## 2017-11-16 NOTE — PROGRESS NOTES
Feeling better  Did return to hospital recent with bleeding around cath site, cultures were negative   No signs of infection and he was discharged  No problems with dialysis  No pain at PD cath removal site  No fevers at home    Ab soft NT ND  Incisions healing well  Left IJ HDC in place, bandaged, clean    Was previously scheduled with Dr Donaldson for AVF  RTC prn  Follow up with Dr Donaldson to reschedule AVF

## 2017-11-20 NOTE — TELEPHONE ENCOUNTER
----- Message from TOM Rios III, MD sent at 11/18/2017 10:34 AM CST -----  just reschedule  ----- Message -----  From: Yohana Farmer MA  Sent: 11/16/2017   4:22 PM  To: TOM Rios III, MD     Good Afternoon, This pt was orginally scheduled for a fistula creation , but was cancelled due to being hospitalized In [facility]. He is now requesting to be r/s, should he have a clinic visit or can we just r/s surgery ???  Please Advise  ----- Message -----  From: Scotty Wilkes  Sent: 11/16/2017   1:06 PM  To: Gabriel SIMON III Staff    Pt said he is now ready to schedule surgery. Please call pt regarding this at [phone]

## 2017-11-21 NOTE — PROGRESS NOTES
Subjective:       Patient ID: Gunner Motley is a 72 y.o. male with CAD s/p CABG 2004, HTN, PHTN, ESRD with Left CW permacath, DMII and normocytic anemia.    Chief Complaint: Hospital Follow up     71 yo male w PMH of ESRD, DMT2 (HgbA1C 5.8 8/2017) CHF (11/1 EF 50% W/ MILD AR), CAD, recent hx of UTI, Diverticulitis and HTN with recent admission for infected hemodialysis catheter in left chest.      Patient was sent to the ED from HD center for evaluation of HTN (BP reported 190s-210s).  Patient was noted to be febrile in the ED.  He reports that he has been having chills and decreased appetite for about a month, with significant worsening. He was found to have pan-sensitive proteus present in his blood cultures and, thus his HD catheter in his right  chest and already non-functioning PD cath were removed.  He was treated with antibiotics and subsequent cultures cleared.  He is despondant about his medical conditions and his wife's poor health as well and wishes to be allowed to die.  He denies active SI but did sign DNR paperwork during his last admission.He denies chest pain, palpitations. Endorses orthopnea which is stable at baseline and pain under left lower rib for over two months.  Pain is worse with supine positioning and he has to sleep in fetal position due to this pain.  He reports his symptoms prior to his CABG in 2004 was jaw pain and had not recurred until recent hospitalization. Troponin peaked at >14 with downward trend noted. LVEF 50-55% per echo with LVH and no WMA. Patient developed  jaw pain and TWI anteriorly on ECG which improved on repeat ECG. Plavix initiated for medical management of NSTEMI. Discussed options with patient and daughter; patient is DNR which would need to be revoked in the event angiogram would be needed. Patient opted for medical management of CAD/NSTEMI.   He is on DAPT with asa and Plavix. Taking BB, ACEI, and statin. He has been doing well since discharge without cardiac  complaint. His depression seems better controlled. Patient reports compliance with medications. Denies chest pain, SOB, fever, chills, NV. Abd pain persists but is reported as mild. He continues to request medical management of his CAD and is not interested in pursuing non invasive  Or invasive cardiac procedures.       Review of Systems   Constitutional: Negative.  Negative for chills, diaphoresis, fatigue and fever.   HENT: Negative.    Eyes: Negative.    Respiratory: Negative.  Negative for chest tightness and shortness of breath.    Cardiovascular: Negative.  Negative for chest pain, palpitations and leg swelling.   Gastrointestinal: Positive for abdominal pain. Negative for blood in stool, diarrhea, nausea and vomiting.   Endocrine: Negative.    Genitourinary: Negative.    Musculoskeletal: Negative.    Skin: Negative.    Allergic/Immunologic: Negative.    Neurological: Negative.    Hematological: Negative.    Psychiatric/Behavioral: Negative.        Objective:      Physical Exam   Constitutional: He is oriented to person, place, and time. No distress.   HENT:   Head: Normocephalic and atraumatic.   Eyes: Right eye exhibits no discharge. Left eye exhibits no discharge.   Neck: No JVD present.   Cardiovascular: Normal rate and regular rhythm.  Exam reveals no gallop and no friction rub.    Murmur heard.  Pulmonary/Chest: Effort normal and breath sounds normal.   Abdominal: Soft. Bowel sounds are normal.   Musculoskeletal: He exhibits no edema.   Neurological: He is alert and oriented to person, place, and time.   Skin: Skin is warm and dry. Capillary refill takes less than 2 seconds. He is not diaphoretic.   Psychiatric: He has a normal mood and affect. His behavior is normal. Judgment and thought content normal.       Assessment:       1. Pulmonary hypertension    2. Chronic diastolic heart failure    3. Coronary artery disease involving native coronary artery of native heart without angina pectoris    4.  "Essential hypertension        Plan:       Gunner was seen today for hospital follow up.    Diagnoses and all orders for this visit:    Pulmonary hypertension    Chronic diastolic heart failure    Coronary artery disease involving native coronary artery of native heart without angina pectoris    Essential hypertension    Patient with recent hospitalization for sepsis. Patient developed  jaw pain and TWI anteriorly on ECG which improved on repeat ECG. Plavix initiated for medical management of NSTEMI. Discussed options with patient and daughter; patient is DNR which would need to be revoked in the event angiogram would be needed. Patient opted for medical management of CAD/NSTEMI.   He is on DAPT with asa and Plavix. Taking BB, ACEI, and statin. He has been doing well since discharge without cardiac complaint. His depression seems better controlled. Patient reports compliance with medications. Denies chest pain, SOB, fever, chills, NV. Abd pain persists but is reported as mild. He continues to request medical management of his CAD and is not interested in pursuing non invasive  Or invasive cardiac procedures.     Continue current medications   CV exercise encouraged  Follow up in 6 mo, sooner if any acute concerns or proceed to the ED     2DE 10/2018  CONCLUSIONS     1 - Low normal to mildly depressed left ventricular systolic function (EF 50-55%).     2 - No wall motion abnormalities.     3 - Concentric hypertrophy.     4 - Normal right ventricular systolic function .     5 - The estimated PA systolic pressure is greater than 27 mmHg.     6 - Mild aortic regurgitation.    Vitals:    11/21/17 1106   BP: 139/71   Pulse: 64   SpO2: 98%   Weight: 71.5 kg (157 lb 11.2 oz)   Height: 5' 6" (1.676 m)     Current Outpatient Prescriptions on File Prior to Visit   Medication Sig Dispense Refill    albuterol (PROVENTIL) 2.5 mg /3 mL (0.083 %) nebulizer solution as needed for Shortness of Breath.   0    allopurinol (ZYLOPRIM) " 100 MG tablet Take 100 mg by mouth once daily.       ALPRAZolam (XANAX) 0.25 MG tablet Take 1 tablet (0.25 mg total) by mouth every 8 (eight) hours as needed. 60 tablet 0    amLODIPine (NORVASC) 10 MG tablet Take 1 tablet (10 mg total) by mouth once daily. 30 tablet 11    aspirin (ECOTRIN) 81 MG EC tablet Take 81 mg by mouth once daily.      calcitRIOL (ROCALTROL) 0.25 MCG Cap Take 1 capsule (0.25 mcg total) by mouth once daily. 30 capsule 11    clopidogrel (PLAVIX) 75 mg tablet Take 1 tablet (75 mg total) by mouth once daily. 30 tablet 11    docusate sodium (COLACE) 100 MG capsule Take 1 capsule (100 mg total) by mouth 2 (two) times daily. 30 capsule `11    lisinopril (PRINIVIL,ZESTRIL) 40 MG tablet Take 1 tablet (40 mg total) by mouth once daily. 90 tablet 3    metoprolol succinate (TOPROL-XL) 100 MG 24 hr tablet Take 1 tablet (100 mg total) by mouth once daily. 30 tablet 11    ondansetron (ZOFRAN) 4 MG tablet Take 1 tablet (4 mg total) by mouth every 8 (eight) hours as needed for Nausea. 30 tablet 0    pravastatin (PRAVACHOL) 40 MG tablet Take 40 mg by mouth once daily.       sertraline (ZOLOFT) 50 MG tablet Take 1 tablet (50 mg total) by mouth once daily. 30 tablet 11     No current facility-administered medications on file prior to visit.      Results for orders placed or performed during the hospital encounter of 11/11/17   Blood culture #1   Result Value Ref Range    Blood Culture, Routine No growth after 5 days.    Blood culture #2   Result Value Ref Range    Blood Culture, Routine No growth after 5 days.    Influenza antigen Nasal Swab   Result Value Ref Range    Influenza A Ag, EIA Negative Negative    Influenza B Ag, EIA Negative Negative    Flu A & B Source Nasal Swab    Lactic acid, plasma   Result Value Ref Range    Lactate (Lactic Acid) 0.7 0.5 - 2.2 mmol/L   Protime-INR   Result Value Ref Range    Prothrombin Time 11.8 9.0 - 12.5 sec    INR 1.1 0.8 - 1.2   Troponin I   Result Value Ref  Range    Troponin I 0.809 (H) 0.000 - 0.026 ng/mL   Troponin I   Result Value Ref Range    Troponin I 0.669 (H) 0.000 - 0.026 ng/mL   Comprehensive Metabolic Panel (CMP)   Result Value Ref Range    Sodium 142 136 - 145 mmol/L    Potassium 3.5 3.5 - 5.1 mmol/L    Chloride 107 95 - 110 mmol/L    CO2 23 23 - 29 mmol/L    Glucose 88 70 - 110 mg/dL    BUN, Bld 27 (H) 8 - 23 mg/dL    Creatinine 3.4 (H) 0.5 - 1.4 mg/dL    Calcium 7.8 (L) 8.7 - 10.5 mg/dL    Total Protein 5.4 (L) 6.0 - 8.4 g/dL    Albumin 2.4 (L) 3.5 - 5.2 g/dL    Total Bilirubin 0.4 0.1 - 1.0 mg/dL    Alkaline Phosphatase 98 55 - 135 U/L    AST 15 10 - 40 U/L    ALT 7 (L) 10 - 44 U/L    Anion Gap 12 8 - 16 mmol/L    eGFR if African American 19 (A) >60 mL/min/1.73 m^2    eGFR if non African American 17 (A) >60 mL/min/1.73 m^2   Magnesium   Result Value Ref Range    Magnesium 1.6 1.6 - 2.6 mg/dL   Phosphorus   Result Value Ref Range    Phosphorus 4.8 (H) 2.7 - 4.5 mg/dL   CBC with Automated Differential   Result Value Ref Range    WBC 7.45 3.90 - 12.70 K/uL    RBC 2.98 (L) 4.60 - 6.20 M/uL    Hemoglobin 8.7 (L) 14.0 - 18.0 g/dL    Hematocrit 27.8 (L) 40.0 - 54.0 %    MCV 93 82 - 98 fL    MCH 29.2 27.0 - 31.0 pg    MCHC 31.3 (L) 32.0 - 36.0 g/dL    RDW 14.0 11.5 - 14.5 %    Platelets 220 150 - 350 K/uL    MPV 9.2 9.2 - 12.9 fL    Gran # 4.2 1.8 - 7.7 K/uL    Lymph # 2.3 1.0 - 4.8 K/uL    Mono # 0.7 0.3 - 1.0 K/uL    Eos # 0.2 0.0 - 0.5 K/uL    Baso # 0.03 0.00 - 0.20 K/uL    Gran% 55.8 38.0 - 73.0 %    Lymph% 30.3 18.0 - 48.0 %    Mono% 9.7 4.0 - 15.0 %    Eosinophil% 2.6 0.0 - 8.0 %    Basophil% 0.4 0.0 - 1.9 %    Differential Method Automated    Troponin I   Result Value Ref Range    Troponin I 0.638 (H) 0.000 - 0.026 ng/mL                 .

## 2017-11-28 NOTE — PRE-PROCEDURE INSTRUCTIONS
Preop instructions: NPO after midnight or 8 hours prior to procedure time, shower instructions, directions/parking, leave all valuables at home, medication instructions for PM prior & am of procedure explained. Patient stated an understanding.    Patient denies any side effects or issues with anesthesia or sedation.

## 2017-11-29 PROBLEM — I77.0 AVF (ARTERIOVENOUS FISTULA): Status: ACTIVE | Noted: 2017-01-01

## 2017-11-29 PROBLEM — Z99.2 ESRD ON DIALYSIS: Status: ACTIVE | Noted: 2017-01-01

## 2017-11-29 PROBLEM — N18.6 ESRD ON DIALYSIS: Status: ACTIVE | Noted: 2017-01-01

## 2017-11-29 NOTE — ANESTHESIA PREPROCEDURE EVALUATION
11/29/2017  Gunner Motley is a 72 y.o., for AV fistula placement.     -Recent admission on 11/1/17, pt has been treated for bacteremia, also had NSTEMI and is being treated with DAPT.   -last had dialysis on tuesday    PMH:  ESRD, previously doing PD- had line infection and since been removed  HTN  CAD s/p CABG x4 vessels   -NSTEMI last admission  CHF  Anemia  Gout    Past Surgical History:   Procedure Laterality Date    APPENDECTOMY      CARDIAC SURGERY  2005    CABG x4 vessels    HERNIA REPAIR  2002    umbilical hernia    KIDNEY STONE SURGERY  1983    abdominal    PERITONEAL CATHETER INSERTION Left 08/28/2017    abdomen     Review of patient's allergies indicates:  No Known Allergies      Pre-op Assessment    I have reviewed the Patient Summary Reports.     I have reviewed the Nursing Notes.      Review of Systems  Anesthesia Hx:  No problems with previous Anesthesia  History of prior surgery of interest to airway management or planning:  Denies Personal Hx of Anesthesia complications.   Social:  Former Smoker    Hematology/Oncology:         -- Anemia:   EENT/Dental:EENT/Dental Normal   Cardiovascular:   Exercise tolerance: poor Hypertension Valvular problems/Murmurs (mild aortic regurg) Past MI CAD (2004, 4 vessel CABG)   CHF (with preserved EF)    Pulmonary:     Pulm HTN   Renal/:   Chronic Renal Disease, ESRD, Dialysis    Hepatic/GI:  Hepatic/GI Normal    Neurological:  Neurology Normal    Endocrine:   Diabetes        Physical Exam  General:  Well nourished    Airway/Jaw/Neck:  Airway Findings: Mouth Opening: Normal Tongue: Normal  General Airway Assessment: Adult  Mallampati: II  TM Distance: Normal, at least 6 cm         Dental:  DENTAL FINDINGS: Normal   Chest/Lungs:  Chest/Lungs Findings: Normal Respiratory Rate     Heart/Vascular:  Heart Findings: Rate: Normal  Rhythm: Regular Rhythm   Sounds: Normal     Abdomen:  Abdomen Findings: Normal      Mental Status:  Mental Status Findings:  Alert and Oriented, Cooperative       Lab Results   Component Value Date    WBC 7.79 11/13/2017    HGB 8.6 (L) 11/13/2017    HCT 26.8 (L) 11/13/2017    MCV 92 11/13/2017     11/13/2017       Chemistry        Component Value Date/Time     11/13/2017 0515    K 3.9 11/13/2017 0515     11/13/2017 0515    CO2 25 11/13/2017 0515    BUN 30 (H) 11/13/2017 0515    CREATININE 3.6 (H) 11/13/2017 0515    GLU 93 11/13/2017 0515        Component Value Date/Time    CALCIUM 8.2 (L) 11/13/2017 0515    ALKPHOS 96 11/13/2017 0515    AST 13 11/13/2017 0515    ALT 7 (L) 11/13/2017 0515    BILITOT 0.4 11/13/2017 0515    ESTGFRAFRICA 18 (A) 11/13/2017 0515    EGFRNONAA 16 (A) 11/13/2017 0515          Wt Readings from Last 3 Encounters:   11/29/17 69.9 kg (154 lb)   11/21/17 71.5 kg (157 lb 11.2 oz)   11/16/17 69.5 kg (153 lb 3.5 oz)     Temp Readings from Last 3 Encounters:   11/29/17 36.6 °C (97.8 °F) (Oral)   11/16/17 37.1 °C (98.7 °F) (Oral)   11/16/17 36.7 °C (98.1 °F) (Oral)     BP Readings from Last 3 Encounters:   11/29/17 (!) 140/78   11/21/17 139/71   11/16/17 (!) 158/75     Pulse Readings from Last 3 Encounters:   11/29/17 71   11/21/17 64   11/16/17 69     2D Echo 11/1/2017  CONCLUSIONS     1 - Low normal to mildly depressed left ventricular systolic function (EF 50-55%).     2 - No wall motion abnormalities.     3 - Concentric hypertrophy.     4 - Normal right ventricular systolic function .     5 - The estimated PA systolic pressure is greater than 27 mmHg.     6 - Mild aortic regurgitation.     EKG 11/2/17  Sinus rhythm with occasional Premature ventricular complexes and Premature  atrial complexes  Septal infarct (cited on or before 01-NOV-2017)  Prolonged QT  Abnormal ECG  When compared with ECG of 02-NOV-2017 01:02,      Anesthesia Plan  Type of Anesthesia, risks & benefits discussed:  Anesthesia Type:   MAC, regional  Patient's Preference:   Intra-op Monitoring Plan: standard ASA monitors  Intra-op Monitoring Plan Comments:   Post Op Pain Control Plan: per primary service following discharge from PACU and IV/PO Opioids PRN  Post Op Pain Control Plan Comments:   Induction:   IV  Beta Blocker:  Patient is on a Beta-Blocker and has received one dose within the past 24 hours (No further documentation required).       Informed Consent: Patient understands risks and agrees with Anesthesia plan.  Questions answered. Anesthesia consent signed with patient.  ASA Score: 4     Day of Surgery Review of History & Physical: I have interviewed and examined the patient. I have reviewed the patient's H&P dated:  There are no significant changes.          Ready For Surgery From Anesthesia Perspective.

## 2017-11-29 NOTE — BRIEF OP NOTE
[facility]  Brief Operative Note     SUMMARY     Surgery Date: 11/29/2017     Surgeon(s) and Role:     * Bruce Yang MD - Resident - Assisting     * TOM Rios III, MD - Primary        Pre-op Diagnosis:  ESRD (end stage renal disease) on dialysis [N18.6, Z99.2]    Post-op Diagnosis:  Post-Op Diagnosis Codes:     * ESRD (end stage renal disease) on dialysis [N18.6, Z99.2]    Procedure(s) (LRB):  [id number] (Left)  brachio-cephalic  Anesthesia: Regional    Description of the findings of the procedure: good cephalic vein (3.5mm), excellent brachial artery (5mm). Good thrill, 2+ulnar after (s/p radial artery harvest)    Findings/Key Components: as above    Estimated Blood Loss:  <5cc       Specimens:   Specimen (12h ago through future)    None          Discharge Note    SUMMARY     Admit Date: 11/29/2017    Discharge Date and Time: 11/29/17    Hospital Course (synopsis of major diagnoses, care, treatment, and services provided during the course of the hospital stay): successful outpatient procedure    Final Diagnosis: Post-Op Diagnosis Codes:     * ESRD (end stage renal disease) on dialysis [N18.6, Z99.2]    Disposition: home    Follow Up/Patient Instructions: Diet: renal  Act: ad naida  FU: 2-3 week with QUANTITATIVE AVF duplex     Medications: pre-op+ analgesic

## 2017-11-29 NOTE — ANESTHESIA POSTPROCEDURE EVALUATION
"Anesthesia Post Evaluation    Patient: Gunner Motley    Procedure(s) Performed: Procedure(s) (LRB):  [id number] (Left)    Final Anesthesia Type: regional  Patient location during evaluation: PACU  Patient participation: Yes- Able to Participate  Level of consciousness: awake and alert and awake  Post-procedure vital signs: reviewed and stable  Pain management: adequate  Airway patency: patent  PONV status at discharge: No PONV  Anesthetic complications: no      Cardiovascular status: blood pressure returned to baseline  Respiratory status: unassisted and spontaneous ventilation  Hydration status: euvolemic  Follow-up not needed.        Visit Vitals  /60   Pulse 75   Temp 36.7 °C (98 °F) (Temporal)   Resp 16   Ht 5' 6" (1.676 m)   Wt 69.9 kg (154 lb)   SpO2 95%   BMI 24.86 kg/m²       Pain/Beryl Score: Pain Assessment Performed: Yes (11/29/2017 11:40 AM)  Presence of Pain: denies (11/29/2017 11:40 AM)  Pain Rating Prior to Med Admin: 9 (11/29/2017 10:24 AM)  Pain Rating Post Med Admin: 4 (11/29/2017 10:30 AM)  Beryl Score: 10 (11/29/2017 11:40 AM)      "

## 2017-11-29 NOTE — DISCHARGE INSTRUCTIONS
Remove dressing in 48 hours      Arteriovenous (AV) Fistula for Dialysis  An AV fistula is a connection between an artery and a vein. For this procedure, an AV fistula is surgically created using an artery and a vein in your arm. (Your healthcare provider will let you know if another site is to be used.) When the artery and vein are joined, blood flow increases from the artery into the vein. As a result, the vein gets bigger over time. The enlarged vein provides easier access to the blood for a treatment for kidney failure (dialysis). This sheet explains the procedure and what to expect.     An AV fistula increases blood flow from the artery into the vein. Over time, the vein becomes stronger and enlarged.   Preparing for the procedure  Prepare as you have been told. In addition:  · Tell your healthcare provider about all the medicines you take. This includes all over-the-counter and prescription medicines, and street drugs. It also includes herbs, vitamins, and other supplements. You may need to stop taking some or all of them before the procedure.  · Follow any directions youre given for not eating or drinking before the procedure.  · Do not allow anyone to draw blood from or take blood pressure on the arm that will have the fistula before the procedure.  The day of the procedure  The procedure takes about 1 to 2 hours. Youll likely go home the same day.  Before the procedure begins:  · An IV (intravenous) line is put into a vein in the arm or hand not being used for the procedure. This line supplies fluids and medicines.  · To keep you free of pain during the procedure, youre given general anesthesia. This medicine puts you into a state like a deep sleep through the procedure. Or a nerve block may be used. This medicine numbs the arm. With it, you may also be given medicine that makes you relaxed and drowsy through the procedure.  During the procedure:  · The skin over your arm may be injected with numbing  medicine.  · One or more small cuts (incisions) are then made through the numbed skin. This depends on the size of your arm and the depth of the vein in your arm.  · The vein is attached to the selected artery.  · Any incisions made are then closed with stitches (sutures), staples, surgical glue, or strips of surgical tape.  After the procedure:  · Youll be asked to keep your arm raised (elevated) as often as possible for at least a week after the procedure.  · Youll be given medicines to manage pain as needed.  · Your arm and hand will be checked to make sure blood is flowing through the fistula properly. The feeling of blood rushing through the fistula is called a thrill. It is somewhat similar to the purring of a cat. Youll be taught how to check for this feeling each day to make sure there are no problems with your fistula. Youll also be taught how to care for your fistula at home.  · When its time for you to leave the hospital, have an adult family member or friend ready to drive you home.  Recovering at home  Once at home, follow all of the instructions youve been given. Be sure to:  · Take all medicines as directed.  · Care for your incision as instructed.  · Check for signs of infection at the incision site (see below).  · Avoid heavy lifting and strenuous activities as directed.  · Monitor and care for your fistula as instructed.  · Do your hand and arm exercises as instructed. This usually involves squeezing a ball in your hand for a few minutes each hour.  Call your healthcare provider if you have any of the following:  · Fever of 100.4°F (38°C) or higher  · Signs of infection at the incision site, such as increased redness or swelling, warmth, worsening pain, bleeding, or bad-smelling drainage  · You cant feel a thrill (the vibration of blood going through your arm)  · Pain or numbness in your fingers, hand, or arm  · Bleeding, redness, or warmth around your fistula  · Sudden bulging of the  fistula (more than usual; a slight bulge is normal)   Follow-Up  Your healthcare provider will check your fistula within 1 to 2 weeks after the procedure. It will likely take about 6 to 8 weeks for the fistula to enlarge enough to start dialysis. After that, make sure the fistula is checked each time you have dialysis. Your healthcare provider may also suggest checkups every 6 months.  Risks and possible complications include:  · The fistula not working properly  · Long wait before the fistula is ready (up to 6 months)  · Coldness or numbness in the hand (due to blood flowing away from the hand and into the fistula)  · An unsightly bump under the skin (due to enlargement of the fistula)  · Prolonged bleeding from the fistula after dialysis  · Narrowing or weakening of the blood vessels used for the fistula  · Formation of blood clots in the blood vessels used for the fistula  · Risks of anesthesia or any other medicines used during the procedure   Living with an AV Fistula  A problem, such as a narrowing (stricture) of the vein or an infection, can make the fistula unusable. If this happens, you may need other treatments to repair or make a new fistula. To protect your fistula, follow these and any other guidelines youre given:  · Check your fistula as often as your healthcare provider says. If you cant feel your thrill, let your provider know right away.  · Make sure your fistula is checked before each dialysis treatment.  · Dont let anyone draw blood from or take blood pressure on the arm that has the fistula.  · Wash your hands often and keep the area around your fistula clean.  · Dont sleep on the arm that has the fistula.  · Dont wear tight jewelry or a watch on the arm with your fistula.  · Protect your fistula from cuts, scrapes, or blows.  Date Last Reviewed: 1/1/2017  © 2727-6398 [employer]. [address] [place] [address], [place] [address]. All rights reserved. This information is not intended  as a substitute for professional medical care. Always follow your healthcare professional's instructions.

## 2017-11-29 NOTE — OP NOTE
[facility]  Surgery Department  Operative Note    SUMMARY     Patient: Gunner Motley  Date: 11/29/2017  MRN: [id number]    Date of Procedure: 11/29/2017     Procedure: Procedure(s) (LRB):  [id number] (Left)  brachio-cephalic    Surgeon(s) and Role:     * Bruce Yang MD - Resident - Assisting     * TOM Rios III, MD - Primary        Pre-Operative Diagnosis: ESRD (end stage renal disease) on dialysis [N18.6, Z99.2]    Post-Operative Diagnosis: Post-Op Diagnosis Codes:     * ESRD (end stage renal disease) on dialysis [N18.6, Z99.2]    Anesthesia: Regional    Indications for Procedure:   Gunner Motley is a 72 y.o.  male with Hx of ESRD, as well as a hx of CABG for which his left radial artery had been removed. He is currently dialyzing through a left IJ permacath.     Procedure in Detail:   After consent was obtained, the patient was taken to the operating room and a local block was performed by anesthesia. Then MAC anesthesia was initiated successfully. The patient was positioned supine with his left arm out. The left arm was prepped and draped in the usual sterile fashion. US was used to identify the basilic vein and its course was marked out. Next a longitudnal incision was made distally to the crease of the antecubital fossa. The tissues were dissected down through using a combination of electrocautery and sharp scissors. The basilic vein was identified and mobilized proximally and distally, assuring to tie off or clip small branches encountered while mobilizing the vein. Next, we moved medially in our incision, and the fascia was incised, and the brachial artery was identified and mobilized proximally and distally, again making sure to tie off or clip off any small branches we encountered, once mobilized we isolated the artery with vessel loops proximally and distally. We administered 3000 U of heparin to heparinize the patietn systemically. The anterior margin of the basilic  vein was made discernible using a sterile marker. The basilic vein distally branched into three branches, each of which were individually ligated with silk suture and the proximal end was brought up to use for the upcoming anastomosis. The basilic vein was then distended and flushed using heparinized saline without resistance. The vein was then controlled with a small plastic clamp proximally. Vessel loops were then tightened on the proximal and distal aspects of the brachial artery for control. A longitudinal arteriotomy was made in brachial artery first using an 11 blade and then extended using lovett scissors. Next the vein and artery were anastomosed in a end-to-side fashion using a running 6-0 Prolene suture.Just prior to completion of the anastomosis the fistula was flushed, and the anastomosis was then completed. A palpable thrill was appreciated through the fistula and strong palpable ulnar artery was felt distally. There was, of course, no radial artery as this patient had his removed for CABG 15 year prior. Hemostasis was obtained and the wound was closed using interrupted vertical mattress and simple stitches with a 4-0 nylon. Sterile dry dressing was applied.       All counts were reported as correct. The procedure was completed.     The patient was brought to the recovery room in stable condition. Dr. Nunez was present and scrubbed for all key portions of the case.     Drains: None    Estimated Blood Loss (EBL): minimal    Complications: No    Specimens:   Specimen (12h ago through future)    None          Condition: Good    Disposition: PACU - hemodynamically stable.    Attestation: Dr. Rios was present and scrubbed for the all key portions of the procedure.      Bruce Yang MD PGY III  [phone]

## 2017-11-29 NOTE — PROGRESS NOTES
Patient sent home in sling, advised not to remove until patient has full feeling in arm and hand, verbalized understanding

## 2017-11-29 NOTE — PLAN OF CARE
Discharge instructions given, patient verbalized understanding. Consents in chart, Vitals stable, no complaints. Prescription given to patient

## 2017-11-29 NOTE — ANESTHESIA PROCEDURE NOTES
Supraclavicular Brachial Plexus Single Injection Block    Patient location during procedure: OR    Reason for block: primary anesthetic   Diagnosis: left arm pain   Start time: 11/29/2017 8:07 AM  Timeout: 11/29/2017 8:07 AM   End time: 11/29/2017 8:15 AM  Staffing  Anesthesiologist: JOSUE JURADO  Resident/CRNA: EBONI AVINA  Performed: resident/CRNA   Preanesthetic Checklist  Completed: patient identified, site marked, surgical consent, pre-op evaluation, timeout performed, IV checked, risks and benefits discussed and monitors and equipment checked  Peripheral Block  Patient position: supine  Prep: ChloraPrep  Patient monitoring: heart rate, cardiac monitor, continuous pulse ox, continuous capnometry and frequent blood pressure checks  Block type: supraclavicular  Laterality: left  Injection technique: single shot  Needle  Needle type: Stimuplex   Needle gauge: 22 G  Needle length: 2 in  Needle localization: anatomical landmarks and ultrasound guidance   -ultrasound image captured on disc.  Assessment  Injection assessment: negative aspiration, negative parasthesia and local visualized surrounding nerve  Paresthesia pain: none  Heart rate change: no  Slow fractionated injection: yes  Medications:  Bolus administered: 30 mL of 1.5 mepivacaine  Epinephrine added: 3.75 mcg/mL (1/300,000)  Additional Notes  Intercostal brachial field block performed with mepivacaine 1.5% 10ml for additional coverage.    VSS.  See anesthesia record.  Patient tolerated procedure well.

## 2017-11-29 NOTE — TRANSFER OF CARE
"Anesthesia Transfer of Care Note    Patient: Gunner Motley    Procedure(s) Performed: Procedure(s) (LRB):  [id number] (Left)    Patient location: PACU    Anesthesia Type: MAC and regional    Transport from OR: Transported from OR on room air with adequate spontaneous ventilation    Post pain: adequate analgesia    Post assessment: no apparent anesthetic complications    Post vital signs: stable    Level of consciousness: awake    Nausea/Vomiting: no nausea/vomiting    Complications: none    Transfer of care protocol was followed      Last vitals:   Visit Vitals  BP (!) 110/51 (BP Location: Right arm, Patient Position: Lying)   Pulse 74   Temp 36.6 °C (97.9 °F) (Temporal)   Resp 18   Ht 5' 6" (1.676 m)   Wt 69.9 kg (154 lb)   SpO2 98%   BMI 24.86 kg/m²     "

## 2017-11-29 NOTE — H&P (VIEW-ONLY)
REFERRING PHYSICIAN:  Jen Talamantes M.D.    HISTORY OF PRESENT ILLNESS:  A 72-year-old male with end-stage renal disease who   has been dialyzing via right IJ PermCath over the last three weeks.  He also   tried peritoneal dialysis, but this did not work well for him.    Notably, he has had what appears to be a left radial artery harvest for CABG 12   years ago.  He is right-handed.  He is dialyzing Monday, Wednesday and Friday.    PAST MEDICAL HISTORY:  Coronary artery disease as above, history of congestive   heart failure, hypertension.    PAST SURGICAL HISTORY:  As above.  Appendectomy, CABG, PD catheter placement.    FAMILY HISTORY:  Positive for coronary artery disease.    SOCIAL HISTORY:  Former smoker.    MEDICATIONS:  Include statin and aspirin.  See for full list.    ALLERGIES:  None.    REVIEW OF SYSTEMS:  CARDIOVASCULAR:  Denies postprandial pain or DVT.  All other systems include eyes, ENT, respiratory, musculoskeletal, breast,   psychiatric, lymph, allergy and immune are negative.    PHYSICAL EXAMINATION:  VITAL SIGNS:  See nursing note.  GENERAL:  No acute distress.  CARDIOVASCULAR:  Regular rate of the PMI, no murmur.  VASCULAR:  Left arm shows a palpable radial pulse, despite his presumed radial   artery resection.  Ulnar pulse was also palpable.  Hand  is 5/5.  EXTREMITIES:  There is a ballottable antecubital vein and softly ballottable   upper arm cephalic vein on the left.  NEUROLOGIC:  Cranial nerves VII-XII intact, 5/5 motor strength in all   extremities    IMAGING:  Vein mapping suggested adequate left upper arm cephalic vein 4.0   distally and 3.0 in the proximal arm.  Right cephalic vein is usable.    ASSESSMENT:  He would appear to be a reasonable AV fistula candidate,   brachiocephalic on the left.  Even though he says radial artery exercised on   this side, he has good hand perfusion and I believe that his risk of vascular   steal is not dramatically elevated.    PLAN:  1.  Left  brachiocephalic AV fistula creation, 11/09/2017.  2.  Regional block.    The patient understands the risks and rationale of procedure and wishes to   proceed.      KAMILLE  dd: 10/31/2017 09:10:09 (CDT)  td: 10/31/2017 20:43:04 (CDT)  Doc ID   #[id number]  Job ID #[id number]    CC:

## 2017-11-29 NOTE — PROGRESS NOTES
Patient complains of pain in left shoulder. Iv dilaudid 0.2 mg given prn, titrated to patient pain. Dressing on left incision site clean,dry and intact. Son at bedside.

## 2017-11-30 PROBLEM — T82.7XXA: Status: RESOLVED | Noted: 2017-01-01 | Resolved: 2017-01-01

## 2017-11-30 NOTE — PROGRESS NOTES
Subjective:       Patient ID: Gunner Motley is a 72 y.o. male.    Chief Complaint: Hospital Follow Up    HPI:    Presents to [facility] for continued hospital follow up.  Recently hospitalized for sepsis related to Proteus bacteremia; condition was related to a dialysis line infection.  Infected Right chest wall Perm Cath was removed.   New Perm Cath placed Left chest wall.  Peritoneal dialysis catheter, which was no longer being used as patient had transitioned from peritoneal to hemodialysis, was also removed during hospitalization.   Patient responded well to ABX and supportive care.  Discharged to home to complete a course of Augmentin and resume routine outpatient hemodialysis MWF.    Since our last visit he has completed his course of Augmentin.  He has newly placed left arm AV fistula for hemodialysis access which he expects to use once it matures.    Reports acute onset RLE pain, swelling, mostly in region of calf. Symptoms began last night. He placed bilateral compression hose but this has not relieved the swelling. Left leg size is at baseline, no swelling.     Still has goal of bringing his wife home with hospice rather than have her remain in correction nursing home hospice. He is hoping to stabilize his medical condition so that he can resume the roll of primary caregiver for her in the home.     Review of Systems  General ROS: negative for chills, fever or weight loss  Psychological ROS: negative for hallucination, depression or suicidal ideation  Ophthalmic ROS: negative for blurry vision, photophobia or eye pain  ENT ROS: negative for epistaxis, sore throat or rhinorrhea  Respiratory ROS: no cough, shortness of breath, or wheezing  Cardiovascular ROS: no chest pain or dyspnea on exertion  Gastrointestinal ROS: no abdominal pain, change in bowel habits, or black/ bloody stools  Genito-Urinary ROS: no dysuria, trouble voiding, or hematuria  Musculoskeletal ROS: negative for gait disturbance or  muscular weakness  Neurological ROS: no syncope or seizures; no ataxia  Dermatological ROS: negative for pruritis, rash and jaundice        Objective:      Vitals:    11/30/17 0949   BP: (!) 111/55   Pulse: 66   Temp: 97.9 °F (36.6 °C)     Physical Exam:  General appearance: alert, cooperative, no distress  Constitutional:Oriented to person, place, and time.appears well-developed and well-nourished.   HEENT: Normocephalic, atraumatic, neck symmetrical, no nasal discharge   Eyes: conjunctivae/corneas clear, PERRL, EOM's intact  Lungs: clear to auscultation bilaterally, no dullness to percussion bilaterally  + left chest wall Perm Cath  Heart: regular rate and rhythm without rub; no displacement of the PMI   Abdomen: soft, non-tender; bowel sounds normoactive; no organomegaly  Extremities: extremities symmetric; no clubbing, cyanosis, or edema  + left arm AV fistula hemodialysis access- newly placed   + RLE edema, non pitting  Integument: Skin color, texture, turgor normal; no rashes; hair distrubution normal  Neurologic: Alert and oriented X 3, normal strength, normal coordination and gait  Psychiatric: no pressured speech; normal affect; no evidence of impaired cognition    Assessment:       1. Swelling of right lower extremity    2. Chronic diastolic heart failure    3. Essential hypertension    4. Mixed hyperlipidemia    5. ESRD on dialysis    6. AVF (arteriovenous fistula) creation (brachiocephalic) on left arm        Plan:       Diagnoses and all orders for this visit:    Swelling of right lower extremity  - acute onset right lower extremity swelling which started last pm  -     US Lower Extremity Veins Right; Future    Chronic diastolic heart failure  - stable on Bblocker, Lasix, ACEI- still makes urine  - has cardiology care established with [facility] cardiology team    Essential hypertension  - blood pressure under good control at present, no changes to medication    Mixed hyperlipidemia  - on statin  therapy    ESRD on dialysis  - recently transitioned from peritoneal dialysis to hemodialysis  - MWF HD via Left chest wall Perm Cath  - has newly placed AV fistula Left arm- not yet mature    AVF (arteriovenous fistula) creation (brachiocephalic) on left arm  - currently recovering from this procedure      I will follow up with Mr Motley regarding the results of today's [facility].  He has follow up with Dr Crystal to establish new Primary Care 1/25/18.

## 2017-12-01 NOTE — TELEPHONE ENCOUNTER
----- Message from Jannet Herman MD sent at 12/1/2017 12:42 PM CST -----  Regarding: ultrasound  It looks like Mr Motley did not have his leg ultrasound done yesterday as planned. Can you call to check on him and find out whats going on? Please reschedule the US ASAP if he will agree to it. Thanks.

## 2017-12-01 NOTE — TELEPHONE ENCOUNTER
Called patient and rescheduled u/s for Thursday December 7th at 4:15. Patient states that his leg is no longer swollen. I instructed patient to still get the u/s done per Dr. Herman.

## 2017-12-11 NOTE — TELEPHONE ENCOUNTER
Called patient to let him know that there was no evidence of a blood clot and that the ultrasound looks good.

## 2017-12-11 NOTE — TELEPHONE ENCOUNTER
----- Message from Jannet Herman MD sent at 12/11/2017  4:01 PM CST -----  Please call Mr Motley and let him know his ultrasound looks good. No evidence of a blood clot. Thanks.

## 2018-01-01 ENCOUNTER — ANESTHESIA (OUTPATIENT)
Dept: SURGERY | Facility: HOSPITAL | Age: 73
End: 2018-01-01
Payer: MEDICARE

## 2018-01-01 ENCOUNTER — TELEPHONE (OUTPATIENT)
Dept: TRANSPLANT | Facility: CLINIC | Age: 73
End: 2018-01-01

## 2018-01-01 ENCOUNTER — LAB VISIT (OUTPATIENT)
Dept: LAB | Facility: HOSPITAL | Age: 73
End: 2018-01-01
Attending: INTERNAL MEDICINE
Payer: MEDICARE

## 2018-01-01 ENCOUNTER — TELEPHONE (OUTPATIENT)
Dept: INTERNAL MEDICINE | Facility: CLINIC | Age: 73
End: 2018-01-01

## 2018-01-01 ENCOUNTER — TELEPHONE (OUTPATIENT)
Dept: PHARMACY | Facility: CLINIC | Age: 73
End: 2018-01-01

## 2018-01-01 ENCOUNTER — TELEPHONE (OUTPATIENT)
Dept: FAMILY MEDICINE | Facility: CLINIC | Age: 73
End: 2018-01-01

## 2018-01-01 ENCOUNTER — OFFICE VISIT (OUTPATIENT)
Dept: FAMILY MEDICINE | Facility: CLINIC | Age: 73
End: 2018-01-01
Attending: FAMILY MEDICINE
Payer: MEDICARE

## 2018-01-01 ENCOUNTER — HOSPITAL ENCOUNTER (OUTPATIENT)
Facility: HOSPITAL | Age: 73
Discharge: HOME | End: 2018-01-22
Attending: SURGERY | Admitting: SURGERY
Payer: MEDICARE

## 2018-01-01 ENCOUNTER — HOSPITAL ENCOUNTER (EMERGENCY)
Facility: HOSPITAL | Age: 73
Discharge: HOME | End: 2018-05-10
Attending: EMERGENCY MEDICINE
Payer: MEDICARE

## 2018-01-01 ENCOUNTER — OFFICE VISIT (OUTPATIENT)
Dept: VASCULAR SURGERY | Facility: CLINIC | Age: 73
End: 2018-01-01
Payer: MEDICARE

## 2018-01-01 ENCOUNTER — OFFICE VISIT (OUTPATIENT)
Dept: INTERNAL MEDICINE | Facility: CLINIC | Age: 73
End: 2018-01-01
Payer: MEDICARE

## 2018-01-01 ENCOUNTER — SURGERY (OUTPATIENT)
Age: 73
End: 2018-01-01

## 2018-01-01 ENCOUNTER — TELEPHONE (OUTPATIENT)
Dept: VASCULAR SURGERY | Facility: CLINIC | Age: 73
End: 2018-01-01

## 2018-01-01 ENCOUNTER — OFFICE VISIT (OUTPATIENT)
Dept: CARDIOLOGY | Facility: CLINIC | Age: 73
End: 2018-01-01
Payer: MEDICARE

## 2018-01-01 ENCOUNTER — TELEPHONE (OUTPATIENT)
Dept: SURGERY | Facility: CLINIC | Age: 73
End: 2018-01-01

## 2018-01-01 ENCOUNTER — HOSPITAL ENCOUNTER (OUTPATIENT)
Dept: RADIOLOGY | Facility: HOSPITAL | Age: 73
Discharge: HOME | End: 2018-08-10
Attending: INTERNAL MEDICINE
Payer: MEDICARE

## 2018-01-01 ENCOUNTER — HOSPITAL ENCOUNTER (OUTPATIENT)
Dept: PREADMISSION TESTING | Facility: HOSPITAL | Age: 73
Discharge: HOME | End: 2018-03-05
Attending: SURGERY
Payer: MEDICARE

## 2018-01-01 ENCOUNTER — PATIENT MESSAGE (OUTPATIENT)
Dept: INTERNAL MEDICINE | Facility: CLINIC | Age: 73
End: 2018-01-01

## 2018-01-01 ENCOUNTER — ANESTHESIA EVENT (OUTPATIENT)
Dept: SURGERY | Facility: HOSPITAL | Age: 73
End: 2018-01-01
Payer: MEDICARE

## 2018-01-01 ENCOUNTER — HOSPITAL ENCOUNTER (OUTPATIENT)
Dept: RADIOLOGY | Facility: HOSPITAL | Age: 73
Discharge: HOME | End: 2018-03-22
Attending: FAMILY MEDICINE
Payer: MEDICARE

## 2018-01-01 ENCOUNTER — HOSPITAL ENCOUNTER (EMERGENCY)
Facility: HOSPITAL | Age: 73
End: 2018-10-10
Attending: EMERGENCY MEDICINE
Payer: MEDICARE

## 2018-01-01 ENCOUNTER — HOSPITAL ENCOUNTER (OUTPATIENT)
Facility: HOSPITAL | Age: 73
Discharge: HOME | End: 2018-03-06
Attending: SURGERY | Admitting: SURGERY
Payer: MEDICARE

## 2018-01-01 ENCOUNTER — HOSPITAL ENCOUNTER (OUTPATIENT)
Dept: VASCULAR SURGERY | Facility: CLINIC | Age: 73
Discharge: HOME | End: 2018-01-09
Attending: SURGERY
Payer: MEDICARE

## 2018-01-01 ENCOUNTER — HOSPITAL ENCOUNTER (OUTPATIENT)
Dept: VASCULAR SURGERY | Facility: CLINIC | Age: 73
Discharge: HOME | End: 2018-01-30
Attending: STUDENT IN AN ORGANIZED HEALTH CARE EDUCATION/TRAINING PROGRAM
Payer: MEDICARE

## 2018-01-01 VITALS
WEIGHT: 140 LBS | HEART RATE: 57 BPM | DIASTOLIC BLOOD PRESSURE: 58 MMHG | HEIGHT: 66 IN | BODY MASS INDEX: 22.5 KG/M2 | OXYGEN SATURATION: 59 % | RESPIRATION RATE: 100 BRPM | SYSTOLIC BLOOD PRESSURE: 131 MMHG

## 2018-01-01 VITALS
OXYGEN SATURATION: 97 % | TEMPERATURE: 99 F | HEART RATE: 68 BPM | BODY MASS INDEX: 24.77 KG/M2 | DIASTOLIC BLOOD PRESSURE: 65 MMHG | SYSTOLIC BLOOD PRESSURE: 131 MMHG | HEIGHT: 66 IN | WEIGHT: 154.13 LBS

## 2018-01-01 VITALS
BODY MASS INDEX: 26.63 KG/M2 | TEMPERATURE: 98 F | HEIGHT: 64 IN | RESPIRATION RATE: 18 BRPM | WEIGHT: 156 LBS | DIASTOLIC BLOOD PRESSURE: 50 MMHG | SYSTOLIC BLOOD PRESSURE: 103 MMHG | HEART RATE: 65 BPM | OXYGEN SATURATION: 98 %

## 2018-01-01 VITALS
HEART RATE: 62 BPM | OXYGEN SATURATION: 95 % | HEIGHT: 66 IN | TEMPERATURE: 98 F | DIASTOLIC BLOOD PRESSURE: 70 MMHG | WEIGHT: 154.56 LBS | BODY MASS INDEX: 24.84 KG/M2 | SYSTOLIC BLOOD PRESSURE: 130 MMHG

## 2018-01-01 VITALS
SYSTOLIC BLOOD PRESSURE: 109 MMHG | HEIGHT: 66 IN | BODY MASS INDEX: 24.43 KG/M2 | DIASTOLIC BLOOD PRESSURE: 68 MMHG | HEART RATE: 101 BPM | WEIGHT: 152 LBS

## 2018-01-01 VITALS
WEIGHT: 157.44 LBS | WEIGHT: 159.19 LBS | BODY MASS INDEX: 25.3 KG/M2 | HEART RATE: 83 BPM | HEART RATE: 73 BPM | HEIGHT: 66 IN | OXYGEN SATURATION: 98 % | SYSTOLIC BLOOD PRESSURE: 138 MMHG | HEIGHT: 64 IN | BODY MASS INDEX: 27.18 KG/M2 | SYSTOLIC BLOOD PRESSURE: 138 MMHG | TEMPERATURE: 98 F | DIASTOLIC BLOOD PRESSURE: 64 MMHG | DIASTOLIC BLOOD PRESSURE: 68 MMHG

## 2018-01-01 VITALS
BODY MASS INDEX: 26.98 KG/M2 | WEIGHT: 158 LBS | SYSTOLIC BLOOD PRESSURE: 87 MMHG | HEIGHT: 64 IN | RESPIRATION RATE: 16 BRPM | HEART RATE: 69 BPM | DIASTOLIC BLOOD PRESSURE: 50 MMHG | OXYGEN SATURATION: 97 %

## 2018-01-01 VITALS
WEIGHT: 157 LBS | DIASTOLIC BLOOD PRESSURE: 56 MMHG | BODY MASS INDEX: 25.23 KG/M2 | TEMPERATURE: 98 F | HEIGHT: 66 IN | HEART RATE: 62 BPM | SYSTOLIC BLOOD PRESSURE: 121 MMHG

## 2018-01-01 VITALS
TEMPERATURE: 98 F | DIASTOLIC BLOOD PRESSURE: 46 MMHG | RESPIRATION RATE: 16 BRPM | BODY MASS INDEX: 25.07 KG/M2 | SYSTOLIC BLOOD PRESSURE: 164 MMHG | WEIGHT: 156 LBS | HEIGHT: 66 IN | OXYGEN SATURATION: 97 % | HEART RATE: 67 BPM

## 2018-01-01 VITALS
WEIGHT: 154 LBS | SYSTOLIC BLOOD PRESSURE: 155 MMHG | HEART RATE: 78 BPM | BODY MASS INDEX: 24.75 KG/M2 | TEMPERATURE: 98 F | RESPIRATION RATE: 16 BRPM | HEIGHT: 66 IN | DIASTOLIC BLOOD PRESSURE: 70 MMHG | OXYGEN SATURATION: 97 %

## 2018-01-01 VITALS
SYSTOLIC BLOOD PRESSURE: 132 MMHG | DIASTOLIC BLOOD PRESSURE: 64 MMHG | HEIGHT: 66 IN | WEIGHT: 149.94 LBS | BODY MASS INDEX: 24.1 KG/M2

## 2018-01-01 VITALS
DIASTOLIC BLOOD PRESSURE: 55 MMHG | TEMPERATURE: 98 F | HEIGHT: 65 IN | SYSTOLIC BLOOD PRESSURE: 126 MMHG | HEART RATE: 66 BPM | BODY MASS INDEX: 26.08 KG/M2 | WEIGHT: 156.5 LBS

## 2018-01-01 VITALS
WEIGHT: 147.69 LBS | BODY MASS INDEX: 23.74 KG/M2 | HEART RATE: 66 BPM | SYSTOLIC BLOOD PRESSURE: 124 MMHG | HEIGHT: 66 IN | DIASTOLIC BLOOD PRESSURE: 52 MMHG

## 2018-01-01 DIAGNOSIS — I50.32 CHRONIC DIASTOLIC (CONGESTIVE) HEART FAILURE: ICD-10-CM

## 2018-01-01 DIAGNOSIS — Z76.82 ORGAN TRANSPLANT CANDIDATE: Primary | ICD-10-CM

## 2018-01-01 DIAGNOSIS — R05.9 COUGH: ICD-10-CM

## 2018-01-01 DIAGNOSIS — R06.2 WHEEZING: ICD-10-CM

## 2018-01-01 DIAGNOSIS — I10 ESSENTIAL HYPERTENSION: ICD-10-CM

## 2018-01-01 DIAGNOSIS — T82.858D ARTERIOVENOUS FISTULA STENOSIS, SUBSEQUENT ENCOUNTER: ICD-10-CM

## 2018-01-01 DIAGNOSIS — I50.32 CHRONIC DIASTOLIC HEART FAILURE: ICD-10-CM

## 2018-01-01 DIAGNOSIS — F41.9 ANXIETY: ICD-10-CM

## 2018-01-01 DIAGNOSIS — M25.561 ACUTE PAIN OF RIGHT KNEE: Primary | ICD-10-CM

## 2018-01-01 DIAGNOSIS — Z99.2 ESRD (END STAGE RENAL DISEASE) ON DIALYSIS: ICD-10-CM

## 2018-01-01 DIAGNOSIS — S89.91XA INJURY OF RIGHT KNEE, INITIAL ENCOUNTER: ICD-10-CM

## 2018-01-01 DIAGNOSIS — E78.2 MIXED HYPERLIPIDEMIA: ICD-10-CM

## 2018-01-01 DIAGNOSIS — R06.02 SHORTNESS OF BREATH: Primary | ICD-10-CM

## 2018-01-01 DIAGNOSIS — Z86.79 H/O CHF: ICD-10-CM

## 2018-01-01 DIAGNOSIS — M25.561 ACUTE PAIN OF RIGHT KNEE: ICD-10-CM

## 2018-01-01 DIAGNOSIS — N18.6 ESRD (END STAGE RENAL DISEASE) ON DIALYSIS: ICD-10-CM

## 2018-01-01 DIAGNOSIS — Z99.2 ESRD (END STAGE RENAL DISEASE) ON DIALYSIS: Primary | ICD-10-CM

## 2018-01-01 DIAGNOSIS — I10 HYPERTENSION, UNSPECIFIED TYPE: ICD-10-CM

## 2018-01-01 DIAGNOSIS — T82.858D ARTERIOVENOUS FISTULA STENOSIS, SUBSEQUENT ENCOUNTER: Primary | ICD-10-CM

## 2018-01-01 DIAGNOSIS — R53.81 PHYSICAL DECONDITIONING: ICD-10-CM

## 2018-01-01 DIAGNOSIS — N19 RENAL FAILURE, UNSPECIFIED CHRONICITY: ICD-10-CM

## 2018-01-01 DIAGNOSIS — R42 DIZZINESS: ICD-10-CM

## 2018-01-01 DIAGNOSIS — T85.698D MALFUNCTION OF DEVICE, SUBSEQUENT ENCOUNTER: Primary | ICD-10-CM

## 2018-01-01 DIAGNOSIS — I15.0 RENOVASCULAR HYPERTENSION: ICD-10-CM

## 2018-01-01 DIAGNOSIS — J44.9 CHRONIC OBSTRUCTIVE PULMONARY DISEASE, UNSPECIFIED COPD TYPE: Primary | ICD-10-CM

## 2018-01-01 DIAGNOSIS — Z00.00 ROUTINE GENERAL MEDICAL EXAMINATION AT A HEALTH CARE FACILITY: Primary | ICD-10-CM

## 2018-01-01 DIAGNOSIS — I25.10 CORONARY ARTERY DISEASE INVOLVING NATIVE CORONARY ARTERY OF NATIVE HEART WITHOUT ANGINA PECTORIS: ICD-10-CM

## 2018-01-01 DIAGNOSIS — I46.9 CARDIAC ARREST: Primary | ICD-10-CM
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
## 2018-01-01 DIAGNOSIS — N18.5 ANEMIA OF CHRONIC RENAL FAILURE, STAGE 5: Chronic | ICD-10-CM

## 2018-01-01 DIAGNOSIS — I77.0 AVF (ARTERIOVENOUS FISTULA): ICD-10-CM

## 2018-01-01 DIAGNOSIS — R06.02 SHORTNESS OF BREATH: ICD-10-CM

## 2018-01-01 DIAGNOSIS — T82.858A STENOSIS OF AV FISTULA: ICD-10-CM

## 2018-01-01 DIAGNOSIS — Z95.1 HX OF CABG: ICD-10-CM

## 2018-01-01 DIAGNOSIS — T82.858A STENOSIS OF AV FISTULA: Primary | ICD-10-CM

## 2018-01-01 DIAGNOSIS — Z01.818 PREOP TESTING: ICD-10-CM

## 2018-01-01 DIAGNOSIS — I25.2 HISTORY OF NON-ST ELEVATION MYOCARDIAL INFARCTION (NSTEMI): ICD-10-CM

## 2018-01-01 DIAGNOSIS — I27.20 PULMONARY HYPERTENSION: ICD-10-CM

## 2018-01-01 DIAGNOSIS — Z99.2 ESRD ON DIALYSIS: ICD-10-CM

## 2018-01-01 DIAGNOSIS — N18.6 ESRD (END STAGE RENAL DISEASE) ON DIALYSIS: Primary | ICD-10-CM

## 2018-01-01 DIAGNOSIS — N18.6 ESRD ON DIALYSIS: ICD-10-CM

## 2018-01-01 DIAGNOSIS — T82.858S STENOSIS OF ARTERIOVENOUS DIALYSIS FISTULA, SEQUELA: Primary | ICD-10-CM

## 2018-01-01 DIAGNOSIS — Z01.818 PREOPERATIVE EXAMINATION: Primary | ICD-10-CM

## 2018-01-01 DIAGNOSIS — F41.9 ANXIETY DISORDER, UNSPECIFIED TYPE: ICD-10-CM
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
## 2018-01-01 DIAGNOSIS — T82.858A STENOSIS OF ARTERIOVENOUS DIALYSIS FISTULA, INITIAL ENCOUNTER: ICD-10-CM

## 2018-01-01 DIAGNOSIS — D63.1 ANEMIA OF CHRONIC RENAL FAILURE, STAGE 5: Chronic | ICD-10-CM

## 2018-01-01 DIAGNOSIS — J44.9 CHRONIC OBSTRUCTIVE PULMONARY DISEASE, UNSPECIFIED COPD TYPE: ICD-10-CM

## 2018-01-01 DIAGNOSIS — E03.9 HYPOTHYROIDISM, UNSPECIFIED TYPE: Primary | ICD-10-CM

## 2018-01-01 DIAGNOSIS — R01.2 ABNORMAL HEART SOUNDS: Primary | ICD-10-CM

## 2018-01-01 LAB
ALBUMIN SERPL BCP-MCNC: 3.3 G/DL
ALP SERPL-CCNC: 95 U/L
ALT SERPL W/O P-5'-P-CCNC: 10 U/L
ANION GAP SERPL CALC-SCNC: 13 MMOL/L
ANION GAP SERPL CALC-SCNC: 8 MMOL/L
AST SERPL-CCNC: 17 U/L
BACTERIA #/AREA URNS HPF: NORMAL /HPF
BACTERIA SPEC AEROBE CULT: NORMAL
BASOPHILS # BLD AUTO: 0.05 K/UL
BASOPHILS NFR BLD: 0.7 %
BILIRUB SERPL-MCNC: 0.4 MG/DL
BILIRUB UR QL STRIP: NEGATIVE
BNP SERPL-MCNC: 1741 PG/ML
BUN SERPL-MCNC: 26 MG/DL
BUN SERPL-MCNC: 35 MG/DL
CALCIUM SERPL-MCNC: 8.8 MG/DL
CALCIUM SERPL-MCNC: 9.5 MG/DL
CHLORIDE SERPL-SCNC: 100 MMOL/L
CHLORIDE SERPL-SCNC: 97 MMOL/L
CLARITY UR: CLEAR
CO2 SERPL-SCNC: 28 MMOL/L
CO2 SERPL-SCNC: 29 MMOL/L
COLOR UR: YELLOW
CREAT SERPL-MCNC: 4.7 MG/DL
CREAT SERPL-MCNC: 5.8 MG/DL
DIFFERENTIAL METHOD: ABNORMAL
EOSINOPHIL # BLD AUTO: 0.3 K/UL
EOSINOPHIL NFR BLD: 4.2 %
ERYTHROCYTE [DISTWIDTH] IN BLOOD BY AUTOMATED COUNT: 13.6 %
EST. GFR  (AFRICAN AMERICAN): 10 ML/MIN/1.73 M^2
EST. GFR  (AFRICAN AMERICAN): 13 ML/MIN/1.73 M^2
EST. GFR  (NON AFRICAN AMERICAN): 11 ML/MIN/1.73 M^2
EST. GFR  (NON AFRICAN AMERICAN): 9 ML/MIN/1.73 M^2
GLUCOSE SERPL-MCNC: 111 MG/DL
GLUCOSE SERPL-MCNC: 119 MG/DL
GLUCOSE UR QL STRIP: NEGATIVE
GRAM STN SPEC: NORMAL
HCT VFR BLD AUTO: 31.6 %
HGB BLD-MCNC: 9.9 G/DL
HGB UR QL STRIP: ABNORMAL
HYALINE CASTS #/AREA URNS LPF: 0 /LPF
KETONES UR QL STRIP: NEGATIVE
LEUKOCYTE ESTERASE UR QL STRIP: NEGATIVE
LYMPHOCYTES # BLD AUTO: 1.2 K/UL
LYMPHOCYTES NFR BLD: 15.7 %
MCH RBC QN AUTO: 31.3 PG
MCHC RBC AUTO-ENTMCNC: 31.3 G/DL
MCV RBC AUTO: 100 FL
MICROSCOPIC COMMENT: NORMAL
MONOCYTES # BLD AUTO: 0.8 K/UL
MONOCYTES NFR BLD: 10.1 %
NEUTROPHILS # BLD AUTO: 5.2 K/UL
NEUTROPHILS NFR BLD: 68.9 %
NITRITE UR QL STRIP: NEGATIVE
PH UR STRIP: 6 [PH] (ref 5–8)
PLATELET # BLD AUTO: 194 K/UL
PMV BLD AUTO: 9.7 FL
POTASSIUM SERPL-SCNC: 4 MMOL/L
POTASSIUM SERPL-SCNC: 4.6 MMOL/L
PROT SERPL-MCNC: 6.4 G/DL
PROT UR QL STRIP: ABNORMAL
RBC # BLD AUTO: 3.16 M/UL
RBC #/AREA URNS HPF: 3 /HPF (ref 0–4)
SODIUM SERPL-SCNC: 137 MMOL/L
SODIUM SERPL-SCNC: 138 MMOL/L
SP GR UR STRIP: 1.02 (ref 1–1.03)
T4 FREE SERPL-MCNC: 0.61 NG/DL
TSH SERPL DL<=0.005 MIU/L-ACNC: 0.31 UIU/ML
URATE SERPL-MCNC: 2.7 MG/DL
URN SPEC COLLECT METH UR: ABNORMAL
UROBILINOGEN UR STRIP-ACNC: NEGATIVE EU/DL
WBC # BLD AUTO: 7.53 K/UL
WBC #/AREA URNS HPF: 1 /HPF (ref 0–5)

## 2018-01-01 PROCEDURE — 36902 INTRO CATH DIALYSIS CIRCUIT: CPT | Mod: 78,NTX,, | Performed by: SURGERY

## 2018-01-01 PROCEDURE — 36000705 HC OR TIME LEV I EA ADD 15 MIN: Mod: TXP | Performed by: SURGERY

## 2018-01-01 PROCEDURE — C1769 GUIDE WIRE: HCPCS | Mod: NTX | Performed by: SURGERY

## 2018-01-01 PROCEDURE — 73564 X-RAY EXAM KNEE 4 OR MORE: CPT | Mod: TC,FY,RT,TXP

## 2018-01-01 PROCEDURE — 99999 PR PBB SHADOW E&M-EST. PATIENT-LVL III: CPT | Mod: PBBFAC,TXP,, | Performed by: SURGERY

## 2018-01-01 PROCEDURE — 99214 OFFICE O/P EST MOD 30 MIN: CPT | Mod: S$GLB,,, | Performed by: NURSE PRACTITIONER

## 2018-01-01 PROCEDURE — 37000008 HC ANESTHESIA 1ST 15 MINUTES: Mod: TXP | Performed by: SURGERY

## 2018-01-01 PROCEDURE — 99213 OFFICE O/P EST LOW 20 MIN: CPT | Mod: S$PBB,,, | Performed by: INTERNAL MEDICINE

## 2018-01-01 PROCEDURE — 92950 HEART/LUNG RESUSCITATION CPR: CPT

## 2018-01-01 PROCEDURE — 36000707: Mod: NTX | Performed by: SURGERY

## 2018-01-01 PROCEDURE — 93010 ELECTROCARDIOGRAM REPORT: CPT | Mod: NTX,S$GLB,, | Performed by: INTERNAL MEDICINE

## 2018-01-01 PROCEDURE — 99999 PR PBB SHADOW E&M-EST. PATIENT-LVL III: CPT | Mod: PBBFAC,,, | Performed by: INTERNAL MEDICINE

## 2018-01-01 PROCEDURE — 36000 PLACE NEEDLE IN VEIN: CPT

## 2018-01-01 PROCEDURE — 85025 COMPLETE CBC W/AUTO DIFF WBC: CPT

## 2018-01-01 PROCEDURE — 93005 ELECTROCARDIOGRAM TRACING: CPT | Mod: 59,NTX

## 2018-01-01 PROCEDURE — 99214 OFFICE O/P EST MOD 30 MIN: CPT | Mod: S$GLB,,, | Performed by: INTERNAL MEDICINE

## 2018-01-01 PROCEDURE — 80053 COMPREHEN METABOLIC PANEL: CPT

## 2018-01-01 PROCEDURE — 31500 INSERT EMERGENCY AIRWAY: CPT

## 2018-01-01 PROCEDURE — 84439 ASSAY OF FREE THYROXINE: CPT

## 2018-01-01 PROCEDURE — 36000704 HC OR TIME LEV I 1ST 15 MIN: Mod: NTX | Performed by: SURGERY

## 2018-01-01 PROCEDURE — 81000 URINALYSIS NONAUTO W/SCOPE: CPT

## 2018-01-01 PROCEDURE — 36415 COLL VENOUS BLD VENIPUNCTURE: CPT | Mod: TXP

## 2018-01-01 PROCEDURE — C1894 INTRO/SHEATH, NON-LASER: HCPCS | Mod: TXP | Performed by: SURGERY

## 2018-01-01 PROCEDURE — 99900035 HC TECH TIME PER 15 MIN (STAT)

## 2018-01-01 PROCEDURE — 27200966 HC CLOSED SUCTION SYSTEM

## 2018-01-01 PROCEDURE — 99213 OFFICE O/P EST LOW 20 MIN: CPT | Mod: S$GLB,,, | Performed by: INTERNAL MEDICINE

## 2018-01-01 PROCEDURE — 99999 PR PBB SHADOW E&M-EST. PATIENT-LVL IV: CPT | Mod: PBBFAC,,, | Performed by: INTERNAL MEDICINE

## 2018-01-01 PROCEDURE — 99214 OFFICE O/P EST MOD 30 MIN: CPT | Mod: S$GLB,,, | Performed by: FAMILY MEDICINE

## 2018-01-01 PROCEDURE — 25000003 PHARM REV CODE 250: Mod: TXP | Performed by: STUDENT IN AN ORGANIZED HEALTH CARE EDUCATION/TRAINING PROGRAM

## 2018-01-01 PROCEDURE — 36000706: Mod: NTX | Performed by: SURGERY

## 2018-01-01 PROCEDURE — 99284 EMERGENCY DEPT VISIT MOD MDM: CPT | Mod: 25

## 2018-01-01 PROCEDURE — 63600175 PHARM REV CODE 636 W HCPCS: Mod: NTX | Performed by: NURSE ANESTHETIST, CERTIFIED REGISTERED

## 2018-01-01 PROCEDURE — 25000242 PHARM REV CODE 250 ALT 637 W/ HCPCS: Mod: TXP | Performed by: ANESTHESIOLOGY

## 2018-01-01 PROCEDURE — 25500020 PHARM REV CODE 255: Mod: TXP | Performed by: SURGERY

## 2018-01-01 PROCEDURE — 93000 ELECTROCARDIOGRAM COMPLETE: CPT | Mod: S$GLB,,, | Performed by: INTERNAL MEDICINE

## 2018-01-01 PROCEDURE — D9220A PRA ANESTHESIA: Mod: CRNA,NTX,, | Performed by: NURSE ANESTHETIST, CERTIFIED REGISTERED

## 2018-01-01 PROCEDURE — 99999 PR PBB SHADOW E&M-EST. PATIENT-LVL III: CPT | Mod: PBBFAC,,, | Performed by: FAMILY MEDICINE

## 2018-01-01 PROCEDURE — D9220A PRA ANESTHESIA: Mod: ANES,NTX,, | Performed by: ANESTHESIOLOGY

## 2018-01-01 PROCEDURE — 93990 DOPPLER FLOW TESTING: CPT | Mod: S$GLB,TXP,, | Performed by: SURGERY

## 2018-01-01 PROCEDURE — 1101F PT FALLS ASSESS-DOCD LE1/YR: CPT | Mod: CPTII,,, | Performed by: INTERNAL MEDICINE

## 2018-01-01 PROCEDURE — 37000009 HC ANESTHESIA EA ADD 15 MINS: Mod: NTX | Performed by: SURGERY

## 2018-01-01 PROCEDURE — 99285 EMERGENCY DEPT VISIT HI MDM: CPT | Mod: 25

## 2018-01-01 PROCEDURE — 93990 DOPPLER FLOW TESTING: CPT | Mod: NTX,S$GLB,, | Performed by: SURGERY

## 2018-01-01 PROCEDURE — 25000003 PHARM REV CODE 250: Mod: NTX | Performed by: SURGERY

## 2018-01-01 PROCEDURE — 87070 CULTURE OTHR SPECIMN AEROBIC: CPT

## 2018-01-01 PROCEDURE — 71046 X-RAY EXAM CHEST 2 VIEWS: CPT | Mod: TC,FY,PO

## 2018-01-01 PROCEDURE — 94761 N-INVAS EAR/PLS OXIMETRY MLT: CPT | Mod: NTX

## 2018-01-01 PROCEDURE — 99024 POSTOP FOLLOW-UP VISIT: CPT | Mod: NTX,S$GLB,, | Performed by: SURGERY

## 2018-01-01 PROCEDURE — 83880 ASSAY OF NATRIURETIC PEPTIDE: CPT

## 2018-01-01 PROCEDURE — 99397 PER PM REEVAL EST PAT 65+ YR: CPT | Mod: S$GLB,,, | Performed by: FAMILY MEDICINE

## 2018-01-01 PROCEDURE — 99024 POSTOP FOLLOW-UP VISIT: CPT | Mod: S$GLB,TXP,, | Performed by: SURGERY

## 2018-01-01 PROCEDURE — 84443 ASSAY THYROID STIM HORMONE: CPT

## 2018-01-01 PROCEDURE — 99900026 HC AIRWAY MAINTENANCE (STAT)

## 2018-01-01 PROCEDURE — 27201423 OPTIME MED/SURG SUP & DEVICES STERILE SUPPLY: Mod: NTX | Performed by: SURGERY

## 2018-01-01 PROCEDURE — 84550 ASSAY OF BLOOD/URIC ACID: CPT

## 2018-01-01 PROCEDURE — 73564 X-RAY EXAM KNEE 4 OR MORE: CPT | Mod: 26,RT,NTX, | Performed by: STUDENT IN AN ORGANIZED HEALTH CARE EDUCATION/TRAINING PROGRAM

## 2018-01-01 PROCEDURE — 71000016 HC POSTOP RECOV ADDL HR: Mod: NTX | Performed by: SURGERY

## 2018-01-01 PROCEDURE — C1725 CATH, TRANSLUMIN NON-LASER: HCPCS | Mod: NTX | Performed by: SURGERY

## 2018-01-01 PROCEDURE — 36415 COLL VENOUS BLD VENIPUNCTURE: CPT | Mod: PO

## 2018-01-01 PROCEDURE — 63600175 PHARM REV CODE 636 W HCPCS: Mod: TXP | Performed by: SURGERY

## 2018-01-01 PROCEDURE — 94002 VENT MGMT INPAT INIT DAY: CPT

## 2018-01-01 PROCEDURE — 99213 OFFICE O/P EST LOW 20 MIN: CPT | Mod: PBBFAC,PO | Performed by: INTERNAL MEDICINE

## 2018-01-01 PROCEDURE — 63600175 PHARM REV CODE 636 W HCPCS: Mod: TXP | Performed by: NURSE ANESTHETIST, CERTIFIED REGISTERED

## 2018-01-01 PROCEDURE — 99999 PR PBB SHADOW E&M-EST. PATIENT-LVL III: CPT | Mod: PBBFAC,,, | Performed by: NURSE PRACTITIONER

## 2018-01-01 PROCEDURE — 93005 ELECTROCARDIOGRAM TRACING: CPT

## 2018-01-01 PROCEDURE — 93010 ELECTROCARDIOGRAM REPORT: CPT | Mod: ,,, | Performed by: INTERNAL MEDICINE

## 2018-01-01 PROCEDURE — 80048 BASIC METABOLIC PNL TOTAL CA: CPT | Mod: NTX

## 2018-01-01 PROCEDURE — 63600175 PHARM REV CODE 636 W HCPCS: Performed by: EMERGENCY MEDICINE

## 2018-01-01 PROCEDURE — 71046 X-RAY EXAM CHEST 2 VIEWS: CPT | Mod: 26,,, | Performed by: RADIOLOGY

## 2018-01-01 PROCEDURE — 25000003 PHARM REV CODE 250: Mod: TXP | Performed by: SURGERY

## 2018-01-01 PROCEDURE — 25000003 PHARM REV CODE 250: Performed by: EMERGENCY MEDICINE

## 2018-01-01 PROCEDURE — 71000015 HC POSTOP RECOV 1ST HR: Mod: NTX | Performed by: SURGERY

## 2018-01-01 PROCEDURE — 71000015 HC POSTOP RECOV 1ST HR: Mod: TXP | Performed by: SURGERY

## 2018-01-01 PROCEDURE — 94640 AIRWAY INHALATION TREATMENT: CPT | Mod: NTX

## 2018-01-01 PROCEDURE — 87205 SMEAR GRAM STAIN: CPT

## 2018-01-01 RX ORDER — PROPOFOL 10 MG/ML
VIAL (ML) INTRAVENOUS CONTINUOUS PRN
Status: DISCONTINUED | OUTPATIENT
Start: 2018-01-01 | End: 2018-01-01

## 2018-01-01 RX ORDER — DOXYCYCLINE 100 MG/1
100 CAPSULE ORAL 2 TIMES DAILY
Qty: 20 CAPSULE | Refills: 0 | Status: SHIPPED | OUTPATIENT
Start: 2018-01-01 | End: 2018-01-01 | Stop reason: SDUPTHER

## 2018-01-01 RX ORDER — METHYLPREDNISOLONE 4 MG/1
TABLET ORAL
Qty: 21 TABLET | Refills: 0 | Status: SHIPPED | OUTPATIENT
Start: 2018-01-01

## 2018-01-01 RX ORDER — FLUTICASONE FUROATE AND VILANTEROL 100; 25 UG/1; UG/1
1 POWDER RESPIRATORY (INHALATION) DAILY
Qty: 60 EACH | Refills: 6 | Status: SHIPPED | OUTPATIENT
Start: 2018-01-01 | End: 2018-01-01

## 2018-01-01 RX ORDER — CLOPIDOGREL BISULFATE 75 MG/1
75 TABLET ORAL DAILY
Qty: 90 TABLET | Refills: 1 | Status: SHIPPED | OUTPATIENT
Start: 2018-01-01 | End: 2018-01-01 | Stop reason: SDUPTHER

## 2018-01-01 RX ORDER — SODIUM CHLORIDE 9 MG/ML
INJECTION, SOLUTION INTRAVENOUS CONTINUOUS
Status: CANCELLED | OUTPATIENT
Start: 2018-01-01

## 2018-01-01 RX ORDER — ALPRAZOLAM 0.25 MG/1
TABLET ORAL
Qty: 90 TABLET | Refills: 0 | Status: SHIPPED | OUTPATIENT
Start: 2018-01-01

## 2018-01-01 RX ORDER — ALBUTEROL SULFATE 0.83 MG/ML
2.5 SOLUTION RESPIRATORY (INHALATION) EVERY 6 HOURS PRN
Qty: 75 ML | Refills: 0 | Status: SHIPPED | OUTPATIENT
Start: 2018-01-01

## 2018-01-01 RX ORDER — OXYCODONE AND ACETAMINOPHEN 10; 325 MG/1; MG/1
TABLET ORAL
COMMUNITY
Start: 2017-01-01 | End: 2018-01-01

## 2018-01-01 RX ORDER — ROCURONIUM BROMIDE 10 MG/ML
INJECTION, SOLUTION INTRAVENOUS
Status: DISCONTINUED
Start: 2018-01-01 | End: 2018-01-01 | Stop reason: WASHOUT

## 2018-01-01 RX ORDER — SERTRALINE HYDROCHLORIDE 50 MG/1
50 TABLET, FILM COATED ORAL DAILY
Qty: 90 TABLET | Refills: 1 | Status: SHIPPED | OUTPATIENT
Start: 2018-01-01 | End: 2019-07-24

## 2018-01-01 RX ORDER — HYDROCODONE BITARTRATE AND ACETAMINOPHEN 5; 325 MG/1; MG/1
1 TABLET ORAL EVERY 4 HOURS PRN
Status: DISCONTINUED | OUTPATIENT
Start: 2018-01-01 | End: 2018-01-01 | Stop reason: HOSPADM

## 2018-01-01 RX ORDER — ALBUTEROL SULFATE 90 UG/1
2 AEROSOL, METERED RESPIRATORY (INHALATION) EVERY 6 HOURS PRN
Qty: 18 G | Refills: 2 | Status: SHIPPED | OUTPATIENT
Start: 2018-01-01 | End: 2018-01-01

## 2018-01-01 RX ORDER — IODIXANOL 320 MG/ML
INJECTION, SOLUTION INTRAVASCULAR
Status: DISCONTINUED | OUTPATIENT
Start: 2018-01-01 | End: 2018-01-01 | Stop reason: HOSPADM

## 2018-01-01 RX ORDER — SODIUM CHLORIDE 9 MG/ML
INJECTION, SOLUTION INTRAVENOUS CONTINUOUS
Status: DISCONTINUED | OUTPATIENT
Start: 2018-01-01 | End: 2018-01-01 | Stop reason: HOSPADM

## 2018-01-01 RX ORDER — HEPARIN SODIUM 1000 [USP'U]/ML
INJECTION, SOLUTION INTRAVENOUS; SUBCUTANEOUS
Status: DISCONTINUED | OUTPATIENT
Start: 2018-01-01 | End: 2018-01-01 | Stop reason: HOSPADM

## 2018-01-01 RX ORDER — DOXYCYCLINE 100 MG/1
100 CAPSULE ORAL 2 TIMES DAILY
Qty: 14 CAPSULE | Refills: 0 | Status: SHIPPED | OUTPATIENT
Start: 2018-01-01 | End: 2018-01-01

## 2018-01-01 RX ORDER — ETOMIDATE 2 MG/ML
INJECTION INTRAVENOUS
Status: DISCONTINUED
Start: 2018-01-01 | End: 2018-01-01 | Stop reason: WASHOUT

## 2018-01-01 RX ORDER — LISINOPRIL 40 MG/1
40 TABLET ORAL DAILY
Qty: 90 TABLET | Refills: 1 | Status: SHIPPED | OUTPATIENT
Start: 2018-01-01 | End: 2019-02-05

## 2018-01-01 RX ORDER — CALCITRIOL 0.25 UG/1
0.25 CAPSULE ORAL DAILY
Qty: 30 CAPSULE | Refills: 11 | Status: SHIPPED | OUTPATIENT
Start: 2018-01-01 | End: 2018-01-01 | Stop reason: SDUPTHER

## 2018-01-01 RX ORDER — SERTRALINE HYDROCHLORIDE 50 MG/1
50 TABLET, FILM COATED ORAL DAILY
Qty: 90 TABLET | Refills: 1 | Status: SHIPPED | OUTPATIENT
Start: 2018-01-01 | End: 2018-01-01 | Stop reason: SDUPTHER

## 2018-01-01 RX ORDER — PROPOFOL 10 MG/ML
INJECTION, EMULSION INTRAVENOUS CONTINUOUS PRN
Status: DISCONTINUED | OUTPATIENT
Start: 2018-01-01 | End: 2018-01-01

## 2018-01-01 RX ORDER — LIDOCAINE HYDROCHLORIDE 10 MG/ML
INJECTION, SOLUTION EPIDURAL; INFILTRATION; INTRACAUDAL; PERINEURAL
Status: DISCONTINUED | OUTPATIENT
Start: 2018-01-01 | End: 2018-01-01 | Stop reason: HOSPADM

## 2018-01-01 RX ORDER — PHENYLEPHRINE HYDROCHLORIDE 10 MG/ML
INJECTION INTRAVENOUS
Status: DISCONTINUED | OUTPATIENT
Start: 2018-01-01 | End: 2018-01-01

## 2018-01-01 RX ORDER — DOXYCYCLINE 100 MG/1
100 CAPSULE ORAL 2 TIMES DAILY
Qty: 20 CAPSULE | Refills: 0 | Status: SHIPPED | OUTPATIENT
Start: 2018-01-01 | End: 2018-01-01

## 2018-01-01 RX ORDER — ALPRAZOLAM 0.25 MG/1
0.25 TABLET ORAL DAILY
Qty: 90 TABLET | Refills: 0 | Status: SHIPPED | OUTPATIENT
Start: 2018-01-01 | End: 2018-01-01 | Stop reason: SDUPTHER

## 2018-01-01 RX ORDER — ALLOPURINOL 100 MG/1
100 TABLET ORAL DAILY
Qty: 90 TABLET | Refills: 3 | Status: SHIPPED | OUTPATIENT
Start: 2018-01-01

## 2018-01-01 RX ORDER — MUPIROCIN 20 MG/G
OINTMENT TOPICAL
Status: DISCONTINUED | OUTPATIENT
Start: 2018-01-01 | End: 2018-01-01 | Stop reason: HOSPADM

## 2018-01-01 RX ORDER — DIPHENOXYLATE HYDROCHLORIDE AND ATROPINE SULFATE 2.5; .025 MG/1; MG/1
1 TABLET ORAL 2 TIMES DAILY PRN
Qty: 180 TABLET | Refills: 1 | Status: SHIPPED | OUTPATIENT
Start: 2018-01-01 | End: 2018-01-01

## 2018-01-01 RX ORDER — DOXYCYCLINE 100 MG/1
CAPSULE ORAL
Qty: 20 CAPSULE | Refills: 0 | Status: SHIPPED | OUTPATIENT
Start: 2018-01-01 | End: 2018-01-01 | Stop reason: SDUPTHER

## 2018-01-01 RX ORDER — LIDOCAINE HCL/PF 100 MG/5ML
SYRINGE (ML) INTRAVENOUS
Status: DISCONTINUED | OUTPATIENT
Start: 2018-01-01 | End: 2018-01-01

## 2018-01-01 RX ORDER — FOLIC ACID/VIT B COMPLEX AND C 0.8 MG
TABLET ORAL
COMMUNITY
Start: 2018-01-01

## 2018-01-01 RX ORDER — CEFAZOLIN SODIUM 2 G/50ML
2 SOLUTION INTRAVENOUS
Status: COMPLETED | OUTPATIENT
Start: 2018-01-01 | End: 2018-01-01

## 2018-01-01 RX ORDER — LIDOCAINE HYDROCHLORIDE 10 MG/ML
1 INJECTION, SOLUTION EPIDURAL; INFILTRATION; INTRACAUDAL; PERINEURAL ONCE
Status: CANCELLED | OUTPATIENT
Start: 2018-01-01 | End: 2018-01-01

## 2018-01-01 RX ORDER — ALPRAZOLAM 0.25 MG/1
TABLET ORAL
Qty: 90 TABLET | Refills: 0 | Status: SHIPPED | OUTPATIENT
Start: 2018-01-01 | End: 2018-01-01 | Stop reason: SDUPTHER

## 2018-01-01 RX ORDER — LIDOCAINE HYDROCHLORIDE 10 MG/ML
1 INJECTION, SOLUTION EPIDURAL; INFILTRATION; INTRACAUDAL; PERINEURAL ONCE
Status: DISCONTINUED | OUTPATIENT
Start: 2018-01-01 | End: 2018-01-01 | Stop reason: HOSPADM

## 2018-01-01 RX ORDER — FLUTICASONE FUROATE AND VILANTEROL 200; 25 UG/1; UG/1
1 POWDER RESPIRATORY (INHALATION) DAILY
Qty: 60 EACH | Refills: 3 | Status: SHIPPED | OUTPATIENT
Start: 2018-01-01

## 2018-01-01 RX ORDER — SODIUM CHLORIDE 0.9 % (FLUSH) 0.9 %
3 SYRINGE (ML) INJECTION
Status: DISCONTINUED | OUTPATIENT
Start: 2018-01-01 | End: 2018-01-01 | Stop reason: HOSPADM

## 2018-01-01 RX ORDER — ATROPINE SULFATE 0.1 MG/ML
INJECTION INTRAVENOUS CODE/TRAUMA/SEDATION MEDICATION
Status: COMPLETED | OUTPATIENT
Start: 2018-01-01 | End: 2018-01-01

## 2018-01-01 RX ORDER — MIDAZOLAM HYDROCHLORIDE 1 MG/ML
INJECTION, SOLUTION INTRAMUSCULAR; INTRAVENOUS
Status: DISCONTINUED | OUTPATIENT
Start: 2018-01-01 | End: 2018-01-01

## 2018-01-01 RX ORDER — CLOPIDOGREL BISULFATE 75 MG/1
75 TABLET ORAL DAILY
Qty: 90 TABLET | Refills: 1 | Status: SHIPPED | OUTPATIENT
Start: 2018-01-01 | End: 2019-07-24

## 2018-01-01 RX ORDER — DIAZEPAM 2 MG/1
TABLET ORAL
COMMUNITY
Start: 2017-01-01 | End: 2018-01-01

## 2018-01-01 RX ORDER — HYDROCODONE BITARTRATE AND ACETAMINOPHEN 5; 325 MG/1; MG/1
1 TABLET ORAL EVERY 6 HOURS PRN
Qty: 10 TABLET | Refills: 0 | Status: SHIPPED | OUTPATIENT
Start: 2018-01-01 | End: 2018-01-01

## 2018-01-01 RX ORDER — CALCIUM CHLORIDE INJECTION 100 MG/ML
INJECTION, SOLUTION INTRAVENOUS CODE/TRAUMA/SEDATION MEDICATION
Status: COMPLETED | OUTPATIENT
Start: 2018-01-01 | End: 2018-01-01

## 2018-01-01 RX ORDER — CALCITRIOL 0.25 UG/1
0.25 CAPSULE ORAL DAILY
Qty: 90 CAPSULE | Refills: 3 | Status: SHIPPED | OUTPATIENT
Start: 2018-01-01

## 2018-01-01 RX ORDER — NOREPINEPHRINE BITARTRATE/D5W 16MG/250ML
0.05 PLASTIC BAG, INJECTION (ML) INTRAVENOUS CONTINUOUS
Status: DISCONTINUED | OUTPATIENT
Start: 2018-01-01 | End: 2018-01-01 | Stop reason: HOSPADM

## 2018-01-01 RX ORDER — PROPOFOL 10 MG/ML
VIAL (ML) INTRAVENOUS
Status: DISCONTINUED | OUTPATIENT
Start: 2018-01-01 | End: 2018-01-01

## 2018-01-01 RX ORDER — FENTANYL CITRATE 50 UG/ML
INJECTION, SOLUTION INTRAMUSCULAR; INTRAVENOUS
Status: DISCONTINUED | OUTPATIENT
Start: 2018-01-01 | End: 2018-01-01

## 2018-01-01 RX ORDER — ALBUTEROL SULFATE 0.83 MG/ML
2.5 SOLUTION RESPIRATORY (INHALATION) ONCE
Status: COMPLETED | OUTPATIENT
Start: 2018-01-01 | End: 2018-01-01

## 2018-01-01 RX ORDER — CALCITRIOL 0.25 UG/1
0.25 CAPSULE ORAL DAILY
Qty: 90 CAPSULE | Refills: 1 | Status: SHIPPED | OUTPATIENT
Start: 2018-01-01 | End: 2018-01-01 | Stop reason: SDUPTHER

## 2018-01-01 RX ORDER — EPINEPHRINE 0.1 MG/ML
INJECTION INTRAVENOUS CODE/TRAUMA/SEDATION MEDICATION
Status: COMPLETED | OUTPATIENT
Start: 2018-01-01 | End: 2018-01-01

## 2018-01-01 RX ORDER — SEVELAMER CARBONATE 800 MG/1
TABLET, FILM COATED ORAL
COMMUNITY
Start: 2018-01-01

## 2018-01-01 RX ORDER — METHYLPREDNISOLONE 4 MG/1
TABLET ORAL
Qty: 1 PACKAGE | Refills: 0 | Status: SHIPPED | OUTPATIENT
Start: 2018-01-01 | End: 2018-01-01

## 2018-01-01 RX ORDER — SODIUM BICARBONATE 1 MEQ/ML
SYRINGE (ML) INTRAVENOUS CODE/TRAUMA/SEDATION MEDICATION
Status: COMPLETED | OUTPATIENT
Start: 2018-01-01 | End: 2018-01-01

## 2018-01-01 RX ADMIN — PROPOFOL 25 MCG/KG/MIN: 10 INJECTION, EMULSION INTRAVENOUS at 11:01

## 2018-01-01 RX ADMIN — LIDOCAINE HYDROCHLORIDE 50 MG: 20 INJECTION, SOLUTION INTRAVENOUS at 01:03

## 2018-01-01 RX ADMIN — EPINEPHRINE 1 MG: 0.1 INJECTION, SOLUTION ENDOTRACHEAL; INTRACARDIAC; INTRAVENOUS at 08:10

## 2018-01-01 RX ADMIN — PROPOFOL 30 MG: 10 INJECTION, EMULSION INTRAVENOUS at 01:03

## 2018-01-01 RX ADMIN — MIDAZOLAM HYDROCHLORIDE 1 MG: 1 INJECTION, SOLUTION INTRAMUSCULAR; INTRAVENOUS at 11:01

## 2018-01-01 RX ADMIN — SODIUM BICARBONATE 50 MEQ: 84 INJECTION, SOLUTION INTRAVENOUS at 08:10

## 2018-01-01 RX ADMIN — EPINEPHRINE 1 MG: 0.1 INJECTION, SOLUTION ENDOTRACHEAL; INTRACARDIAC; INTRAVENOUS at 09:10

## 2018-01-01 RX ADMIN — LIDOCAINE HYDROCHLORIDE 50 MG: 20 INJECTION, SOLUTION INTRAVENOUS at 11:01

## 2018-01-01 RX ADMIN — ATROPINE SULFATE 0.5 MG: 0.1 INJECTION PARENTERAL at 09:10

## 2018-01-01 RX ADMIN — CEFAZOLIN SODIUM 2 G: 2 SOLUTION INTRAVENOUS at 01:03

## 2018-01-01 RX ADMIN — MUPIROCIN: 20 OINTMENT TOPICAL at 11:01

## 2018-01-01 RX ADMIN — SODIUM CHLORIDE: 0.9 INJECTION, SOLUTION INTRAVENOUS at 12:03

## 2018-01-01 RX ADMIN — LIDOCAINE HYDROCHLORIDE 1 ML: 10 INJECTION, SOLUTION EPIDURAL; INFILTRATION; INTRACAUDAL; PERINEURAL at 12:01

## 2018-01-01 RX ADMIN — FENTANYL CITRATE 25 MCG: 50 INJECTION, SOLUTION INTRAMUSCULAR; INTRAVENOUS at 11:01

## 2018-01-01 RX ADMIN — PHENYLEPHRINE HYDROCHLORIDE 100 MCG: 10 INJECTION INTRAVENOUS at 01:03

## 2018-01-01 RX ADMIN — HYDROCODONE BITARTRATE AND ACETAMINOPHEN 1 TABLET: 5; 325 TABLET ORAL at 12:01

## 2018-01-01 RX ADMIN — ALBUTEROL SULFATE 2.5 MG: 2.5 SOLUTION RESPIRATORY (INHALATION) at 11:01

## 2018-01-01 RX ADMIN — HEPARIN SODIUM 10000 UNITS: 1000 INJECTION, SOLUTION INTRAVENOUS; SUBCUTANEOUS at 11:01

## 2018-01-01 RX ADMIN — FENTANYL CITRATE 25 MCG: 50 INJECTION, SOLUTION INTRAMUSCULAR; INTRAVENOUS at 12:01

## 2018-01-01 RX ADMIN — IODIXANOL 28 ML: 320 INJECTION, SOLUTION INTRAVASCULAR at 12:01

## 2018-01-01 RX ADMIN — CALCIUM CHLORIDE 1 G: 100 INJECTION, SOLUTION INTRAVENOUS; INTRAVENTRICULAR at 08:10

## 2018-01-09 PROBLEM — T82.858A STENOSIS OF ARTERIOVENOUS DIALYSIS FISTULA: Status: ACTIVE | Noted: 2018-01-01

## 2018-01-09 NOTE — PROGRESS NOTES
REFERRING PHYSICIAN:  Jen Talamantes M.D.    HISTORY OF PRESENT ILLNESS:  A 72-year-old male with end-stage renal disease who   has been dialyzing via right IJ PermCath over the last three weeks.  He also   tried peritoneal dialysis, but this did not work well for him.    S/P L brachiocephalic AVF 11/29/17.  No c/o    Notably, he has had what appears to be a left radial artery harvest for CABG 12   years ago.  He is right-handed.  He is dialyzing Monday, Wednesday and Friday.    PAST MEDICAL HISTORY:  Coronary artery disease as above, history of congestive   heart failure, hypertension.    PAST SURGICAL HISTORY:  As above.  Appendectomy, CABG, PD catheter placement.    FAMILY HISTORY:  Positive for coronary artery disease.    SOCIAL HISTORY:  Former smoker.    MEDICATIONS:  Include statin and aspirin.  See for full list.    ALLERGIES:  None.    REVIEW OF SYSTEMS:  CARDIOVASCULAR:  Denies postprandial pain or DVT.  All other systems include eyes, ENT, respiratory, musculoskeletal, breast,   psychiatric, lymph, allergy and immune are negative.    PHYSICAL EXAMINATION:  VITAL SIGNS:  See nursing note.  GENERAL:  No acute distress.  CARDIOVASCULAR:  Regular rate of the PMI, no murmur.  VASCULAR:  Left arm shows a palpable radial pulse, despite his presumed radial   artery resection.  Ulnar pulse was also palpable.  Hand  is 5/5.  EXTREMITIES:  L arm shows well healed incision.  Proximally very pulsy with faint thrill distal    NEUROLOGIC:  Cranial nerves VII-XII intact, 5/5 motor strength in all   extremities    IMAGING:  Signif prox AVF stneosis, low flow volume of 456    ASSESSMENT:  Prox L AVF stenosis. Rx needed    PLAN:  1.  ANTEGRADE Left fistulagram and intervention 1/22/18, after 12 since he dialyzes earlier     I have explained the risks, benefits and alternatives for this procedure in detail.  The patients voices understanding and all questions have be answered, and agrees to proceed with the procedure.    ROM  Sly Rios III, MD, FACS  Professor and Chief, Vascular and Endovascular Surgery

## 2018-01-12 NOTE — TELEPHONE ENCOUNTER
Good Morning.    The prior authorization for Mr. Motley's Calcitriol has been denied through his [facility] Medicare part D benefit. The medication may be covered through his medicare Part B benefit, which cannot be billed at our [facility]. The medication can possibly be sent to the patients other preferred [facility], but cannot be transferred because it is a Medicare Part B prescription.    If there are any additional questions, please contact the pharmacy at, [phone].    Thank You.    Beth Reeves CpT.  Patient Care Advocate  [facility]  [email or web address]

## 2018-01-12 NOTE — TELEPHONE ENCOUNTER
Called Mr. Motley and let him know that Dr. Herman sent over his prescription to [facility].

## 2018-01-19 NOTE — TELEPHONE ENCOUNTER
Attempted several times to notify patient, unsuccessful. Phone numbers: [phone], [phone] unable to leave messages due to voicemail boxes full.

## 2018-01-22 PROBLEM — T82.858A STENOSIS OF AV FISTULA: Status: ACTIVE | Noted: 2018-01-01

## 2018-01-22 NOTE — ANESTHESIA POSTPROCEDURE EVALUATION
"Anesthesia Post Evaluation    Patient: Gunner Motley    Procedure(s) Performed: Procedure(s) (LRB):  Fistulogram-antegrade (Left)  ANGIOPLASTY    Final Anesthesia Type: general  Patient location during evaluation: PACU  Patient participation: Yes- Able to Participate  Level of consciousness: awake and alert  Post-procedure vital signs: reviewed and stable  Pain management: adequate  Airway patency: patent  PONV status at discharge: No PONV  Anesthetic complications: no      Cardiovascular status: blood pressure returned to baseline  Respiratory status: unassisted  Hydration status: euvolemic  Follow-up not needed.        Visit Vitals  BP (!) 146/58 (BP Location: Right arm, Patient Position: Lying)   Pulse 61   Temp 36.5 °C (97.7 °F) (Temporal)   Resp 16   Ht 5' 6" (1.676 m)   Wt 70.8 kg (156 lb)   SpO2 96%   BMI 25.18 kg/m²       Pain/Beryl Score: Pain Assessment Performed: Yes (1/22/2018  1:00 PM)  Presence of Pain: complains of pain/discomfort (1/22/2018  1:00 PM)  Pain Rating Prior to Med Admin: 6 (1/22/2018 12:51 PM)  Beryl Score: 10 (1/22/2018  1:00 PM)  Modified Beryl Score: 18 (1/22/2018 12:22 PM)      "

## 2018-01-22 NOTE — ANESTHESIA PREPROCEDURE EVALUATION
01/22/2018  Gunner Motley is a 72 y.o., male.    Anesthesia Evaluation         Review of Systems  Anesthesia Hx:  No problems with previous Anesthesia   Social:  Former Smoker Stopped 20+ years ago   Cardiovascular:   Hypertension Past MI CABG/stent  ECG has been reviewed. Echo 2017 CONCLUSIONS     1 - Low normal to mildly depressed left ventricular systolic function (EF 50-55%).     2 - No wall motion abnormalities.     3 - Concentric hypertrophy.     4 - Normal right ventricular systolic function .     5 - The estimated PA systolic pressure is greater than 27 mmHg.     6 - Mild aortic regurgitation.    Renal/:   ESKD  On HD  Last treatment this am       Physical Exam  General:  Well nourished    Airway/Jaw/Neck:  Airway Findings: Mouth Opening: Normal Tongue: Normal  General Airway Assessment: Adult  Mallampati: II  Improves to I with phonation.  Jaw/Neck Findings:  Neck ROM: Normal ROM      Dental:  Dental Findings: In tact   Chest/Lungs:  Chest/Lungs Findings: Expiratory Wheezes, Mod.         Mental Status:  Mental Status Findings:  Cooperative         Anesthesia Plan  Type of Anesthesia, risks & benefits discussed:  Anesthesia Type:  general  Patient's Preference:   Intra-op Monitoring Plan: standard ASA monitors  Intra-op Monitoring Plan Comments:   Post Op Pain Control Plan:   Post Op Pain Control Plan Comments:   Induction:   IV  Beta Blocker:  Patient is on a Beta-Blocker and has received one dose within the past 24 hours (No further documentation required).       Informed Consent: Patient understands risks and agrees with Anesthesia plan.  Questions answered. Anesthesia consent signed with patient.  ASA Score: 3     Day of Surgery Review of History & Physical:    H&P update referred to the surgeon.     Anesthesia Plan Notes: Wheezing on exam; will administer albuterol now        Ready For Surgery  From Anesthesia Perspective.

## 2018-01-22 NOTE — ANESTHESIA RELEASE NOTE
"Anesthesia Release from PACU Note    Patient: Gunner Motley    Procedure(s) Performed: Procedure(s) (LRB):  Fistulogram-antegrade (Left)  ANGIOPLASTY    Anesthesia type: general    Post pain: Adequate analgesia    Post assessment: no apparent anesthetic complications    Last Vitals:   Visit Vitals  BP (!) 146/58 (BP Location: Right arm, Patient Position: Lying)   Pulse 61   Temp 36.5 °C (97.7 °F) (Temporal)   Resp 16   Ht 5' 6" (1.676 m)   Wt 70.8 kg (156 lb)   SpO2 96%   BMI 25.18 kg/m²       Post vital signs: stable    Level of consciousness: awake, alert  and oriented    Nausea/Vomiting: no nausea/no vomiting    Complications: none    Airway Patency: patent    Respiratory: unassisted    Cardiovascular: stable and blood pressure at baseline    Hydration: euvolemic     "

## 2018-01-22 NOTE — PLAN OF CARE
Discharge instructions reviewed with pt. Understanding verbalized. Pt reports PRN medication relieved pain. Able to tolerate PO intake. Daughter notified of pt's discharge. Arrived in front of hospital. To be transported by RN to car.

## 2018-01-22 NOTE — TRANSFER OF CARE
"Anesthesia Transfer of Care Note    Patient: Gunner Motley    Procedure(s) Performed: Procedure(s) (LRB):  Fistulogram-antegrade (Left)  ANGIOPLASTY    Patient location: [facility]    Anesthesia Type: general    Transport from OR: Transported from OR on room air with adequate spontaneous ventilation    Post pain: adequate analgesia    Post assessment: no apparent anesthetic complications    Post vital signs: stable    Level of consciousness: awake, alert and oriented    Nausea/Vomiting: no nausea/vomiting    Complications: none    Transfer of care protocol was followed      Last vitals:   Visit Vitals  BP (!) 146/58 (BP Location: Right arm, Patient Position: Lying)   Pulse 61   Temp 36.5 °C (97.7 °F) (Temporal)   Resp 16   Ht 5' 6" (1.676 m)   Wt 70.8 kg (156 lb)   SpO2 96%   BMI 25.18 kg/m²     "

## 2018-01-22 NOTE — BRIEF OP NOTE
[facility]  Brief Operative Note     SUMMARY     Surgery Date: 1/22/2018     Surgeon(s) and Role:     * TOM Rios III, MD - Primary     * Demian Velasquez MD - Resident - Assisting        Pre-op Diagnosis:  Stenosis of arteriovenous dialysis fistula, sequela [T82.858S]    Post-op Diagnosis:  Post-Op Diagnosis Codes:     * Stenosis of arteriovenous dialysis fistula, sequela [T82.858S]    PROCEDURES:    1. PTA, L michael-anatomic AVF (6x20 ultraverse)  2. Antegrade fistualgram    Anesthesia: Local MAC    Description of the findings of the procedure: >80% stenosis, ~30% residual, better thrill    Findings/Key Components: as above    Estimated Blood Loss: <4cc         Specimens:   Specimen (12h ago through future)    None          Discharge Note    SUMMARY     Admit Date: 1/22/2018    Discharge Date and Time: 1/22/18    Hospital Course (synopsis of major diagnoses, care, treatment, and services provided during the course of the hospital stay): successful outpatient procedure    Final Diagnosis: Post-Op Diagnosis Codes:     * Stenosis of arteriovenous dialysis fistula, sequela [T82.858S]    Disposition: home    Follow Up/Patient Instructions: Diet: renal  Act: ad naida  FU: 1 week with QUANTITATIVE AVF duplex     Medications: pre-op      Follow-up Information     TOM Rios Iii, MD In 1 week.    Specialty:  Vascular Surgery  Contact information:  [address]  [facility] [id number]  [phone]

## 2018-01-22 NOTE — INTERVAL H&P NOTE
The patient has been examined and the H&P has been reviewed:    I concur with the findings and no changes have occurred since H&P was written.   To OR for left fistulogram with possible intervention      Anesthesia/Surgery risks, benefits and alternative options discussed and understood by patient/family.          There are no hospital problems to display for this patient.

## 2018-01-23 NOTE — OP NOTE
DATE OF PROCEDURE:  01/22/2018.    PREOPERATIVE DIAGNOSIS:  AV fistula stenosis.    POSTOPERATIVE DIAGNOSIS:  AV fistula stenosis.    PROCEDURES PERFORMED:  1.  Tracy-anastomotic angioplasty, left AV fistula (6 x 20).  2.  Fistulogram.    SURGEON:  ESTER Rios III    INDICATIONS FOR PROCEDURE:  A 72-year-old male with a brachiocephalic fistula   created approximately 6 to 8 weeks ago with a proximal tracy-anastomotic stenosis   by duplex.  This patient has had a prior radial artery harvest, so a   transradial approach was not possible.    PROCEDURE IN DETAIL:  The patient was brought in the Hybrid Suite and placed in   supine position.  After sterile prep and drape and infiltration of 1% lidocaine,   the left brachiocephalic fistula was accessed in an antegrade fashion using a   micropuncture set.  Fistulogram was performed, which showed a well-matured   fistula throughout and no central venous stenosis.  Refluxing at the   anastomosis, however, showed a greater than 70% stenosis, which was actually   difficult to see given the obliquities needed.  A non-stiff Glidewire was passed   through this and a short 6 sheath placed.  Initially, a 5 x 20 Ultraverse   balloon was brought in the field, prepped, and placed over this area.  There was   severe wasting, confirming a high-grade stenosis that did not fully efface.    However, this appeared to be the balloon was undersized.  The balloon was   replaced with a 6 x 20 balloon, inflated to 10 atmospheres and held for 2   minutes.  Final completion films revealed significant improvement in the   stenosis.  Importantly, clinically, the thrill in the fistula, which had been   quite weak, was much stronger.    All catheters and guidewires were removed and hemostasis was achieved with   Monocryl suture.  Sterile dressing was applied and the patient was sent to   Recovery Room in stable condition.      BRODIE/GRISELDA  dd: 01/23/2018 08:13:48 (CST)  td: 01/23/2018 08:48:45 (CST)  Doc ID    #[id number]  Job ID #[id number]    CC:

## 2018-01-25 PROBLEM — N18.5 ANEMIA OF CHRONIC RENAL FAILURE, STAGE 5: Chronic | Status: ACTIVE | Noted: 2018-01-01

## 2018-01-25 PROBLEM — D63.1 ANEMIA OF CHRONIC RENAL FAILURE, STAGE 5: Chronic | Status: ACTIVE | Noted: 2018-01-01

## 2018-01-25 PROBLEM — E11.22 TYPE 2 DIABETES MELLITUS WITH STAGE 4 CHRONIC KIDNEY DISEASE, WITHOUT LONG-TERM CURRENT USE OF INSULIN: Status: RESOLVED | Noted: 2017-08-22 | Resolved: 2018-01-01

## 2018-01-25 PROBLEM — N18.4 TYPE 2 DIABETES MELLITUS WITH STAGE 4 CHRONIC KIDNEY DISEASE, WITHOUT LONG-TERM CURRENT USE OF INSULIN: Status: RESOLVED | Noted: 2017-08-22 | Resolved: 2018-01-01

## 2018-01-25 PROBLEM — F41.9 ANXIETY DISORDER: Status: ACTIVE | Noted: 2018-01-01

## 2018-01-25 NOTE — PROGRESS NOTES
Patient, Gunner Motley (MRN #[id number]), presented with a recent Estimated Glumerular Filtration Rate (EGFR) between 15 and 29 consistent with the definition of chronic kidney disease stage 4 (ICD10 - N18.4).    eGFR if non    Date Value Ref Range Status   11/13/2017 16 (A) >60 mL/min/1.73 m^2 Final     Comment:     Calculation used to obtain the estimated glomerular filtration  rate (eGFR) is the CKD-EPI equation.          The patient's chronic kidney disease stage 4 was monitored, evaluated, addressed and/or treated. This addendum to the medical record is made on 01/25/2018.

## 2018-01-25 NOTE — PROGRESS NOTES
Subjective:       Patient ID: Gunner Motley is a 72 y.o. male.    Chief Complaint: Annual Exam    72 yr old pleasant white male with hypertension, ESRD on HD, pulmonary hypertension, anxiety, HLD, and other co morbidities presents today as new patient and establishing primary care and his annual wellness check and lab work. No new complaints today.      ESRD on HD - he gets dialysis M/W/F - follows nephrology - Dr Liu       HTN - controlled -       HLD - controlled - LDL normal       Anxiety - controlled       History as below - reviewed       Hypertension   This is a chronic problem. The current episode started more than 1 year ago. The problem has been gradually improving since onset. The problem is controlled. Pertinent negatives include no anxiety, chest pain, malaise/fatigue, palpitations, peripheral edema, shortness of breath or sweats. There are no associated agents to hypertension. Risk factors for coronary artery disease include dyslipidemia, male gender and sedentary lifestyle. Past treatments include calcium channel blockers and ACE inhibitors. The current treatment provides significant improvement. There are no compliance problems.  Hypertensive end-organ damage includes kidney disease and heart failure. There is no history of CVA, left ventricular hypertrophy or renovascular disease. Identifiable causes of hypertension include chronic renal disease. There is no history of hyperaldosteronism, hyperparathyroidism, a hypertension causing med or a thyroid problem.   Hyperlipidemia   This is a chronic problem. The current episode started more than 1 year ago. The problem is controlled. Recent lipid tests were reviewed and are normal. Exacerbating diseases include chronic renal disease. There are no known factors aggravating his hyperlipidemia. Pertinent negatives include no chest pain, myalgias or shortness of breath. Current antihyperlipidemic treatment includes statins. The current treatment provides  significant improvement of lipids. There are no compliance problems.  Risk factors for coronary artery disease include dyslipidemia, hypertension and a sedentary lifestyle.     Review of Systems   Constitutional: Negative.  Negative for activity change, diaphoresis, malaise/fatigue and unexpected weight change.   HENT: Negative.  Negative for congestion, ear pain, mouth sores, rhinorrhea and voice change.    Eyes: Negative.  Negative for pain, discharge and visual disturbance.   Respiratory: Negative.  Negative for apnea, cough, shortness of breath and wheezing.    Cardiovascular: Negative.  Negative for chest pain and palpitations.   Gastrointestinal: Negative.  Negative for abdominal distention, anal bleeding, diarrhea and vomiting.   Endocrine: Negative.  Negative for cold intolerance and polyuria.   Genitourinary: Negative.  Negative for decreased urine volume, difficulty urinating, discharge, frequency and scrotal swelling.   Musculoskeletal: Negative.  Negative for back pain, myalgias and neck stiffness.   Skin: Negative.  Negative for color change and rash.   Allergic/Immunologic: Negative.  Negative for environmental allergies and immunocompromised state.   Neurological: Negative.  Negative for dizziness, speech difficulty, weakness and light-headedness.   Hematological: Negative.    Psychiatric/Behavioral: Negative.  Negative for agitation, dysphoric mood and suicidal ideas. The patient is not nervous/anxious.        Active Ambulatory Problems     Diagnosis Date Noted    Recurrent nephrolithiasis 05/01/2015    Coronary artery disease involving native coronary artery of native heart without angina pectoris 05/01/2015    Mixed hyperlipidemia 08/22/2017    Chronic diastolic heart failure 08/23/2017    Anemia     Essential hypertension     Proteinuria     Pulmonary hypertension 08/23/2017    Non-rheumatic mitral regurgitation 09/11/2017    Old MI (myocardial infarction) 11/11/2017    ESRD (end stage  renal disease) on dialysis     ESRD on dialysis 11/29/2017    AVF (arteriovenous fistula) creation (brachiocephalic) on left arm 11/29/2017    Hx of CABG     H/O CHF     Stenosis of arteriovenous dialysis fistula 01/09/2018    Stenosis of AV fistula 01/22/2018    Anemia of chronic renal failure, stage 5 01/25/2018    Anxiety disorder 01/25/2018     Resolved Ambulatory Problems     Diagnosis Date Noted    HTN (hypertension) 05/01/2015    CKD (chronic kidney disease) stage 5, GFR less than 15 ml/min 05/01/2015    Normocytic anemia 05/01/2015    Gout 05/01/2015    Acute renal failure superimposed on stage 4 chronic kidney disease 08/22/2017    Edema extremities 08/22/2017    Metabolic acidosis 08/22/2017    Hyperkalemia 08/22/2017    Hypervolemia     Type 2 diabetes mellitus with stage 4 chronic kidney disease, without long-term current use of insulin 08/22/2017    Hypomagnesemia 08/24/2017    Hypokalemia 08/25/2017    Benign hypertensive CKD 08/28/2017    Acute pain of right knee     ESRD on dialysis     Acute gout of knee     Hypotension due to drugs 10/13/2017    Preop examination     Bacteremia 11/01/2017    SOB (shortness of breath) 11/03/2017    Bacteremia 11/05/2017    Hemodialysis catheter infection, initial encounter 11/11/2017    Bacteremia associated with intravascular line      Past Medical History:   Diagnosis Date    Anemia     Bacteremia associated with intravascular line     CHF (congestive heart failure)     Coronary artery disease     Diverticulosis     ESRD (end stage renal disease) on dialysis     Gout     Hypertension     Recurrent nephrolithiasis     Urinary tract infection      Past Surgical History:   Procedure Laterality Date    APPENDECTOMY      CARDIAC SURGERY  2005    CABG x4 vessels    HERNIA REPAIR  2002    umbilical hernia    KIDNEY STONE SURGERY  1983    abdominal    PERITONEAL CATHETER INSERTION Left 08/28/2017    abdomen     Family History    Problem Relation Age of Onset    Cancer Mother     Kidney disease Mother     Heart attack Father      Social History     Social History    Marital status:      Spouse name: N/A    Number of children: N/A    Years of education: N/A     Occupational History    Not on file.     Social History Main Topics    Smoking status: Former Smoker     Packs/day: 2.00     Years: 30.00     Quit date: 10/12/1995    Smokeless tobacco: Former User     Quit date: 1/12/1995    Alcohol use No      Comment: quit 2014    Drug use: Unknown    Sexual activity: Not on file     Other Topics Concern    Not on file     Social History Narrative    No narrative on file     Review of patient's allergies indicates:  No Known Allergies  Current Outpatient Prescriptions on File Prior to Visit   Medication Sig Dispense Refill    albuterol (PROVENTIL) 2.5 mg /3 mL (0.083 %) nebulizer solution as needed for Shortness of Breath.   0    allopurinol (ZYLOPRIM) 100 MG tablet Take 100 mg by mouth once daily.       amLODIPine (NORVASC) 10 MG tablet Take 1 tablet (10 mg total) by mouth once daily. 30 tablet 11    aspirin (ECOTRIN) 81 MG EC tablet Take 81 mg by mouth every evening.       calcitRIOL (ROCALTROL) 0.25 MCG Cap Take 1 capsule (0.25 mcg total) by mouth once daily. 30 capsule 11    ergocalciferol (ERGOCALCIFEROL) 50,000 unit Cap Take 2 Units by mouth once daily.      lisinopril (PRINIVIL,ZESTRIL) 40 MG tablet Take 1 tablet (40 mg total) by mouth once daily. 90 tablet 3    metoprolol succinate (TOPROL-XL) 100 MG 24 hr tablet Take 1 tablet (100 mg total) by mouth once daily. 30 tablet 11    oxyCODONE-acetaminophen (PERCOCET)  mg per tablet       pravastatin (PRAVACHOL) 40 MG tablet Take 40 mg by mouth every evening.       tiotropium (SPIRIVA) 18 mcg inhalation capsule Inhale 1 capsule (18 mcg total) into the lungs once daily. Controller 30 capsule 0    [DISCONTINUED] ALPRAZolam (XANAX) 0.25 MG tablet Take 1 tablet  (0.25 mg total) by mouth every 8 (eight) hours as needed. 60 tablet 0    [DISCONTINUED] clopidogrel (PLAVIX) 75 mg tablet Take 1 tablet (75 mg total) by mouth once daily. 30 tablet 11    [DISCONTINUED] sertraline (ZOLOFT) 50 MG tablet Take 1 tablet (50 mg total) by mouth once daily. 30 tablet 11    [DISCONTINUED] diazePAM (VALIUM) 2 MG tablet       [DISCONTINUED] docusate sodium (COLACE) 100 MG capsule Take 1 capsule (100 mg total) by mouth 2 (two) times daily. 30 capsule `11    [DISCONTINUED] ondansetron (ZOFRAN) 4 MG tablet Take 1 tablet (4 mg total) by mouth every 8 (eight) hours as needed for Nausea. 30 tablet 0     No current facility-administered medications on file prior to visit.        Objective:       Vitals:    01/25/18 1000   BP: 131/65   Pulse: 68   Temp: 98.5 °F (36.9 °C)       Physical Exam   Constitutional: He is oriented to person, place, and time. He appears well-developed and well-nourished.   HENT:   Head: Normocephalic and atraumatic.   Right Ear: External ear normal.   Left Ear: External ear normal.   Nose: Nose normal.   Mouth/Throat: Oropharynx is clear and moist. No oropharyngeal exudate.   Eyes: Conjunctivae and EOM are normal. Pupils are equal, round, and reactive to light. Right eye exhibits no discharge. Left eye exhibits no discharge. No scleral icterus.   Neck: Normal range of motion. Neck supple. No JVD present. No tracheal deviation present. No thyromegaly present.   Cardiovascular: Normal rate, regular rhythm, normal heart sounds and intact distal pulses.  Exam reveals no gallop and no friction rub.    No murmur heard.  Pulmonary/Chest: Effort normal and breath sounds normal. No stridor. No respiratory distress. He has no wheezes. He has no rales. He exhibits no tenderness.   Abdominal: Soft. Bowel sounds are normal. He exhibits no distension and no mass. There is no tenderness. There is no rebound and no guarding. No hernia.   Musculoskeletal: Normal range of motion. He  exhibits no edema or tenderness.   Lymphadenopathy:     He has no cervical adenopathy.   Neurological: He is alert and oriented to person, place, and time. He has normal reflexes. He displays normal reflexes. No cranial nerve deficit. He exhibits normal muscle tone. Coordination normal.   Skin: Skin is warm and dry. No rash noted. No erythema. No pallor.   Psychiatric: He has a normal mood and affect. His behavior is normal. Judgment and thought content normal.       Assessment:       1. Routine general medical examination at a health care facility    2. ESRD (end stage renal disease) on dialysis    3. Coronary artery disease involving native coronary artery of native heart without angina pectoris    4. Chronic diastolic heart failure    5. Pulmonary hypertension    6. Mixed hyperlipidemia    7. Essential hypertension    8. ESRD on dialysis    9. AVF (arteriovenous fistula) creation (brachiocephalic) on left arm    10. Anemia of chronic renal failure, stage 5    11. Anxiety disorder, unspecified type        Plan:       Gunner was seen today for annual exam.    Diagnoses and all orders for this visit:    Routine general medical examination at a health care facility    ESRD (end stage renal disease) on dialysis    Coronary artery disease involving native coronary artery of native heart without angina pectoris    Chronic diastolic heart failure    Pulmonary hypertension    Mixed hyperlipidemia    Essential hypertension    ESRD on dialysis    AVF (arteriovenous fistula) creation (brachiocephalic) on left arm    Anemia of chronic renal failure, stage 5    Anxiety disorder, unspecified type      Wellness check  -normal exam  -labs    CAD  -stable    ESRD on HD  -M/W/F    HTN  -controlled    HLD  -controlled    Anxiety  -controlled    Anemia  -baseline    Spent adequate time in obtaining history and explaining differentials    40 minutes spent during this visit of which greater than 50% devoted to face-face counseling and  coordination of care regarding diagnosis and management plan    Follow-up in about 6 months (around 7/25/2018), or if symptoms worsen or fail to improve.

## 2018-02-01 NOTE — TELEPHONE ENCOUNTER
----- Message from Angelina Ruvalcaba sent at 2/1/2018  9:57 AM CST -----  Contact: [phone]/ self   Pt its requesting an appointment for tomorrow after 10 am , states he has a big boil under his arm pit . Please advise

## 2018-02-05 NOTE — TELEPHONE ENCOUNTER
----- Message from Lissa Mcguire sent at 2/5/2018 10:08 AM CST -----  Contact: self/[phone]  Patient is requesting a refill on his medications.  Please call and advise when sent to pharmacy.    calcitRIOL (ROCALTROL) 0.25 MCG Cap  lisinopril (PRINIVIL,ZESTRIL) 40 MG tablet    [facility] - [place], [place] - [address]

## 2018-02-28 NOTE — TELEPHONE ENCOUNTER
----- Message from Koki Arreola sent at 2/28/2018 10:25 AM CST -----  Contact: self, [phone]  Patient request to speak with you about coming in tomorrow if possible. States he doesn't need a catheter on his chest anymore and needs to close the hole. Please advise.    2/28/18  1:03pm  Spoke to patient regarding above message. Patient states he spoke to his PCP's office who scheduled an appointment regarding catheter removal with another physician. Patient advised to call for any additional needs. Patient verbalized understanding.

## 2018-03-05 NOTE — ANESTHESIA PREPROCEDURE EVALUATION
03/05/2018  Gunner Motley is a 72 y.o., is scheduled for removal of left  PermACath under MAC/gen on 3/06/2018.     Hx NSTEMI in 11/2017 and started on DAPT (ASA/plavix)    Past Surgical History:   Procedure Laterality Date    APPENDECTOMY      CARDIAC SURGERY  2005    CABG x4 vessels    HERNIA REPAIR  2002    umbilical hernia    KIDNEY STONE SURGERY  1983    abdominal    PERITONEAL CATHETER INSERTION Left 08/28/2017    abdomen       BP Readings from Last 3 Encounters:   03/05/18 (!) 87/50   03/01/18 (!) 141/50   01/30/18 (!) 126/55         Anesthesia Evaluation    I have reviewed the Patient Summary Reports.    I have reviewed the Nursing Notes.   I have reviewed the Medications.     Review of Systems  Anesthesia Hx:  No problems with previous Anesthesia  History of prior surgery of interest to airway management or planning: Previous anesthesia: General, MAC  Procedure performed at an [facility].   Procedure performed at an [facility].  Denies Personal Hx of Anesthesia complications.   Social:  Former Smoker, No Alcohol Use    Hematology/Oncology:         -- Anemia: Anemia of Chronic Kidney Disease Hematology Comments: On ASA and plavix fpr secondary prevention; pt with recent NSTEMI 11/2017 and will continue on ASA and plavix for procedure.    EENT/Dental:EENT/Dental Normal   Cardiovascular:   Exercise tolerance: poor Hypertension (episode of hypotension after HD today; BP 92/52 and 87/50), well controlled Valvular problems/Murmurs (mild aortic regurg) Past MI (2004 MI; NSTEMI 11/2017 on DAPT) CAD  CABG/stent (2004, 4 vessel CABG)  Denies Dysrhythmias.   Denies Angina. CHF (with preserved EF)     ECG has been reviewed.    Pulmonary:   Denies Shortness of breath.  Pulmonary Hypertension, primary    Renal/:   Chronic Renal Disease  Kidney Function/Disease  Dialysis Dependent     Hepatic/GI:  Hepatic/GI Normal    Neurological:  Neurology Normal    Endocrine:   Diabetes, well controlled, type 2    Psych:   anxiety depression          Physical Exam  General:  Well nourished    Airway/Jaw/Neck:  Airway Findings: Mouth Opening: Normal Tongue: Normal  General Airway Assessment: Adult  Mallampati: II  TM Distance: Normal, at least 6 cm         Dental:  Dental Findings: In tact, Periodontal disease, Mild   Chest/Lungs:  Chest/Lungs Clear    Heart/Vascular:  Heart Findings: Normal Heart murmur: negative Vascular Findings:  Dialysis Access: Internal Jugular Catheter, AV Fistula LUE     Abdomen:  Abdomen Findings: Normal      Mental Status:  Mental Status Findings:  Alert and Oriented, Cooperative        2D Echo 11/1/2017  CONCLUSIONS     1 - Low normal to mildly depressed left ventricular systolic function (EF 50-55%).     2 - No wall motion abnormalities.     3 - Concentric hypertrophy.     4 - Normal right ventricular systolic function .     5 - The estimated PA systolic pressure is greater than 27 mmHg.     6 - Mild aortic regurgitation.   This document has been electronically    SIGNED BY: Mekhi Blair MD On: 11/01/2017 17:40       EKG 11/2/17  Sinus rhythm with occasional Premature ventricular complexes and Premature  atrial complexes  Septal infarct (cited on or before 01-NOV-2017)  Prolonged QT  Abnormal ECG  When compared with ECG of 02-NOV-2017 01:02,      Anesthesia Plan  Type of Anesthesia, risks & benefits discussed:  Anesthesia Type:  MAC, general  Patient's Preference:   Intra-op Monitoring Plan:   Intra-op Monitoring Plan Comments:   Post Op Pain Control Plan:   Post Op Pain Control Plan Comments:   Induction:   IV  Beta Blocker:         Informed Consent: Patient understands risks and agrees with Anesthesia plan.  Questions answered.   ASA Score: 3     Day of Surgery Review of History & Physical:        Anesthesia Plan Notes: Anesthesia consent will be obtained prior to procedure  on 3/06/2018.    Pt to continue ASA/plavix s/p NSTEMI 11/2017.        Ready For Surgery From Anesthesia Perspective.

## 2018-03-05 NOTE — DISCHARGE INSTRUCTIONS
Your surgery is scheduled for 3/6/18.    Please report to Outpatient Surgery Intake Office on the 2nd FLOOR at 11:30a.m.          INSTRUCTIONS IMPORTANT!!!  ¨ Do not eat or drink after 12 midnight-including water. OK to brush teeth, no   gum, candy or mints!    ¨ Take only these medicines with a small swallow of water-morning of surgery: amlodipine, lisinopril and metoprolol      ____  No powder, lotions or creams to surgical area.  ____  Please remove all jewelry, including piercings and leave at home.  ____  No money or valuables needed. Please leave at home.  ____  Please bring any documents given by your doctor.  ____  If going home the same day, arrange for a ride home. You will not be able to             drive if Anesthesia was used.  ____  Wear loose fitting clothing. Allow for dressings, bandages.  ____  Stop Aspirin, Ibuprofen, Motrin and Aleve at least 3-5 days before surgery, unless otherwise instructed by your doctor, or the nurse.   You MAY use Tylenol/acetaminophen until day of surgery.  ____  Wash the surgical area with Hibiclens the night before surgery, and again the             morning of surgery.  Be sure to rinse hibiclens off completely (if instructed by   nurse).  ____  If you take diabetic medication, do not take am of surgery unless instructed by Doctor.  ____  Call MD for temperature above 101 degrees.  ____ Stop taking any Fish Oil supplement or any Vitamins that contain Vitamin E at least 5 days prior to surgery.  ____ Do Not wear your contact lenses the day of your procedure.  You may wear your glasses.        I have read or had read and explained to me, and understand the above information.  Additional comments or instructions:  For additional questions call [phone]      Pre-Op Bathing Instructions    Before surgery, you can play an important role in your own health.    Because skin is not sterile, we need to be sure that your skin is as free of germs as possible. By following the  instructions below, you can reduce the number of germs on your skin before surgery.    IMPORTANT: You will need to shower with a special soap called Hibiclens*, available at any pharmacy.  If you are allergic to Chlorhexidine (the antiseptic in Hibiclens), use an antibacterial soap such as Dial Soap for your preoperative shower.  You will shower with Hibiclens both the night before your surgery and the morning of your surgery.  Do not use Hibiclens on the head, face or genitals to avoid injury to those areas.    STEP #1: THE NIGHT BEFORE YOUR SURGERY     1. Do not shave the area of your body where your surgery will be performed.  2. Shower and wash your hair and body as usual with your normal soap and shampoo.  3. Rinse your hair and body thoroughly after you shower to remove all soap residue.  4. With your hand, apply one packet of Hibiclens soap to the surgical site.   5. Wash the site gently for five (5) minutes. Do not scrub your skin too hard.   6. Do not wash with your regular soap after Hibiclens is used.  7. Rinse your body thoroughly.  8. Pat yourself dry with a clean, soft towel.  9. Do not use lotion, cream, or powder.  10. Wear clean clothes.    STEP #2: THE MORNING OF YOUR SURGERY     1. Repeat Step #1.    * Not to be used by people allergic to Chlorhexidine.

## 2018-03-06 PROBLEM — T85.698A MALFUNCTION OF DEVICE: Status: ACTIVE | Noted: 2018-01-01

## 2018-03-06 NOTE — PLAN OF CARE
VSS. Pt denies pain or discomfort @ this time. Pt states he is ready to be discharged home @ this time. PIV dc'd with tip intact. Dressing placed. Discharge instructions reviewed with patient and family, with time allotted for questions. Pt and family verbalize understanding of instructions and state they have no further questions @ this time. Wheeled out to family's car.

## 2018-03-06 NOTE — ANESTHESIA POSTPROCEDURE EVALUATION
"Anesthesia Post Evaluation    Patient: Gunner Motley    Procedure(s) Performed: Procedure(s) (LRB):  REMOVAL-CATHETER-HEMODIALYSIS (Left)    Final Anesthesia Type: MAC  Patient location during evaluation: OPS  Patient participation: Yes- Able to Participate  Level of consciousness: awake and alert and oriented  Post-procedure vital signs: reviewed and stable  Pain management: adequate  Airway patency: patent  PONV status at discharge: No PONV  Anesthetic complications: no      Cardiovascular status: blood pressure returned to baseline and hemodynamically stable  Respiratory status: unassisted, spontaneous ventilation and room air  Hydration status: euvolemic  Follow-up not needed.        Visit Vitals  BP (!) 109/57 (BP Location: Right arm, Patient Position: Lying)   Pulse 62   Temp 36.5 °C (97.7 °F) (Oral)   Resp 20   Ht 5' 4" (1.626 m)   Wt 70.8 kg (156 lb)   SpO2 97%   BMI 26.78 kg/m²       Pain/Beryl Score: Pain Assessment Performed: Yes (3/6/2018 12:24 PM)  Presence of Pain: denies (3/6/2018 12:24 PM)  Beryl Score: 10 (3/6/2018 12:24 PM)      "

## 2018-03-06 NOTE — PLAN OF CARE
VSS. Denies pain or discomfort @ this time. Daughter updated. Pt aaox3 drinking coffee with no difficulty. Call light in reach. Cont to monitor.

## 2018-03-06 NOTE — DISCHARGE INSTRUCTIONS
DIET: You may resume your home diet. If nausea is present, increase your diet gradually with fluids and bland foods    DRESSING: Remove dressing in 72 hours. Ok to shower. No tub bath until cleared by your surgeon. Wash with soap and water. Ok to place band aids over incision if it still has a small amount of drainge.    ACTIVITY LEVEL: If you have received sedation or an anesthetic, you may feel sleepy for several hours. Rest until you are more awake. Gradually resume your normal activities    Medications: Pain medication should be taken only if needed and as directed. If antibiotics are prescribed, the medication should be taken until completed. You will be given an updated list of you medications.    No driving, alcoholic beverages or signing legal documents for next 24 hours or while taking pain medication.       CALL THE DOCTOR:    For any obvious bleeding (some dried blood over the incision is normal).      Redness, swelling, foul smell around incision or fever over 101.   Shortness of breath, Coughing up Bloody sputum, Pains or Swelling in your Calves .   Persistent pain or nausea not relieved by medication.    If any unusual problems or difficulties occur contact your doctor. If you cannot contact your doctor but feel your signs and symptoms warrant a physicians attention return to the emergency room.        Discharge Instructions: After Your Surgery  Youve just had surgery. During surgery you were given medicine called anesthesia to keep you relaxed and free of pain. After surgery you may have some pain or nausea. This is common. Here are some tips for feeling better and getting well after surgery.     Stay on schedule with your medication.   Going home  Your doctor or nurse will show you how to take care of yourself when you go home. He or she will also answer your questions. Have an adult family member or friend drive you home. For the first 24 hours after your surgery:  · Do not drive or use heavy  equipment.  · Do not make important decisions or sign legal papers.  · Do not drink alcohol.  · Have someone stay with you, if needed. He or she can watch for problems and help keep you safe.  Be sure to go to all follow-up visits with your doctor. And rest after your surgery for as long as your doctor tells you to.  Coping with pain  If you have pain after surgery, pain medicine will help you feel better. Take it as told, before pain becomes severe. Also, ask your doctor or pharmacist about other ways to control pain. This might be with heat, ice, or relaxation. And follow any other instructions your surgeon or nurse gives you.  Tips for taking pain medicine  To get the best relief possible, remember these points:  · Pain medicines can upset your stomach. Taking them with a little food may help.  · Most pain relievers taken by mouth need at least 20 to 30 minutes to start to work.  · Taking medicine on a schedule can help you remember to take it. Try to time your medicine so that you can take it before starting an activity. This might be before you get dressed, go for a walk, or sit down for dinner.  · Constipation is a common side effect of pain medicines. Call your doctor before taking any medicines such as laxatives or stool softeners to help ease constipation. Also ask if you should skip any foods. Drinking lots of fluids and eating foods such as fruits and vegetables that are high in fiber can also help. Remember, do not take laxatives unless your surgeon has prescribed them.  · Drinking alcohol and taking pain medicine can cause dizziness and slow your breathing. It can even be deadly. Do not drink alcohol while taking pain medicine.  · Pain medicine can make you react more slowly to things. Do not drive or run machinery while taking pain medicine.  Your health care provider may tell you to take acetaminophen to help ease your pain. Ask him or her how much you are supposed to take each day. Acetaminophen or  other pain relievers may interact with your prescription medicines or other over-the-counter (OTC) drugs. Some prescription medicines have acetaminophen and other ingredients. Using both prescription and OTC acetaminophen for pain can cause you to overdose. Read the labels on your OTC medicines with care. This will help you to clearly know the list of ingredients, how much to take, and any warnings. It may also help you not take too much acetaminophen. If you have questions or do not understand the information, ask your pharmacist or health care provider to explain it to you before you take the OTC medicine.  Managing nausea  Some people have an upset stomach after surgery. This is often because of anesthesia, pain, or pain medicine, or the stress of surgery. These tips will help you handle nausea and eat healthy foods as you get better. If you were on a special food plan before surgery, ask your doctor if you should follow it while you get better. These tips may help:  · Do not push yourself to eat. Your body will tell you when to eat and how much.  · Start off with clear liquids and soup. They are easier to digest.  · Next try semi-solid foods, such as mashed potatoes, applesauce, and gelatin, as you feel ready.  · Slowly move to solid foods. Dont eat fatty, rich, or spicy foods at first.  · Do not force yourself to have 3 large meals a day. Instead eat smaller amounts more often.  · Take pain medicines with a small amount of solid food, such as crackers or toast, to avoid nausea.     Call your surgeon if  · You still have pain an hour after taking medicine. The medicine may not be strong enough.  · You feel too sleepy, dizzy, or groggy. The medicine may be too strong.  · You have side effects like nausea, vomiting, or skin changes, such as rash, itching, or hives.       If you have obstructive sleep apnea  You were given anesthesia medicine during surgery to keep you comfortable and free of pain. After surgery,  you may have more apnea spells because of this medicine and other medicines you were given. The spells may last longer than usual.   At home:  · Keep using the continuous positive airway pressure (CPAP) device when you sleep. Unless your health care provider tells you not to, use it when you sleep, day or night. CPAP is a common device used to treat obstructive sleep apnea.  · Talk with your provider before taking any pain medicine, muscle relaxants, or sedatives. Your provider will tell you about the possible dangers of taking these medicines.  © 8292-2734 [employer]. [address] [place] [address]. All rights reserved. This information is not intended as a substitute for professional medical care. Always follow your healthcare professional's instructions.

## 2018-03-06 NOTE — OP NOTE
DATE OF PROCEDURE:  03/06/2018.    PREOPERATIVE DIAGNOSIS:  Malfunctioning Perm-A-Cath.    POSTOPERATIVE DIAGNOSIS:  Malfunctioning Perm-A-Cath.    OPERATION:  Removal of Perm-A-Cath.    SURGEON:  Vlad Srivastava M.D.    ANESTHESIA:  Xylocaine 1% with IV sedation.    PROCEDURE IN DETAILS:  After satisfactory IV sedation, the patient in supine   position, left side of the neck and chest prepped and draped in normal sterile   manner using ChloraPrep.  The area of the cuff portion of the catheter was   infiltrated using 1% Xylocaine solution.  Incision was made in the same area,   taken down to deep subcutaneous tissue, subcuticular and bovied, further taken   down.  The cuff portion was then  from deep subcutaneous tissue.    Catheter was retrieved both proximally and distally.  Vein was ligated   proximally using interrupted 3-0 Vicryl subcutaneous tissue and skin was closed   using running 4-0 nylon suture.  Sterile gauze dressing was applied.  The   instrument count, sponge count, and needle count was correct.  The patient   tolerated it well.  Estimated blood loss was 10 mL.  Specimen removed was   catheter.      MS/GRISELDA  dd: 03/06/2018 13:30:53 (CST)  td: 03/06/2018 18:50:56 (CST)  Doc ID   #[id number]  Job ID #[id number]    CC:     [id number]

## 2018-03-06 NOTE — TRANSFER OF CARE
"Anesthesia Transfer of Care Note    Patient: Gunner Motley    Procedure(s) Performed: Procedure(s) (LRB):  REMOVAL-CATHETER-HEMODIALYSIS (Left)    Patient location: OPS    Anesthesia Type: MAC    Transport from OR: Transported from OR on room air with adequate spontaneous ventilation    Post pain: adequate analgesia    Post assessment: no apparent anesthetic complications    Post vital signs: stable    Level of consciousness: awake, alert and oriented    Nausea/Vomiting: no nausea/vomiting    Complications: none    Transfer of care protocol was followed      Last vitals:   Visit Vitals  BP (!) 109/57 (BP Location: Right arm, Patient Position: Lying)   Pulse 62   Temp 36.5 °C (97.7 °F) (Oral)   Resp 20   Ht 5' 4" (1.626 m)   Wt 70.8 kg (156 lb)   SpO2 97%   BMI 26.78 kg/m²     "

## 2018-03-06 NOTE — H&P
History of Present Illness:  Patient is a 72 y.o. male presents with            Chief Complaint   Patient presents with    Hemodialysis Access       ref by [facility] for cath removal; pt states AV Fistula left arm being used         Review of patient's allergies indicates:  No Known Allergies     Current Medications          Current Outpatient Prescriptions   Medication Sig Dispense Refill    albuterol (PROVENTIL) 2.5 mg /3 mL (0.083 %) nebulizer solution as needed for Shortness of Breath.    0    allopurinol (ZYLOPRIM) 100 MG tablet Take 1 tablet (100 mg total) by mouth once daily. 90 tablet 3    ALPRAZolam (XANAX) 0.25 MG tablet Take 1 tablet (0.25 mg total) by mouth once daily. 90 tablet 0    amLODIPine (NORVASC) 10 MG tablet Take 1 tablet (10 mg total) by mouth once daily. 30 tablet 11    aspirin (ECOTRIN) 81 MG EC tablet Take 81 mg by mouth every evening.         calcitRIOL (ROCALTROL) 0.25 MCG Cap Take 1 capsule (0.25 mcg total) by mouth once daily. 90 capsule 3    clopidogrel (PLAVIX) 75 mg tablet Take 1 tablet (75 mg total) by mouth once daily. 90 tablet 1    ergocalciferol (ERGOCALCIFEROL) 50,000 unit Cap Take 2 Units by mouth once daily.        lisinopril (PRINIVIL,ZESTRIL) 40 MG tablet Take 1 tablet (40 mg total) by mouth once daily. 90 tablet 1    metoprolol succinate (TOPROL-XL) 100 MG 24 hr tablet Take 1 tablet (100 mg total) by mouth once daily. 30 tablet 11    pravastatin (PRAVACHOL) 40 MG tablet Take 40 mg by mouth every evening.         sertraline (ZOLOFT) 50 MG tablet Take 1 tablet (50 mg total) by mouth once daily. 90 tablet 1    tiotropium (SPIRIVA) 18 mcg inhalation capsule Inhale 1 capsule (18 mcg total) into the lungs once daily. Controller 30 capsule 0      No current facility-administered medications for this visit.                  Past Medical History:   Diagnosis Date    Anemia      Bacteremia associated with intravascular line      CHF (congestive heart failure)       "Coronary artery disease      Diverticulosis      ESRD (end stage renal disease) on dialysis      Gout      Hypertension      Recurrent nephrolithiasis      Urinary tract infection              Past Surgical History:   Procedure Laterality Date    APPENDECTOMY        CARDIAC SURGERY   2005     CABG x4 vessels    HERNIA REPAIR   2002     umbilical hernia    KIDNEY STONE SURGERY   1983     abdominal    PERITONEAL CATHETER INSERTION Left 08/28/2017     abdomen            Family History   Problem Relation Age of Onset    Cancer Mother      Kidney disease Mother      Heart attack Father               Social History   Substance Use Topics    Smoking status: Former Smoker       Packs/day: 2.00       Years: 30.00       Quit date: 10/12/1995    Smokeless tobacco: Former User       Quit date: 1/12/1995    Alcohol use No         Comment: quit 2014         Review of Systems:     Review of Systems   Constitutional: Negative.    HENT: Negative.    Eyes: Negative.    Respiratory: Negative.    Cardiovascular: Negative.    Gastrointestinal: Negative.    Genitourinary: Negative.    Musculoskeletal: Negative.    Neurological: Negative.    Psychiatric/Behavioral: Negative.          OBJECTIVE:      Vital Signs (Most Recent)  Pulse: (!) 58 (03/01/18 1348)  Resp: 17 (03/01/18 1348)  BP: (!) 141/50 (03/01/18 1348)  5' 4.5" (1.638 m)  71.7 kg (158 lb)      Physical Exam:     Physical Exam   Constitutional: He is oriented to person, place, and time. He appears well-developed and well-nourished.   HENT:   Head: Normocephalic.   Eyes: Conjunctivae and EOM are normal. Pupils are equal, round, and reactive to light.   Neck: Normal range of motion. Neck supple.   Cardiovascular: Normal rate, regular rhythm, normal heart sounds and intact distal pulses.    Pulmonary/Chest: Effort normal and breath sounds normal.   Abdominal: Soft. Bowel sounds are normal.   Musculoskeletal: Normal range of motion.   Neurological: He is alert and " oriented to person, place, and time. He has normal reflexes.   Skin: Skin is warm and dry.              Laboratory        Diagnostic Results:        ASSESSMENT/PLAN:      crf .   Malfunction cath  S/p av fistula     PLAN:Plan   Removal today

## 2018-03-19 NOTE — TELEPHONE ENCOUNTER
----- Message from Danilo Crawford sent at 3/19/2018  1:18 PM CDT -----  Contact: Pt  Pt would like to be called back regarding scheduling an appt due to falling in the tub four days ago and Mr. Motley states he has hurt his head,shoulder,back,and neck.   Pt is concerned about wellness.  Pt can be reached at [phone].    Thank You.

## 2018-03-19 NOTE — TELEPHONE ENCOUNTER
Spoke to pt and states that he fell 4 days and hurt his back and legs. Pt did not want go to the ER and did not want to go. Pt wanted diagnostics studies done. Pt was informed that he needed to be seen by a physician. Pt scheduled an appt for tomorrow.

## 2018-03-22 NOTE — PROGRESS NOTES
Subjective:       Patient ID: Gunner Motley is a 72 y.o. male.    Chief Complaint: Fall and Leg Pain    72 yr old pleasant white male with hypertension, ESRD on HD, pulmonary hypertension, anxiety, HLD, and other co morbidities presents today for evaluation of right knee pain/injury after a fall a week ago. He was coming out of tub and his knee caught up and he felt on his right side of back and then his knee gave away. Since then his knee is hurting a lot. He is compromising his left side for it. He did not take anything yet and was resting to relieve the pain. He declined to take any opioids and want to get imaging first. Details as follows -      History as below - reviewed       Fall   The accident occurred more than 1 week ago. Fall occurred: in tub. He fell from a height of 1 to 2 ft. The volume of blood lost was minimal. The point of impact was the head and right knee. The pain is present in the right knee. The pain is at a severity of 8/10. The pain is severe. The symptoms are aggravated by pressure on injury, movement, extension and standing. Pertinent negatives include no abdominal pain, headaches, hematuria, nausea, numbness or vomiting. He has tried nothing for the symptoms. The treatment provided no relief.   Knee Pain    The incident occurred more than 1 week ago. The incident occurred at home. The injury mechanism was a fall. The pain is present in the right knee. The quality of the pain is described as aching. The pain is at a severity of 8/10. The pain is severe. The pain has been constant since onset. Pertinent negatives include no numbness. The symptoms are aggravated by movement, palpation and weight bearing. He has tried nothing for the symptoms. The treatment provided no relief.     Review of Systems   Constitutional: Negative.  Negative for activity change, diaphoresis and unexpected weight change.   HENT: Negative.  Negative for congestion, ear pain, mouth sores, rhinorrhea and voice change.     Eyes: Negative.  Negative for pain, discharge and visual disturbance.   Respiratory: Negative.  Negative for apnea, cough and wheezing.    Cardiovascular: Negative.  Negative for chest pain and palpitations.   Gastrointestinal: Negative.  Negative for abdominal distention, abdominal pain, anal bleeding, diarrhea, nausea and vomiting.   Endocrine: Negative.  Negative for cold intolerance and polyuria.   Genitourinary: Negative.  Negative for decreased urine volume, difficulty urinating, discharge, frequency, hematuria and scrotal swelling.   Musculoskeletal: Positive for arthralgias, joint swelling and myalgias. Negative for back pain and neck stiffness.   Skin: Negative.  Negative for color change and rash.   Allergic/Immunologic: Negative.  Negative for environmental allergies and immunocompromised state.   Neurological: Negative.  Negative for dizziness, speech difficulty, weakness, light-headedness, numbness and headaches.   Hematological: Negative.    Psychiatric/Behavioral: Negative.  Negative for agitation, dysphoric mood and suicidal ideas. The patient is not nervous/anxious.        PMH/PSH/FH/SH/MED/ALLERGY reviewed    Objective:       Vitals:    03/22/18 1541   BP: 138/68   Pulse: 83   Temp: 98 °F (36.7 °C)       Physical Exam   Constitutional: He is oriented to person, place, and time. He appears well-developed and well-nourished.   HENT:   Head: Normocephalic and atraumatic.   Cardiovascular: Normal rate, regular rhythm, normal heart sounds and intact distal pulses.  Exam reveals no gallop and no friction rub.    No murmur heard.  Pulmonary/Chest: Effort normal and breath sounds normal. No respiratory distress. He has no wheezes. He has no rales. He exhibits no tenderness.   Musculoskeletal: He exhibits tenderness (TTP right knee - lateral joint line with mild edema and restricted ROM.).   Neurological: He is alert and oriented to person, place, and time.   Skin: Skin is warm and dry. Rash (bruise on  the left scapula area) noted.       Assessment:       1. Fall, initial encounter    2. Injury of right knee, initial encounter    3. Acute pain of right knee        Plan:       Gunner was seen today for fall and leg pain.    Diagnoses and all orders for this visit:    Fall, initial encounter  -     X-Ray Knee Complete 4 Or More Views Right; Future    Injury of right knee, initial encounter  -     X-Ray Knee Complete 4 Or More Views Right; Future    Acute pain of right knee  -     X-Ray Knee Complete 4 Or More Views Right; Future      Fall/right knee injury/pain  -DD ligament strain/rupture  -rest, ice application  -avoid weight bearing x 1 week  -persistent pain - need referral to orthopedics    Spent adequate time in obtaining history and explaining differentials    40 minutes spent during this visit of which greater than 50% devoted to face-face counseling and coordination of care regarding diagnosis and management plan    Follow-up in about 4 months (around 7/22/2018), or if symptoms worsen or fail to improve.

## 2018-03-23 PROBLEM — N19 RENAL FAILURE: Status: ACTIVE | Noted: 2018-01-01

## 2018-03-23 NOTE — TELEPHONE ENCOUNTER
Spoke to pt and requesting Prednisone. Pt states that he has Gout in his right knee. Pt was scheduled for a same day appt today with Dr. Schmitt.

## 2018-03-23 NOTE — TELEPHONE ENCOUNTER
Called patient to notify that X ray is reassuring and showed no acute problem or fracture.  Patient verbalized understanding.

## 2018-03-23 NOTE — TELEPHONE ENCOUNTER
----- Message from Ann Marie Copeland sent at 3/23/2018  8:24 AM CDT -----  Contact: [phone]/self  Patient requesting to speak with you regarding getting a new prescription written for predniSONE (DELTASONE) 20 MG tablet. Please call patient when it is ready for pickup. Please advise

## 2018-03-23 NOTE — PROGRESS NOTES
Pt. ID: Gunner Motley is a 72 y.o. male      Chief complaint:   Chief Complaint   Patient presents with    Knee Pain       HPI: Pt. Here for R knee pain after fall one week ago; pt. Saw PCP yesterday and had xray which showed minimal degenerative changes; kidney fxn went up on BMP dated 3/6/18; he gets dialysis M,W and Friday;  pt. Denies being on norco; ; he is compliant with meds; pt. Is requesting steroids because he feels it is gout; he states his pain was present before he fell; he states he has hx of gout and it feels like his previous gout attack; I could not find any hx of diabetes and HGBA1C dated 8/22/17 was 5.6; pain is 8/10 and worse with ROM; he does not want narcotic and only wants steroid pack       Review of Systems   Constitutional: Negative for chills and fever.   Cardiovascular: Negative for chest pain.   Gastrointestinal: Negative for abdominal pain, nausea and vomiting.   Musculoskeletal: Positive for joint pain.        R knee pain which is worse with ROM and decreased ability to flex knee         Objective:    Physical Exam   Constitutional: He is oriented to person, place, and time. He appears well-developed.   Eyes: EOM are normal.   Neck: Normal range of motion.   Cardiovascular: Normal rate, regular rhythm and normal heart sounds.    Pulmonary/Chest: Effort normal and breath sounds normal.   Abdominal: Soft. There is no tenderness. There is no rebound and no guarding.   Musculoskeletal: He exhibits tenderness.   R lateral knee pain and decreased ability to flex R knee; tenderness noted to R lateral knee    Neurological: He is alert and oriented to person, place, and time.   Skin: No rash noted.         Health Maintenance   Topic Date Due    Lipid Panel  11/02/2022    TETANUS VACCINE  07/23/2026    Colonoscopy  01/25/2028    Zoster Vaccine  Addressed    Influenza Vaccine  Addressed    Abdominal Aortic Aneurysm Screening  Completed    Pneumococcal (65+)  Excluded         Assessment:      1. Acute pain of right knee Active   2. Hypertension, unspecified type Well controlled   3. Renal failure, unspecified chronicity Active         Plan: Gunner was seen today for knee pain.    Diagnoses and all orders for this visit:    Acute pain of right knee  Comments:  tyelenol prn and pt. insists on medrol dosepack only;he does not want stronger pain med and will take tylenol; get uric acid; re-evaluate in 2 weeks; MRI on f/u  Orders:  -     Uric acid; Future  -     methylPREDNISolone (MEDROL DOSEPACK) 4 mg tablet; use as directed; taper 24 mg to 0 mg PO  QD over 6 days    Hypertension, unspecified type  Comments:  continue current regimen and encouraged low Na diet     Renal failure, unspecified chronicity  Comments:  continue dialysis and f/u renal         Problem List Items Addressed This Visit        Cardiac/Vascular    Hypertension       Renal/    Renal failure       Orthopedic    Acute pain of right knee - Primary    Relevant Medications    methylPREDNISolone (MEDROL DOSEPACK) 4 mg tablet    Other Relevant Orders    Uric acid

## 2018-04-03 NOTE — TELEPHONE ENCOUNTER
----- Message from Kathleen Sheriff sent at 4/3/2018 11:24 AM CDT -----  Contact: [facility]  [phone]  ref# [id number]  Pharmacist need clarification on Vitamin D prescription  Please call and advise

## 2018-04-04 NOTE — TELEPHONE ENCOUNTER
----- Message from Verena Vo LPN sent at 4/3/2018 11:56 AM CDT -----  Contact: [facility]  [phone]  ref# [id number]      ----- Message -----  From: Kathleen Sheriff  Sent: 4/3/2018  11:24 AM  To: Marah Carranza Staff    Pharmacist need clarification on Vitamin D prescription  Please call and advise

## 2018-05-10 NOTE — ED PROVIDER NOTES
Addendum to previous note  5:40 PM  Discussed with hospital medicine who advises to have patient follow up with PCP and neurology regarding abnormal thyroid results and mental status. Patient and daughter state understanding and are agreeable to treatment plan.      Yvonne Ward PA-C  05/10/18 [id number]

## 2018-05-10 NOTE — ED PROVIDER NOTES
"Encounter Date: 5/10/2018       History     Chief Complaint   Patient presents with    Fall     72 year old male presents to ed cc of fall. patient states he fell on tuesday and has been "foggy and having an unsteady gait" since fall. patient denies headache states bilateral blurry vision patient ambulatory to triage.      72-year-old male with PMH of CAD S/P CABG, and STEMI, HTN, ESRD on dialysis, CHF presents complaining of fogginess and unsteady gait since falling 2 days ago.  Patient states he was working in his yard, was up on a 6 foot ladder when he fell.  Patient admits to hitting his head on the ladder, uncertain if he also hit his head on the ground.  Denies LOC.  Also admits to bruising on the left side of his body, flank pain times one week ago, hematuria ×1 week.  Patient is on Plavix.           Review of patient's allergies indicates:  No Known Allergies  Past Medical History:   Diagnosis Date    Anemia     Bacteremia associated with intravascular line     CHF (congestive heart failure)     Coronary artery disease     Diverticulosis     ESRD (end stage renal disease) on dialysis     Gout     History of non-ST elevation myocardial infarction (NSTEMI)     Hypertension     Recurrent nephrolithiasis     Urinary tract infection      Past Surgical History:   Procedure Laterality Date    APPENDECTOMY      CARDIAC SURGERY  2005    CABG x4 vessels    HERNIA REPAIR  2002    umbilical hernia    KIDNEY STONE SURGERY  1983    abdominal    PERITONEAL CATHETER INSERTION Left 08/28/2017    abdomen     Family History   Problem Relation Age of Onset    Cancer Mother     Kidney disease Mother     Heart attack Father      Social History   Substance Use Topics    Smoking status: Former Smoker     Packs/day: 2.00     Years: 30.00     Quit date: 10/12/1995    Smokeless tobacco: Former User     Quit date: 1/12/1995    Alcohol use No      Comment: quit 2014     Review of Systems   Constitutional: Negative " for appetite change, chills and fever.   HENT: Negative for sore throat.    Respiratory: Positive for wheezing. Negative for shortness of breath.    Cardiovascular: Negative for chest pain.   Gastrointestinal: Negative for nausea.   Genitourinary: Positive for decreased urine volume, flank pain and hematuria. Negative for dysuria.   Musculoskeletal: Negative for back pain.   Skin: Negative for rash.   Neurological: Positive for headaches. Negative for weakness.        Foggy feeling   Hematological: Does not bruise/bleed easily.   Psychiatric/Behavioral: Positive for confusion.       Physical Exam     Initial Vitals [05/10/18 1250]   BP Pulse Resp Temp SpO2   (!) 143/83 74 20 98.2 °F (36.8 °C) 98 %      MAP       103         Physical Exam    Nursing note and vitals reviewed.  Constitutional: He appears well-developed and well-nourished. No distress.   HENT:   Head: Normocephalic and atraumatic. Head is without abrasion and without laceration.   Right Ear: Tympanic membrane normal.   Left Ear: Tympanic membrane normal.   Nose: Nose normal.   Eyes: Conjunctivae and lids are normal. Pupils are equal, round, and reactive to light.   Neck: Normal range of motion.   Cardiovascular: Normal rate, regular rhythm and normal heart sounds.   Pulmonary/Chest: Effort normal. He has wheezes.   Abdominal: Soft. Normal appearance and bowel sounds are normal. There is no tenderness. There is CVA tenderness.   Musculoskeletal: Normal range of motion.   Neurological: He is alert and oriented to person, place, and time. He has normal strength. No cranial nerve deficit or sensory deficit. He displays a negative Romberg sign. GCS eye subscore is 4. GCS verbal subscore is 5. GCS motor subscore is 6.   Patient gait is normal and steady walking in straight line, when he has to turn, he performs several stutter steps.   Skin: Skin is warm and dry. Bruising noted.        Psychiatric: He has a normal mood and affect. His speech is normal and  behavior is normal. Judgment and thought content normal. Cognition and memory are normal.   Patient is lucid and can recall all details of fall and medical history.          ED Course   Procedures  Labs Reviewed   URINALYSIS - Abnormal; Notable for the following:        Result Value    Protein, UA 2+ (*)     Occult Blood UA Trace (*)     All other components within normal limits   TSH - Abnormal; Notable for the following:     TSH 0.309 (*)     All other components within normal limits   CBC W/ AUTO DIFFERENTIAL - Abnormal; Notable for the following:     RBC 3.16 (*)     Hemoglobin 9.9 (*)     Hematocrit 31.6 (*)      (*)     MCH 31.3 (*)     MCHC 31.3 (*)     Lymph% 15.7 (*)     All other components within normal limits   COMPREHENSIVE METABOLIC PANEL - Abnormal; Notable for the following:     Glucose 111 (*)     BUN, Bld 26 (*)     Creatinine 4.7 (*)     Albumin 3.3 (*)     eGFR if  13 (*)     eGFR if non  11 (*)     All other components within normal limits   B-TYPE NATRIURETIC PEPTIDE - Abnormal; Notable for the following:     BNP 1,741 (*)     All other components within normal limits   T4, FREE - Abnormal; Notable for the following:     Free T4 0.61 (*)     All other components within normal limits   URINALYSIS MICROSCOPIC        Imaging Results          CT Head Without Contrast (Final result)  Result time 05/10/18 15:05:14    Final result by Darnell March DO (05/10/18 15:05:14)                 Impression:      Age-appropriate cerebral volume loss.  Otherwise unremarkable noncontrast CT head specifically without evidence for acute intracranial hemorrhage.  Clinical correlation and further evaluation as warranted.      Electronically signed by: Darnell March DO  Date:    05/10/2018  Time:    15:05             Narrative:    EXAMINATION:  CT HEAD WITHOUT CONTRAST    CLINICAL HISTORY:  head trauma from fall;    TECHNIQUE:  Multiple sequential 5 mm axial images of the head  without contrast.  Coronal and sagittal reformatted imaging from the axial acquisition.    COMPARISON:  None    FINDINGS:  Age-appropriate generalized cerebral volume loss.  Compensatory enlargement of ventricles without hydrocephalus.  No evidence for acute intracranial hemorrhage or sulcal effacement.  No midline shift or mass effect.                               X-Ray Chest PA And Lateral (Final result)  Result time 05/10/18 14:43:19    Final result by Elver Marrero MD (05/10/18 14:43:19)                 Impression:      No focal consolidation      Electronically signed by: Elver Marrero MD  Date:    05/10/2018  Time:    14:43             Narrative:    EXAMINATION:  XR CHEST PA AND LATERAL    CLINICAL HISTORY:  Wheezing    TECHNIQUE:  PA and lateral views of the chest were performed.    COMPARISON:  Chest radiograph 11/29/2017    FINDINGS:  Since the prior chest radiograph there has been removal of the left-sided tunneled catheter.  There are stable postsurgical changes from median sternotomy.  The lungs are clear.  Cardiac silhouette is stable.  Osseous and soft tissue structures demonstrate no acute abnormality                                   Medical Decision Making:   Initial Assessment:   72-year-old male with PMH of CAD S/P CABG, and STEMI, HTN, ESRD on dialysis, CHF presents complaining of fogginess and unsteady gait since falling 2 days ago.  Patient states he was working in his yard, was up on a 6 foot ladder when he fell.  Patient admits to hitting his head on the ladder, uncertain if he also hit his head on the ground.  Denies LOC.  Also admits to bruising on the left side of his body, flank pain times one week ago, hematuria ×1 week.  Patient is on Plavix.   Physical exam reveals bruising to left arm and hip. No bruising or lacerations to head. Patient is lucid and recalls medical history and all details of fall. Steady gait while walking, patient takes stutter steps when turning.    Differential Diagnosis:   Brain bleed/ hematoma, UTI, kidney stone,   Clinical Tests:   Lab Tests: Ordered and Reviewed  Radiological Study: Ordered and Reviewed  ED Management:  CBC, CMP, UA, BNP, chest c-ray, and head CT ordered.     4:38 PM  Ct head shows Age-appropriate generalized cerebral volume loss.  Compensatory enlargement of ventricles without hydrocephalus.  No evidence for acute intracranial hemorrhage or sulcal effacement.  No midline shift or mass effect. Chest x-ray The lungs are clear.  Cardiac silhouette is stable.  Osseous and soft tissue structures demonstrate no acute abnormality.  CBC macrocytic anemia. CMP, BNP, and UA unchanged from prior.   TSH 0.309 free T4 0.61, changed from TSH that was WNL in 11/17    4:53 PM  Spoke to [facility] medicine who will revue chart and advise.                      Clinical Impression:   Diagnoses of Dizziness and Wheezing were pertinent to this visit.                           Srinivas Rodriguez MD  05/10/18 [id number]

## 2018-05-10 NOTE — ED PROVIDER NOTES
"Encounter Date: 5/10/2018       History     Chief Complaint   Patient presents with    Fall     72 year old male presents to ed cc of fall. patient states he fell on tuesday and has been "foggy and having an unsteady gait" since fall. patient denies headache states bilateral blurry vision patient ambulatory to triage.      HPI  Review of patient's allergies indicates:  No Known Allergies  Past Medical History:   Diagnosis Date    Anemia     Bacteremia associated with intravascular line     CHF (congestive heart failure)     Coronary artery disease     Diverticulosis     ESRD (end stage renal disease) on dialysis     Gout     History of non-ST elevation myocardial infarction (NSTEMI)     Hypertension     Recurrent nephrolithiasis     Urinary tract infection      Past Surgical History:   Procedure Laterality Date    APPENDECTOMY      CARDIAC SURGERY  2005    CABG x4 vessels    HERNIA REPAIR  2002    umbilical hernia    KIDNEY STONE SURGERY  1983    abdominal    PERITONEAL CATHETER INSERTION Left 08/28/2017    abdomen     Family History   Problem Relation Age of Onset    Cancer Mother     Kidney disease Mother     Heart attack Father      Social History   Substance Use Topics    Smoking status: Former Smoker     Packs/day: 2.00     Years: 30.00     Quit date: 10/12/1995    Smokeless tobacco: Former User     Quit date: 1/12/1995    Alcohol use No      Comment: quit 2014     Review of Systems    Physical Exam     Initial Vitals [05/10/18 1250]   BP Pulse Resp Temp SpO2   (!) 143/83 74 20 98.2 °F (36.8 °C) 98 %      MAP       103         Physical Exam    ED Course   Procedures  Labs Reviewed   URINALYSIS - Abnormal; Notable for the following:        Result Value    Protein, UA 2+ (*)     Occult Blood UA Trace (*)     All other components within normal limits   TSH - Abnormal; Notable for the following:     TSH 0.309 (*)     All other components within normal limits   CBC W/ AUTO DIFFERENTIAL - " Abnormal; Notable for the following:     RBC 3.16 (*)     Hemoglobin 9.9 (*)     Hematocrit 31.6 (*)      (*)     MCH 31.3 (*)     MCHC 31.3 (*)     Lymph% 15.7 (*)     All other components within normal limits   COMPREHENSIVE METABOLIC PANEL - Abnormal; Notable for the following:     Glucose 111 (*)     BUN, Bld 26 (*)     Creatinine 4.7 (*)     Albumin 3.3 (*)     eGFR if  13 (*)     eGFR if non  11 (*)     All other components within normal limits   B-TYPE NATRIURETIC PEPTIDE - Abnormal; Notable for the following:     BNP 1,741 (*)     All other components within normal limits   T4, FREE - Abnormal; Notable for the following:     Free T4 0.61 (*)     All other components within normal limits   URINALYSIS MICROSCOPIC        ECG Results          EKG 12-lead (Final result)  Result time 05/10/18 17:17:15    Final result by Interface, [facility] (05/10/18 17:17:15)                 Narrative:    Test Reason : R42,  Blood Pressure : ***/*** mmHG  Vent. Rate : 081 BPM     Atrial Rate : 081 BPM     P-R Int : 180 ms          QRS Dur : 086 ms      QT Int : 430 ms       P-R-T Axes : 074 076 084 degrees     QTc Int : 499 ms    Sinus rhythm with Premature supraventricular complexes with occasional and  consecutive Premature ventricular complexes  Prolonged QT  Abnormal ECG  When compared with ECG of 06-MAR-2018 12:00,  Premature ventricular complexes are now Present  Premature supraventricular complexes are now Present  Confirmed by Mitzi Matta MD ([id number]) on 5/10/2018 5:17:00 PM    Referred By: KAMILA WALTER           Confirmed By:Mitzi Matta MD                                                    Clinical Impression:   {Add your Clinical Impression here. If you haven't documented one yet, please pend the note, finalize a Clinical Impression, and refresh your note before signing.:[id number]}

## 2018-08-10 NOTE — PROGRESS NOTES
Subjective:       Patient ID: Gunner Motley is a 73 y.o. male.    Chief Complaint: Cough (w/ wheezing ) and Shortness of Breath    HPI Mr. Motley is a 73-year-old male with congestive heart failure, coronary artery disease, COPD, and ESRD on hemodialysis who presents with a chief complaint of cough and shortness of breath.  Patient states that he has had a breathing problem for the last 1 year.  He constantly feels like he can't catch his breath.  He feels shortness of breath all the time.  He feels like this has gotten worse over the last 1 year.  He feels out of breath a lot.  He has used albuterol and has found this to be helpful for his symptom of shortness of breath.  Of note he smoked for about 40 years but stopped at age 48.  He is on dialysis.  He just completed a session of dialysis earlier today.  He notes that he felt short of breath while coming out of dialysis today.  He denies any leg swelling, but admits that he did not even have leg swelling a couple of years ago when he had issues with his heart.  He is not currently following with a cardiologist.  He has brought a sputum cup with a sputum sample today.  He reports that his primary care physician asked him to obtain a sample so that it could be sent for culture.    Review of Systems   Constitutional: Positive for fatigue.   Respiratory: Positive for shortness of breath.    Cardiovascular: Negative for leg swelling.       Objective:      Physical Exam   Constitutional: He is oriented to person, place, and time. He appears well-developed and well-nourished. No distress.   HENT:   Head: Normocephalic and atraumatic.   Eyes: Conjunctivae and EOM are normal.   Cardiovascular: Normal rate, regular rhythm, normal heart sounds and intact distal pulses.  Exam reveals no gallop and no friction rub.    No murmur heard.  Pulmonary/Chest: Effort normal. No respiratory distress. He has wheezes (mild, diffuse inspiratory and expiratory wheezing in all lung  fields, pt states this is chronic). He has no rales. He exhibits no tenderness.   Neurological: He is alert and oriented to person, place, and time.   Skin: Skin is warm and dry. He is not diaphoretic.   Psychiatric: He has a normal mood and affect. His behavior is normal. Judgment and thought content normal.   Vitals reviewed.      Assessment:       1. Shortness of breath    2. Cough    3. Chronic obstructive pulmonary disease, unspecified COPD type    4. Chronic diastolic (congestive) heart failure        Plan:     1.  Shortness of breath and cough  Discussed with patient that there could be multiple factors at play causing shortness of breath and cough.  Obtaining chest x-ray today  Sputum sent for culture    2.  COPD  Treating patient for COPD exacerbation today.  Patient states that wheezing at baseline, although this is my 1st visit with the patient today.  Patient states he could not afford Spiriva in the past.  Prescribing Breo today  Albuterol as needed  Doxycycline 100 mg p.o. b.i.d. x7 days  Medrol Dosepak prescribed    3.  Chronic diastolic congestive heart failure  Wheeze heard on exam today could be cardiac in nature.  But patient does not have any outward signs of volume overload, and also just returned from dialysis.  Obtaining chest x-ray for further evaluation  Referral placed to cardiology    Return to clinic in 2 weeks

## 2018-08-16 NOTE — PROGRESS NOTES
Subjective:       Patient ID: Gunner Motley is a 73 y.o. male with CAD S/P CABG, and STEMI, HTN, ESRD on dialysis, CHF    Chief Complaint: Congestive Heart Failure and Shortness of Breath    Patient admitted 11/2017 for sepsis.   Patient developed  jaw pain and TWI anteriorly on ECG which improved on repeat ECG. Plavix initiated for medical management of NSTEMI.   Patient is DNR and opted for medical management of CAD/NSTEMI.     He is on DAPT with asa and Plavix. Taking BB, ACEI, and statin.     He has been doing well since discharge without cardiac complaint. Patient reports compliance with medications.     Denies chest pain, chills, NV. Abd pain persists but is reported as mild.   He has SOB at baseline 2/2 lung disease. Reports minimal UO and volume status is maintained by HD.    He continues to request medical management of his CAD and is not interested in pursuing non invasive  Or invasive cardiac procedures.     Patient seen by his PCP on 08/10/2018 for cough and shortness of breath.   He constantly feels like he can't catch his breath. He has not followed up with his pulmonologist at [facility] to have his pulm meds adjusted. His SOB improved with Medrol Dose pack prescribed by his PCP. He smoked for about 40 years but stopped at age 48.      Wheezing noted on PE, no rales, edema, abdominal distension, JVD.     CXR 08/10/2018 without evidence of pulmonary vascular congestion     Weight 152-157 lbs       Current Cardiac Outpatient Medications:     amLODIPine (NORVASC) 10 MG tablet, Take 1 tablet (10 mg total) by mouth once daily., Disp: 30 tablet, Rfl: 11      amLODIPine (NORVASC) 5 MG tablet, TAKE ONE TABLET BY MOUTH ONCE DAILY, Disp: 90 tablet, Rfl: 11    aspirin (ECOTRIN) 81 MG EC tablet, Take 81 mg by mouth every evening. , Disp: , Rfl:     clopidogrel (PLAVIX) 75 mg tablet, TAKE 1 TABLET (75 MG TOTAL) BY MOUTH ONCE DAILY., Disp: 90 tablet, Rfl: 1    furosemide (LASIX) 80 MG tablet, Take three tablets by  mouth twice daily, Disp: 180 tablet, Rfl: 3    lisinopril (PRINIVIL,ZESTRIL) 40 MG tablet, Take 1 tablet (40 mg total) by mouth once daily., Disp: 90 tablet, Rfl: 1    metoprolol succinate (TOPROL-XL) 100 MG 24 hr tablet, Take 1 tablet (100 mg total) by mouth once daily., Disp: 30 tablet, Rfl: 11    pravastatin (PRAVACHOL) 40 MG tablet, Take 40 mg by mouth every evening. , Disp: , Rfl:        2DE 10/2018  CONCLUSIONS     1 - Low normal to mildly depressed left ventricular systolic function (EF 50-55%).     2 - No wall motion abnormalities.     3 - Concentric hypertrophy.     4 - Normal right ventricular systolic function .     5 - The estimated PA systolic pressure is greater than 27 mmHg.     6 - Mild AR    Continue current medications   CV exercise encouraged  Follow up in 6 mo, sooner if any acute concerns or proceed to the ED   Follow up with Pulmonologist               Review of Systems   Constitutional: Negative.  Negative for diaphoresis and fatigue.   HENT: Negative.    Eyes: Negative.    Respiratory: Positive for cough, shortness of breath and wheezing. Negative for choking and chest tightness.    Cardiovascular: Negative for chest pain, palpitations and leg swelling.   Gastrointestinal: Positive for abdominal pain. Negative for abdominal distention, nausea and vomiting.   Endocrine: Negative.    Genitourinary: Positive for decreased urine volume.   Musculoskeletal: Negative.    Skin: Negative.    Allergic/Immunologic: Negative.    Neurological: Negative.  Negative for dizziness, syncope, weakness and light-headedness.   Hematological: Negative.    Psychiatric/Behavioral: Negative.        Objective:      Physical Exam   Constitutional: He is oriented to person, place, and time. He appears well-developed and well-nourished. No distress.   HENT:   Head: Normocephalic and atraumatic.   Eyes: Right eye exhibits no discharge. Left eye exhibits no discharge.   Neck: No JVD present.   Cardiovascular: Normal  rate. Frequent extrasystoles are present.   No murmur heard.  Pulmonary/Chest: No accessory muscle usage. No respiratory distress. He has decreased breath sounds. He has wheezes. He has no rhonchi. He has no rales.   Abdominal: Soft. Bowel sounds are normal. He exhibits no distension.   Musculoskeletal: He exhibits no edema.   Neurological: He is alert and oriented to person, place, and time.   Skin: Skin is warm and dry. Capillary refill takes less than 2 seconds. He is not diaphoretic.   Psychiatric: He has a normal mood and affect. His behavior is normal. Judgment and thought content normal.       Assessment:       1. Abnormal heart sounds    2. Pulmonary hypertension    3. Coronary artery disease involving native coronary artery of native heart without angina pectoris    4. Mixed hyperlipidemia    5. Chronic diastolic heart failure    6. Renovascular hypertension    7. Hx of CABG    8. ESRD (end stage renal disease) on dialysis        Plan:   Gunner was seen today for congestive heart failure and shortness of breath.    Diagnoses and all orders for this visit:    Abnormal heart sounds  -     IN OFFICE EKG 12-LEAD (to Muse)    Pulmonary hypertension    Coronary artery disease involving native coronary artery of native heart without angina pectoris    Mixed hyperlipidemia    Chronic diastolic heart failure    Renovascular hypertension    Hx of CABG    ESRD (end stage renal disease) on dialysis      Patient admitted 11/2017 for sepsis.   Patient developed  jaw pain and TWI anteriorly on ECG which improved on repeat ECG. Plavix initiated for medical management of NSTEMI.   Patient is DNR and opted for medical management of CAD/NSTEMI.     He is on DAPT with asa and Plavix. Taking BB, ACEI, and statin.     He has been doing well since discharge without cardiac complaint. Patient reports compliance with medications.     Denies chest pain, chills, NV. Abd pain persists but is reported as mild.   He has SOB at baseline  2/2 lung disease. Reports minimal UO and volume status is maintained by HD.    He continues to request medical management of his CAD and is not interested in pursuing non invasive  Or invasive cardiac procedures.     Patient seen by his PCP on 08/10/2018 for cough and shortness of breath.   He constantly feels like he can't catch his breath. He has not followed up with his pulmonologist at [facility] to have his pulm meds adjusted. His SOB improved with Medrol Dose pack prescribed by his PCP. He smoked for about 40 years but stopped at age 48.      Wheezing noted on PE, no rales, edema, abdominal distension, JVD.     CXR 08/10/2018 without evidence of pulmonary vascular congestion     Weight 152-157 lbs       Current Cardiac Outpatient Medications:     amLODIPine (NORVASC) 10 MG tablet, Take 1 tablet (10 mg total) by mouth once daily., Disp: 30 tablet, Rfl: 11      amLODIPine (NORVASC) 5 MG tablet, TAKE ONE TABLET BY MOUTH ONCE DAILY, Disp: 90 tablet, Rfl: 11    aspirin (ECOTRIN) 81 MG EC tablet, Take 81 mg by mouth every evening. , Disp: , Rfl:     clopidogrel (PLAVIX) 75 mg tablet, TAKE 1 TABLET (75 MG TOTAL) BY MOUTH ONCE DAILY., Disp: 90 tablet, Rfl: 1    furosemide (LASIX) 80 MG tablet, Take three tablets by mouth twice daily, Disp: 180 tablet, Rfl: 3    lisinopril (PRINIVIL,ZESTRIL) 40 MG tablet, Take 1 tablet (40 mg total) by mouth once daily., Disp: 90 tablet, Rfl: 1    metoprolol succinate (TOPROL-XL) 100 MG 24 hr tablet, Take 1 tablet (100 mg total) by mouth once daily., Disp: 30 tablet, Rfl: 11    pravastatin (PRAVACHOL) 40 MG tablet, Take 40 mg by mouth every evening. , Disp: , Rfl:        2DE 10/2018  CONCLUSIONS     1 - Low normal to mildly depressed left ventricular systolic function (EF 50-55%).     2 - No wall motion abnormalities.     3 - Concentric hypertrophy.     4 - Normal right ventricular systolic function .     5 - The estimated PA systolic pressure is greater than 27 mmHg.     6 - Mild  "AR    Continue current medications   CV exercise encouraged  Follow up in 6 mo, sooner if any acute concerns or proceed to the ED   Follow up with Pulmonologist     Vitals:    08/16/18 1328   BP: 109/68   Pulse: 101   Weight: 68.9 kg (152 lb)   Height: 5' 6" (1.676 m)         Current Outpatient Medications:     albuterol (PROVENTIL) 2.5 mg /3 mL (0.083 %) nebulizer solution, Take 3 mLs (2.5 mg total) by nebulization every 6 (six) hours as needed for Wheezing or Shortness of Breath., Disp: 75 mL, Rfl: 0    allopurinol (ZYLOPRIM) 100 MG tablet, Take 1 tablet (100 mg total) by mouth once daily., Disp: 90 tablet, Rfl: 3    ALPRAZolam (XANAX) 0.25 MG tablet, TAKE 1 TABLET ONE TIME DAILY, Disp: 90 tablet, Rfl: 0    amLODIPine (NORVASC) 5 MG tablet, TAKE ONE TABLET BY MOUTH ONCE DAILY, Disp: 90 tablet, Rfl: 11    aspirin (ECOTRIN) 81 MG EC tablet, Take 81 mg by mouth every evening. , Disp: , Rfl:     calcitRIOL (ROCALTROL) 0.25 MCG Cap, Take 1 capsule (0.25 mcg total) by mouth once daily., Disp: 90 capsule, Rfl: 3    clopidogrel (PLAVIX) 75 mg tablet, TAKE 1 TABLET (75 MG TOTAL) BY MOUTH ONCE DAILY., Disp: 90 tablet, Rfl: 1    doxycycline (MONODOX) 100 MG capsule, Take 1 capsule (100 mg total) by mouth 2 (two) times daily. for 7 days, Disp: 14 capsule, Rfl: 0    ergocalciferol (ERGOCALCIFEROL) 50,000 unit Cap, Take 2 Units by mouth once daily., Disp: , Rfl:     fluticasone-vilanterol (BREO ELLIPTA) 100-25 mcg/dose diskus inhaler, Inhale 1 puff into the lungs once daily. Controller, Disp: 60 each, Rfl: 6    furosemide (LASIX) 80 MG tablet, Take three tablets by mouth twice daily, Disp: 180 tablet, Rfl: 3    lisinopril (PRINIVIL,ZESTRIL) 40 MG tablet, Take 1 tablet (40 mg total) by mouth once daily., Disp: 90 tablet, Rfl: 1    methylPREDNISolone (MEDROL DOSEPACK) 4 mg tablet, Take as directed, Disp: 21 tablet, Rfl: 0    metoprolol succinate (TOPROL-XL) 100 MG 24 hr tablet, Take 1 tablet (100 mg total) by mouth " once daily., Disp: 30 tablet, Rfl: 11    NEPHRO-CHAVO 0.8 mg Tab, , Disp: , Rfl:     ondansetron (ZOFRAN) 4 MG tablet, Take 1 tablet (4 mg total) by mouth 2 (two) times daily as needed., Disp: 30 tablet, Rfl: 1    pravastatin (PRAVACHOL) 40 MG tablet, Take 40 mg by mouth every evening. , Disp: , Rfl:     RENVELA 800 mg Tab, , Disp: , Rfl:     sertraline (ZOLOFT) 50 MG tablet, TAKE 1 TABLET (50 MG TOTAL) BY MOUTH ONCE DAILY., Disp: 90 tablet, Rfl: 1    tiotropium (SPIRIVA) 18 mcg inhalation capsule, Inhale 1 capsule (18 mcg total) into the lungs once daily. Controller, Disp: 30 capsule, Rfl: 0    Results for orders placed or performed in visit on 08/10/18   Culture, Respiratory with Gram Stain   Result Value Ref Range    Respiratory Culture Normal respiratory mumtaz     Gram Stain (Respiratory) <10 epithelial cells per low power field.     Gram Stain (Respiratory) Few WBC's     Gram Stain (Respiratory) Many Gram positive cocci     Gram Stain (Respiratory) Few Gram negative rods     Gram Stain (Respiratory) Rare Gram positive rods     Gram Stain (Respiratory) Rare Gram negative diplococci

## 2018-08-16 NOTE — LETTER
August 17, 2018      Genevieve Early MD  [address] [address]           [facility]  [address], [address]  [facility] [address]  Phone: [phone]          Patient: Gunner Motley   MR Number: [id number]   YOB: 1945   Date of Visit: 8/16/2018       Dear Dr. Genevieve Early:    Thank you for referring Gunner Motley to me for evaluation. Attached you will find relevant portions of my assessment and plan of care.    If you have questions, please do not hesitate to call me. I look forward to following Gunner Motley along with you.    Sincerely,    Laron Escobar, NAVEED    Enclosure  CC:  No Recipients    If you would like to receive this communication electronically, please contact [email or web address] or [phone] to request more information on [employer] Call Link access.    For providers and/or their staff who would like to refer a patient to [facility], please contact us through our one-stop-shop provider referral line, [facility], at [phone].    If you feel you have received this communication in error or would no longer like to receive these types of communications, please e-mail [email or web address]

## 2018-09-10 NOTE — TELEPHONE ENCOUNTER
Called and spoke w/ patient. I scheduled an appt for medical clearance. Patient is scheduled for 9/27/18 for catarac surgery.

## 2018-09-19 NOTE — PROGRESS NOTES
Subjective:       Patient ID: Gunner Motley is a 73 y.o. male.    Chief Complaint: Pre-op Exam    HPI Mr. Motley is a 73 year old male with chronic diastolic congestive heart failure, coronary artery disease status post STEMI and CABG, hypertension, hyperlipidemia, COPD, end-stage renal disease on hemodialysis, anxiety, and gout who presents for preop evaluation prior to cataract surgery.  Ophthalmologist is Dr. Lopez.  Surgery is scheduled for September 27th.  Patient is unsure which time they plan to do cataract removal in 1st.  States that he needs cataract removal in bilateral eyes.   Patient has shortness of breath at baseline.  He has not had any recent worsening of his shortness of breath.  He also denies any recent episodes of chest pain, cough, or increased sputum production.  He is feeling well today and has no acute complaints.    Review of Systems   Respiratory: Positive for shortness of breath. Negative for cough.    Cardiovascular: Negative for chest pain.       Objective:      Physical Exam   Constitutional: He is oriented to person, place, and time. He appears well-developed and well-nourished. No distress.   HENT:   Head: Normocephalic and atraumatic.   Eyes: Conjunctivae and EOM are normal.   Cardiovascular: Normal rate, regular rhythm, normal heart sounds and intact distal pulses. Exam reveals no gallop and no friction rub.   No murmur heard.  Pulmonary/Chest: Effort normal. No stridor. No respiratory distress. He has no wheezes. He has rales (diffuse, bilateral).   Overall pulmonary examination improved compared to prior.   Neurological: He is alert and oriented to person, place, and time.   Skin: He is not diaphoretic.   Vitals reviewed.      Assessment:       1. Preoperative examination        Plan:     1.  Preoperative examination for cataract surgery  Patient's course 3 points on the revised cardiac risk index indicating an 11% risk of major cardiovascular complication.  Given the  patient's numerous and severe comorbidities (CAD, CHF, ESRD on hemodialysis, COPD), he is a high risk surgical candidate for any procedure involving general anesthesia.  Therefore I strongly advised against use of general anesthesia and this has been placed in my note to the ophthalmologist.  Okay for procedure to move forward assuming localized or topical anesthesia only.    RTC PRN

## 2018-09-27 NOTE — TELEPHONE ENCOUNTER
----- Message from Norma Sorenson sent at 9/27/2018 11:00 AM CDT -----  Tianna with [facility] called regarding the clearance.   She did not receive all the papers.    No. [phone]    Patient is waiting to have surgery.

## 2018-09-27 NOTE — TELEPHONE ENCOUNTER
----- Message from Felipa Knight sent at 9/27/2018 10:12 AM CDT -----  Contact: Tianna raphael/ [facility] [place]/ [phone]  Rep called in to check on medical clearance for patient. Rep said they have not received it and patient is schedule for surgery at 11 am today.     Please call.    Fax# [phone]

## 2018-09-27 NOTE — TELEPHONE ENCOUNTER
----- Message from Sada Burciaga sent at 9/27/2018 10:04 AM CDT -----  Contact: Daughter [phone]  Patient would like to speak with you about getting the medical clearance faxed to [phone]. Patient is having surgery this morning and needs the clearance. Please advise

## 2018-10-10 NOTE — TELEPHONE ENCOUNTER
Spoke to Tia and stated that the pt arrived at the hospital PEA and ER doctors does not sign death certificates. Pls advise.

## 2018-10-10 NOTE — ED NOTES
Pt placed in body bag. Post mortem care complete. Family took pt's belongings including wallet. Security called to transport pt to [facility].

## 2018-10-10 NOTE — ED NOTES
Pulse check, no pulse palpated, no cardiac movement seen via ultrasound by Dr. Hernandez. Compressions started at this time. PEA at this time.

## 2018-10-10 NOTE — ED NOTES
No pulse, no cardiac movement seen via ultrasound by Dr. Hernandez. Compressions started at this time.

## 2018-10-10 NOTE — ED NOTES
Pt arrives via EMS. Ems reports that pt was at [facility] dialysis receiving dialysis when pt became unresponsive. [facility] reports pt had 1hr of dialysis when pt became unresponsive. EMS arrived at [facility] and started compressions at 0808am. 351mg of amiorodane given by EMS. 3 shocks delivered by EMS and several epis given by EMS, 1 calcium, and 1 bicarb given by EMS. Pt arrived to ED with LMA. Per EMS pt was PEA and then vfib pta.     Per dialysis, pt was receiving his normal dialysis treatment and was 1hr into the treatment when pt became unresponsive.

## 2018-10-10 NOTE — CHAPLAIN
"I was called to ER for CODE Blue, which started at [facility], pt. lost pulse in dialysis. When I met daughter she was reach out to other family via cell phone. She lost her Mother earlier this year and cannot bare losing Father.  CODE lasted a long time and daughter " just wanted to see him." Family trickled in and were very supportive to her. Provided grief care. Pt's son chose  home. Explored concerns of family, assisted with decedent care paperwork.  "

## 2018-10-10 NOTE — TELEPHONE ENCOUNTER
----- Message from Oneyda Partida sent at 10/10/2018  9:48 AM CDT -----  Contact: [phone]/coronor  Coronor's office needing a death certificate signed. Patient passed away today 10/10/18 9:10am

## 2018-10-10 NOTE — ED PROVIDER NOTES
Encounter Date: 10/10/2018    SCRIBE #1 NOTE: I, Darwin Wild, am scribing for, and in the presence of,  Dr. Hernandez. I have scribed the entire note.       History     Chief Complaint   Patient presents with    Cardiac Arrest     Gunner Motley is a 73 y.o. male who  has a past medical history of Anemia, Bacteremia associated with intravascular line, CHF (congestive heart failure), Coronary artery disease, Diverticulosis, ESRD (end stage renal disease) on dialysis, Gout, History of non-ST elevation myocardial infarction (NSTEMI), Hypertension, Recurrent nephrolithiasis, and Urinary tract infection.    Code blue called to dialysis center in the medical office building  At 0801. Patient was sitting in dialysis chair and suddenly became unresponsive and cyanotic per report.   Patient had been defibrillated 3 times by paramedics prior to my arrival.  Patient found have shockable rhythm and defibrillated several more times.  Patient given multiple rounds of epinephrine, calcium, bicarb.  He was also given amiodarone.  Patient was not hypoglycemic, BG > 100.   Resuscitation was performed according to ACLS protocol.  Paramedics transported to ED.  Upon arrival to ED stated that ROSC had been achieved.   Once in ED, patient found to be pulseless again.  ACLS protocol continued.  Patient intubated.  Patient remained in PEA.  Unable to achieve ROSC a second time.  No cardiac activity seen on bedside ECHO.  No pericardial effusion.   Patient was pronounce dead at 0910 AM.              The history is provided by the EMS personnel. The history is limited by the condition of the patient.     Review of patient's allergies indicates:  No Known Allergies  Past Medical History:   Diagnosis Date    Anemia     Bacteremia associated with intravascular line     CHF (congestive heart failure)     Coronary artery disease     Diverticulosis     ESRD (end stage renal disease) on dialysis     Gout     History of non-ST elevation  myocardial infarction (NSTEMI)     Hypertension     Recurrent nephrolithiasis     Urinary tract infection      Past Surgical History:   Procedure Laterality Date    ANGIOPLASTY  2018    Performed by [facility]ios III, [facility] at [facility] OR 2ND FLR    APPENDECTOMY      CARDIAC SURGERY      CABG x4 vessels    XVOSCQIU-YZQPCEZ-DX Left 2017    Performed [facility] at [facility] OR 2ND FLR    Fistulogram-antegrade Left 2018    Performed by [facility] at [facility] OR 2ND FLR    HERNIA REPAIR      umbilical hernia    INSERTION-CATHETER-DIALYSIS N/A 2017    [facility] Cat[facility] at [facility] OR    INSERTION-CATHETER-DIALYSIS-PERITONEAL-LAPAROSCOPIC N/A 2017    Performed by Jen Talamantes MD at [facility] OR    INSERTION-CATHETER-PERM-A-CATH Right 10/12/2017    Performed by Catr[facility]ero [facility] at [facility] OR    KIDNEY STONE SURGERY      abdominal    PERITONEAL CATHETER INSERTION Left 2017    abdomen    REMOVAL-CATHETER-DIALYSIS-PERITONEAL N/A 11/3/2017    Performed by Cat[facility]ero [facility] at [facility] OR    REMOVAL-CATHETER-HEMODIALYSIS Left 3/6/2018    Performed by Vlad Srivastava MD at [facility] OR    REMOVAL-CATHETER-HEMODIALYSIS permacath Right 11/3/2017    Performed by Catarcho sEcudero MD at [facility] OR     Family History   Problem Relation Age of Onset    Cancer Mother     Kidney disease Mother     Heart attack Father      Social History     Tobacco Use    Smoking status: Former Smoker     Packs/day: 2.00     Years: 30.00     Pack years: 60.00     Last attempt to quit: 10/12/1995     Years since quittin.0    Smokeless tobacco: Former User     Quit date: 1995   Substance Use Topics    Alcohol use: No     Comment: quit     Drug use: Not on file     Review of Systems   Unable to perform ROS: Patient unresponsive       Physical Exam     Initial Vitals [10/10/18 0843]   BP Pulse Resp Temp SpO2   (!) 131/58 (!) 57 (!) 100 -- (!) 59 %       MAP       --         Physical Exam    Nursing note and vitals reviewed.  Constitutional:   Pale, Unresponsive.   HENT:   Head: Normocephalic and atraumatic.   Right Ear: External ear normal.   Left Ear: External ear normal.   Eyes:   Pupils fixed and dilated   Cardiovascular:   Pulseless.   Pulmonary/Chest: Breath sounds normal.   LMA in place.   Abdominal: Soft. There is no tenderness. There is no rebound and no guarding.   Musculoskeletal: He exhibits no edema.   Cool extremities   Neurological: GCS eye subscore is 1. GCS verbal subscore is 1. GCS motor subscore is 1.   unresponsive   Skin: There is pallor.   Cool, dry skin         ED Course   Critical Care  Date/Time: 10/10/2018 8:01 AM  Performed by: Liberty Hernandez MD  Authorized by: Liberty Hernandez MD   Direct patient critical care time: 10 minutes  Additional history critical care time: 10 minutes  Ordering / reviewing critical care time: 10 minutes  Documentation critical care time: 10 minutes  Consulting other physicians critical care time: 10 minutes  Consult with family critical care time: 10 minutes  Other critical care time: 10 minutes  Total critical care time (exclusive of procedural time) : 70 minutes  Critical care was necessary to treat or prevent imminent or life-threatening deterioration of the following conditions: shock, respiratory failure and cardiac failure.  Critical care was time spent personally by me on the following activities: development of treatment plan with patient or surrogate, evaluation of patient's response to treatment, review of old charts, transcutaneous pacing, discussions with consultants, examination of patient, obtaining history from patient or surrogate, ordering and performing treatments and interventions and re-evaluation of patient's condition.    Intubation  Date/Time: 10/10/2018 10:17 AM  Performed by: Liberty Hernandez MD  Authorized by: Liberty Hernandez MD   Indications: respiratory  failure  Intubation method: glidescope.  Patient status: unconscious  Pretreatment medications: none  Paralytic: none  Laryngoscope size: Mac 4  Tube size: 7.5 mm  Number of attempts: 1  Cricoid pressure: no  Cords visualized: yes  Post-procedure assessment: chest rise and CO2 detector  Breath sounds: clear  Patient tolerance: Patient tolerated the procedure well with no immediate complications        Labs Reviewed - No data to display       Imaging Results    None          Medical Decision Making:   Initial Assessment:   Gunner Motley is a 73 y.o. male who presents to the ED due to cardiac arrest  Differential Diagnosis:   Hypovolemia, hypoxia, acidosis, hyperkalemia, hypoglycemia, hypothermia, intoxication, tamponade, tension pneumothorax, myocardial infarction, pulmonary embolus  ED Management:  Resuscitation per ACLS protocol performed but unable to achieve ROSC.  Time of death 0910.  Patient's family updated and all questions answered.   was available for family.                      Clinical Impression:     1. Cardiac arrest        Disposition:   Disposition:        I, Liberty Hernandez,  personally performed the services described in this documentation. All medical record entries made by the scribe were at my direction and in my presence.  I have reviewed the chart and agree that the record reflects my personal performance and is accurate and complete. Liberty Hernandez M.D. 10:22 AM10/10/2018                 Liberty Hernandez MD  10/10/18 1023       Liberty Hernandez MD  10/10/18 1023

## 2018-10-10 NOTE — ED NOTES
Spoke to Liberty from [facility]. Pt is not suitable for eye donation because of previous cataract surgery.

## 2018-10-10 NOTE — ED NOTES
Spoke to Mathieu Anderson from [facility]. Referral number is [id number]. Pt is a possible candidate for eyes only.

## 2018-10-10 NOTE — TELEPHONE ENCOUNTER
----- Message from Angelina Ruvalcaba sent at 10/10/2018  3:28 PM CDT -----  Contact: [phone]/Luis A with [facility]   Ms Gunn wants to know if Dr Crystal would be able to sigh pt's death certificate . Please advise

## 2018-10-11 DIAGNOSIS — I46.9 CARDIAC ARREST: Primary | ICD-10-CM

## 2020-01-10 NOTE — PROGRESS NOTES
Called [facility] mail order pharmacy at  and spoke with Joss to james WILLIAM for Ajovy.  He said, it may take 72 hours, before we hear anything.    [employer] renal fellow HOIV      Subjective:      denies cp/sob; says pain in LLQ has resolved     Objective:   Last 24 Hour Vital Signs:  BP  Min: 111/66  Max: 185/86  Temp  Av.7 °F (36.5 °C)  Min: 96.3 °F (35.7 °C)  Max: 98.6 °F (37 °C)  Pulse  Av.1  Min: 60  Max: 105  Resp  Av.8  Min: 18  Max: 20  SpO2  Av %  Min: 93 %  Max: 97 %  I/O last 3 completed shifts:  In: 481 [P.O.:125; I.V.:56; Other:300]  Out: 4850 [Urine:1550; Other:3300]    Physical Examination:  GEN - AAOx4, NAD  CHEST - Bibasilar end inspiratory scattered crackles  HEART - rrr, no m/r/s3/s4  ABD - soft; ttp LLQ  EXTR - warm; no edema  NEURO  - no asterixis or focal deficits      Laboratory:  Laboratory   Pertinent Findings:    Recent Labs  Lab 17  0603 17  0600 17  1305 17  0512   WBC 6.76 7.13  --  11.47   HGB 8.9* 8.9*  --  9.9*   HCT 26.9* 27.3*  --  30.3*    196  --  262   MCV 90 89  --  90   RDW 13.2 13.1  --  13.2    135* 136 137   K 3.7 3.5 3.8 3.6    105 106 102   CO2 20* 21* 21* 26   BUN 32* 38* 38* 23   GLU 87 100 96 77   PROT 5.5* 5.3*  --  6.0   ALBUMIN 2.2* 2.2*  --  2.4*   BILITOT 0.6 0.5  --  0.4   AST 26 20  --  20   ALKPHOS 125 133  --  145*   ALT 12 12  --  9*             Current Medications:     Infusions:        Scheduled:   albuterol sulfate  2.5 mg Nebulization Q6H WAKE    allopurinol  100 mg Oral Daily    amoxicillin-clavulanate 875-125mg  1 tablet Oral Q12H    aspirin  81 mg Oral Daily    carvedilol  3.125 mg Oral BID    clopidogrel  75 mg Oral Daily    docusate sodium  100 mg Oral Daily    epoetin fredi (PROCRIT) injection  10,000 Units Subcutaneous Every Mon, Wed, Fri    lisinopril  10 mg Oral Daily    paricalcitol  0.1 mcg/kg Intravenous Every Mon, Wed, Fri    pravastatin  40 mg Oral Daily    sertraline  50 mg Oral Daily    sodium chloride 0.9%  3 mL Intravenous Q8H    vitamin renal formula (B-complex-vitamin c-folic acid)  1 capsule Oral Daily         PRN:  heparin (porcine), hydrALAZINE, influenza, LORazepam, ondansetron, oxyCODONE-acetaminophen, ramelteon, sodium chloride 0.9%        Assessment/Plan     This is a 71 yo C male with ESRD/CAD s/p CABG/HTN/gout who is admitted for sepsis + GNR in blood; L-renal has been consulted for     ESRD  -etiol includes HTN/small vessel Dz; recent initiation 8/2017 PD->switched to HD 10/12/17 MWF will[facility] - access R tunneled cath; has an appt next week for AVF placement -> will need to cancel this  -permacath placed 11/6/17; Uf yesterday of 3.3L  -nepro and nephrocaps added; zemplar with HD     GNR bacteremia  -pan sensitive proteus bacteremia; BCx through CVC neg; peritoneal fluid neg  -HD cath and PD cath to be removed 11/3/17  -cont Abx per primary; repeat BCx ngtd     NSTEMI  -cards on board  -DAPT for now; will await recs for intervention VS medical therapy     Normocytic anemia  -will give epo 10K with HD  -hold IV iron for now in face of active infection      Gout  -ok to continue allopurinol 100mg daily    abd pain  -CT abd without acute abnormalities      Papito Paniagua II  [employer] renal HO IV  [phone]

## 2020-01-13 NOTE — PROGRESS NOTES
Subjective:       Patient ID: Gunner Motley is a 73 y.o. male.    Chief Complaint: Wheezing; Cough; Shortness of Breath; and Follow-up (2 weeks )    HPI Mr. Motley is a 73-year-old male with congestive heart failure, COPD, end-stage renal disease on dialysis who presents with a chief complaint of shortness of breath.  Patient states that after last visit, when he was taking the Z-Joe and steroids, he was feeling better.  He almost felt normal .  His shortness of breath was somewhat relieved and he felt like he had more energy.  However he began to feel tired again after finishing these medications.  Wednesday and Thursday nights were his worst nights.  He also felt totally exhausted after leaving dialysis recently and he had an episode where he almost blacked out from coughing .  He used to see a pulmonologist at [facility] in the past, but has not seen 1 in some time.  He does think that the Breo and albuterol do help to improve his symptoms some.    Review of Systems   Constitutional: Positive for fatigue.   Respiratory: Positive for shortness of breath.        Objective:      Physical Exam   Constitutional: He is oriented to person, place, and time. He appears well-developed and well-nourished. No distress.   HENT:   Head: Normocephalic and atraumatic.   Eyes: Conjunctivae and EOM are normal.   Cardiovascular: Normal rate, regular rhythm, normal heart sounds and intact distal pulses. Exam reveals no gallop and no friction rub.   No murmur heard.  Pulmonary/Chest: Effort normal. No stridor. No respiratory distress. He has wheezes (diffuse, bilateral inspiratory and expiratory wheezing). He has no rales.   Neurological: He is alert and oriented to person, place, and time.   Skin: Skin is warm and dry. He is not diaphoretic.   Psychiatric: He has a normal mood and affect. His behavior is normal. Judgment and thought content normal.   Vitals reviewed.      Assessment:       1. Chronic  Transitions of Care     Hospital Discharge Follow-Up      Date/Time:  2020 3:48 PM    Patient was admitted to [facility] on 2020 and discharged on 1/10/2020  For:     - Acute Hypoxemic respiratory failure  ( Principal)   * Per Discharge Summary of 1/10/2020  By Dr. Arturo Valles      The physician discharge summary was available at the time of outreach. Patient was contacted within 1 business days of discharge. Top Challenges reviewed with the provider   Patient started on home Oxygen    Patient reports she is taking Mucinex every 12 hours. Patient did not know dosage of Mucinex. Advance Care Planning:   Does patient have an Advance Directive:    Per EMR notation: on file. Was this a readmission? no       Care Transition Nurse (CTN) contacted the patient by telephone to perform post hospital discharge assessment. Verified name and  with patient as identifiers. Provided introduction to self, and explanation of the CTN role. Patient received hospital discharge instructions. CTN reviewed discharge instructions and red flags with patient who verbalized understanding. Patient given an opportunity to ask questions and does not have any further questions or concerns at this time. The patient agrees to contact the PCP office for questions related to their healthcare. CTN provided contact information for future reference.     Patients top risk factors for readmission:    - Patient s/p hospital discharge   - Patient started on home [address] orders at discharge:  - None noted        Durable Medical Equipment ordered at discharge:   Yes   [address] received: yes     Medication(s):   New Medications at Discharge:   - Tobitussin AC     Changed Medications at Discharge:   None noted     Discontinued Medications at Discharge:   None noted     Medication reconciliation was performed with patient, who obstructive pulmonary disease, unspecified COPD type    2. Physical deconditioning        Plan:     1.  COPD  Patient has diffuse inspiratory and expiratory wheezing bilaterally.  But states that overall he feels better in comparison to prior visit.  Suspect that patient possibly has wheezing at baseline.  Not treating for exacerbation at this time.  Increased dose of Breo.  Continue albuterol as needed  Referral placed to pulmonology    2.  Physical deconditioning  Discussed with patient that he has many chronic illnesses that can lead to fatigue and deconditioning.  Discussed  referral to palliative care, but patient declines at this time.  Referral placed to physical therapy for deconditioning.    Return to clinic in 3 months.       verbalizes understanding of administration of home medications. There were no barriers to obtaining medications identified at this time. Referral to Pharm D needed: no     Current Outpatient Medications   Medication Sig    ipratropium (ATROVENT HFA) 17 mcg/actuation inhaler 2 puffs every 8 hours.  budesonide-formoterol (SYMBICORT) 80-4.5 mcg/actuation HFAA Take 2 Puffs by inhalation two (2) times a day.  levothyroxine (SYNTHROID) 50 mcg tablet Take 1 Tab by mouth Daily (before breakfast).  omega-3 fatty acids-vitamin e (FISH OIL) 1,000 mg cap Take 1 capsule by mouth.  calcium-cholecalciferol, D3, (CALTRATE 600+D) tablet Take 1 tablet by mouth daily.  aspirin delayed-release 81 mg tablet Take  by mouth daily. No current facility-administered medications for this visit. There are no discontinued medications. [facility] follow up appointment(s):   Future Appointments   Date Time Provider Erick Houser   1/21/2020  1:30 PM DO HUBER Hull   9/15/2020 11:45 AM [facility] CHEYENNE STEREO BX RM 1 [facility]CMAM [facility]      Non-[facility] follow up appointment(s): None noted     Goals      Attends follow-up appointments as directed. [employer] Date:  2/11/2020 1-  Patient has a Transitoin of Care appointment with Dr. Enrique Blake with [address] [place] [address] scheduled for 1/21/2020.  Prevent complications post hospitalization. [employer] Date:  2/11/2020       Supportive resources in place to maintain patient in the community (ie. Home Health, DME equipment, refer to, medication assistant plan, etc.)      [employer] Date:  2/11/2020 1/13/2020   Patient reports she has received home oxygen  Per [employer] company providing home Oxygen is [employer]         Patient reports she has received Oxygen. She states that she has a concentrator and two tanks for use at home.      Patient states, \" I am hanging in there\"

## 2020-02-08 NOTE — BRIEF OP NOTE
[facility] Medicine Services  DISCHARGE SUMMARY        Prepared For PCP:  Jovana Nur APRN    Patient Name: Rock Rock  : 1941  MRN: [id number]      Date of Admission:   2020    Date of Discharge:  2020    Length of stay:  LOS: 2 days     Hospital Course     Presenting Problem:   Arterial occlusion [I70.90]      Active Hospital Problems    Diagnosis  POA   • **Arterial occlusion [I70.90]  Yes     Priority: High   • Acute myeloid leukemia in adult (CMS/HCC) [C92.00]  Yes     Priority: High   • Chronic kidney disease, stage III (moderate) (CMS/HCC) [N18.3]  Yes     Priority: Medium   • Dementia (CMS/HCC) [F03.90]  Yes     Priority: Medium   • Hypokalemia [E87.6]  Yes     Priority: Medium   • Hyperlipidemia [E78.5]  Yes     Priority: Low   • Hypothyroidism [E03.9]  Yes     Priority: Low   • Hypertension [I10]  Yes     Priority: Low   • Osteoarthritis of hip [M16.9]  Yes     Priority: Low   • Anemia [D64.9]  Yes      Resolved Hospital Problems   No resolved problems to display.           Hospital Course:  Mr. Rock is a 78 y.o. male with past medical history of dementia, hypertension, hypothyroidism, Hyperlipidemia and leukemia who presents to [facility] complaining of pain in left calf and foot.  ED provider from sending facility notes patient presented via EMS complaining of pain and discoloration of his foot for the past 2 days.  Patient is A&O x1 and is a generally poor historian with frequent tangential speech.  He does note his pain began on as a cramping sensation.  He states it has continued to get worse in spite of pain pill provided by his hospice provider.        At the sending facility patient found to have labs significant for potassium: 3.2, glucose: 208, creatinine: 1.96, WBCs: 179.5, hemoglobin: 11.1     2020: Patient in significant more pain and I spoke to family at the bedside.  Patient will be hospice and palliative care only.   Discharge Summary/Operative Note       Surgery Date: 3/6/2018     Surgeon(s) and Role:     * Vlad Srivastava MD - Primary    Pre-op Diagnosis:  AVF (arteriovenous fistula) [I77.0]  ESRD (end stage renal disease) on dialysis [N18.6, Z99.2]  Essential hypertension [I10]  Arteriovenous fistula stenosis, subsequent encounter [T82.872D]    Post-op Diagnosis: Post-Op Diagnosis Codes:     * AVF (arteriovenous fistula) [I77.0]     * ESRD (end stage renal disease) on dialysis [N18.6, Z99.2]     * Essential hypertension [I10]     * Arteriovenous fistula stenosis, subsequent encounter [T82.837D]    Procedure(s) (LRB):  REMOVAL-CATHETER-HEMODIALYSIS (Left)    Anesthesia: Local MAC    Procedure in Detail/Findings:  dictated    Estimated Blood Loss: 10 cc         Specimens     None        Implants: * No implants in log *           Disposition: PACU - hemodynamically stable.           Condition: Good    Attestation:  I performed the procedure.           Discharge Note    Admit Date: 3/6/2018    Attending Physician: Vlad Srivastava MD     Discharge Physician: Vlad Srivastava MD    Final Diagnosis: Post-Op Diagnosis Codes:     * AVF (arteriovenous fistula) [I77.0]     * ESRD (end stage renal disease) on dialysis [N18.6, Z99.2]     * Essential hypertension [I10]     * Arteriovenous fistula stenosis, subsequent encounter [T82.135D]    Disposition: Home or Self Care    Patient Instructions:   Current Discharge Medication List      START taking these medications    Details   hydrocodone-acetaminophen 5-325mg (NORCO) 5-325 mg per tablet Take 1 tablet by mouth every 6 (six) hours as needed for Pain.  Qty: 10 tablet, Refills: 0         CONTINUE these medications which have NOT CHANGED    Details   albuterol (PROVENTIL) 2.5 mg /3 mL (0.083 %) nebulizer solution as needed for Shortness of Breath.   Refills: 0      allopurinol (ZYLOPRIM) 100 MG tablet Take 1 tablet (100 mg total) by mouth once daily.  Qty: 90 tablet, Refills: 3       ALPRAZolam (XANAX) 0.25 MG tablet Take 1 tablet (0.25 mg total) by mouth once daily.  Qty: 90 tablet, Refills: 0    Associated Diagnoses: Anxiety      amLODIPine (NORVASC) 10 MG tablet Take 1 tablet (10 mg total) by mouth once daily.  Qty: 30 tablet, Refills: 11    Associated Diagnoses: Essential hypertension      aspirin (ECOTRIN) 81 MG EC tablet Take 81 mg by mouth every evening.       calcitRIOL (ROCALTROL) 0.25 MCG Cap Take 1 capsule (0.25 mcg total) by mouth once daily.  Qty: 90 capsule, Refills: 3      ergocalciferol (ERGOCALCIFEROL) 50,000 unit Cap Take 2 Units by mouth once daily.      lisinopril (PRINIVIL,ZESTRIL) 40 MG tablet Take 1 tablet (40 mg total) by mouth once daily.  Qty: 90 tablet, Refills: 1      metoprolol succinate (TOPROL-XL) 100 MG 24 hr tablet Take 1 tablet (100 mg total) by mouth once daily.  Qty: 30 tablet, Refills: 11      pravastatin (PRAVACHOL) 40 MG tablet Take 40 mg by mouth every evening.       sertraline (ZOLOFT) 50 MG tablet Take 1 tablet (50 mg total) by mouth once daily.  Qty: 90 tablet, Refills: 1      clopidogrel (PLAVIX) 75 mg tablet Take 1 tablet (75 mg total) by mouth once daily.  Qty: 90 tablet, Refills: 1      tiotropium (SPIRIVA) 18 mcg inhalation capsule Inhale 1 capsule (18 mcg total) into the lungs once daily. Controller  Qty: 30 capsule, Refills: 0             Discharge Procedure Orders (must include Diet, Follow-up, Activity)    Discharge Procedure Orders (must include Diet, Follow-up, Activity)  Diet general     Activity as tolerated     Keep surgical extremity elevated     Lifting restrictions     Call MD for:  temperature >100.4     Call MD for:  persistent nausea and vomiting     Call MD for:  severe uncontrolled pain     Call MD for:  redness, tenderness, or signs of infection (pain, swelling, redness, odor or green/yellow discharge around incision site)     Leave dressing on - Keep it clean, dry, and intact until clinic visit        Return office one  Family was explained about options of palliative surgery versus palliative medications.     Left lower extremity acute ischemic limb  -Patient is on heparin drip  -Has been seen by vascular and they have advised conservative management  -Patient is on IV narcotic pain medications for control and also adding fentanyl patch for the pain control.  -Patient has poor prognosis and will be seen by palliative care plan possible hospice  -Patient is hospice now        Acute myeloid leukemia  -Patient being seen by oncology  -Poor prognosis     Chronic kidney disease stage III    Hospital course.  Patient had significant acute ischemia of the left lower extremity.  Patient was seen by vascular surgery and advised palliative management.  Patient was on heparin drip and IV narcotics.  His pain was very uncontrolled and he was not improving.  Family was explained and patient was seen by palliative medicine as well as it was advised the patient should go to hospice the patient was discharged to hospice for comfort management only.    Recommendation for Outpatient Providers:             Reasons For Change In Medications and Indications for New Medications:        Day of Discharge     HPI:       Vital Signs:   Temp:  [102.6 °F (39.2 °C)-104.7 °F (40.4 °C)] 104.7 °F (40.4 °C)  Heart Rate:  [111-135] 135  Resp:  [0-37] 0  BP: (89-93)/(58-63) 89/58     Physical Exam:  Physical Exam   Cardiovascular: Normal rate and regular rhythm.   Pulmonary/Chest: Effort normal and breath sounds normal.   Abdominal: Soft. Bowel sounds are normal.       Pertinent  and/or Most Recent Results     Results from last 7 days   Lab Units 02/07/20  0242 02/07/20  0220 02/06/20  0326 02/05/20  1809 02/05/20  0645   WBC 10*3/mm3 180.40*  --  172.20*  --  169.00*   HEMOGLOBIN g/dL 9.7*  --  10.2*  --  10.8*   HEMOGLOBIN, POC g/dL  --  11.2*  --   --   --    HEMATOCRIT % 28.2*  --  30.2*  --  31.9*   HEMATOCRIT POC %  --  33*  --   --   --    PLATELETS 10*3/mm3  36*  --  36*  --  43*   SODIUM mmol/L  --   --  137  --   --    POTASSIUM mmol/L 3.7  --  4.0 4.0 3.2*   CHLORIDE mmol/L  --   --  97*  --   --    CO2 mmol/L  --   --  23.0  --   --    BUN mg/dL  --   --  23  --   --    CREATININE mg/dL  --   --  1.90*  --   --    GLUCOSE mg/dL  --   --  146*  --   --    CALCIUM mg/dL  --   --  8.7  --   --      Results from last 7 days   Lab Units 02/07/20  0825 02/07/20  0242 02/06/20  1243 02/06/20  0326 02/05/20  2108 02/05/20  1405 02/05/20  0645   BILIRUBIN mg/dL  --   --   --  0.4  --   --   --    ALK PHOS U/L  --   --   --  80  --   --   --    ALT (SGPT) U/L  --   --   --  13  --   --   --    AST (SGOT) U/L  --   --   --  34  --   --   --    PROTIME Seconds  --   --   --   --   --   --  12.7*   INR   --   --   --   --   --   --  1.26*   APTT seconds 66.4 58.3* 74.2 71.2 52.8* 50.3* 29.4*           Invalid input(s): TG, LDLCALC, LDLREALC  Results from last 7 days   Lab Units 02/07/20 0242 02/07/20  0220 02/06/20  0326   PROBNP pg/mL 14,707.0*  --   --    TROPONIN T ng/mL  --   --  0.064*   LACTATE mmol/L  --  0.5  --        Brief Urine Lab Results  (Last result in the past 365 days)      Color   Clarity   Blood   Leuk Est   Nitrite   Protein   CREAT   Urine HCG        02/07/20 0244 Yellow Turbid  Comment:  Result checked  Large (3+) Large (3+) Negative 100 mg/dL (2+)               Microbiology Results Abnormal     Procedure Component Value - Date/Time    Blood Culture - Blood, Hand, Right [[id number]]  (Abnormal) Collected:  02/07/20 1118    Lab Status:  Preliminary result Specimen:  Blood from Hand, Right Updated:  02/08/20 1726     Blood Culture Escherichia coli     Isolated from Aerobic Bottle     Gram Stain Aerobic Bottle Gram negative bacilli      --     Comment: The previously reported stain <null> is no longer being reported.       Urine Culture - Urine, Urine, Catheter In/Out [[id number]]  (Abnormal) Collected:  02/07/20 0244    Lab Status:  Preliminary result  week.  Discharge Date: 3/6/2018   Specimen:  Urine, Catheter In/Out Updated:  02/08/20 1337     Urine Culture >100,000 CFU/mL Gram Negative Bacilli    Blood Culture - Blood, Arm, Right [[id number]] Collected:  02/07/20 1117    Lab Status:  Preliminary result Specimen:  Blood from Arm, Right Updated:  02/08/20 1146     Blood Culture No growth at 24 hours    Blood Culture ID, PCR - Blood, Hand, Right [[id number]]  (Abnormal) Collected:  02/07/20 1118    Lab Status:  Final result Specimen:  Blood from Hand, Right Updated:  02/08/20 0528     BCID, PCR Escherichia coli. Identification by BCID PCR.          Xr Chest 1 View    Result Date: 2/7/2020  Impression:   1.  Cardiomegaly.  No acute cardiopulmonary disease identified.   Electronically Signed By-David Morales On:2/7/2020 7:24 AM This report was finalized on [id number] by  David Morales, .                             Test Results Pending at Discharge   Order Current Status    Blood Culture - Blood, Arm, Right Preliminary result    Blood Culture - Blood, Hand, Right Preliminary result    Urine Culture - Urine, Urine, Catheter In/Out Preliminary result            Procedures Performed           Consults:   Consults     Date and Time Order Name Status Description    2/5/2020 1326 Hematology & Oncology Inpatient Consult Completed     2/5/2020 0634 Inpatient Vascular Surgery Consult Completed             Discharge Details        Discharge Medications      ASK your doctor about these medications      Instructions Start Date   acetaminophen 500 MG tablet  Commonly known as:  TYLENOL   500 mg, Oral, Every 6 Hours PRN      acyclovir 400 MG tablet  Commonly known as:  ZOVIRAX   400 mg, Oral, 2 Times Daily      atenolol 25 MG tablet  Commonly known as:  TENORMIN   12.5 mg, Oral, Daily      atorvastatin 40 MG tablet  Commonly known as:  LIPITOR   40 mg, Oral, Daily      baclofen 10 MG tablet  Commonly known as:  LIORESAL   10 mg, Oral, 2 Times Daily PRN      clotrimazole 10 MG kaitlyn  Commonly known as:   MYCELEX   10 mg, Oral, 3 Times Daily PRN      Cyanocobalamin 500 MCG chewable tablet   500 mcg, Oral, Daily      diphenoxylate-atropine 2.5-0.025 MG per tablet  Commonly known as:  LOMOTIL   2 tablets, Oral, 4 Times Daily PRN      HYDROcodone-acetaminophen 5-325 MG per tablet  Commonly known as:  NORCO   1 tablet, Oral, Every 8 Hours PRN      levothyroxine 75 MCG tablet  Commonly known as:  SYNTHROID, LEVOTHROID   75 mcg, Oral, Daily      loperamide 2 MG capsule  Commonly known as:  IMODIUM   2 mg, Oral, 4 Times Daily PRN      mirtazapine 15 MG tablet  Commonly known as:  REMERON   15 mg, Oral, Nightly      multivitamin with minerals tablet tablet   1 tablet, Oral, Daily      ondansetron ODT 4 MG disintegrating tablet  Commonly known as:  ZOFRAN-ODT   8 mg, Oral, Every 8 Hours PRN      Potassium Chloride crystals   99 mg, Does not apply, Daily      sulfamethoxazole-trimethoprim 800-160 MG per tablet  Commonly known as:  BACTRIM DS,SEPTRA DS   160 mg, Oral, Daily      traZODone 50 MG tablet  Commonly known as:  DESYREL   25 mg, Oral, Nightly PRN      Venetoclax 100 MG tablet  Commonly known as:  VENCLEXTA   Take 1 tablet by mouth on day 1. Take 2 tablets by mouth on day 2. Take 4 tablets by mouth daily starting day 3.      vitamin D 1.25 MG (16727 UT) capsule capsule  Commonly known as:  ERGOCALCIFEROL   50,000 Units, Oral, Weekly, Unknown what day of the week patient takes medication              Allergies   Allergen Reactions   • Codeine Itching         Discharge Disposition:  Hospice/Medical Facility ([facility])    Diet:  Hospital:No active diet order        Discharge Activity:         CODE STATUS:    Code Status and Medical Interventions:   Ordered at: 02/07/20 1321     Code Status:    No CPR     Medical Interventions (Level of Support Prior to Arrest):    Comfort Measures         Follow-up Appointments  No future appointments.          Condition on Discharge:      Stable          Electronically  signed by Wilber Farias MD, 02/08/20, 6:58 PM.    Time: I spent  34  minutes on this discharge activity which included face-to-face encounter with the patient/reviewing the data in the system/coordination of the care with the nursing staff as well as consultants/documentation/entering orders.

## (undated) DEVICE — SET DECANTER MEDICHOICE

## (undated) DEVICE — NDL 22GA X1 1/2 REG BEVEL

## (undated) DEVICE — BLADE SURG CARBON STEEL SZ11

## (undated) DEVICE — SEE MEDLINE ITEM 157117

## (undated) DEVICE — KIT ANTIFOG

## (undated) DEVICE — DRESSING ANTIMICROBIAL 1 INCH

## (undated) DEVICE — CLOSURE SKIN STERI STRIP 1/4X4

## (undated) DEVICE — UNDERGLOVE BIOGEL PI SZ 6.5 LF

## (undated) DEVICE — COVERS PROBE NR-48 STERILE

## (undated) DEVICE — ELECTRODE REM PLYHSV RETURN 9

## (undated) DEVICE — SYR 30CC LUER LOCK

## (undated) DEVICE — DRESSING TEGADERM 2 3/8 X 2.75

## (undated) DEVICE — SUT 3-0 VICRYL / SH (J416)

## (undated) DEVICE — SOL NACL 0.9% INJ 500ML BG

## (undated) DEVICE — DRESSING TRANS 4X4 TEGADERM

## (undated) DEVICE — DRESSING TEGADERM IV 3.5 X 4.5

## (undated) DEVICE — CLOSURE SKIN STERI STRIP 1/2X4

## (undated) DEVICE — SEE MEDLINE ITEM 156955

## (undated) DEVICE — SUT ETHILON 3-0 PS2 18 BLK

## (undated) DEVICE — NDL HYPO REG 25G X 1 1/2

## (undated) DEVICE — GLOVE SURGICAL LATEX SZ 7

## (undated) DEVICE — PACK BASIC

## (undated) DEVICE — STOCKINET 4INX48

## (undated) DEVICE — COVER OVERHEAD SURG LT BLUE

## (undated) DEVICE — DRAPE PLASTIC U 60X72

## (undated) DEVICE — GOWN SMART IMP BREATHABLE XXLG

## (undated) DEVICE — DRAPE OPTIMA MAJOR PEDIATRIC

## (undated) DEVICE — MANIFOLD 4 PORT

## (undated) DEVICE — SYR 10CC LUER LOCK

## (undated) DEVICE — GLOVE BIOGEL ECLIPSE SZ 6.5

## (undated) DEVICE — SEE MEDLINE ITEM 153688

## (undated) DEVICE — GLOVE BIOGEL ECLIPSE SZ 7.5

## (undated) DEVICE — GAUZE SPONGE 4X4 12PLY

## (undated) DEVICE — SEE MEDLINE ITEM 157116

## (undated) DEVICE — SUT 4-0 12-18IN SILK BLACK

## (undated) DEVICE — SEE MEDLINE ITEM 152622

## (undated) DEVICE — APPLICATOR CHLORAPREP ORN 26ML

## (undated) DEVICE — Device

## (undated) DEVICE — GLOVE BIOGEL ECLIPSE SZ 6

## (undated) DEVICE — CATH ULTRAVERSE 035 5X20X75

## (undated) DEVICE — COVER PROBE 6X48

## (undated) DEVICE — SUT MCRYL PLUS 4-0 PS2 27IN

## (undated) DEVICE — GOWN SURGICAL X-LARGE

## (undated) DEVICE — DRAPE C-ARM/MOBILE XRAY 44X80

## (undated) DEVICE — DEVICE PICC SECURE SORBA VIEW

## (undated) DEVICE — SOL NS 1000CC

## (undated) DEVICE — SHEET THYROID W/ISO-BAC

## (undated) DEVICE — LOOP VESSEL BLUE MAXI

## (undated) DEVICE — SUPPORT ULNA NERVE PROTECTOR

## (undated) DEVICE — TROCAR ENDOPATH XCEL 8MM 10CM

## (undated) DEVICE — SUT VICRYL 3-0 27 SH

## (undated) DEVICE — SPONGE DERMA 8PLY 2X2

## (undated) DEVICE — SUT MONOCRYL 3-0 SH U/D

## (undated) DEVICE — CATH ULTRAVERSE 035 6X20X75

## (undated) DEVICE — DRESSING XEROFORM FOIL PK 1X8

## (undated) DEVICE — PAD PREP 50/CA

## (undated) DEVICE — SUT MONOCRYL 5-0 P-3 UND 18

## (undated) DEVICE — POSITIONER HEAD DONUT 9IN FOAM

## (undated) DEVICE — DRAPE ABDOMINAL TIBURON 14X11

## (undated) DEVICE — KIT INTRO MICRO NIT VSI 4FR

## (undated) DEVICE — DRESSING TRANS 4X4 3/4

## (undated) DEVICE — DRESSING TELFA STRL 4X3 LF

## (undated) DEVICE — STOPCOCK NDLS INJ MALE LL IV

## (undated) DEVICE — DRESSING TEGADERM HP 2 3/8X2 3

## (undated) DEVICE — CLIP APPLIER ENDO III 5MM

## (undated) DEVICE — SPONGE DERMACEL 4PLY 2X2

## (undated) DEVICE — SEE MEDLINE ITEM 152537

## (undated) DEVICE — DRESSING SORBAVIEW ULTIMATE IJ

## (undated) DEVICE — ADHESIVE SURG LIQ 2 OZ

## (undated) DEVICE — UNDERGLOVES BIOGEL PI SZ 7 LF

## (undated) DEVICE — SEE MEDLINE ITEM 157173

## (undated) DEVICE — INFLATOR ENCORE

## (undated) DEVICE — SUT ETHILON 4-0 BLK MONO

## (undated) DEVICE — SET MICROPUNCT 5FRMPIS-501

## (undated) DEVICE — SPONGE LAP 18X18 PREWASHED

## (undated) DEVICE — SUT SILK 2-0 SH 18IN BLACK

## (undated) DEVICE — TROCAR ENDOPATH XCEL 5MM 7.5CM

## (undated) DEVICE — SUT LIGACLIP SMALL XTRA

## (undated) DEVICE — SUT 2-0 12-18IN SILK

## (undated) DEVICE — ADHESIVE DERMABOND ADVANCED

## (undated) DEVICE — UNDERGLOVES BIOGEL PI SIZE 7.5

## (undated) DEVICE — TRAY FOLEY 16FR INFECTION CONT

## (undated) DEVICE — SHEATH PINNACLE 6FRX6CM

## (undated) DEVICE — SUT 3-0 12-18IN SILK

## (undated) DEVICE — SPONGE DERMACEA 4X4IN 12PLY

## (undated) DEVICE — SEE L#120831

## (undated) DEVICE — GUIDEWIRE STD .035X180CM ANG

## (undated) DEVICE — TRAY MINOR GEN SURG

## (undated) DEVICE — BOOT SUTURE AID

## (undated) DEVICE — SUT VICRYL CTD 2-0 GI 27 SH

## (undated) DEVICE — COVER LIGHT HANDLE 80/CA

## (undated) DEVICE — SYR ONLY LUER LOCK 20CC

## (undated) DEVICE — ELECTRODE BLADE INSULATED 1 IN

## (undated) DEVICE — TUBING INSUFFLATION 10

## (undated) DEVICE — DRESSING TRANS 2X2 TEGADERM

## (undated) DEVICE — COVER INSTR ELASTIC BAND 40X20